# Patient Record
Sex: MALE | Race: WHITE | NOT HISPANIC OR LATINO | Employment: OTHER | URBAN - METROPOLITAN AREA
[De-identification: names, ages, dates, MRNs, and addresses within clinical notes are randomized per-mention and may not be internally consistent; named-entity substitution may affect disease eponyms.]

---

## 2017-01-07 DIAGNOSIS — I25.10 CORONARY ARTERY DISEASE INVOLVING NATIVE CORONARY ARTERY OF NATIVE HEART WITHOUT ANGINA PECTORIS: ICD-10-CM

## 2017-01-07 DIAGNOSIS — Z95.1 S/P CABG (CORONARY ARTERY BYPASS GRAFT): ICD-10-CM

## 2017-01-07 RX ORDER — METOPROLOL SUCCINATE 50 MG/1
TABLET, EXTENDED RELEASE ORAL
Qty: 30 TABLET | Refills: 6 | Status: SHIPPED | OUTPATIENT
Start: 2017-01-07 | End: 2017-08-05 | Stop reason: SDUPTHER

## 2017-05-22 ENCOUNTER — OFFICE VISIT (OUTPATIENT)
Dept: INTERNAL MEDICINE | Facility: CLINIC | Age: 67
End: 2017-05-22
Payer: MEDICARE

## 2017-05-22 ENCOUNTER — LAB VISIT (OUTPATIENT)
Dept: LAB | Facility: HOSPITAL | Age: 67
End: 2017-05-22
Attending: INTERNAL MEDICINE
Payer: MEDICARE

## 2017-05-22 VITALS
SYSTOLIC BLOOD PRESSURE: 126 MMHG | DIASTOLIC BLOOD PRESSURE: 84 MMHG | BODY MASS INDEX: 35.53 KG/M2 | WEIGHT: 239.88 LBS | HEART RATE: 57 BPM | HEIGHT: 69 IN | TEMPERATURE: 96 F | OXYGEN SATURATION: 97 %

## 2017-05-22 DIAGNOSIS — I15.2 HYPERTENSION ASSOCIATED WITH DIABETES: ICD-10-CM

## 2017-05-22 DIAGNOSIS — Z87.891 HISTORY OF SMOKING: ICD-10-CM

## 2017-05-22 DIAGNOSIS — E11.9 TYPE 2 DIABETES MELLITUS WITHOUT COMPLICATION, WITHOUT LONG-TERM CURRENT USE OF INSULIN: ICD-10-CM

## 2017-05-22 DIAGNOSIS — E11.59 HYPERTENSION ASSOCIATED WITH DIABETES: ICD-10-CM

## 2017-05-22 DIAGNOSIS — I25.10 CORONARY ARTERY DISEASE INVOLVING NATIVE CORONARY ARTERY OF NATIVE HEART WITHOUT ANGINA PECTORIS: ICD-10-CM

## 2017-05-22 DIAGNOSIS — Z00.00 ROUTINE GENERAL MEDICAL EXAMINATION AT A HEALTH CARE FACILITY: ICD-10-CM

## 2017-05-22 DIAGNOSIS — E11.69 HYPERLIPIDEMIA ASSOCIATED WITH TYPE 2 DIABETES MELLITUS: ICD-10-CM

## 2017-05-22 DIAGNOSIS — I25.2 OLD MI (MYOCARDIAL INFARCTION): ICD-10-CM

## 2017-05-22 DIAGNOSIS — E78.5 HYPERLIPIDEMIA ASSOCIATED WITH TYPE 2 DIABETES MELLITUS: ICD-10-CM

## 2017-05-22 DIAGNOSIS — Z00.00 ROUTINE GENERAL MEDICAL EXAMINATION AT A HEALTH CARE FACILITY: Primary | ICD-10-CM

## 2017-05-22 DIAGNOSIS — E66.01 SEVERE OBESITY (BMI 35.0-39.9): ICD-10-CM

## 2017-05-22 LAB
25(OH)D3+25(OH)D2 SERPL-MCNC: 43 NG/ML
ALBUMIN SERPL BCP-MCNC: 3.9 G/DL
ALP SERPL-CCNC: 83 U/L
ALT SERPL W/O P-5'-P-CCNC: 32 U/L
ANION GAP SERPL CALC-SCNC: 11 MMOL/L
AST SERPL-CCNC: 34 U/L
BASOPHILS # BLD AUTO: 0.01 K/UL
BASOPHILS NFR BLD: 0.2 %
BILIRUB SERPL-MCNC: 1 MG/DL
BUN SERPL-MCNC: 14 MG/DL
CALCIUM SERPL-MCNC: 9.8 MG/DL
CHLORIDE SERPL-SCNC: 102 MMOL/L
CO2 SERPL-SCNC: 29 MMOL/L
COMPLEXED PSA SERPL-MCNC: 1.4 NG/ML
CREAT SERPL-MCNC: 1.3 MG/DL
DIFFERENTIAL METHOD: ABNORMAL
EOSINOPHIL # BLD AUTO: 0.1 K/UL
EOSINOPHIL NFR BLD: 0.9 %
ERYTHROCYTE [DISTWIDTH] IN BLOOD BY AUTOMATED COUNT: 12.7 %
EST. GFR  (AFRICAN AMERICAN): >60 ML/MIN/1.73 M^2
EST. GFR  (NON AFRICAN AMERICAN): 56.9 ML/MIN/1.73 M^2
GLUCOSE SERPL-MCNC: 136 MG/DL
HCT VFR BLD AUTO: 43.7 %
HGB BLD-MCNC: 15 G/DL
LYMPHOCYTES # BLD AUTO: 1.4 K/UL
LYMPHOCYTES NFR BLD: 20.8 %
MCH RBC QN AUTO: 31.4 PG
MCHC RBC AUTO-ENTMCNC: 34.3 %
MCV RBC AUTO: 91 FL
MONOCYTES # BLD AUTO: 0.6 K/UL
MONOCYTES NFR BLD: 9.5 %
NEUTROPHILS # BLD AUTO: 4.5 K/UL
NEUTROPHILS NFR BLD: 68.6 %
PLATELET # BLD AUTO: 162 K/UL
PMV BLD AUTO: 11.1 FL
POTASSIUM SERPL-SCNC: 4.8 MMOL/L
PROT SERPL-MCNC: 7.2 G/DL
RBC # BLD AUTO: 4.78 M/UL
SODIUM SERPL-SCNC: 142 MMOL/L
TSH SERPL DL<=0.005 MIU/L-ACNC: 2.72 UIU/ML
WBC # BLD AUTO: 6.6 K/UL

## 2017-05-22 PROCEDURE — 82306 VITAMIN D 25 HYDROXY: CPT

## 2017-05-22 PROCEDURE — 84443 ASSAY THYROID STIM HORMONE: CPT

## 2017-05-22 PROCEDURE — 99999 PR PBB SHADOW E&M-EST. PATIENT-LVL III: CPT | Mod: PBBFAC,,, | Performed by: INTERNAL MEDICINE

## 2017-05-22 PROCEDURE — 80053 COMPREHEN METABOLIC PANEL: CPT

## 2017-05-22 PROCEDURE — 85025 COMPLETE CBC W/AUTO DIFF WBC: CPT

## 2017-05-22 PROCEDURE — 36415 COLL VENOUS BLD VENIPUNCTURE: CPT | Mod: PO

## 2017-05-22 PROCEDURE — 86803 HEPATITIS C AB TEST: CPT

## 2017-05-22 PROCEDURE — 99499 UNLISTED E&M SERVICE: CPT | Mod: S$PBB,,, | Performed by: INTERNAL MEDICINE

## 2017-05-22 PROCEDURE — 3079F DIAST BP 80-89 MM HG: CPT | Mod: S$GLB,,, | Performed by: INTERNAL MEDICINE

## 2017-05-22 PROCEDURE — 83036 HEMOGLOBIN GLYCOSYLATED A1C: CPT

## 2017-05-22 PROCEDURE — 3074F SYST BP LT 130 MM HG: CPT | Mod: S$GLB,,, | Performed by: INTERNAL MEDICINE

## 2017-05-22 PROCEDURE — 84153 ASSAY OF PSA TOTAL: CPT

## 2017-05-22 PROCEDURE — 99387 INIT PM E/M NEW PAT 65+ YRS: CPT | Mod: S$GLB,,, | Performed by: INTERNAL MEDICINE

## 2017-05-22 NOTE — PROGRESS NOTES
"Subjective:      Patient ID: Deniz Paulino is a 66 y.o. male.    Chief Complaint: Establish Care    65 yo with Patient Active Problem List:     CAD (coronary artery disease)     Hypertension associated with diabetes     Hyperlipidemia associated with type 2 diabetes mellitus     S/P CABG (coronary artery bypass graft)     Old MI (myocardial infarction)     Diabetes mellitus     Chest pain    Here today for annual exam.  Compliant with meds without significant side effects. Feeling in his usu state of health today. Quit tob 2003. 23 pack year h/o smoking.       Review of Systems   Constitutional: Negative for chills and fever.   HENT: Negative for ear pain and sore throat.    Respiratory: Negative for cough.    Cardiovascular: Negative for chest pain.   Gastrointestinal: Negative for abdominal pain and blood in stool.   Genitourinary: Negative for dysuria and hematuria.   Neurological: Negative for seizures and syncope.     Objective:   /84   Pulse (!) 57   Temp 96.4 °F (35.8 °C) (Tympanic)   Ht 5' 9" (1.753 m)   Wt 108.8 kg (239 lb 13.8 oz)   SpO2 97%   BMI 35.42 kg/m²     Physical Exam   Constitutional: He is oriented to person, place, and time. He appears well-developed and well-nourished. No distress.   HENT:   Head: Normocephalic and atraumatic.   Mouth/Throat: Oropharynx is clear and moist.   Eyes: EOM are normal. Pupils are equal, round, and reactive to light.   Neck: Neck supple. Carotid bruit is not present. No thyromegaly present.   Cardiovascular: Normal rate and regular rhythm.    Pulses:       Dorsalis pedis pulses are 2+ on the right side, and 2+ on the left side.   Pulmonary/Chest: Breath sounds normal. He has no wheezes. He has no rales.   Abdominal: Soft. Bowel sounds are normal. There is no tenderness.   Musculoskeletal: He exhibits no edema.   Feet:   Right Foot:   Protective Sensation: 8 sites tested. 8 sites sensed.   Skin Integrity: Negative for ulcer or blister.   Left Foot: "   Protective Sensation: 8 sites tested. 8 sites sensed.   Skin Integrity: Negative for ulcer or blister.   Lymphadenopathy:     He has no cervical adenopathy.   Neurological: He is alert and oriented to person, place, and time.   Skin: Skin is warm and dry.   Psychiatric: He has a normal mood and affect. His behavior is normal.     No visits with results within 2 Week(s) from this visit.   Latest known visit with results is:   Lab Visit on 11/08/2016   Component Date Value Ref Range Status    Cholesterol 11/08/2016 140  120 - 199 mg/dL Final    Comment: The National Cholesterol Education Program (NCEP) has set the  following guidelines (reference ranges) for Cholesterol:  Optimal.....................<200 mg/dL  Borderline High.............200-239 mg/dL  High........................> or = 240 mg/dL      Triglycerides 11/08/2016 154* 30 - 150 mg/dL Final    Comment: The National Cholesterol Education Program (NCEP) has set the  following guidelines (reference values) for triglycerides:  Normal......................<150 mg/dL  Borderline High.............150-199 mg/dL  High........................200-499 mg/dL      HDL 11/08/2016 42  40 - 75 mg/dL Final    Comment: The National Cholesterol Education Program (NCEP) has set the  following guidelines (reference values) for HDL Cholesterol:  Low...............<40 mg/dL  Optimal...........>60 mg/dL      LDL Cholesterol 11/08/2016 67.2  63.0 - 159.0 mg/dL Final    Comment: The National Cholesterol Education Program (NCEP) has set the  following guidelines (reference values) for LDL Cholesterol:  Optimal.......................<130 mg/dL  Borderline High...............130-159 mg/dL  High..........................160-189 mg/dL  Very High.....................>190 mg/dL      HDL/Chol Ratio 11/08/2016 30.0  20.0 - 50.0 % Final    Total Cholesterol/HDL Ratio 11/08/2016 3.3  2.0 - 5.0 Final    Non-HDL Cholesterol 11/08/2016 98  mg/dL Final    Comment: Risk category and  Non-HDL cholesterol goals:  Coronary heart disease (CHD)or equivalent (10-year risk of CHD >20%):  Non-HDL cholesterol goal     <130 mg/dL  Two or more CHD risk factors and 10-year risk of CHD <= 20%:  Non-HDL cholesterol goal     <160 mg/dL  0 to 1 CHD risk factor:  Non-HDL cholesterol goal     <190 mg/dL      Sodium 11/08/2016 142  136 - 145 mmol/L Final    Potassium 11/08/2016 5.0  3.5 - 5.1 mmol/L Final    Chloride 11/08/2016 103  95 - 110 mmol/L Final    CO2 11/08/2016 29  23 - 29 mmol/L Final    Glucose 11/08/2016 114* 70 - 110 mg/dL Final    BUN, Bld 11/08/2016 17  8 - 23 mg/dL Final    Creatinine 11/08/2016 1.2  0.5 - 1.4 mg/dL Final    Calcium 11/08/2016 10.1  8.7 - 10.5 mg/dL Final    Total Protein 11/08/2016 7.3  6.0 - 8.4 g/dL Final    Albumin 11/08/2016 4.0  3.5 - 5.2 g/dL Final    Total Bilirubin 11/08/2016 0.8  0.1 - 1.0 mg/dL Final    Comment: For infants and newborns, interpretation of results should be based  on gestational age, weight and in agreement with clinical  observations.  Premature Infant recommended reference ranges:  Up to 24 hours.............<8.0 mg/dL  Up to 48 hours............<12.0 mg/dL  3-5 days..................<15.0 mg/dL  6-29 days.................<15.0 mg/dL      Alkaline Phosphatase 11/08/2016 94  55 - 135 U/L Final    AST 11/08/2016 32  10 - 40 U/L Final    ALT 11/08/2016 32  10 - 44 U/L Final    Anion Gap 11/08/2016 10  8 - 16 mmol/L Final    eGFR if African American 11/08/2016 >60.0  >60 mL/min/1.73 m^2 Final    eGFR if non African American 11/08/2016 >60.0  >60 mL/min/1.73 m^2 Final    Comment: Calculation used to obtain the estimated glomerular filtration  rate (eGFR) is the CKD-EPI equation. Since race is unknown   in our information system, the eGFR values for   -American and Non--American patients are given   for each creatinine result.      Hemoglobin A1C 11/09/2016 6.7* 4.5 - 6.2 % Final    Comment: According to ADA guidelines,  hemoglobin A1C <7.0% represents  optimal control in non-pregnant diabetic patients.  Different  metrics may apply to specific populations.   Standards of Medical Care in Diabetes - 2016.  For the purpose of screening for the presence of diabetes:  <5.7%     Consistent with the absence of diabetes  5.7-6.4%  Consistent with increasing risk for diabetes   (prediabetes)  >or=6.5%  Consistent with diabetes  Currently no consensus exists for use of hemoglobin A1C  for diagnosis of diabetes for children.      Estimated Avg Glucose 11/09/2016 146* 68 - 131 mg/dL Final       Assessment:     1. Routine general medical examination at a health care facility    2. Type 2 diabetes mellitus without complication, without long-term current use of insulin    3. Hypertension associated with diabetes    4. Hyperlipidemia associated with type 2 diabetes mellitus    5. Coronary artery disease involving native coronary artery of native heart without angina pectoris    6. Old MI (myocardial infarction)    7. History of smoking    8. Severe obesity (BMI 35.0-39.9)      Plan:   Routine general medical examination at a health care facility  -     Comprehensive metabolic panel; Future; Expected date: 05/22/2017  -     CBC auto differential; Future; Expected date: 05/22/2017  -     Hepatitis C antibody; Future; Expected date: 05/22/2017  -     Hemoglobin A1c; Future; Expected date: 05/22/2017  -     TSH; Future; Expected date: 05/22/2017  -     Vitamin D; Future; Expected date: 05/22/2017  -     PSA, Screening; Future; Expected date: 05/22/2017    Type 2 diabetes mellitus without complication, without long-term current use of insulin  Controlled    continue current medications    Hypertension associated with diabetes   controlled  Continue current medications    Hyperlipidemia associated with type 2 diabetes mellitus   controlled  Continue current medications    Coronary artery disease involving native coronary artery of native heart without  angina pectoris    Old MI (myocardial infarction)    History of smoking  -      Abdominal Aorta; Future; Expected date: 05/22/2017    Severe obesity (BMI 35.0-39.9)   diet and exercise discussed     Check with your pharmacy about tetanus and shingles vaccines.    Please get last colonoscopy from [facility].    Return in about 1 year (around 5/22/2018), or if symptoms worsen or fail to improve.

## 2017-05-23 LAB
ESTIMATED AVG GLUCOSE: 146 MG/DL
HBA1C MFR BLD HPLC: 6.7 %
HCV AB SERPL QL IA: NEGATIVE

## 2017-06-02 ENCOUNTER — TELEPHONE (OUTPATIENT)
Dept: RADIOLOGY | Facility: HOSPITAL | Age: 67
End: 2017-06-02

## 2017-06-05 ENCOUNTER — HOSPITAL ENCOUNTER (OUTPATIENT)
Dept: RADIOLOGY | Facility: HOSPITAL | Age: 67
Discharge: HOME | End: 2017-06-05
Attending: INTERNAL MEDICINE
Payer: MEDICARE

## 2017-06-05 DIAGNOSIS — Z87.891 HISTORY OF SMOKING: ICD-10-CM

## 2017-06-05 PROCEDURE — 76775 US EXAM ABDO BACK WALL LIM: CPT | Mod: TC,PO

## 2017-06-05 PROCEDURE — 76775 US EXAM ABDO BACK WALL LIM: CPT | Mod: 26,,, | Performed by: RADIOLOGY

## 2017-06-16 DIAGNOSIS — Z95.1 S/P CABG (CORONARY ARTERY BYPASS GRAFT): ICD-10-CM

## 2017-06-16 DIAGNOSIS — I25.10 CORONARY ARTERY DISEASE INVOLVING NATIVE CORONARY ARTERY OF NATIVE HEART WITHOUT ANGINA PECTORIS: ICD-10-CM

## 2017-06-16 DIAGNOSIS — I10 ESSENTIAL HYPERTENSION: ICD-10-CM

## 2017-06-16 RX ORDER — HYDROCHLOROTHIAZIDE 12.5 MG/1
TABLET ORAL
Qty: 30 TABLET | Refills: 6 | Status: SHIPPED | OUTPATIENT
Start: 2017-06-16 | End: 2018-05-04 | Stop reason: SDUPTHER

## 2017-07-22 DIAGNOSIS — Z95.1 S/P CABG (CORONARY ARTERY BYPASS GRAFT): ICD-10-CM

## 2017-07-22 DIAGNOSIS — I25.10 CORONARY ARTERY DISEASE INVOLVING NATIVE CORONARY ARTERY OF NATIVE HEART WITHOUT ANGINA PECTORIS: ICD-10-CM

## 2017-07-22 DIAGNOSIS — I10 ESSENTIAL HYPERTENSION: ICD-10-CM

## 2017-07-22 RX ORDER — LISINOPRIL 20 MG/1
TABLET ORAL
Qty: 30 TABLET | Refills: 6 | Status: SHIPPED | OUTPATIENT
Start: 2017-07-22 | End: 2018-04-18 | Stop reason: SDUPTHER

## 2017-07-29 DIAGNOSIS — I10 ESSENTIAL HYPERTENSION: ICD-10-CM

## 2017-07-29 DIAGNOSIS — Z95.1 S/P CABG (CORONARY ARTERY BYPASS GRAFT): ICD-10-CM

## 2017-07-29 DIAGNOSIS — I25.10 CORONARY ARTERY DISEASE INVOLVING NATIVE CORONARY ARTERY OF NATIVE HEART WITHOUT ANGINA PECTORIS: ICD-10-CM

## 2017-07-29 RX ORDER — NISOLDIPINE 20 MG/1
TABLET, FILM COATED, EXTENDED RELEASE ORAL
Qty: 30 TABLET | Refills: 6 | Status: SHIPPED | OUTPATIENT
Start: 2017-07-29 | End: 2018-02-24 | Stop reason: SDUPTHER

## 2017-07-31 DIAGNOSIS — Z95.1 S/P CABG (CORONARY ARTERY BYPASS GRAFT): ICD-10-CM

## 2017-07-31 DIAGNOSIS — I25.10 CORONARY ARTERY DISEASE INVOLVING NATIVE CORONARY ARTERY OF NATIVE HEART WITHOUT ANGINA PECTORIS: ICD-10-CM

## 2017-07-31 DIAGNOSIS — E78.5 HYPERLIPIDEMIA, UNSPECIFIED HYPERLIPIDEMIA TYPE: ICD-10-CM

## 2017-08-01 DIAGNOSIS — Z95.1 S/P CABG (CORONARY ARTERY BYPASS GRAFT): ICD-10-CM

## 2017-08-01 DIAGNOSIS — I25.10 CORONARY ARTERY DISEASE INVOLVING NATIVE CORONARY ARTERY OF NATIVE HEART WITHOUT ANGINA PECTORIS: Primary | ICD-10-CM

## 2017-08-01 DIAGNOSIS — E78.5 HYPERLIPIDEMIA, UNSPECIFIED HYPERLIPIDEMIA TYPE: ICD-10-CM

## 2017-08-01 RX ORDER — ATORVASTATIN CALCIUM 40 MG/1
40 TABLET, FILM COATED ORAL NIGHTLY
Qty: 30 TABLET | Refills: 5 | Status: SHIPPED | OUTPATIENT
Start: 2017-08-01 | End: 2017-08-02 | Stop reason: SDUPTHER

## 2017-08-02 DIAGNOSIS — Z95.1 S/P CABG (CORONARY ARTERY BYPASS GRAFT): ICD-10-CM

## 2017-08-02 DIAGNOSIS — E78.5 HYPERLIPIDEMIA, UNSPECIFIED HYPERLIPIDEMIA TYPE: ICD-10-CM

## 2017-08-02 DIAGNOSIS — I25.10 CORONARY ARTERY DISEASE INVOLVING NATIVE CORONARY ARTERY OF NATIVE HEART WITHOUT ANGINA PECTORIS: ICD-10-CM

## 2017-08-02 DIAGNOSIS — E78.2 MIXED HYPERLIPIDEMIA: Primary | ICD-10-CM

## 2017-08-02 RX ORDER — ATORVASTATIN CALCIUM 40 MG/1
40 TABLET, FILM COATED ORAL NIGHTLY
Qty: 30 TABLET | Refills: 6 | Status: SHIPPED | OUTPATIENT
Start: 2017-08-02 | End: 2018-05-21 | Stop reason: DRUGHIGH

## 2017-08-02 NOTE — TELEPHONE ENCOUNTER
----- Message from Sadia Solano sent at 8/2/2017  2:24 PM CDT -----  Contact: [phone]  Would like to consult with nurse regarding renewal. Please call back at [phone].      Thanks,  Sadia Solano

## 2017-08-05 DIAGNOSIS — Z95.1 S/P CABG (CORONARY ARTERY BYPASS GRAFT): ICD-10-CM

## 2017-08-05 DIAGNOSIS — I25.10 CORONARY ARTERY DISEASE INVOLVING NATIVE CORONARY ARTERY OF NATIVE HEART WITHOUT ANGINA PECTORIS: ICD-10-CM

## 2017-08-06 RX ORDER — METOPROLOL SUCCINATE 50 MG/1
TABLET, EXTENDED RELEASE ORAL
Qty: 30 TABLET | Refills: 6 | Status: SHIPPED | OUTPATIENT
Start: 2017-08-06 | End: 2018-05-29 | Stop reason: SDUPTHER

## 2017-08-18 RX ORDER — METFORMIN HYDROCHLORIDE 500 MG/1
500 TABLET ORAL 2 TIMES DAILY WITH MEALS
Qty: 60 TABLET | Refills: 10 | Status: SHIPPED | OUTPATIENT
Start: 2017-08-18 | End: 2018-05-21 | Stop reason: SDUPTHER

## 2017-08-18 NOTE — TELEPHONE ENCOUNTER
Spoke with pt, notified him that refill request would be sent to Dr. Kraft for approval. Pt verbalized understanding.

## 2017-08-18 NOTE — TELEPHONE ENCOUNTER
----- Message from Blaire Jamess sent at 8/18/2017 11:38 AM CDT -----  Contact: Patient  1. What is the name of the medication you are requesting? Metformin  2. What is the dose? 500mg  3. How do you take the medication? Orally, topically, etc? orally  4. How often do you take this medication? Twice daily  5. Do you need a 30 day or 90 day supply? 30  6. How many refills are you requesting? 12  7. What is your preferred pharmacy and location of the pharmacy? [facility] on [address]  8. Who can we contact with further questions? Patient at [phone]    Patient will run out tomorrow of medication

## 2017-10-30 ENCOUNTER — LAB VISIT (OUTPATIENT)
Dept: LAB | Facility: HOSPITAL | Age: 67
End: 2017-10-30
Attending: INTERNAL MEDICINE
Payer: MEDICARE

## 2017-10-30 DIAGNOSIS — E78.2 MIXED HYPERLIPIDEMIA: ICD-10-CM

## 2017-10-30 LAB
ALBUMIN SERPL BCP-MCNC: 3.5 G/DL
ALP SERPL-CCNC: 91 U/L
ALT SERPL W/O P-5'-P-CCNC: 34 U/L
ANION GAP SERPL CALC-SCNC: 11 MMOL/L
AST SERPL-CCNC: 30 U/L
BILIRUB SERPL-MCNC: 0.9 MG/DL
BUN SERPL-MCNC: 16 MG/DL
CALCIUM SERPL-MCNC: 9.3 MG/DL
CHLORIDE SERPL-SCNC: 103 MMOL/L
CHOLEST SERPL-MCNC: 147 MG/DL
CHOLEST/HDLC SERPL: 3.3 {RATIO}
CO2 SERPL-SCNC: 25 MMOL/L
CREAT SERPL-MCNC: 1 MG/DL
EST. GFR  (AFRICAN AMERICAN): >60 ML/MIN/1.73 M^2
EST. GFR  (NON AFRICAN AMERICAN): >60 ML/MIN/1.73 M^2
GLUCOSE SERPL-MCNC: 122 MG/DL
HDLC SERPL-MCNC: 45 MG/DL
HDLC SERPL: 30.6 %
LDLC SERPL CALC-MCNC: 81.2 MG/DL
NONHDLC SERPL-MCNC: 102 MG/DL
POTASSIUM SERPL-SCNC: 3.9 MMOL/L
PROT SERPL-MCNC: 6.8 G/DL
SODIUM SERPL-SCNC: 139 MMOL/L
TRIGL SERPL-MCNC: 104 MG/DL

## 2017-10-30 PROCEDURE — 80053 COMPREHEN METABOLIC PANEL: CPT

## 2017-10-30 PROCEDURE — 36415 COLL VENOUS BLD VENIPUNCTURE: CPT

## 2017-10-30 PROCEDURE — 80061 LIPID PANEL: CPT

## 2017-11-01 DIAGNOSIS — I25.10 CORONARY ARTERY DISEASE, ANGINA PRESENCE UNSPECIFIED, UNSPECIFIED VESSEL OR LESION TYPE, UNSPECIFIED WHETHER NATIVE OR TRANSPLANTED HEART: Primary | ICD-10-CM

## 2017-11-02 ENCOUNTER — OFFICE VISIT (OUTPATIENT)
Dept: CARDIOLOGY | Facility: CLINIC | Age: 67
End: 2017-11-02
Payer: MEDICARE

## 2017-11-02 ENCOUNTER — CLINICAL SUPPORT (OUTPATIENT)
Dept: CARDIOLOGY | Facility: CLINIC | Age: 67
End: 2017-11-02
Payer: MEDICARE

## 2017-11-02 VITALS
HEART RATE: 60 BPM | BODY MASS INDEX: 35.53 KG/M2 | SYSTOLIC BLOOD PRESSURE: 128 MMHG | WEIGHT: 239.88 LBS | DIASTOLIC BLOOD PRESSURE: 78 MMHG | HEIGHT: 69 IN

## 2017-11-02 DIAGNOSIS — I15.2 HYPERTENSION ASSOCIATED WITH DIABETES: ICD-10-CM

## 2017-11-02 DIAGNOSIS — E11.59 HYPERTENSION ASSOCIATED WITH DIABETES: ICD-10-CM

## 2017-11-02 DIAGNOSIS — I25.2 OLD MI (MYOCARDIAL INFARCTION): ICD-10-CM

## 2017-11-02 DIAGNOSIS — Z95.1 S/P CABG (CORONARY ARTERY BYPASS GRAFT): ICD-10-CM

## 2017-11-02 DIAGNOSIS — E11.9 TYPE 2 DIABETES MELLITUS WITHOUT COMPLICATION, WITHOUT LONG-TERM CURRENT USE OF INSULIN: ICD-10-CM

## 2017-11-02 DIAGNOSIS — I25.10 CORONARY ARTERY DISEASE INVOLVING NATIVE CORONARY ARTERY OF NATIVE HEART WITHOUT ANGINA PECTORIS: Primary | ICD-10-CM

## 2017-11-02 DIAGNOSIS — E11.69 HYPERLIPIDEMIA ASSOCIATED WITH TYPE 2 DIABETES MELLITUS: ICD-10-CM

## 2017-11-02 DIAGNOSIS — E78.5 HYPERLIPIDEMIA ASSOCIATED WITH TYPE 2 DIABETES MELLITUS: ICD-10-CM

## 2017-11-02 DIAGNOSIS — I25.10 CORONARY ARTERY DISEASE, ANGINA PRESENCE UNSPECIFIED, UNSPECIFIED VESSEL OR LESION TYPE, UNSPECIFIED WHETHER NATIVE OR TRANSPLANTED HEART: ICD-10-CM

## 2017-11-02 PROCEDURE — 99499 UNLISTED E&M SERVICE: CPT | Mod: S$PBB,,, | Performed by: INTERNAL MEDICINE

## 2017-11-02 PROCEDURE — 99214 OFFICE O/P EST MOD 30 MIN: CPT | Mod: S$GLB,,, | Performed by: INTERNAL MEDICINE

## 2017-11-02 PROCEDURE — 99999 PR PBB SHADOW E&M-EST. PATIENT-LVL III: CPT | Mod: PBBFAC,,, | Performed by: INTERNAL MEDICINE

## 2017-11-02 PROCEDURE — 93000 ELECTROCARDIOGRAM COMPLETE: CPT | Mod: S$GLB,,, | Performed by: INTERNAL MEDICINE

## 2017-11-02 RX ORDER — LANOLIN ALCOHOL/MO/W.PET/CERES
1000 CREAM (GRAM) TOPICAL DAILY
COMMUNITY

## 2017-11-02 NOTE — PROGRESS NOTES
Subjective:   Patient ID:  Deniz Paulino is a 66 y.o. male who presents for follow up of Coronary Artery Disease (Patient presents to clinic today for annual Cardiology follow up. ); Hyperlipidemia; and Hypertension      HPI  A 67 yo male with h/o cad s/p cabg diabetes htn hlp is here for f/u cad. He has done well clinically ahs no angina no chest pain no shortness of breath he has not been compliant with diet no exercise due to muscle spasm from statins. He dropped atorvastatin to 40 mg po and has less spasm howver he has nocturnal muscle spasm relieved with some  Muscle relaxant . However he is not optimized on his ldl. His diabetes  May be imprioved a little. Has not lost weight . Takes meds regularily.has minimal snoring works nite he gets daytime sleepiness. No other stigmata for sleep apnea.  Past Medical History:   Diagnosis Date    Acute coronary syndrome     Coronary artery disease     Hyperlipidemia     Hypertension        Past Surgical History:   Procedure Laterality Date    CARDIAC CATHETERIZATION      CORONARY ARTERY BYPASS GRAFT         Social History   Substance Use Topics    Smoking status: Former Smoker     Packs/day: 1.00     Years: 30.00     Types: Cigarettes     Quit date: 2/1/2003    Smokeless tobacco: Never Used    Alcohol use Yes      Comment: socially       Family History   Problem Relation Age of Onset    Diabetes Mother     Heart murmur Mother     Cancer Mother      Breast    Stroke Mother     Alcohol abuse Father     Heart attack Father 63    Cancer Father      Esophageal       Current Outpatient Prescriptions   Medication Sig    aspirin (ECOTRIN) 325 MG EC tablet Take 325 mg by mouth once daily.    atorvastatin (LIPITOR) 40 MG tablet Take 1 tablet (40 mg total) by mouth every evening.    cyanocobalamin (VITAMIN B-12) 1000 MCG tablet Take 100 mcg by mouth once daily.    esomeprazole (NEXIUM) 20 MG capsule Take 20 mg by mouth before breakfast.    hydrochlorothiazide  (HYDRODIURIL) 12.5 MG Tab TAKE ONE TABLET BY MOUTH ONCE DAILY    lisinopril (PRINIVIL,ZESTRIL) 20 MG tablet TAKE ONE TABLET BY MOUTH ONCE DAILY    LORATADINE (ALAVERT ORAL) Take 1 tablet by mouth as needed.    metformin (GLUCOPHAGE) 500 MG tablet Take 1 tablet (500 mg total) by mouth 2 (two) times daily with meals.    metoprolol succinate (TOPROL-XL) 50 MG 24 hr tablet TAKE ONE TABLET BY MOUTH ONCE DAILY    nisoldipine (SULAR) 20 MG Tb24 TAKE ONE TABLET BY MOUTH ONCE DAILY    vitamin D 1000 units Tab Take 2,000 Units by mouth once daily.     No current facility-administered medications for this visit.      Current Outpatient Prescriptions on File Prior to Visit   Medication Sig    aspirin (ECOTRIN) 325 MG EC tablet Take 325 mg by mouth once daily.    atorvastatin (LIPITOR) 40 MG tablet Take 1 tablet (40 mg total) by mouth every evening.    esomeprazole (NEXIUM) 20 MG capsule Take 20 mg by mouth before breakfast.    hydrochlorothiazide (HYDRODIURIL) 12.5 MG Tab TAKE ONE TABLET BY MOUTH ONCE DAILY    lisinopril (PRINIVIL,ZESTRIL) 20 MG tablet TAKE ONE TABLET BY MOUTH ONCE DAILY    LORATADINE (ALAVERT ORAL) Take 1 tablet by mouth as needed.    metformin (GLUCOPHAGE) 500 MG tablet Take 1 tablet (500 mg total) by mouth 2 (two) times daily with meals.    metoprolol succinate (TOPROL-XL) 50 MG 24 hr tablet TAKE ONE TABLET BY MOUTH ONCE DAILY    nisoldipine (SULAR) 20 MG Tb24 TAKE ONE TABLET BY MOUTH ONCE DAILY    vitamin D 1000 units Tab Take 2,000 Units by mouth once daily.     No current facility-administered medications on file prior to visit.      Review of patient's allergies indicates:  No Known Allergies  Review of Systems   Constitution: Positive for malaise/fatigue. Negative for weakness.   Eyes: Negative for blurred vision.   Cardiovascular: Negative for chest pain, claudication, cyanosis, dyspnea on exertion, irregular heartbeat, leg swelling, near-syncope, orthopnea, palpitations and paroxysmal  nocturnal dyspnea.   Respiratory: Positive for snoring (minimal). Negative for cough, hemoptysis and shortness of breath.    Hematologic/Lymphatic: Negative for bleeding problem. Does not bruise/bleed easily.   Skin: Negative for dry skin and itching.   Musculoskeletal: Positive for muscle cramps and myalgias. Negative for falls and muscle weakness.   Gastrointestinal: Negative for abdominal pain, diarrhea, heartburn, hematemesis, hematochezia and melena.   Genitourinary: Negative for flank pain and hematuria.   Neurological: Positive for excessive daytime sleepiness. Negative for dizziness, focal weakness, headaches, light-headedness, numbness, paresthesias and seizures.   Psychiatric/Behavioral: Negative for altered mental status and memory loss. The patient is not nervous/anxious.    Allergic/Immunologic: Negative for hives.       Objective:   Physical Exam   Constitutional: He is oriented to person, place, and time. He appears well-developed and well-nourished. No distress.   HENT:   Head: Normocephalic and atraumatic.   Eyes: EOM are normal. Pupils are equal, round, and reactive to light. Right eye exhibits no discharge. Left eye exhibits no discharge.   Neck: Neck supple. No JVD present. No thyromegaly present.   Cardiovascular: Normal rate, regular rhythm, normal heart sounds and intact distal pulses.  Exam reveals no gallop and no friction rub.    No murmur heard.  Pulmonary/Chest: Effort normal and breath sounds normal. No respiratory distress. He has no wheezes. He has no rales. He exhibits no tenderness.   Chest wall incision well healed.   Abdominal: Soft. Bowel sounds are normal. He exhibits no distension. There is no tenderness.   Obese  abdomen   Musculoskeletal: Normal range of motion. He exhibits no edema.   Neurological: He is alert and oriented to person, place, and time. No cranial nerve deficit.   Skin: Skin is warm and dry. No rash noted. He is not diaphoretic. No erythema.   Psychiatric: He  "has a normal mood and affect. His behavior is normal.   Nursing note and vitals reviewed.    Vitals:    11/02/17 1004   BP: 128/78   Pulse: 60   Weight: 108.8 kg (239 lb 13.8 oz)   Height: 5' 9" (1.753 m)     Lab Results   Component Value Date    CHOL 147 10/30/2017    CHOL 140 11/08/2016    CHOL 129 10/27/2014     Lab Results   Component Value Date    HDL 45 10/30/2017    HDL 42 11/08/2016    HDL 52 10/27/2014     Lab Results   Component Value Date    LDLCALC 81.2 10/30/2017    LDLCALC 67.2 11/08/2016    LDLCALC 59.4 (L) 10/27/2014     Lab Results   Component Value Date    TRIG 104 10/30/2017    TRIG 154 (H) 11/08/2016    TRIG 88 10/27/2014     Lab Results   Component Value Date    CHOLHDL 30.6 10/30/2017    CHOLHDL 30.0 11/08/2016    CHOLHDL 40.3 10/27/2014       Chemistry        Component Value Date/Time     10/30/2017 0846    K 3.9 10/30/2017 0846     10/30/2017 0846    CO2 25 10/30/2017 0846    BUN 16 10/30/2017 0846    CREATININE 1.0 10/30/2017 0846     (H) 10/30/2017 0846        Component Value Date/Time    CALCIUM 9.3 10/30/2017 0846    ALKPHOS 91 10/30/2017 0846    AST 30 10/30/2017 0846    ALT 34 10/30/2017 0846    BILITOT 0.9 10/30/2017 0846    ESTGFRAFRICA >60.0 10/30/2017 0846    EGFRNONAA >60.0 10/30/2017 0846        Lab Results   Component Value Date    HGBA1C 6.7 (H) 05/22/2017       Lab Results   Component Value Date    TSH 2.721 05/22/2017     Lab Results   Component Value Date    INR 1.0 09/19/2009     Lab Results   Component Value Date    WBC 6.60 05/22/2017    HGB 15.0 05/22/2017    HCT 43.7 05/22/2017    MCV 91 05/22/2017     05/22/2017     BMP  Sodium   Date Value Ref Range Status   10/30/2017 139 136 - 145 mmol/L Final     Potassium   Date Value Ref Range Status   10/30/2017 3.9 3.5 - 5.1 mmol/L Final     Chloride   Date Value Ref Range Status   10/30/2017 103 95 - 110 mmol/L Final     CO2   Date Value Ref Range Status   10/30/2017 25 23 - 29 mmol/L Final     BUN, " Bld   Date Value Ref Range Status   10/30/2017 16 8 - 23 mg/dL Final     Creatinine   Date Value Ref Range Status   10/30/2017 1.0 0.5 - 1.4 mg/dL Final     Calcium   Date Value Ref Range Status   10/30/2017 9.3 8.7 - 10.5 mg/dL Final     Anion Gap   Date Value Ref Range Status   10/30/2017 11 8 - 16 mmol/L Final     eGFR if    Date Value Ref Range Status   10/30/2017 >60.0 >60 mL/min/1.73 m^2 Final     eGFR if non    Date Value Ref Range Status   10/30/2017 >60.0 >60 mL/min/1.73 m^2 Final     Comment:     Calculation used to obtain the estimated glomerular filtration  rate (eGFR) is the CKD-EPI equation. Since race is unknown   in our information system, the eGFR values for   -American and Non--American patients are given   for each creatinine result.       Estimated Creatinine Clearance: 88.3 mL/min (based on SCr of 1 mg/dL).    Assessment:     1. Coronary artery disease involving native coronary artery of native heart without angina pectoris    2. Hypertension associated with diabetes    3. Hyperlipidemia associated with type 2 diabetes mellitus    4. S/P CABG (coronary artery bypass graft)    5. Old MI (myocardial infarction)    6. Type 2 diabetes mellitus without complication, without long-term current use of insulin      Stable clinically. Has no angina no shortness of breath. He needs aggressive risk factor modification diet and exercise as well as weight loss and diabetes control. He has cramps as side effects from statins I will keep atorvastatin at this dose and aggressively work on risk factors stay hydrated and use coenzyme q10 300 mg po daily.has no muscle weakness.  Plan:   Coenzyme q10 300 mg po daily  Continue current therapy  Cardiac low salt diet.  Risk factor modification and excercise program.  F/u in 6 months with lipid cmp a1c

## 2017-12-22 ENCOUNTER — OFFICE VISIT (OUTPATIENT)
Dept: INTERNAL MEDICINE | Facility: CLINIC | Age: 67
End: 2017-12-22
Payer: MEDICARE

## 2017-12-22 VITALS
OXYGEN SATURATION: 98 % | WEIGHT: 231.5 LBS | SYSTOLIC BLOOD PRESSURE: 124 MMHG | HEART RATE: 58 BPM | BODY MASS INDEX: 34.29 KG/M2 | DIASTOLIC BLOOD PRESSURE: 76 MMHG | TEMPERATURE: 97 F | HEIGHT: 69 IN

## 2017-12-22 DIAGNOSIS — E11.59 HYPERTENSION ASSOCIATED WITH DIABETES: ICD-10-CM

## 2017-12-22 DIAGNOSIS — I15.2 HYPERTENSION ASSOCIATED WITH DIABETES: ICD-10-CM

## 2017-12-22 DIAGNOSIS — R10.9 FLANK PAIN: ICD-10-CM

## 2017-12-22 DIAGNOSIS — M62.838 MUSCLE SPASM: ICD-10-CM

## 2017-12-22 DIAGNOSIS — M79.18 MUSCULOSKELETAL PAIN: Primary | ICD-10-CM

## 2017-12-22 PROCEDURE — 99499 UNLISTED E&M SERVICE: CPT | Mod: S$PBB,,, | Performed by: INTERNAL MEDICINE

## 2017-12-22 PROCEDURE — 90662 IIV NO PRSV INCREASED AG IM: CPT | Mod: S$GLB,,, | Performed by: INTERNAL MEDICINE

## 2017-12-22 PROCEDURE — 99999 PR PBB SHADOW E&M-EST. PATIENT-LVL IV: CPT | Mod: PBBFAC,,, | Performed by: INTERNAL MEDICINE

## 2017-12-22 PROCEDURE — 99213 OFFICE O/P EST LOW 20 MIN: CPT | Mod: S$GLB,,, | Performed by: INTERNAL MEDICINE

## 2017-12-22 PROCEDURE — G0008 ADMIN INFLUENZA VIRUS VAC: HCPCS | Mod: S$GLB,,, | Performed by: INTERNAL MEDICINE

## 2017-12-22 RX ORDER — EPINEPHRINE 0.22MG
100 AEROSOL WITH ADAPTER (ML) INHALATION DAILY
COMMUNITY
End: 2018-05-21 | Stop reason: DRUGHIGH

## 2017-12-22 RX ORDER — METHOCARBAMOL 500 MG/1
500 TABLET, FILM COATED ORAL 4 TIMES DAILY
Qty: 40 TABLET | Refills: 0 | Status: SHIPPED | OUTPATIENT
Start: 2017-12-22 | End: 2018-01-02 | Stop reason: SDUPTHER

## 2017-12-22 RX ORDER — NAPROXEN 500 MG/1
500 TABLET ORAL 2 TIMES DAILY WITH MEALS
Qty: 30 TABLET | Refills: 0 | Status: SHIPPED | OUTPATIENT
Start: 2017-12-22 | End: 2018-08-02

## 2017-12-22 NOTE — PROGRESS NOTES
Subjective:      Patient ID: Deniz Paulino is a 67 y.o. male.    Chief Complaint: Flank Pain (left side)    66 yo with Patient Active Problem List:     CAD (coronary artery disease)     Hypertension associated with diabetes     Hyperlipidemia associated with type 2 diabetes mellitus     S/P CABG (coronary artery bypass graft)     Old MI (myocardial infarction)     Diabetes mellitus     Chest pain    Here today planning of left flank pain that is similar to previous muscle spasm pain improved with methocarbamol.      Flank Pain   This is a new problem. The current episode started in the past 7 days. The problem occurs intermittently. The problem is unchanged. Pain location: left flank. The quality of the pain is described as aching and cramping. Radiates to: left hip and left abd. The pain is moderate. The pain is the same all the time. The symptoms are aggravated by bending and twisting. Stiffness is present all day. Pertinent negatives include no abdominal pain, bladder incontinence, bowel incontinence, chest pain, headaches, leg pain, numbness, paresthesias, pelvic pain, tingling or weakness. Risk factors include obesity. Treatments tried: otc meds. The treatment provided mild relief.     Review of Systems   Constitutional: Negative for activity change and unexpected weight change.   HENT: Negative for hearing loss, rhinorrhea and trouble swallowing.    Eyes: Negative for discharge and visual disturbance.   Respiratory: Negative for chest tightness and wheezing.    Cardiovascular: Negative for chest pain and palpitations.   Gastrointestinal: Negative for abdominal pain, blood in stool, bowel incontinence, constipation, diarrhea and vomiting.   Endocrine: Negative for polydipsia and polyuria.   Genitourinary: Positive for flank pain. Negative for bladder incontinence, difficulty urinating, hematuria, pelvic pain and urgency.   Musculoskeletal: Negative for arthralgias, joint swelling and neck pain.  "  Neurological: Negative for tingling, weakness, numbness, headaches and paresthesias.   Psychiatric/Behavioral: Negative for confusion and dysphoric mood.     Objective:   /76 (BP Location: Right arm, Patient Position: Sitting)   Pulse (!) 58   Temp 96.8 °F (36 °C) (Tympanic)   Ht 5' 9" (1.753 m)   Wt 105 kg (231 lb 7.7 oz)   SpO2 98%   BMI 34.18 kg/m²     Physical Exam   Constitutional: He is oriented to person, place, and time. He appears well-developed and well-nourished. No distress.   Eyes: Conjunctivae and EOM are normal.   Cardiovascular: Normal rate and regular rhythm.    Pulmonary/Chest: Breath sounds normal. He has no wheezes. He has no rales.   Abdominal: Soft. Bowel sounds are normal. There is no tenderness.   Musculoskeletal: He exhibits no edema.        Lumbar back: He exhibits normal range of motion, no tenderness, no bony tenderness and no swelling.   Neurological: He is alert and oriented to person, place, and time. He displays normal reflexes. He exhibits normal muscle tone. Coordination normal.   Skin: Skin is warm and dry.   Psychiatric: He has a normal mood and affect. His behavior is normal.       Assessment:     1. Musculoskeletal pain    2. Flank pain    3. Muscle spasm    4. Hypertension associated with diabetes      Plan:   Musculoskeletal pain  -     naproxen (NAPROSYN) 500 MG tablet; Take 1 tablet (500 mg total) by mouth 2 (two) times daily with meals. For pain and inflammation  Dispense: 30 tablet; Refill: 0    Flank pain  -     methocarbamol (ROBAXIN) 500 MG Tab; Take 1 tablet (500 mg total) by mouth 4 (four) times daily. Prn sleep  Dispense: 40 tablet; Refill: 0    Muscle spasm  -     methocarbamol (ROBAXIN) 500 MG Tab; Take 1 tablet (500 mg total) by mouth 4 (four) times daily. Prn sleep  Dispense: 40 tablet; Refill: 0  -     naproxen (NAPROSYN) 500 MG tablet; Take 1 tablet (500 mg total) by mouth 2 (two) times daily with meals. For pain and inflammation  Dispense: 30 " tablet; Refill: 0    Hypertension associated with diabetes    Other orders  -     Influenza - High Dose (65+) (PF) (IM)    Tylenol 500-1000 mg every 8 hours prn pain.  Pt declined ua      Lab Frequency Next Occurrence   Comprehensive metabolic panel Once 05/04/2018   Lipid panel Once 05/04/2018   Hemoglobin A1c Once 05/01/2018       Problem List Items Addressed This Visit        Cardiac/Vascular    Hypertension associated with diabetes      Other Visit Diagnoses     Musculoskeletal pain    -  Primary    Relevant Medications    naproxen (NAPROSYN) 500 MG tablet    Flank pain        Relevant Medications    methocarbamol (ROBAXIN) 500 MG Tab    Muscle spasm        Relevant Medications    methocarbamol (ROBAXIN) 500 MG Tab    naproxen (NAPROSYN) 500 MG tablet          Return if symptoms worsen or fail to improve.

## 2018-01-02 DIAGNOSIS — M62.838 MUSCLE SPASM: ICD-10-CM

## 2018-01-02 DIAGNOSIS — R10.9 FLANK PAIN: ICD-10-CM

## 2018-01-02 RX ORDER — METHOCARBAMOL 500 MG/1
500 TABLET, FILM COATED ORAL 4 TIMES DAILY
Qty: 40 TABLET | Refills: 0 | Status: SHIPPED | OUTPATIENT
Start: 2018-01-02 | End: 2018-08-21 | Stop reason: SDUPTHER

## 2018-01-02 NOTE — TELEPHONE ENCOUNTER
----- Message from Nicolle Chin sent at 1/2/2018 11:57 AM CST -----  Contact: pt  1. What is the name of the medication you are requesting? Methocharbamol   2. What is the dose? 500mg  3. How do you take the medication? Orally, topically, etc? Orally  4. How often do you take this medication? 4 times a day   5. Do you need a 30 day or 90 day supply? .  6. How many refills are you requesting? .  7. What is your preferred pharmacy and location of the pharmacy? [facility] on [address] close to [facility]  8. Who can we contact with further questions? [phone]    Pt had a half a bottle but left it out of town.

## 2018-02-24 DIAGNOSIS — Z95.1 S/P CABG (CORONARY ARTERY BYPASS GRAFT): ICD-10-CM

## 2018-02-24 DIAGNOSIS — I10 ESSENTIAL HYPERTENSION: ICD-10-CM

## 2018-02-24 DIAGNOSIS — I25.10 CORONARY ARTERY DISEASE INVOLVING NATIVE CORONARY ARTERY OF NATIVE HEART WITHOUT ANGINA PECTORIS: ICD-10-CM

## 2018-02-24 RX ORDER — NISOLDIPINE 20 MG/1
TABLET, FILM COATED, EXTENDED RELEASE ORAL
Qty: 30 TABLET | Refills: 6 | Status: SHIPPED | OUTPATIENT
Start: 2018-02-24 | End: 2018-03-26 | Stop reason: RX

## 2018-03-23 NOTE — TELEPHONE ENCOUNTER
----- Message from Susana Jackson sent at 3/23/2018  1:22 PM CDT -----  Contact: [facility]  Please call pharmacy @ [phone] regarding surlar 20mg, states the medication is on back order.

## 2018-03-26 RX ORDER — AMLODIPINE BESYLATE 5 MG/1
5 TABLET ORAL DAILY
Qty: 30 TABLET | Refills: 11 | Status: SHIPPED | OUTPATIENT
Start: 2018-03-26 | End: 2019-03-16 | Stop reason: SDUPTHER

## 2018-04-18 DIAGNOSIS — I10 ESSENTIAL HYPERTENSION: ICD-10-CM

## 2018-04-18 DIAGNOSIS — I25.10 CORONARY ARTERY DISEASE INVOLVING NATIVE CORONARY ARTERY OF NATIVE HEART WITHOUT ANGINA PECTORIS: ICD-10-CM

## 2018-04-18 DIAGNOSIS — Z95.1 S/P CABG (CORONARY ARTERY BYPASS GRAFT): ICD-10-CM

## 2018-04-18 RX ORDER — LISINOPRIL 20 MG/1
20 TABLET ORAL DAILY
Qty: 30 TABLET | Refills: 6 | Status: SHIPPED | OUTPATIENT
Start: 2018-04-18 | End: 2018-05-29 | Stop reason: SDUPTHER

## 2018-05-04 DIAGNOSIS — I10 ESSENTIAL HYPERTENSION: ICD-10-CM

## 2018-05-04 DIAGNOSIS — Z95.1 S/P CABG (CORONARY ARTERY BYPASS GRAFT): ICD-10-CM

## 2018-05-04 DIAGNOSIS — I25.10 CORONARY ARTERY DISEASE INVOLVING NATIVE CORONARY ARTERY OF NATIVE HEART WITHOUT ANGINA PECTORIS: ICD-10-CM

## 2018-05-04 RX ORDER — HYDROCHLOROTHIAZIDE 12.5 MG/1
TABLET ORAL
Qty: 30 TABLET | Refills: 6 | Status: SHIPPED | OUTPATIENT
Start: 2018-05-04 | End: 2018-12-07 | Stop reason: SDUPTHER

## 2018-05-18 ENCOUNTER — LAB VISIT (OUTPATIENT)
Dept: LAB | Facility: HOSPITAL | Age: 68
End: 2018-05-18
Attending: INTERNAL MEDICINE
Payer: MEDICARE

## 2018-05-18 DIAGNOSIS — E78.5 HYPERLIPIDEMIA ASSOCIATED WITH TYPE 2 DIABETES MELLITUS: ICD-10-CM

## 2018-05-18 DIAGNOSIS — E11.9 TYPE 2 DIABETES MELLITUS WITHOUT COMPLICATION, WITHOUT LONG-TERM CURRENT USE OF INSULIN: ICD-10-CM

## 2018-05-18 DIAGNOSIS — E11.69 HYPERLIPIDEMIA ASSOCIATED WITH TYPE 2 DIABETES MELLITUS: ICD-10-CM

## 2018-05-18 LAB
ALBUMIN SERPL BCP-MCNC: 3.8 G/DL
ALP SERPL-CCNC: 88 U/L
ALT SERPL W/O P-5'-P-CCNC: 36 U/L
ANION GAP SERPL CALC-SCNC: 9 MMOL/L
AST SERPL-CCNC: 37 U/L
BILIRUB SERPL-MCNC: 1 MG/DL
BUN SERPL-MCNC: 14 MG/DL
CALCIUM SERPL-MCNC: 9.6 MG/DL
CHLORIDE SERPL-SCNC: 100 MMOL/L
CHOLEST SERPL-MCNC: 144 MG/DL
CHOLEST/HDLC SERPL: 2.7 {RATIO}
CO2 SERPL-SCNC: 28 MMOL/L
CREAT SERPL-MCNC: 1 MG/DL
EST. GFR  (AFRICAN AMERICAN): >60 ML/MIN/1.73 M^2
EST. GFR  (NON AFRICAN AMERICAN): >60 ML/MIN/1.73 M^2
ESTIMATED AVG GLUCOSE: 128 MG/DL
GLUCOSE SERPL-MCNC: 116 MG/DL
HBA1C MFR BLD HPLC: 6.1 %
HDLC SERPL-MCNC: 54 MG/DL
HDLC SERPL: 37.5 %
LDLC SERPL CALC-MCNC: 66.2 MG/DL
NONHDLC SERPL-MCNC: 90 MG/DL
POTASSIUM SERPL-SCNC: 3.7 MMOL/L
PROT SERPL-MCNC: 6.6 G/DL
SODIUM SERPL-SCNC: 137 MMOL/L
TRIGL SERPL-MCNC: 119 MG/DL

## 2018-05-18 PROCEDURE — 80061 LIPID PANEL: CPT

## 2018-05-18 PROCEDURE — 83036 HEMOGLOBIN GLYCOSYLATED A1C: CPT

## 2018-05-18 PROCEDURE — 36415 COLL VENOUS BLD VENIPUNCTURE: CPT

## 2018-05-18 PROCEDURE — 80053 COMPREHEN METABOLIC PANEL: CPT

## 2018-05-21 ENCOUNTER — PATIENT MESSAGE (OUTPATIENT)
Dept: INTERNAL MEDICINE | Facility: CLINIC | Age: 68
End: 2018-05-21

## 2018-05-21 ENCOUNTER — HOSPITAL ENCOUNTER (OUTPATIENT)
Dept: RADIOLOGY | Facility: HOSPITAL | Age: 68
Discharge: HOME | End: 2018-05-21
Attending: INTERNAL MEDICINE
Payer: MEDICARE

## 2018-05-21 ENCOUNTER — OFFICE VISIT (OUTPATIENT)
Dept: INTERNAL MEDICINE | Facility: CLINIC | Age: 68
End: 2018-05-21
Payer: MEDICARE

## 2018-05-21 ENCOUNTER — PATIENT MESSAGE (OUTPATIENT)
Dept: RHEUMATOLOGY | Facility: CLINIC | Age: 68
End: 2018-05-21

## 2018-05-21 ENCOUNTER — PATIENT OUTREACH (OUTPATIENT)
Dept: ADMINISTRATIVE | Facility: HOSPITAL | Age: 68
End: 2018-05-21

## 2018-05-21 VITALS
HEIGHT: 69 IN | WEIGHT: 238.75 LBS | BODY MASS INDEX: 35.36 KG/M2 | TEMPERATURE: 97 F | SYSTOLIC BLOOD PRESSURE: 138 MMHG | HEART RATE: 59 BPM | OXYGEN SATURATION: 99 % | DIASTOLIC BLOOD PRESSURE: 84 MMHG

## 2018-05-21 DIAGNOSIS — Z12.5 ENCOUNTER FOR SCREENING FOR MALIGNANT NEOPLASM OF PROSTATE: ICD-10-CM

## 2018-05-21 DIAGNOSIS — E11.9 TYPE 2 DIABETES MELLITUS WITHOUT COMPLICATION, WITHOUT LONG-TERM CURRENT USE OF INSULIN: ICD-10-CM

## 2018-05-21 DIAGNOSIS — Z95.1 S/P CABG (CORONARY ARTERY BYPASS GRAFT): ICD-10-CM

## 2018-05-21 DIAGNOSIS — Z00.00 ROUTINE GENERAL MEDICAL EXAMINATION AT A HEALTH CARE FACILITY: Primary | ICD-10-CM

## 2018-05-21 DIAGNOSIS — E78.5 HYPERLIPIDEMIA ASSOCIATED WITH TYPE 2 DIABETES MELLITUS: ICD-10-CM

## 2018-05-21 DIAGNOSIS — E11.69 HYPERLIPIDEMIA ASSOCIATED WITH TYPE 2 DIABETES MELLITUS: ICD-10-CM

## 2018-05-21 DIAGNOSIS — M79.671 PAIN OF RIGHT HEEL: ICD-10-CM

## 2018-05-21 DIAGNOSIS — I15.2 HYPERTENSION ASSOCIATED WITH DIABETES: ICD-10-CM

## 2018-05-21 DIAGNOSIS — E11.59 HYPERTENSION ASSOCIATED WITH DIABETES: ICD-10-CM

## 2018-05-21 DIAGNOSIS — Z23 NEED FOR PNEUMOCOCCAL VACCINATION: ICD-10-CM

## 2018-05-21 DIAGNOSIS — I25.10 CORONARY ARTERY DISEASE INVOLVING NATIVE CORONARY ARTERY OF NATIVE HEART WITHOUT ANGINA PECTORIS: ICD-10-CM

## 2018-05-21 DIAGNOSIS — I25.2 OLD MI (MYOCARDIAL INFARCTION): ICD-10-CM

## 2018-05-21 PROCEDURE — 99213 OFFICE O/P EST LOW 20 MIN: CPT | Mod: S$GLB,,, | Performed by: INTERNAL MEDICINE

## 2018-05-21 PROCEDURE — 73650 X-RAY EXAM OF HEEL: CPT | Mod: 26,RT,, | Performed by: RADIOLOGY

## 2018-05-21 PROCEDURE — 3044F HG A1C LEVEL LT 7.0%: CPT | Mod: CPTII,S$GLB,, | Performed by: INTERNAL MEDICINE

## 2018-05-21 PROCEDURE — 3075F SYST BP GE 130 - 139MM HG: CPT | Mod: CPTII,S$GLB,, | Performed by: INTERNAL MEDICINE

## 2018-05-21 PROCEDURE — 99999 PR PBB SHADOW E&M-EST. PATIENT-LVL III: CPT | Mod: PBBFAC,,, | Performed by: INTERNAL MEDICINE

## 2018-05-21 PROCEDURE — 3079F DIAST BP 80-89 MM HG: CPT | Mod: CPTII,S$GLB,, | Performed by: INTERNAL MEDICINE

## 2018-05-21 PROCEDURE — 73650 X-RAY EXAM OF HEEL: CPT | Mod: TC,FY,PO,RT

## 2018-05-21 RX ORDER — CALCIPOTRIENE 50 UG/G
CREAM TOPICAL 2 TIMES DAILY PRN
COMMUNITY
Start: 2018-04-25 | End: 2020-11-17

## 2018-05-21 RX ORDER — ATORVASTATIN CALCIUM 80 MG/1
40 TABLET, FILM COATED ORAL DAILY
COMMUNITY
Start: 2018-04-21 | End: 2018-05-29

## 2018-05-21 RX ORDER — FLUOROURACIL 50 MG/G
CREAM TOPICAL 2 TIMES DAILY PRN
COMMUNITY
Start: 2018-04-25 | End: 2020-11-17

## 2018-05-21 RX ORDER — METFORMIN HYDROCHLORIDE 500 MG/1
500 TABLET ORAL 2 TIMES DAILY WITH MEALS
Qty: 60 TABLET | Refills: 12 | Status: SHIPPED | OUTPATIENT
Start: 2018-05-21 | End: 2018-11-29 | Stop reason: SDUPTHER

## 2018-05-21 RX ORDER — ACETAMINOPHEN 160 MG/5ML
200 SUSPENSION, ORAL (FINAL DOSE FORM) ORAL 2 TIMES DAILY
COMMUNITY

## 2018-05-21 RX ORDER — METHYLPREDNISOLONE 4 MG/1
TABLET ORAL
Qty: 1 PACKAGE | Refills: 0 | Status: SHIPPED | OUTPATIENT
Start: 2018-05-21 | End: 2018-08-02

## 2018-05-21 NOTE — PROGRESS NOTES
Subjective:      Patient ID: Deniz Paulino is a 67 y.o. male.    Chief Complaint: Annual Exam    66 yo with Patient Active Problem List:     CAD (coronary artery disease)     Hypertension associated with diabetes     Hyperlipidemia associated with type 2 diabetes mellitus     S/P CABG (coronary artery bypass graft)     Old MI (myocardial infarction)     Diabetes mellitus    Past Medical History:  No date: Acute coronary syndrome  No date: Coronary artery disease  No date: Hyperlipidemia  No date: Hypertension    Here today for annual prev exam and c/o foot pain.  Compliant with meds without significant side effects. Quit smoking over 15 years ago. Feeling overall well today. Denies puncture wound. Energy and appetite are good.     Foot Injury    Incident onset: 2 months ago. The incident occurred at home. Injury mechanism: running in flip flops on rocks. The pain is present in the right heel. The quality of the pain is described as aching. The pain is moderate. The pain has been fluctuating since onset. Pertinent negatives include no inability to bear weight, loss of motion, muscle weakness, numbness or tingling. He reports no foreign bodies present. The symptoms are aggravated by weight bearing (walking). He has tried NSAIDs for the symptoms. The treatment provided mild relief.     Review of Systems   Constitutional: Negative for activity change, chills and fever.   HENT: Negative for ear pain and sore throat.    Eyes: Negative for discharge.   Respiratory: Negative for cough and wheezing.    Cardiovascular: Negative for chest pain and palpitations.   Gastrointestinal: Negative for abdominal pain, blood in stool, constipation, diarrhea and vomiting.   Genitourinary: Negative for difficulty urinating, dysuria and hematuria.   Skin: Negative for rash and wound.   Neurological: Negative for tingling, seizures, syncope, numbness and headaches.   Psychiatric/Behavioral: Negative for dysphoric mood.     Past Surgical  "History:   Procedure Laterality Date    CARDIAC CATHETERIZATION      CORONARY ARTERY BYPASS GRAFT       Social History     Social History    Marital status:      Spouse name: N/A    Number of children: N/A    Years of education: N/A     Occupational History    Not on file.     Social History Main Topics    Smoking status: Former Smoker     Packs/day: 1.00     Years: 30.00     Types: Cigarettes     Quit date: 2/1/2003    Smokeless tobacco: Never Used    Alcohol use Yes      Comment: socially    Drug use: Unknown    Sexual activity: Not on file     Other Topics Concern    Not on file     Social History Narrative    No narrative on file     family history includes Alcohol abuse in his father; Cancer in his father and mother; Diabetes in his mother; Heart attack (age of onset: 63) in his father; Heart murmur in his mother; Stroke in his mother.  Objective:   /84 (BP Location: Right arm, Patient Position: Sitting)   Pulse (!) 59   Temp 96.7 °F (35.9 °C) (Tympanic)   Ht 5' 9" (1.753 m)   Wt 108.3 kg (238 lb 12.1 oz)   SpO2 99%   BMI 35.26 kg/m²     Physical Exam   Constitutional: He is oriented to person, place, and time. He appears well-developed and well-nourished. No distress.   HENT:   Head: Normocephalic and atraumatic.   Mouth/Throat: Oropharynx is clear and moist.   Eyes: EOM are normal. Pupils are equal, round, and reactive to light.   Neck: Neck supple. Carotid bruit is not present. No thyromegaly present.   Cardiovascular: Normal rate and regular rhythm.    Pulses:       Dorsalis pedis pulses are 2+ on the right side, and 2+ on the left side.   Pulmonary/Chest: Breath sounds normal. He has no wheezes. He has no rales.   Abdominal: Soft. Bowel sounds are normal. There is no tenderness.   Musculoskeletal: He exhibits no edema.        Right foot: There is normal range of motion, no tenderness, no bony tenderness, no swelling, normal capillary refill and no laceration.        " Feet:    Feet:   Right Foot:   Protective Sensation: 8 sites tested. 8 sites sensed.   Skin Integrity: Negative for ulcer, blister, skin breakdown, erythema or warmth.   Left Foot:   Protective Sensation: 8 sites tested. 8 sites sensed.   Skin Integrity: Negative for ulcer, blister, skin breakdown, erythema or callus.   Lymphadenopathy:     He has no cervical adenopathy.   Neurological: He is alert and oriented to person, place, and time.   Skin: Skin is warm and dry.   Psychiatric: He has a normal mood and affect. His behavior is normal.     Lab Visit on 05/18/2018   Component Date Value Ref Range Status    Sodium 05/18/2018 137  136 - 145 mmol/L Final    Potassium 05/18/2018 3.7  3.5 - 5.1 mmol/L Final    Chloride 05/18/2018 100  95 - 110 mmol/L Final    CO2 05/18/2018 28  23 - 29 mmol/L Final    Glucose 05/18/2018 116* 70 - 110 mg/dL Final    BUN, Bld 05/18/2018 14  8 - 23 mg/dL Final    Creatinine 05/18/2018 1.0  0.5 - 1.4 mg/dL Final    Calcium 05/18/2018 9.6  8.7 - 10.5 mg/dL Final    Total Protein 05/18/2018 6.6  6.0 - 8.4 g/dL Final    Albumin 05/18/2018 3.8  3.5 - 5.2 g/dL Final    Total Bilirubin 05/18/2018 1.0  0.1 - 1.0 mg/dL Final    Comment: For infants and newborns, interpretation of results should be based  on gestational age, weight and in agreement with clinical  observations.  Premature Infant recommended reference ranges:  Up to 24 hours.............<8.0 mg/dL  Up to 48 hours............<12.0 mg/dL  3-5 days..................<15.0 mg/dL  6-29 days.................<15.0 mg/dL      Alkaline Phosphatase 05/18/2018 88  55 - 135 U/L Final    AST 05/18/2018 37  10 - 40 U/L Final    ALT 05/18/2018 36  10 - 44 U/L Final    Anion Gap 05/18/2018 9  8 - 16 mmol/L Final    eGFR if African American 05/18/2018 >60.0  >60 mL/min/1.73 m^2 Final    eGFR if non African American 05/18/2018 >60.0  >60 mL/min/1.73 m^2 Final    Comment: Calculation used to obtain the estimated glomerular  filtration  rate (eGFR) is the CKD-EPI equation.       Cholesterol 05/18/2018 144  120 - 199 mg/dL Final    Comment: The National Cholesterol Education Program (NCEP) has set the  following guidelines (reference ranges) for Cholesterol:  Optimal.....................<200 mg/dL  Borderline High.............200-239 mg/dL  High........................> or = 240 mg/dL      Triglycerides 05/18/2018 119  30 - 150 mg/dL Final    Comment: The National Cholesterol Education Program (NCEP) has set the  following guidelines (reference values) for triglycerides:  Normal......................<150 mg/dL  Borderline High.............150-199 mg/dL  High........................200-499 mg/dL      HDL 05/18/2018 54  40 - 75 mg/dL Final    Comment: The National Cholesterol Education Program (NCEP) has set the  following guidelines (reference values) for HDL Cholesterol:  Low...............<40 mg/dL  Optimal...........>60 mg/dL      LDL Cholesterol 05/18/2018 66.2  63.0 - 159.0 mg/dL Final    Comment: The National Cholesterol Education Program (NCEP) has set the  following guidelines (reference values) for LDL Cholesterol:  Optimal.......................<130 mg/dL  Borderline High...............130-159 mg/dL  High..........................160-189 mg/dL  Very High.....................>190 mg/dL      HDL/Chol Ratio 05/18/2018 37.5  20.0 - 50.0 % Final    Total Cholesterol/HDL Ratio 05/18/2018 2.7  2.0 - 5.0 Final    Non-HDL Cholesterol 05/18/2018 90  mg/dL Final    Comment: Risk category and Non-HDL cholesterol goals:  Coronary heart disease (CHD)or equivalent (10-year risk of CHD >20%):  Non-HDL cholesterol goal     <130 mg/dL  Two or more CHD risk factors and 10-year risk of CHD <= 20%:  Non-HDL cholesterol goal     <160 mg/dL  0 to 1 CHD risk factor:  Non-HDL cholesterol goal     <190 mg/dL      Hemoglobin A1C 05/18/2018 6.1* 4.0 - 5.6 % Final    Comment: According to ADA guidelines, hemoglobin A1c <7.0% represents  optimal  control in non-pregnant diabetic patients. Different  metrics may apply to specific patient populations.   Standards of Medical Care in Diabetes-2016.  For the purpose of screening for the presence of diabetes:  <5.7%     Consistent with the absence of diabetes  5.7-6.4%  Consistent with increasing risk for diabetes   (prediabetes)  >or=6.5%  Consistent with diabetes  Currently, no consensus exists for use of hemoglobin A1c  for diagnosis of diabetes for children.  This Hemoglobin A1c assay has significant interference with fetal   hemoglobin   (HbF). The results are invalid for patients with abnormal amounts of   HbF,   including those with known Hereditary Persistence   of Fetal Hemoglobin. Heterozygous hemoglobin variants (HbAS, HbAC,   HbAD, HbAE, HbA2) do not significantly interfere with this assay;   however, presence of multiple variants in a sample may impact the %   interference.      Estimated Avg Glucose 05/18/2018 128  68 - 131 mg/dL Final       Assessment:     1. Routine general medical examination at a health care facility    2. Coronary artery disease involving native coronary artery of native heart without angina pectoris    3. Hyperlipidemia associated with type 2 diabetes mellitus    4. Hypertension associated with diabetes    5. Old MI (myocardial infarction)    6. S/P CABG (coronary artery bypass graft)    7. Type 2 diabetes mellitus without complication, without long-term current use of insulin    8. Need for pneumococcal vaccination    9. Encounter for screening for malignant neoplasm of prostate    10. Pain of right heel      Plan:   Routine general medical examination at a health care facility  Heart healthy diet and reg exercise   reviewed    Coronary artery disease involving native coronary artery of native heart without angina pectoris  Stable  Seeing cards today.     Hyperlipidemia associated with type 2 diabetes mellitus  controlled  -     Lipid panel; Future; Expected date:  05/21/2019  -     TSH; Future; Expected date: 05/21/2019    Hypertension associated with diabetes  controlled  -     CBC auto differential; Future; Expected date: 05/21/2019    Old MI (myocardial infarction)    S/P CABG (coronary artery bypass graft)    Type 2 diabetes mellitus without complication, without long-term current use of insulin  Controlled  refilled  -     metFORMIN (GLUCOPHAGE) 500 MG tablet; Take 1 tablet (500 mg total) by mouth 2 (two) times daily with meals.  Dispense: 60 tablet; Refill: 12  -     Comprehensive metabolic panel; Future; Expected date: 05/21/2019  -     Hemoglobin A1c; Future; Expected date: 11/17/2018  -     Hemoglobin A1c; Future; Expected date: 05/21/2019    Encounter for screening for malignant neoplasm of prostate  -     PSA, Screening; Future; Expected date: 05/21/2019    Pain of right heel  Spur vs plantar fasciitis  Podiatry if no resolutions  Ice, offloading, stretching, inserts as discussed and pt info selected  -     X-Ray Calcaneus 2 View Right; Future; Expected date: 05/21/2018  -     methylPREDNISolone (MEDROL, CONCHA,) 4 mg tablet; use as directed  Dispense: 1 Package; Refill: 0      Does Eye exams with [facility].         Problem List Items Addressed This Visit        Cardiac/Vascular    CAD (coronary artery disease)    Hypertension associated with diabetes    Relevant Orders    CBC auto differential    Hyperlipidemia associated with type 2 diabetes mellitus    Relevant Orders    Lipid panel    TSH    S/P CABG (coronary artery bypass graft)    Old MI (myocardial infarction)       Endocrine    Diabetes mellitus    Relevant Medications    metFORMIN (GLUCOPHAGE) 500 MG tablet    Other Relevant Orders    Comprehensive metabolic panel    Hemoglobin A1c    Hemoglobin A1c      Other Visit Diagnoses     Routine general medical examination at a health care facility    -  Primary    Need for pneumococcal vaccination        Encounter for screening for malignant  neoplasm of prostate        Relevant Orders    PSA, Screening    Pain of right heel        Relevant Medications    methylPREDNISolone (MEDROL, CONCHA,) 4 mg tablet    Other Relevant Orders    X-Ray Calcaneus 2 View Right (Completed)          Follow-up in about 6 months (around 11/21/2018), or if symptoms worsen or fail to improve.

## 2018-05-21 NOTE — LETTER
May 21, 2018      We are seeing Deniz Paulino, 1950, at [facility]. Hiro Kraft MD is their primary care physician. To help with our [place] maintenance records could you please send the following:     MOST RECENT EYE EXAM     Please fax to [facility] at [phone], attention Carol Vann.     Thank-you in advance for your assistance. If you have any questions or concerns please contact me at [phone].     Carol RAMIREZ LPN  Care Coordination Department  [facility]  [phone]

## 2018-05-21 NOTE — PATIENT INSTRUCTIONS
Plantar Fasciitis  Plantar fasciitis is a painful swelling of the plantar fascia. The plantar fascia is a thick, fibrous layer of tissue. It covers the bones on the bottom of your foot. And it supports the foot bones in an arched position.  This can happen gradually or suddenly. It usually affects one foot at a time. Heel pain can be sharp, like a knife sticking into the bottom of your foot. You may feel pain after exercising, long-distance jogging, stair climbing, long periods of standing, or after standing up.  Risk factors include: non-active lifestyle, arthritis, diabetes, obesity or recent weight gain, flat foot, high arch. Wearing high heels, loose shoes, or shoes with poor arch support for long periods of time adds to the risk. This problem is commonly found in runners and dancers. It also found in people who stand on hard surfaces for long periods of time.  Foot pain from this condition is usually worse in the morning. But it improves with walking. By the end of the day there may be a dull aching. Treatment requires short-term rest and controlling swelling. It may take up to 9 months before all symptoms go away. Rarely, a steroid injection into the foot, or surgery, may be needed.  Home care  · If you are overweight, lose weight to help healing.  · Choose supportive shoes with good arch support and shock absorbency. Replace athletic shoes when they become worn out. Dont walk or run barefoot.  · Premade or custom-fitted shoe inserts may be helpful. Inserts made of silicone seem to be the most effective. Custom-made inserts can be provided by a podiatrist or foot specialist, physical therapist, or orthopedist.  · Premade or custom-made night splints keep the heel stretched out while you sleep. They may prevent morning pain.  · Avoid activities that stress the feet: jogging, prolonged standing or walking, contact sports, etc.  · First thing in the morning and before sports, stretch the bottom of your feet.  Gently flex your ankle so the toes move toward your knee.  · Icing may help control heel pain. Apply an ice pack to the heel for 10-20 minutes as a preventive. Or ice your heel after a severe flare-up of symptoms. You may repeat this every 1-2 hours as needed.  · You may use over-the-counter pain medicine to control pain, unless another medicine was prescribed. Anti-inflammatory pain medicines, such as ibuprofen or naproxen, may work better than acetaminophen. If you have chronic liver or kidney disease or ever had a stomach ulcer or GI bleeding, talk with your healthcare provider before using these medicines.  Follow-up care  Follow up with your healthcare provider, physical therapist, or podiatrist or foot specialist as advised.  Call for an appointment if pain worsens or there is no relief after a few weeks of home treatment. Shoe inserts, a night splint, or a special boot may be required.  If X-rays were taken, you will be told of any new findings that may affect your care.  When to seek medical advice  Call your healthcare provider right away if any of these occur:  · Foot swelling  · Redness with increasing pain  Date Last Reviewed: 11/21/2015  © 1728-3377 [employer]. [address] [place] [address]. All rights reserved. This information is not intended as a substitute for professional medical care. Always follow your healthcare professional's instructions.        Treating Plantar Fasciitis  First, your healthcare provider tries to determine the cause of your problem in order to suggest ways to relieve pain. If your pain is due to poor foot mechanics, custom-made shoe inserts (orthoses) may help.    Reduce symptoms  · To relieve mild symptoms, try aspirin, ibuprofen, or other medicines as directed. Rubbing ice on the affected area may also help.  · To reduce severe pain and swelling, your healthcare provider may prescribe pills or injections or a walking cast in some instances.  Physical therapy, such as ultrasound or a daily stretching program, may also be recommended. Surgery is rarely required.  · To reduce symptoms caused by poor foot mechanics, your foot may be taped. This supports the arch and temporarily controls movement. Night splints may also help by stretching the fascia.  Control movement  If taping helps, your healthcare provider may prescribe orthoses. Built from plaster casts of your feet, these inserts control the way your foot moves. As a result, your symptoms should go away.  Reduce overuse  Every time your foot strikes the ground, the plantar fascia is stretched. You can reduce the strain on the plantar fascia and the possibility of overuse by following these suggestions:  · Lose any excess weight.  · Avoid running on hard or uneven ground.  · Use orthoses at all times in your shoes and house slippers.  If surgery is needed  Your healthcare provider may consider surgery if other types of treatment don't control your pain. During surgery, the plantar fascia is partially cut to release tension. As you heal, fibrous tissue fills the space between the heel bone and the plantar fascia.   Date Last Reviewed: 10/14/2015  © 5800-8659 [employer]. [address] [place] [address]. All rights reserved. This information is not intended as a substitute for professional medical care. Always follow your healthcare professional's instructions.        Understanding Plantar Fasciitis    Plantar fasciitis is a condition that causes foot and heel pain. The plantar fascia is a tough band of tissue that runs across the bottom of the foot from the heel to the toes. This tissue pulls on the heel bone. It supports the arch of the foot as it pushes off the ground. If the tissue becomes irritated or red and swollen (inflamed), it is called plantar fasciitis.  How to say it  PLAN-tuhr [id number]-IY-[id number]   What causes plantar fasciitis?  Plantar fasciitis most often occurs from  overusing the plantar fascia. The tissue may become damaged from activities that put repeated stress on the heel and foot. Or it may wear down over time with age and ankle stiffness. You are more likely to have plantar fasciitis if you:  · Do activities that require a lot of running, jumping, or dancing  · Have a job that requires being on your feet for long periods  · Are overweight or obese  · Have certain foot problems, such as a tight Achilles tendon, flat feet, or high arches  · Often wear poorly fitting shoes  Symptoms of plantar fasciitis  The condition most often causes pain in the heel and the bottom of the foot. The pain may occur when you take your first steps in the morning. It may get better as you walk throughout the day. But as you continue to put weight on the foot, the pain often returns. Pain may also occur after standing or sitting for long periods.  Treating plantar fasciitis  Treatments for plantar fasciitis include:  · Resting the foot. This involves limiting movements that make your foot hurt. You may also need to avoid certain sports and types of work for a time.  · Using cold packs. Put an ice pack on the heel and foot to help reduce pain and swelling.  · Taking pain medicines. Prescription and over-the-counter pain medicines can help relieve pain and swelling.  · Using heel cups or foot inserts (orthotics). These are placed in the shoes to help support the heel or arch and cushion the heel. You may also be told to buy proper-fitting shoes with good arch support and cushioned soles.  · Taping the foot. This supports the arch and limits the movement of the plantar fascia to help relieve symptoms.  · Wearing a night splint. This stretches the plantar fascia and leg muscles while you sleep. This may help relieve pain.  · Doing exercises and physical therapy. These stretch and strengthen the plantar fascia and the muscles in the leg that support the heel and foot.  · Getting shots of medicine  into the foot. These may help relieve symptoms for a time.  · Having surgery. This may be needed if other treatments fail to relieve symptoms. During surgery, the surgeon may partially cut the plantar fascia to release tension.  Possible complications of plantar fasciitis  Without proper care and treatment, healing may take longer than normal. Also, symptoms may continue or get worse. Over time, the plantar fascia may be damaged. This can make it hard to walk or even stand without pain.  When to call your healthcare provider  Call your healthcare provider right away if you have any of these:  · Fever of 100.4°F (38°C) or higher, or as directed  · Symptoms that dont get better with treatment, or get worse  · New symptoms, such as numbness, tingling, or weakness in the foot   Date Last Reviewed: 3/10/2016  © 1609-7075 [employer]. [address] [place] [address]. All rights reserved. This information is not intended as a substitute for professional medical care. Always follow your healthcare professional's instructions.        Heel Spurs    The plantar fascia is a thick, fibrous layer of tissue that covers the bones on the bottom of your foot. It holds the foot bones in an arched position. Plantar fasciitis is a painful swelling of the plantar fascia.  A heel spur is an overgrowth of bone where the plantar fascia attaches to the heel bone. The heel spur itself usually doesnt cause pain. However, the heel spur might be a sign of plantar fasciitis which may cause your foot pain. There is no specific treatment for heel spurs.   Plantar fasciitis can develop slowly or suddenly. It usually affects one foot at a time. Heel pain can feel sharp, like a knife sticking into the bottom of your foot. You may feel pain after exercising, long-distance jogging, stair climbing, long periods of standing, or after standing up.  Risk factors for plantar fasciitis include: arthritis, diabetes, obesity or recent  weight gain, flat foot, and having high arches. Wearing high heels, loose shoes, or shoes with poor arch support adds to the risk.    Foot pain is usually worse in the morning. But it improves with walking. By the end of the day there may be a dull aching. Treatment includes short-term rest and controlling inflammation. It may take up to 9 months before all symptoms go away. In rare cases, a steroid injection in the foot, or surgery, may be needed.  Home care  · If you are overweight, lose weight to help healing.  · Choose supportive shoes with good arch support and shock absorbency. Replace athletic shoes when they become worn out. Dont walk or run barefoot.  · Premade or custom-fitted shoe inserts may be helpful. Inserts made of silicone seem to be the most effective. Custom-made inserts can be provided by a podiatrist or foot specialist, physical therapist, or orthopedist.  · Premade or custom-made night splints keep the heel stretched out while you sleep. They may prevent morning pain.  · Avoid activities that stress the feet: jogging, prolonged standing or walking, contact sports, etc.  · First thing in the morning and before sports, stretch the bottom of your foot. Gently flex your ankle so the toes move toward your knee.  · Icing may help control heel pain. Apply an ice pack to the heel for 10-20 minutes as a preventive. Or ice your heel after a severe flare-up of symptoms. You may repeat this every 1-2 hours as needed.  · You may use over-the-counter pain medicine to control pain, unless another medicine was prescribed. Anti-inflammatory pain medicines, such as ibuprofen or naproxen, may work better than acetaminophen. If you have chronic liver or kidney disease or ever had a stomach ulcer or GI bleeding, talk with your healthcare provider before using these medicines.  · Shoe inserts, a night splint, or a special boot may be needed. Use these as directed by your healthcare provider.  Follow-up  care   Follow up with your healthcare provider, physical therapist, or podiatrist or foot specialist as advised.  When to seek medical advice  Call your healthcare provider right away if any of these occur:  · The pain worsens.  · There is no relief after a few weeks of home treatment.   Date Last Reviewed: 11/23/2015  © 0492-3663 [employer]. [address] [place] [address]. All rights reserved. This information is not intended as a substitute for professional medical care. Always follow your healthcare professional's instructions.        Heel Spurs    The plantar fascia is a thick, fibrous layer of tissue that covers the bones on the bottom of your foot. It holds the foot bones in an arched position. Plantar fasciitis is a painful swelling of the plantar fascia.  A heel spur is an overgrowth of bone where the plantar fascia attaches to the heel bone. The heel spur itself usually doesnt cause pain. However, the heel spur might be a sign of plantar fasciitis which may cause your foot pain. There is no specific treatment for heel spurs.   Plantar fasciitis can develop slowly or suddenly. It usually affects one foot at a time. Heel pain can feel sharp, like a knife sticking into the bottom of your foot. You may feel pain after exercising, long-distance jogging, stair climbing, long periods of standing, or after standing up.  Risk factors for plantar fasciitis include: arthritis, diabetes, obesity or recent weight gain, flat foot, and having high arches. Wearing high heels, loose shoes, or shoes with poor arch support adds to the risk.    Foot pain is usually worse in the morning. But it improves with walking. By the end of the day there may be a dull aching. Treatment includes short-term rest and controlling inflammation. It may take up to 9 months before all symptoms go away. In rare cases, a steroid injection in the foot, or surgery, may be needed.  Home care  · If you are overweight, lose  weight to help healing.  · Choose supportive shoes with good arch support and shock absorbency. Replace athletic shoes when they become worn out. Dont walk or run barefoot.  · Premade or custom-fitted shoe inserts may be helpful. Inserts made of silicone seem to be the most effective. Custom-made inserts can be provided by a podiatrist or foot specialist, physical therapist, or orthopedist.  · Premade or custom-made night splints keep the heel stretched out while you sleep. They may prevent morning pain.  · Avoid activities that stress the feet: jogging, prolonged standing or walking, contact sports, etc.  · First thing in the morning and before sports, stretch the bottom of your foot. Gently flex your ankle so the toes move toward your knee.  · Icing may help control heel pain. Apply an ice pack to the heel for 10-20 minutes as a preventive. Or ice your heel after a severe flare-up of symptoms. You may repeat this every 1-2 hours as needed.  · You may use over-the-counter pain medicine to control pain, unless another medicine was prescribed. Anti-inflammatory pain medicines, such as ibuprofen or naproxen, may work better than acetaminophen. If you have chronic liver or kidney disease or ever had a stomach ulcer or GI bleeding, talk with your healthcare provider before using these medicines.  · Shoe inserts, a night splint, or a special boot may be needed. Use these as directed by your healthcare provider.  Follow-up care   Follow up with your healthcare provider, physical therapist, or podiatrist or foot specialist as advised.  When to seek medical advice  Call your healthcare provider right away if any of these occur:  · The pain worsens.  · There is no relief after a few weeks of home treatment.   Date Last Reviewed: 11/23/2015  © 4882-0543 [employer]. [address] [place] [address], [place] [address]. All rights reserved. This information is not intended as a substitute for professional medical care.  Always follow your healthcare professional's instructions.

## 2018-05-29 ENCOUNTER — OFFICE VISIT (OUTPATIENT)
Dept: CARDIOLOGY | Facility: CLINIC | Age: 68
End: 2018-05-29
Payer: MEDICARE

## 2018-05-29 VITALS
DIASTOLIC BLOOD PRESSURE: 78 MMHG | SYSTOLIC BLOOD PRESSURE: 122 MMHG | BODY MASS INDEX: 35.36 KG/M2 | HEIGHT: 69 IN | HEART RATE: 52 BPM | WEIGHT: 238.75 LBS

## 2018-05-29 DIAGNOSIS — I15.2 HYPERTENSION ASSOCIATED WITH DIABETES: ICD-10-CM

## 2018-05-29 DIAGNOSIS — I25.2 OLD MI (MYOCARDIAL INFARCTION): ICD-10-CM

## 2018-05-29 DIAGNOSIS — E11.69 HYPERLIPIDEMIA ASSOCIATED WITH TYPE 2 DIABETES MELLITUS: ICD-10-CM

## 2018-05-29 DIAGNOSIS — Z95.1 S/P CABG (CORONARY ARTERY BYPASS GRAFT): ICD-10-CM

## 2018-05-29 DIAGNOSIS — I25.10 CORONARY ARTERY DISEASE INVOLVING NATIVE CORONARY ARTERY OF NATIVE HEART WITHOUT ANGINA PECTORIS: Primary | ICD-10-CM

## 2018-05-29 DIAGNOSIS — E11.59 HYPERTENSION ASSOCIATED WITH DIABETES: ICD-10-CM

## 2018-05-29 DIAGNOSIS — E78.5 HYPERLIPIDEMIA ASSOCIATED WITH TYPE 2 DIABETES MELLITUS: ICD-10-CM

## 2018-05-29 DIAGNOSIS — I10 ESSENTIAL HYPERTENSION: ICD-10-CM

## 2018-05-29 DIAGNOSIS — E11.9 TYPE 2 DIABETES MELLITUS WITHOUT COMPLICATION, WITHOUT LONG-TERM CURRENT USE OF INSULIN: ICD-10-CM

## 2018-05-29 PROCEDURE — 3078F DIAST BP <80 MM HG: CPT | Mod: CPTII,S$GLB,, | Performed by: NURSE PRACTITIONER

## 2018-05-29 PROCEDURE — 99214 OFFICE O/P EST MOD 30 MIN: CPT | Mod: S$GLB,,, | Performed by: NURSE PRACTITIONER

## 2018-05-29 PROCEDURE — 3044F HG A1C LEVEL LT 7.0%: CPT | Mod: CPTII,S$GLB,, | Performed by: NURSE PRACTITIONER

## 2018-05-29 PROCEDURE — 3074F SYST BP LT 130 MM HG: CPT | Mod: CPTII,S$GLB,, | Performed by: NURSE PRACTITIONER

## 2018-05-29 PROCEDURE — 99999 PR PBB SHADOW E&M-EST. PATIENT-LVL III: CPT | Mod: PBBFAC,,, | Performed by: NURSE PRACTITIONER

## 2018-05-29 RX ORDER — LISINOPRIL 20 MG/1
20 TABLET ORAL DAILY
Qty: 90 TABLET | Refills: 3 | Status: SHIPPED | OUTPATIENT
Start: 2018-05-29 | End: 2019-08-09 | Stop reason: SDUPTHER

## 2018-05-29 RX ORDER — METOPROLOL SUCCINATE 50 MG/1
50 TABLET, EXTENDED RELEASE ORAL DAILY
Qty: 90 TABLET | Refills: 3 | Status: SHIPPED | OUTPATIENT
Start: 2018-05-29 | End: 2019-06-03 | Stop reason: SDUPTHER

## 2018-05-29 RX ORDER — ATORVASTATIN CALCIUM 40 MG/1
40 TABLET, FILM COATED ORAL DAILY
Qty: 90 TABLET | Refills: 3 | Status: SHIPPED | OUTPATIENT
Start: 2018-05-29 | End: 2019-06-14 | Stop reason: SDUPTHER

## 2018-05-29 NOTE — PROGRESS NOTES
Subjective:   Patient ID:  Deniz Paulino is a 67 y.o. male who presents for follow up of Coronary Artery Disease      HPI   Patient presents to clinic for follow up and management of CAD. Had issues with myalgias in the past due to atorvastatin, but improved with decreasing dose to 40mg daily. Has what he describes as very brief chest pain that is not concerning to him. Described as a brief cramp, but associated with SOB, palpitations, dizziness, near syncope or syncope. Is very active. Owns a small business clearing concrete. BP looks good. Lipids and CMP stable. Has good med compliance. Knows that he can do better with heart healthy diet.     Past Medical History:   Diagnosis Date    Acute coronary syndrome     Coronary artery disease     Hyperlipidemia     Hypertension        Past Surgical History:   Procedure Laterality Date    CARDIAC CATHETERIZATION      CORONARY ARTERY BYPASS GRAFT         Social History   Substance Use Topics    Smoking status: Former Smoker     Packs/day: 1.00     Years: 30.00     Types: Cigarettes     Quit date: 2/1/2003    Smokeless tobacco: Never Used    Alcohol use Yes      Comment: socially       Family History   Problem Relation Age of Onset    Diabetes Mother     Heart murmur Mother     Cancer Mother         Breast    Stroke Mother     Alcohol abuse Father     Heart attack Father 63    Cancer Father         Esophageal       Current Outpatient Prescriptions   Medication Sig    amLODIPine (NORVASC) 5 MG tablet Take 1 tablet (5 mg total) by mouth once daily.    aspirin (ECOTRIN) 325 MG EC tablet Take 325 mg by mouth once daily.    coenzyme Q10 200 mg capsule Take 200 mg by mouth 2 (two) times daily.    cyanocobalamin (VITAMIN B-12) 1000 MCG tablet Take 1,000 mcg by mouth once daily.     esomeprazole (NEXIUM) 20 MG capsule Take 20 mg by mouth before breakfast.    hydroCHLOROthiazide (HYDRODIURIL) 12.5 MG Tab TAKE ONE TABLET BY MOUTH ONCE DAILY    lisinopril  (PRINIVIL,ZESTRIL) 20 MG tablet Take 1 tablet (20 mg total) by mouth once daily.    LORATADINE (ALAVERT ORAL) Take 1 tablet by mouth as needed.    metFORMIN (GLUCOPHAGE) 500 MG tablet Take 1 tablet (500 mg total) by mouth 2 (two) times daily with meals.    metoprolol succinate (TOPROL-XL) 50 MG 24 hr tablet TAKE ONE TABLET BY MOUTH ONCE DAILY    naproxen (NAPROSYN) 500 MG tablet Take 1 tablet (500 mg total) by mouth 2 (two) times daily with meals. For pain and inflammation (Patient taking differently: Take 500 mg by mouth as needed. For pain and inflammation)    vitamin D 1000 units Tab Take 2,000 Units by mouth once daily.    calcipotriene (DOVONOX) 0.005 % cream Apply topically 2 (two) times daily as needed. For pre skin cancer    fluorouracil (EFUDEX) 5 % cream Apply topically 2 (two) times daily as needed.    methylPREDNISolone (MEDROL, CONCHA,) 4 mg tablet use as directed     No current facility-administered medications for this visit.      Current Outpatient Prescriptions on File Prior to Visit   Medication Sig    amLODIPine (NORVASC) 5 MG tablet Take 1 tablet (5 mg total) by mouth once daily.    aspirin (ECOTRIN) 325 MG EC tablet Take 325 mg by mouth once daily.    coenzyme Q10 200 mg capsule Take 200 mg by mouth 2 (two) times daily.    cyanocobalamin (VITAMIN B-12) 1000 MCG tablet Take 1,000 mcg by mouth once daily.     esomeprazole (NEXIUM) 20 MG capsule Take 20 mg by mouth before breakfast.    hydroCHLOROthiazide (HYDRODIURIL) 12.5 MG Tab TAKE ONE TABLET BY MOUTH ONCE DAILY    lisinopril (PRINIVIL,ZESTRIL) 20 MG tablet Take 1 tablet (20 mg total) by mouth once daily.    LORATADINE (ALAVERT ORAL) Take 1 tablet by mouth as needed.    metFORMIN (GLUCOPHAGE) 500 MG tablet Take 1 tablet (500 mg total) by mouth 2 (two) times daily with meals.    metoprolol succinate (TOPROL-XL) 50 MG 24 hr tablet TAKE ONE TABLET BY MOUTH ONCE DAILY    naproxen (NAPROSYN) 500 MG tablet Take 1 tablet (500 mg total)  "by mouth 2 (two) times daily with meals. For pain and inflammation (Patient taking differently: Take 500 mg by mouth as needed. For pain and inflammation)    vitamin D 1000 units Tab Take 2,000 Units by mouth once daily.    [DISCONTINUED] atorvastatin (LIPITOR) 80 MG tablet Take 40 mg by mouth once daily.    calcipotriene (DOVONOX) 0.005 % cream Apply topically 2 (two) times daily as needed. For pre skin cancer    fluorouracil (EFUDEX) 5 % cream Apply topically 2 (two) times daily as needed.    methylPREDNISolone (MEDROL, CONCHA,) 4 mg tablet use as directed     No current facility-administered medications on file prior to visit.        ROS    Objective:   Physical Exam  Vitals:    05/29/18 1028   BP: 122/78   BP Location: Left arm   Pulse: (!) 52   Weight: 108.3 kg (238 lb 12.1 oz)   Height: 5' 9" (1.753 m)     Lab Results   Component Value Date    CHOL 144 05/18/2018    CHOL 147 10/30/2017    CHOL 140 11/08/2016     Lab Results   Component Value Date    HDL 54 05/18/2018    HDL 45 10/30/2017    HDL 42 11/08/2016     Lab Results   Component Value Date    LDLCALC 66.2 05/18/2018    LDLCALC 81.2 10/30/2017    LDLCALC 67.2 11/08/2016     Lab Results   Component Value Date    TRIG 119 05/18/2018    TRIG 104 10/30/2017    TRIG 154 (H) 11/08/2016     Lab Results   Component Value Date    CHOLHDL 37.5 05/18/2018    CHOLHDL 30.6 10/30/2017    CHOLHDL 30.0 11/08/2016       Chemistry        Component Value Date/Time     05/18/2018 0756    K 3.7 05/18/2018 0756     05/18/2018 0756    CO2 28 05/18/2018 0756    BUN 14 05/18/2018 0756    CREATININE 1.0 05/18/2018 0756     (H) 05/18/2018 0756        Component Value Date/Time    CALCIUM 9.6 05/18/2018 0756    ALKPHOS 88 05/18/2018 0756    AST 37 05/18/2018 0756    ALT 36 05/18/2018 0756    BILITOT 1.0 05/18/2018 0756    ESTGFRAFRICA >60.0 05/18/2018 0756    EGFRNONAA >60.0 05/18/2018 0756          Lab Results   Component Value Date    TSH 2.721 05/22/2017 "     Lab Results   Component Value Date    INR 1.0 09/19/2009     Lab Results   Component Value Date    WBC 6.60 05/22/2017    HGB 15.0 05/22/2017    HCT 43.7 05/22/2017    MCV 91 05/22/2017     05/22/2017     BMP  Sodium   Date Value Ref Range Status   05/18/2018 137 136 - 145 mmol/L Final     Potassium   Date Value Ref Range Status   05/18/2018 3.7 3.5 - 5.1 mmol/L Final     Chloride   Date Value Ref Range Status   05/18/2018 100 95 - 110 mmol/L Final     CO2   Date Value Ref Range Status   05/18/2018 28 23 - 29 mmol/L Final     BUN, Bld   Date Value Ref Range Status   05/18/2018 14 8 - 23 mg/dL Final     Creatinine   Date Value Ref Range Status   05/18/2018 1.0 0.5 - 1.4 mg/dL Final     Calcium   Date Value Ref Range Status   05/18/2018 9.6 8.7 - 10.5 mg/dL Final     Anion Gap   Date Value Ref Range Status   05/18/2018 9 8 - 16 mmol/L Final     eGFR if    Date Value Ref Range Status   05/18/2018 >60.0 >60 mL/min/1.73 m^2 Final     eGFR if non    Date Value Ref Range Status   05/18/2018 >60.0 >60 mL/min/1.73 m^2 Final     Comment:     Calculation used to obtain the estimated glomerular filtration  rate (eGFR) is the CKD-EPI equation.        CrCl cannot be calculated (Patient's most recent lab result is older than the maximum 7 days allowed.).    Assessment:     1. Coronary artery disease involving native coronary artery of native heart without angina pectoris    2. S/P CABG (coronary artery bypass graft)    3. Essential hypertension    4. Hypertension associated with diabetes    5. Old MI (myocardial infarction)    6. Hyperlipidemia associated with type 2 diabetes mellitus    7. Type 2 diabetes mellitus without complication, without long-term current use of insulin    No angina or equivalent  Diabetes well controlled  BP stable on current regimen  No CNS complaints to suggest TIA or CVA  Myalgias have improved since decreasing atorvastatin to 40mg daily     Plan:   No changes  to medical management  Heart healthy diet  Exercise program   Diabetes control   Weight loss  RTC in 6 months with lipids, CMP and A1C, but patient requests 1 year follow up

## 2018-08-02 ENCOUNTER — OFFICE VISIT (OUTPATIENT)
Dept: INTERNAL MEDICINE | Facility: CLINIC | Age: 68
End: 2018-08-02
Payer: MEDICARE

## 2018-08-02 VITALS
HEIGHT: 69 IN | WEIGHT: 246.94 LBS | DIASTOLIC BLOOD PRESSURE: 84 MMHG | OXYGEN SATURATION: 98 % | BODY MASS INDEX: 36.57 KG/M2 | HEART RATE: 60 BPM | TEMPERATURE: 96 F | SYSTOLIC BLOOD PRESSURE: 136 MMHG

## 2018-08-02 DIAGNOSIS — M79.671 PAIN OF RIGHT HEEL: Primary | ICD-10-CM

## 2018-08-02 PROCEDURE — 99999 PR PBB SHADOW E&M-EST. PATIENT-LVL III: CPT | Mod: PBBFAC,,, | Performed by: INTERNAL MEDICINE

## 2018-08-02 PROCEDURE — 99214 OFFICE O/P EST MOD 30 MIN: CPT | Mod: S$GLB,,, | Performed by: INTERNAL MEDICINE

## 2018-08-02 PROCEDURE — 3079F DIAST BP 80-89 MM HG: CPT | Mod: CPTII,S$GLB,, | Performed by: INTERNAL MEDICINE

## 2018-08-02 PROCEDURE — 3075F SYST BP GE 130 - 139MM HG: CPT | Mod: CPTII,S$GLB,, | Performed by: INTERNAL MEDICINE

## 2018-08-02 RX ORDER — METHYLPREDNISOLONE 4 MG/1
TABLET ORAL
Qty: 1 PACKAGE | Refills: 0 | Status: SHIPPED | OUTPATIENT
Start: 2018-08-02 | End: 2018-11-27

## 2018-08-02 RX ORDER — NAPROXEN SODIUM 220 MG
440 TABLET ORAL 2 TIMES DAILY
COMMUNITY
End: 2018-11-27

## 2018-08-02 NOTE — PROGRESS NOTES
Subjective:      Patient ID: Deniz Paulino is a 67 y.o. male.    Chief Complaint: Foot Pain (right heel)    68 yo with Patient Active Problem List:     CAD (coronary artery disease)     Hypertension associated with diabetes     Hyperlipidemia associated with type 2 diabetes mellitus     S/P CABG (coronary artery bypass graft)     Old MI (myocardial infarction)     Diabetes mellitus    Past Medical History:  No date: Acute coronary syndrome  No date: Coronary artery disease  No date: Hyperlipidemia  No date: Hypertension    Here today c/o foot pain.       Leg Pain    The incident occurred more than 1 week ago. The incident occurred at home. There was no injury mechanism. The pain is present in the right heel. The quality of the pain is described as aching. The pain is moderate. The pain has been fluctuating since onset. Pertinent negatives include no inability to bear weight, loss of motion, loss of sensation, muscle weakness, numbness or tingling. He reports no foreign bodies present. Exacerbated by: walking. Treatments tried: medrol in past. The treatment provided significant relief.     Review of Systems   Constitutional: Negative for activity change and unexpected weight change.   HENT: Negative for hearing loss, rhinorrhea and trouble swallowing.    Eyes: Negative for discharge and visual disturbance.   Respiratory: Negative for chest tightness and wheezing.    Cardiovascular: Negative for chest pain and palpitations.   Gastrointestinal: Negative for blood in stool, constipation, diarrhea and vomiting.   Endocrine: Negative for polydipsia and polyuria.   Genitourinary: Negative for difficulty urinating, hematuria and urgency.   Musculoskeletal: Negative for arthralgias, joint swelling and neck pain.   Neurological: Negative for tingling, weakness, numbness and headaches.   Psychiatric/Behavioral: Negative for confusion and dysphoric mood.     Objective:   /84   Pulse 60   Temp 96.4 °F (35.8 °C)  "(Tympanic)   Ht 5' 9" (1.753 m)   Wt 112 kg (246 lb 14.6 oz)   SpO2 98%   BMI 36.46 kg/m²     Physical Exam    Assessment:     1. Pain of right heel      Plan:   Pain of right heel  -     methylPREDNISolone (MEDROLCONCHA,) 4 mg tablet; use as directed  Dispense: 1 Package; Refill: 0    plantar fasciitis vs heel spur  Advised inserts and stretching.   Podiatry if no resolution  Tylenol 500 to 1000 mg every 8 hours as needed for pain.     Lab Frequency Next Occurrence   Comprehensive metabolic panel Once 05/21/2019   Lipid panel Once 05/21/2019   CBC auto differential Once 05/21/2019   PSA, Screening Once 05/21/2019   TSH Once 05/21/2019   Hemoglobin A1c Once 11/17/2018   Hemoglobin A1c Once 05/21/2019   Comprehensive metabolic panel Once 11/28/2018   Lipid panel Once 11/28/2018       Problem List Items Addressed This Visit     None      Visit Diagnoses     Pain of right heel    -  Primary    Relevant Medications    methylPREDNISolone (MEDROL, CONCHA,) 4 mg tablet          Follow-up if symptoms worsen or fail to improve.  "

## 2018-08-20 DIAGNOSIS — M62.838 MUSCLE SPASM: ICD-10-CM

## 2018-08-20 DIAGNOSIS — R10.9 FLANK PAIN: ICD-10-CM

## 2018-08-20 DIAGNOSIS — M79.673 PAIN OF FOOT, UNSPECIFIED LATERALITY: Primary | ICD-10-CM

## 2018-08-20 NOTE — TELEPHONE ENCOUNTER
----- Message from Blaire Muñoz sent at 8/20/2018 10:53 AM CDT -----  Contact: Patient  Patient states his foot pain is not better, and needs a referral for a specialist, please call him back at [phone]. Thank you

## 2018-08-21 RX ORDER — METHOCARBAMOL 500 MG/1
500 TABLET, FILM COATED ORAL 4 TIMES DAILY
Qty: 40 TABLET | Refills: 0 | Status: SHIPPED | OUTPATIENT
Start: 2018-08-21 | End: 2019-07-22 | Stop reason: SDUPTHER

## 2018-08-21 NOTE — TELEPHONE ENCOUNTER
Called to schedule Podiatry appointment, pt is already scheduled for 8/28/18 with Dr. Mabry at [facility].   Pt also is requesting a prescription for Methocarbamol. States Dr. Kraft last prescribed this. Pt is complaining that since his foot is giving him trouble it has caused spasms to his hip and back.

## 2018-08-28 ENCOUNTER — OFFICE VISIT (OUTPATIENT)
Dept: PODIATRY | Facility: CLINIC | Age: 68
End: 2018-08-28
Payer: MEDICARE

## 2018-08-28 VITALS
HEART RATE: 61 BPM | RESPIRATION RATE: 16 BRPM | WEIGHT: 239.38 LBS | SYSTOLIC BLOOD PRESSURE: 158 MMHG | DIASTOLIC BLOOD PRESSURE: 91 MMHG | BODY MASS INDEX: 35.45 KG/M2 | HEIGHT: 69 IN

## 2018-08-28 DIAGNOSIS — M24.571 CONTRACTURE, RIGHT ANKLE: ICD-10-CM

## 2018-08-28 DIAGNOSIS — M72.2 PLANTAR FASCIITIS: Primary | ICD-10-CM

## 2018-08-28 DIAGNOSIS — M79.671 PAIN IN RIGHT FOOT: ICD-10-CM

## 2018-08-28 PROCEDURE — 3077F SYST BP >= 140 MM HG: CPT | Mod: CPTII,S$GLB,, | Performed by: PODIATRIST

## 2018-08-28 PROCEDURE — 99203 OFFICE O/P NEW LOW 30 MIN: CPT | Mod: 25,S$GLB,, | Performed by: PODIATRIST

## 2018-08-28 PROCEDURE — 20550 NJX 1 TENDON SHEATH/LIGAMENT: CPT | Mod: RT,S$GLB,, | Performed by: PODIATRIST

## 2018-08-28 PROCEDURE — 99999 PR PBB SHADOW E&M-EST. PATIENT-LVL III: CPT | Mod: PBBFAC,,, | Performed by: PODIATRIST

## 2018-08-28 PROCEDURE — 3080F DIAST BP >= 90 MM HG: CPT | Mod: CPTII,S$GLB,, | Performed by: PODIATRIST

## 2018-08-28 RX ORDER — DEXAMETHASONE SODIUM PHOSPHATE 4 MG/ML
4 INJECTION, SOLUTION INTRA-ARTICULAR; INTRALESIONAL; INTRAMUSCULAR; INTRAVENOUS; SOFT TISSUE ONCE
Status: COMPLETED | OUTPATIENT
Start: 2018-08-28 | End: 2018-08-28

## 2018-08-28 RX ORDER — TRIAMCINOLONE ACETONIDE 40 MG/ML
40 INJECTION, SUSPENSION INTRA-ARTICULAR; INTRAMUSCULAR ONCE
Status: COMPLETED | OUTPATIENT
Start: 2018-08-28 | End: 2018-08-28

## 2018-08-28 RX ADMIN — DEXAMETHASONE SODIUM PHOSPHATE 4 MG: 4 INJECTION, SOLUTION INTRA-ARTICULAR; INTRALESIONAL; INTRAMUSCULAR; INTRAVENOUS; SOFT TISSUE at 09:08

## 2018-08-28 RX ADMIN — TRIAMCINOLONE ACETONIDE 40 MG: 40 INJECTION, SUSPENSION INTRA-ARTICULAR; INTRAMUSCULAR at 09:08

## 2018-08-28 NOTE — PROGRESS NOTES
Subjective:       Patient ID: Deniz Paulino is a 67 y.o. male.    Chief Complaint: Heel Pain ( right heel pain, pain level 10/10, wears tennis shoes, PCP Dr. Kraft.)      HPI: Deniz Paulino complains of severe pains to the right foot. States pains are sharp and stabbing-like in nature. Pains are to the plantar foot, mostly with walking and standing. Rates the pains at approx. 10/10. States post-static dyskinesia. Denies any recent identifiable trauma. Does limp with gait. No NSAID medications thus far. Pains have been present for the past several weeks to months and the pains have worsened over the past couple of weeks.  He does not relate a history of plantar fasciitis. States walking and standing causes and/or exacerbates the symptoms. Patient has had no corticosteroid injection(s) to the right foot, prior. Patient's Primary Care Provider is Hiro Kraft MD.  He does wear work boots daily.  He has purchased over-the-counter arch supports.  He has been taking Tylenol for this ailment without relief. PCP did give him Medrol dose packs, x2, prior, over the past several months.     Review of patient's allergies indicates:  No Known Allergies    Past Medical History:   Diagnosis Date    Acute coronary syndrome     Coronary artery disease     Hyperlipidemia     Hypertension        Family History   Problem Relation Age of Onset    Diabetes Mother     Heart murmur Mother     Cancer Mother         Breast    Stroke Mother     Alcohol abuse Father     Heart attack Father 63    Cancer Father         Esophageal       Social History     Socioeconomic History    Marital status:      Spouse name: Not on file    Number of children: Not on file    Years of education: Not on file    Highest education level: Not on file   Social Needs    Financial resource strain: Not on file    Food insecurity - worry: Not on file    Food insecurity - inability: Not on file    Transportation needs - medical: Not  "on file    Transportation needs - non-medical: Not on file   Occupational History    Not on file   Tobacco Use    Smoking status: Former Smoker     Packs/day: 1.00     Years: 30.00     Pack years: 30.00     Types: Cigarettes     Last attempt to quit: 2/1/2003     Years since quitting: 15.5    Smokeless tobacco: Never Used   Substance and Sexual Activity    Alcohol use: Yes     Comment: socially    Drug use: Not on file    Sexual activity: Not on file   Other Topics Concern    Not on file   Social History Narrative    Not on file       Past Surgical History:   Procedure Laterality Date    CARDIAC CATHETERIZATION      CORONARY ARTERY BYPASS GRAFT         Review of Systems   Constitutional: Negative for chills, fatigue and fever.   HENT: Negative for hearing loss.    Eyes: Negative for photophobia and visual disturbance.   Respiratory: Negative for cough, chest tightness, shortness of breath and wheezing.    Cardiovascular: Negative for chest pain and palpitations.   Gastrointestinal: Negative for constipation, diarrhea, nausea and vomiting.   Endocrine: Negative for cold intolerance and heat intolerance.   Genitourinary: Negative for flank pain.   Musculoskeletal: Positive for gait problem. Negative for neck pain and neck stiffness.   Skin: Negative for wound.   Neurological: Negative for light-headedness, numbness and headaches.   Psychiatric/Behavioral: Negative for sleep disturbance.         Objective:   BP (!) 158/91 (BP Location: Left arm, Patient Position: Sitting, BP Method: Medium (Automatic))   Pulse 61   Resp 16   Ht 5' 9" (1.753 m)   Wt 108.6 kg (239 lb 6 oz)   BMI 35.35 kg/m²     X-Ray Calcaneus 2 View Right  Narrative: EXAMINATION:  XR CALCANEUS 2 VIEW RIGHT    CLINICAL HISTORY:  Pain in right foot    TECHNIQUE:  Tangential and lateral views of the right calcaneus were performed.    COMPARISON:  None    FINDINGS:  Dorsal and plantar calcaneal spurs.  Multi articular degenerative changes " noted in the ankle and midfoot.  No acute or healing fracture identified involving the calcaneus.  No focal or diffuse soft tissue swelling or radiopaque foreign body identified.  Impression: As above.    Electronically signed by: João Mcnally MD  Date:    05/21/2018  Time:    12:06       LOWER EXTREMITY PHYSICAL EXAMINATION    VASCULAR: The right DP pulse is 2/4 and the left DP is 2/4. The right PT pulse is 2/4 and the left PT pulse is 2/4. Proximal to distal, warm to warm. No dependent rubor or elevation palor is noted. Capillary refill time is less than 3 seconds. Hair growth is appreciated to the dorsal foot and digits.    NEUROLOGY: Proprioception is intact, bilateral. Sensation to light touch is intact. Negative Tinel's Sign and negative Valleix sign. No neurological sensations with compression of the area of Riddle's Nerve in the area of the Abductor Hallucis muscle belly.    ORTHOPEDIC:  Severe tenderness to palpation of the area around the plantar medial calcaneal tubercle on the right foot. Pains are characterized as sharp and stabbing-like with direct palpation of the area. There is also mild pain to palpation of the immediate plantar aspect of the heel, and mild pains to the lateral band of the fascia. No edema is noted. No fullness is noted. No pains or defects are noted within the plantar fascia at the arch. No plantar fibromas are noted. No defects are noted within the ligament. Dorsiflexion of the MTPJs with simultaneous palpation of the fascia at the arch, does worsen the pains. No pains with medial to lateral compression of the heel. Equinus contracture is noted. Antalgic gait pattern is noted.     DERMATOLOGY: No ecchymosis is noted.  Skin is supple, dry and intact. Skin is supple.  No hyperkeratosis noted. No calluses.  No open wounds or ulcerations are noted.  No palpable plantar fibromas noted.    Physical Exam    Assessment:     1. Plantar fasciitis    2. Contracture, right ankle    3. Pain  in right foot          Plan:     Plantar fasciitis    Contracture, right ankle    Pain in right foot        XRAYS are reviewed in detail with the patient. All questions and concerns regarding findings and its/their implications are outlined and discussed.  Did discuss proper and supportive shoe gear in detail and at length with the patient.  These are shoes with firm and robust arch support; medial counter.  Shoes which only bend at the metatarsophalangeal joint and which are rigid in the midfoot and hindfoot. Patient urged to purchase running type or cross training type shoes gear which are designed for pronation control.  Thorough discussion is had with the patient today, concerning the diagnosis, its etiology, and the treatment algorithm at present.  Following sterile preparation with alcohol swab and/or betadine paint, injection was given into and around the area of the right plantar medial calcaneal tubercle, using 25-gauge needle. Injection consisted of approximately 0.5cc of Dexamethasone Sodium Phosphate, 0.5cc of Kenalog-40 and 1cc of 1% Lidocaine w/ Epinephrine. Bandage application thereafter. Patient tolerated procedure well, and without complications or complaints. Patient educated that the area of pathology, might actually be slightly more painful, due to the injection, over the course of the next one to two days.               Future Appointments   Date Time Provider Department Center   9/11/2018  9:15 AM Don Mabry DPM [facility]   11/19/2018  9:00 AM [facility]   5/20/2019  8:00 AM [facility], [facility] [place]

## 2018-08-28 NOTE — LETTER
August 28, 2018      Hiro Kraft MD  [address]  [facility] [address]           [facility]  [address] [place] [id number]  Phone: [phone]  Fax: [phone]          Patient: Deniz Paulino   MR Number: [id number]   YOB: 1950   Date of Visit: 8/28/2018       Dear Dr. Hiro Kraft:    Thank you for referring Deniz Paulino to me for evaluation. Attached you will find relevant portions of my assessment and plan of care.    If you have questions, please do not hesitate to call me. I look forward to following Deniz Paulino along with you.    Sincerely,    Don Mabry, RADHA    Enclosure  CC:  No Recipients    If you would like to receive this communication electronically, please contact [email or web address] or [phone] to request more information on [facility] access.    For providers and/or their staff who would like to refer a patient to [facility], please contact us through our one-stop-shop provider referral line, [facility] , at [phone].    If you feel you have received this communication in error or would no longer like to receive these types of communications, please e-mail [email or web address]

## 2018-09-11 ENCOUNTER — OFFICE VISIT (OUTPATIENT)
Dept: PODIATRY | Facility: CLINIC | Age: 68
End: 2018-09-11
Payer: MEDICARE

## 2018-09-11 VITALS
DIASTOLIC BLOOD PRESSURE: 90 MMHG | BODY MASS INDEX: 35.59 KG/M2 | RESPIRATION RATE: 16 BRPM | SYSTOLIC BLOOD PRESSURE: 159 MMHG | HEART RATE: 59 BPM | HEIGHT: 69 IN | WEIGHT: 240.31 LBS

## 2018-09-11 DIAGNOSIS — M76.61 TENDONITIS, ACHILLES, RIGHT: ICD-10-CM

## 2018-09-11 DIAGNOSIS — M24.571 CONTRACTURE, RIGHT ANKLE: ICD-10-CM

## 2018-09-11 DIAGNOSIS — M72.2 PLANTAR FASCIITIS: Primary | ICD-10-CM

## 2018-09-11 DIAGNOSIS — M79.671 PAIN IN RIGHT FOOT: ICD-10-CM

## 2018-09-11 DIAGNOSIS — M77.31 CALCANEAL SPUR, RIGHT: ICD-10-CM

## 2018-09-11 PROCEDURE — 3077F SYST BP >= 140 MM HG: CPT | Mod: CPTII,,, | Performed by: PODIATRIST

## 2018-09-11 PROCEDURE — 99213 OFFICE O/P EST LOW 20 MIN: CPT | Mod: PBBFAC | Performed by: PODIATRIST

## 2018-09-11 PROCEDURE — 20550 NJX 1 TENDON SHEATH/LIGAMENT: CPT | Mod: PBBFAC | Performed by: PODIATRIST

## 2018-09-11 PROCEDURE — 3080F DIAST BP >= 90 MM HG: CPT | Mod: CPTII,,, | Performed by: PODIATRIST

## 2018-09-11 PROCEDURE — 20550 NJX 1 TENDON SHEATH/LIGAMENT: CPT | Mod: S$PBB,RT,, | Performed by: PODIATRIST

## 2018-09-11 PROCEDURE — 1101F PT FALLS ASSESS-DOCD LE1/YR: CPT | Mod: CPTII,,, | Performed by: PODIATRIST

## 2018-09-11 PROCEDURE — 99999 PR PBB SHADOW E&M-EST. PATIENT-LVL III: CPT | Mod: PBBFAC,,, | Performed by: PODIATRIST

## 2018-09-11 PROCEDURE — 99213 OFFICE O/P EST LOW 20 MIN: CPT | Mod: 25,S$PBB,, | Performed by: PODIATRIST

## 2018-09-11 RX ORDER — DEXAMETHASONE SODIUM PHOSPHATE 4 MG/ML
4 INJECTION, SOLUTION INTRA-ARTICULAR; INTRALESIONAL; INTRAMUSCULAR; INTRAVENOUS; SOFT TISSUE ONCE
Status: COMPLETED | OUTPATIENT
Start: 2018-09-11 | End: 2018-09-11

## 2018-09-11 RX ORDER — TRIAMCINOLONE ACETONIDE 40 MG/ML
40 INJECTION, SUSPENSION INTRA-ARTICULAR; INTRAMUSCULAR ONCE
Status: COMPLETED | OUTPATIENT
Start: 2018-09-11 | End: 2018-09-11

## 2018-09-11 RX ADMIN — TRIAMCINOLONE ACETONIDE 40 MG: 40 INJECTION, SUSPENSION INTRA-ARTICULAR; INTRAMUSCULAR at 01:09

## 2018-09-11 RX ADMIN — DEXAMETHASONE SODIUM PHOSPHATE 4 MG: 4 INJECTION, SOLUTION INTRA-ARTICULAR; INTRALESIONAL; INTRAMUSCULAR; INTRAVENOUS; SOFT TISSUE at 01:09

## 2018-09-11 NOTE — PROGRESS NOTES
Subjective:       Patient ID: Deniz Paulino is a 67 y.o. male.    Chief Complaint: Foot Pain (c/o right foot pain while walking, pain level 3/10, wears tennis shoes w/ socks, diabetic pt, PCP Dr. Kraft.)      HPI: Deniz Paulino presents to the office today, for follow-up concerning plantar fasciitis of the right foot. At this time, pains are 3/10 and are described as achy in nature. States slightly less analgic gait pattern. States mildly improved post static dyskinesia. States walking and standing is not as painful as prior, but persists somewhat. To this juncture, patient has had 1 cortisone injections to the right foot. States occasional NSAIDs. Patient's Primary Care Provider is Hiro Kraft MD.  He did purchase arch supports.  Patient also states slight posterior heel pains.  These pains are stated approximately 2/10.    Review of patient's allergies indicates:  No Known Allergies    Past Medical History:   Diagnosis Date    Acute coronary syndrome     Coronary artery disease     Hyperlipidemia     Hypertension        Family History   Problem Relation Age of Onset    Diabetes Mother     Heart murmur Mother     Cancer Mother         Breast    Stroke Mother     Alcohol abuse Father     Heart attack Father 63    Cancer Father         Esophageal       Social History     Socioeconomic History    Marital status:      Spouse name: Not on file    Number of children: Not on file    Years of education: Not on file    Highest education level: Not on file   Social Needs    Financial resource strain: Not on file    Food insecurity - worry: Not on file    Food insecurity - inability: Not on file    Transportation needs - medical: Not on file    Transportation needs - non-medical: Not on file   Occupational History    Not on file   Tobacco Use    Smoking status: Former Smoker     Packs/day: 1.00     Years: 30.00     Pack years: 30.00     Types: Cigarettes     Last attempt to quit:  "2/1/2003     Years since quitting: 15.6    Smokeless tobacco: Never Used   Substance and Sexual Activity    Alcohol use: Yes     Comment: socially    Drug use: Not on file    Sexual activity: Not on file   Other Topics Concern    Not on file   Social History Narrative    Not on file       Past Surgical History:   Procedure Laterality Date    CARDIAC CATHETERIZATION      CORONARY ARTERY BYPASS GRAFT         Review of Systems   Constitutional: Negative for chills, fatigue and fever.   HENT: Negative for hearing loss.    Eyes: Negative for photophobia and visual disturbance.   Respiratory: Negative for cough, chest tightness, shortness of breath and wheezing.    Cardiovascular: Negative for chest pain and palpitations.   Gastrointestinal: Negative for constipation, diarrhea, nausea and vomiting.   Endocrine: Negative for cold intolerance and heat intolerance.   Genitourinary: Negative for flank pain.   Musculoskeletal: Positive for gait problem. Negative for neck pain and neck stiffness.   Skin: Negative for wound.   Neurological: Negative for light-headedness, numbness and headaches.   Psychiatric/Behavioral: Negative for sleep disturbance.         Objective:   BP (!) 159/90 (BP Location: Right arm, Patient Position: Sitting, BP Method: Large (Automatic))   Pulse (!) 59   Resp 16   Ht 5' 9" (1.753 m)   Wt 109 kg (240 lb 4.8 oz)   BMI 35.49 kg/m²     X-Ray Calcaneus 2 View Right  Narrative: EXAMINATION:  XR CALCANEUS 2 VIEW RIGHT    CLINICAL HISTORY:  Pain in right foot    TECHNIQUE:  Tangential and lateral views of the right calcaneus were performed.    COMPARISON:  None    FINDINGS:  Dorsal and plantar calcaneal spurs.  Multi articular degenerative changes noted in the ankle and midfoot.  No acute or healing fracture identified involving the calcaneus.  No focal or diffuse soft tissue swelling or radiopaque foreign body identified.  Impression: As above.    Electronically signed by: João Mcnally, " MD  Date:    05/21/2018  Time:    12:06      LOWER EXTREMITY PHYSICAL EXAMINATION    NEUROLOGY: Negative Tinel's Sign and negative Valleix sign. No neurological sensations with compression of the area of Riddle's Nerve in the area of the Abductor Hallucis muscle belly.    ORTHOPEDIC:  Mild to moderate tenderness to palpation of the area around the plantar medial calcaneal tubercle on the right foot. Pains are characterized as sharp and stabbing-like with direct palpation of the area. There is also mild pain to palpation of the immediate plantar aspect of the heel, and mild pains to the lateral band of the fascia. No edema is noted. No fullness is noted. No pains or defects are noted within the plantar fascia at the arch. No plantar fibromas are noted. No defects are noted within the ligament. Dorsiflexion of the MTPJs with simultaneous palpation of the fascia at the arch, worsens the pains. No pains with medial to lateral compression of the heel. Equinus contracture is noted. Palpable posterior heel spurs also noted, slight.  Slight discomfort palpation of distal aspect the Achilles tendon upon its insertion into the heel bone.  No edema is noted.  No fusiform swelling is noted.  No palpable defects noted. Slightly antalgic gait pattern is noted.     DERMATOLOGY: No ecchymosis is noted.  Skin is supple, dry and intact. Skin is supple.  No hyperkeratosis noted. No calluses.  No open wounds or ulcerations are noted.  No palpable plantar fibromas noted.    VASCULAR: On the right foot, the dorsalis pedis pulse is 2/4 and the posterior tibial pulse is 1/4. Capillary refill time is less than 3 seconds. Hair growth is present on the dorsum of the foot and at the digits. No rubor is present. Proximal to distal temperature is warm to warm.    Physical Exam    Assessment:     1. Plantar fasciitis    2. Contracture, right ankle    3. Pain in right foot    4. Calcaneal spur, right    5. Tendonitis, Achilles, right           Plan:     Plantar fasciitis  Contracture, right ankle  Pain in right foot  -     dexamethasone injection 4 mg; Inject 1 mL (4 mg total) into the muscle once.  -     triamcinolone acetonide injection 40 mg; 1 mL (40 mg total) by Tendon Sheath Injection route once.    Improved plantar fasciitis, status post 1 cortisone injection to this point.  He did purchase over-the-counter arch supports.  Continue NSAIDs as needed.  Following sterile preparation with alcohol swab and/or betadine paint, injection was given into and around the area of the right plantar medial calcaneal tubercle, using 25-gauge needle. Injection consisted of approximately 0.5cc of Dexamethasone Sodium Phosphate, 0.5cc of Kenalog-40 and 1cc of 1% Lidocaine w/ Epinephrine. Bandage application thereafter. Patient tolerated procedure well, and without complications or complaints. Patient educated that the area of pathology, might actually be slightly more painful, due to the injection, over the course of the next one to two days.  Follow up as needed or in the event of symptomology.  I did discuss possible initiation outpatient physical therapy given his equinus contracture.      Calcaneal spur, right  Tendonitis, Achilles, right  Mild Achilles tendinitis, right lower extremity due to posterior enthesopathy.  Continue NSAIDs as needed.  Stretching exercises are discussed, taught, and are demonstrates to the patient this afternoon due to concomitant diagnosis of equinus contracture. The relationship between equinus contracture and the other aforementioned pathologies are detailed and outlined to the patient. The patient does acknowledge understanding, and we will embark on a vigorous stretching algorithm for the lower extremity.               Future Appointments   Date Time Provider Department Center   11/19/2018  9:00 [facility]   5/20/2019  8:00 AM [facility] [place]

## 2018-11-19 ENCOUNTER — LAB VISIT (OUTPATIENT)
Dept: LAB | Facility: HOSPITAL | Age: 68
End: 2018-11-19
Attending: INTERNAL MEDICINE
Payer: MEDICARE

## 2018-11-19 DIAGNOSIS — E11.9 TYPE 2 DIABETES MELLITUS WITHOUT COMPLICATION, WITHOUT LONG-TERM CURRENT USE OF INSULIN: ICD-10-CM

## 2018-11-19 DIAGNOSIS — E78.5 HYPERLIPIDEMIA ASSOCIATED WITH TYPE 2 DIABETES MELLITUS: ICD-10-CM

## 2018-11-19 DIAGNOSIS — I25.10 CORONARY ARTERY DISEASE INVOLVING NATIVE CORONARY ARTERY OF NATIVE HEART WITHOUT ANGINA PECTORIS: ICD-10-CM

## 2018-11-19 DIAGNOSIS — I10 ESSENTIAL HYPERTENSION: ICD-10-CM

## 2018-11-19 DIAGNOSIS — E11.69 HYPERLIPIDEMIA ASSOCIATED WITH TYPE 2 DIABETES MELLITUS: ICD-10-CM

## 2018-11-19 DIAGNOSIS — Z95.1 S/P CABG (CORONARY ARTERY BYPASS GRAFT): ICD-10-CM

## 2018-11-19 LAB
ALBUMIN SERPL BCP-MCNC: 3.8 G/DL
ALP SERPL-CCNC: 100 U/L
ALT SERPL W/O P-5'-P-CCNC: 33 U/L
ANION GAP SERPL CALC-SCNC: 10 MMOL/L
AST SERPL-CCNC: 28 U/L
BILIRUB SERPL-MCNC: 0.9 MG/DL
BUN SERPL-MCNC: 16 MG/DL
CALCIUM SERPL-MCNC: 9.9 MG/DL
CHLORIDE SERPL-SCNC: 102 MMOL/L
CHOLEST SERPL-MCNC: 145 MG/DL
CHOLEST/HDLC SERPL: 3.5 {RATIO}
CO2 SERPL-SCNC: 28 MMOL/L
CREAT SERPL-MCNC: 1.2 MG/DL
EST. GFR  (AFRICAN AMERICAN): >60 ML/MIN/1.73 M^2
EST. GFR  (NON AFRICAN AMERICAN): >60 ML/MIN/1.73 M^2
ESTIMATED AVG GLUCOSE: 157 MG/DL
GLUCOSE SERPL-MCNC: 190 MG/DL
HBA1C MFR BLD HPLC: 7.1 %
HDLC SERPL-MCNC: 42 MG/DL
HDLC SERPL: 29 %
LDLC SERPL CALC-MCNC: 79 MG/DL
NONHDLC SERPL-MCNC: 103 MG/DL
POTASSIUM SERPL-SCNC: 4 MMOL/L
PROT SERPL-MCNC: 6.8 G/DL
SODIUM SERPL-SCNC: 140 MMOL/L
TRIGL SERPL-MCNC: 120 MG/DL

## 2018-11-19 PROCEDURE — 80061 LIPID PANEL: CPT | Mod: PO

## 2018-11-19 PROCEDURE — 83036 HEMOGLOBIN GLYCOSYLATED A1C: CPT

## 2018-11-19 PROCEDURE — 80053 COMPREHEN METABOLIC PANEL: CPT | Mod: PO

## 2018-11-19 PROCEDURE — 36415 COLL VENOUS BLD VENIPUNCTURE: CPT | Mod: PO

## 2018-11-27 ENCOUNTER — OFFICE VISIT (OUTPATIENT)
Dept: CARDIOLOGY | Facility: CLINIC | Age: 68
End: 2018-11-27
Payer: MEDICARE

## 2018-11-27 ENCOUNTER — CLINICAL SUPPORT (OUTPATIENT)
Dept: CARDIOLOGY | Facility: CLINIC | Age: 68
End: 2018-11-27
Payer: MEDICARE

## 2018-11-27 VITALS
DIASTOLIC BLOOD PRESSURE: 90 MMHG | HEART RATE: 56 BPM | SYSTOLIC BLOOD PRESSURE: 138 MMHG | HEIGHT: 69 IN | WEIGHT: 243.19 LBS | BODY MASS INDEX: 36.02 KG/M2

## 2018-11-27 DIAGNOSIS — I25.10 CORONARY ARTERY DISEASE INVOLVING NATIVE CORONARY ARTERY OF NATIVE HEART WITHOUT ANGINA PECTORIS: ICD-10-CM

## 2018-11-27 DIAGNOSIS — E11.9 TYPE 2 DIABETES MELLITUS WITHOUT COMPLICATION, WITHOUT LONG-TERM CURRENT USE OF INSULIN: ICD-10-CM

## 2018-11-27 DIAGNOSIS — Z95.1 S/P CABG (CORONARY ARTERY BYPASS GRAFT): ICD-10-CM

## 2018-11-27 DIAGNOSIS — E11.59 HYPERTENSION ASSOCIATED WITH DIABETES: ICD-10-CM

## 2018-11-27 DIAGNOSIS — E11.69 HYPERLIPIDEMIA ASSOCIATED WITH TYPE 2 DIABETES MELLITUS: ICD-10-CM

## 2018-11-27 DIAGNOSIS — E78.5 HYPERLIPIDEMIA ASSOCIATED WITH TYPE 2 DIABETES MELLITUS: ICD-10-CM

## 2018-11-27 DIAGNOSIS — I25.10 CORONARY ARTERY DISEASE INVOLVING NATIVE CORONARY ARTERY OF NATIVE HEART WITHOUT ANGINA PECTORIS: Primary | ICD-10-CM

## 2018-11-27 DIAGNOSIS — I25.2 OLD MI (MYOCARDIAL INFARCTION): ICD-10-CM

## 2018-11-27 DIAGNOSIS — I15.2 HYPERTENSION ASSOCIATED WITH DIABETES: ICD-10-CM

## 2018-11-27 PROCEDURE — 93000 ELECTROCARDIOGRAM COMPLETE: CPT | Mod: S$GLB,,, | Performed by: INTERNAL MEDICINE

## 2018-11-27 PROCEDURE — 3045F PR MOST RECENT HEMOGLOBIN A1C LEVEL 7.0-9.0%: CPT | Mod: CPTII,S$GLB,, | Performed by: NURSE PRACTITIONER

## 2018-11-27 PROCEDURE — 99999 PR PBB SHADOW E&M-EST. PATIENT-LVL III: CPT | Mod: PBBFAC,,, | Performed by: NURSE PRACTITIONER

## 2018-11-27 PROCEDURE — 1101F PT FALLS ASSESS-DOCD LE1/YR: CPT | Mod: CPTII,S$GLB,, | Performed by: NURSE PRACTITIONER

## 2018-11-27 PROCEDURE — 99214 OFFICE O/P EST MOD 30 MIN: CPT | Mod: S$GLB,,, | Performed by: NURSE PRACTITIONER

## 2018-11-27 PROCEDURE — 3075F SYST BP GE 130 - 139MM HG: CPT | Mod: CPTII,S$GLB,, | Performed by: NURSE PRACTITIONER

## 2018-11-27 PROCEDURE — 3080F DIAST BP >= 90 MM HG: CPT | Mod: CPTII,S$GLB,, | Performed by: NURSE PRACTITIONER

## 2018-11-27 PROCEDURE — 99499 UNLISTED E&M SERVICE: CPT | Mod: S$GLB,,, | Performed by: NURSE PRACTITIONER

## 2018-11-27 NOTE — PROGRESS NOTES
Subjective:   Patient ID:  Deniz Paulino is a 68 y.o. male who presents for follow up of Coronary Artery Disease      HPI  Mr. Paulino has PMH of HTN,HLP,  DM, CAD, old MI, CABG x4 in 2003. He has been doing well since last visit. Had issues with myalgias on high dose Statin, but improved after decreasing dose. Patient still trying to maintain active lifestyle.   BP well controlled on current regimen. Runs 110-120's/70-80's at home.  Has no chest pain, SOB, orthopnea, PND, dizziness, near syncope, syncope, palpitations, edema. Lipids look good. CMP stable. A1C 7.1 Completed a course of steroid for plantar fascitis     Past Medical History:   Diagnosis Date    Acute coronary syndrome     Coronary artery disease     Diabetes mellitus 4/24/2014    Hyperlipidemia     Hypertension     Old MI (myocardial infarction) 4/24/2014    S/P CABG (coronary artery bypass graft) 4/24/2014       Past Surgical History:   Procedure Laterality Date    CARDIAC CATHETERIZATION      CORONARY ARTERY BYPASS GRAFT         Social History     Tobacco Use    Smoking status: Former Smoker     Packs/day: 1.00     Years: 30.00     Pack years: 30.00     Types: Cigarettes     Last attempt to quit: 2/1/2003     Years since quitting: 15.8    Smokeless tobacco: Never Used   Substance Use Topics    Alcohol use: Yes     Comment: socially    Drug use: Not on file       Family History   Problem Relation Age of Onset    Diabetes Mother     Heart murmur Mother     Cancer Mother         Breast    Stroke Mother     Alcohol abuse Father     Heart attack Father 63    Cancer Father         Esophageal       Current Outpatient Medications   Medication Sig    amLODIPine (NORVASC) 5 MG tablet Take 1 tablet (5 mg total) by mouth once daily.    aspirin (ECOTRIN) 325 MG EC tablet Take 325 mg by mouth once daily.    atorvastatin (LIPITOR) 40 MG tablet Take 1 tablet (40 mg total) by mouth once daily.    coenzyme Q10 200 mg capsule Take 200 mg by  mouth 2 (two) times daily.    cyanocobalamin (VITAMIN B-12) 1000 MCG tablet Take 1,000 mcg by mouth once daily.     esomeprazole (NEXIUM) 20 MG capsule Take 20 mg by mouth before breakfast.    fluorouracil (EFUDEX) 5 % cream Apply topically 2 (two) times daily as needed.    hydroCHLOROthiazide (HYDRODIURIL) 12.5 MG Tab TAKE ONE TABLET BY MOUTH ONCE DAILY    lisinopril (PRINIVIL,ZESTRIL) 20 MG tablet Take 1 tablet (20 mg total) by mouth once daily.    metFORMIN (GLUCOPHAGE) 500 MG tablet Take 1 tablet (500 mg total) by mouth 2 (two) times daily with meals.    metoprolol succinate (TOPROL-XL) 50 MG 24 hr tablet Take 1 tablet (50 mg total) by mouth once daily.    vitamin D 1000 units Tab Take 2,000 Units by mouth once daily.    calcipotriene (DOVONOX) 0.005 % cream Apply topically 2 (two) times daily as needed. For pre skin cancer    LORATADINE (ALAVERT ORAL) Take 1 tablet by mouth as needed.     No current facility-administered medications for this visit.      Current Outpatient Medications on File Prior to Visit   Medication Sig    amLODIPine (NORVASC) 5 MG tablet Take 1 tablet (5 mg total) by mouth once daily.    aspirin (ECOTRIN) 325 MG EC tablet Take 325 mg by mouth once daily.    atorvastatin (LIPITOR) 40 MG tablet Take 1 tablet (40 mg total) by mouth once daily.    coenzyme Q10 200 mg capsule Take 200 mg by mouth 2 (two) times daily.    cyanocobalamin (VITAMIN B-12) 1000 MCG tablet Take 1,000 mcg by mouth once daily.     esomeprazole (NEXIUM) 20 MG capsule Take 20 mg by mouth before breakfast.    fluorouracil (EFUDEX) 5 % cream Apply topically 2 (two) times daily as needed.    hydroCHLOROthiazide (HYDRODIURIL) 12.5 MG Tab TAKE ONE TABLET BY MOUTH ONCE DAILY    lisinopril (PRINIVIL,ZESTRIL) 20 MG tablet Take 1 tablet (20 mg total) by mouth once daily.    metFORMIN (GLUCOPHAGE) 500 MG tablet Take 1 tablet (500 mg total) by mouth 2 (two) times daily with meals.    metoprolol succinate  (TOPROL-XL) 50 MG 24 hr tablet Take 1 tablet (50 mg total) by mouth once daily.    vitamin D 1000 units Tab Take 2,000 Units by mouth once daily.    calcipotriene (DOVONOX) 0.005 % cream Apply topically 2 (two) times daily as needed. For pre skin cancer    LORATADINE (ALAVERT ORAL) Take 1 tablet by mouth as needed.    [DISCONTINUED] methylPREDNISolone (MEDROL, CONCHA,) 4 mg tablet use as directed    [DISCONTINUED] naproxen sodium (ANAPROX) 220 MG tablet Take 440 mg by mouth 2 (two) times daily.     No current facility-administered medications on file prior to visit.        Review of Systems   Constitution: Negative for diaphoresis, weakness, malaise/fatigue, weight gain and weight loss.   HENT: Negative for congestion and nosebleeds.    Cardiovascular: Negative for chest pain, claudication, cyanosis, dyspnea on exertion, irregular heartbeat, leg swelling, near-syncope, orthopnea, palpitations, paroxysmal nocturnal dyspnea and syncope.   Respiratory: Negative for cough, hemoptysis, shortness of breath, sleep disturbances due to breathing, snoring, sputum production and wheezing.    Hematologic/Lymphatic: Negative for bleeding problem. Does not bruise/bleed easily.   Skin: Negative for rash.   Musculoskeletal: Negative for arthritis, back pain, falls, joint pain, muscle cramps and muscle weakness.   Gastrointestinal: Negative for abdominal pain, constipation, diarrhea, heartburn, hematemesis, hematochezia, melena and nausea.   Genitourinary: Negative for dysuria, hematuria and nocturia.   Neurological: Negative for excessive daytime sleepiness, dizziness, headaches, light-headedness, loss of balance, numbness and vertigo.       Objective:   Physical Exam   Constitutional: He is oriented to person, place, and time. He appears well-developed and well-nourished.   Neck: Neck supple. No JVD present.   Cardiovascular: Normal rate, regular rhythm, normal heart sounds and normal pulses. Exam reveals no friction rub.   No  "murmur heard.  Pulmonary/Chest: Effort normal and breath sounds normal. No respiratory distress. He has no wheezes. He has no rales.   Sternal scar    Abdominal: Soft. Bowel sounds are normal. He exhibits no distension.   Musculoskeletal: He exhibits no edema or tenderness.   Neurological: He is alert and oriented to person, place, and time.   Skin: Skin is warm and dry. No rash noted.   Psychiatric: He has a normal mood and affect. His behavior is normal.   Nursing note and vitals reviewed.    Vitals:    11/27/18 1036   BP: (!) 138/90   BP Location: Right arm   Patient Position: Sitting   Pulse: (!) 56   Weight: 110.3 kg (243 lb 2.7 oz)   Height: 5' 9" (1.753 m)     Lab Results   Component Value Date    CHOL 145 11/19/2018    CHOL 144 05/18/2018    CHOL 147 10/30/2017     Lab Results   Component Value Date    HDL 42 11/19/2018    HDL 54 05/18/2018    HDL 45 10/30/2017     Lab Results   Component Value Date    LDLCALC 79.0 11/19/2018    LDLCALC 66.2 05/18/2018    LDLCALC 81.2 10/30/2017     Lab Results   Component Value Date    TRIG 120 11/19/2018    TRIG 119 05/18/2018    TRIG 104 10/30/2017     Lab Results   Component Value Date    CHOLHDL 29.0 11/19/2018    CHOLHDL 37.5 05/18/2018    CHOLHDL 30.6 10/30/2017       Chemistry        Component Value Date/Time     11/19/2018 0901    K 4.0 11/19/2018 0901     11/19/2018 0901    CO2 28 11/19/2018 0901    BUN 16 11/19/2018 0901    CREATININE 1.2 11/19/2018 0901     (H) 11/19/2018 0901        Component Value Date/Time    CALCIUM 9.9 11/19/2018 0901    ALKPHOS 100 11/19/2018 0901    AST 28 11/19/2018 0901    ALT 33 11/19/2018 0901    BILITOT 0.9 11/19/2018 0901    ESTGFRAFRICA >60 11/19/2018 0901    EGFRNONAA >60 11/19/2018 0901          Lab Results   Component Value Date    TSH 2.721 05/22/2017     Lab Results   Component Value Date    INR 1.0 09/19/2009     Lab Results   Component Value Date    WBC 6.60 05/22/2017    HGB 15.0 05/22/2017    HCT 43.7 " 05/22/2017    MCV 91 05/22/2017     05/22/2017     BMP  Sodium   Date Value Ref Range Status   11/19/2018 140 136 - 145 mmol/L Final     Potassium   Date Value Ref Range Status   11/19/2018 4.0 3.5 - 5.1 mmol/L Final     Chloride   Date Value Ref Range Status   11/19/2018 102 95 - 110 mmol/L Final     CO2   Date Value Ref Range Status   11/19/2018 28 23 - 29 mmol/L Final     BUN, Bld   Date Value Ref Range Status   11/19/2018 16 8 - 23 mg/dL Final     Creatinine   Date Value Ref Range Status   11/19/2018 1.2 0.5 - 1.4 mg/dL Final     Calcium   Date Value Ref Range Status   11/19/2018 9.9 8.7 - 10.5 mg/dL Final     Anion Gap   Date Value Ref Range Status   11/19/2018 10 8 - 16 mmol/L Final     eGFR if    Date Value Ref Range Status   11/19/2018 >60 >60 mL/min/1.73 m^2 Final     eGFR if non    Date Value Ref Range Status   11/19/2018 >60 >60 mL/min/1.73 m^2 Final     Comment:     Calculation used to obtain the estimated glomerular filtration  rate (eGFR) is the CKD-EPI equation.        CrCl cannot be calculated (Patient's most recent lab result is older than the maximum 7 days allowed.).    Assessment:     1. Coronary artery disease involving native coronary artery of native heart without angina pectoris    2. Hyperlipidemia associated with type 2 diabetes mellitus    3. Hypertension associated with diabetes    4. Old MI (myocardial infarction)    5. S/P CABG (coronary artery bypass graft)    6. Type 2 diabetes mellitus without complication, without long-term current use of insulin      No angina or equivalent  No abnormal bleeding on DAPT  Lipids look good. CMP stable and A1C 7.1   Had issues with myalgias in the past when he was on high dose statin, but has improved since decreasing dose   BP stable on current regimen   Good activity tolerance     Plan:   Recommendation made for patient to follow up in 6 months for CAD, but he requests to be followed annually. I have advised  him to reach out to me if he has issues before then   Heart healthy diet  Exercise routine  Weight loss  Diabetes management

## 2018-11-29 ENCOUNTER — OFFICE VISIT (OUTPATIENT)
Dept: INTERNAL MEDICINE | Facility: CLINIC | Age: 68
End: 2018-11-29
Payer: MEDICARE

## 2018-11-29 VITALS
BODY MASS INDEX: 36.27 KG/M2 | SYSTOLIC BLOOD PRESSURE: 138 MMHG | HEART RATE: 63 BPM | OXYGEN SATURATION: 99 % | TEMPERATURE: 98 F | DIASTOLIC BLOOD PRESSURE: 88 MMHG | WEIGHT: 245.56 LBS

## 2018-11-29 DIAGNOSIS — I25.10 CORONARY ARTERY DISEASE INVOLVING NATIVE CORONARY ARTERY OF NATIVE HEART WITHOUT ANGINA PECTORIS: ICD-10-CM

## 2018-11-29 DIAGNOSIS — E78.5 HYPERLIPIDEMIA ASSOCIATED WITH TYPE 2 DIABETES MELLITUS: Primary | ICD-10-CM

## 2018-11-29 DIAGNOSIS — E11.9 TYPE 2 DIABETES MELLITUS WITHOUT COMPLICATION, WITHOUT LONG-TERM CURRENT USE OF INSULIN: ICD-10-CM

## 2018-11-29 DIAGNOSIS — I15.2 HYPERTENSION ASSOCIATED WITH DIABETES: ICD-10-CM

## 2018-11-29 DIAGNOSIS — E11.69 HYPERLIPIDEMIA ASSOCIATED WITH TYPE 2 DIABETES MELLITUS: Primary | ICD-10-CM

## 2018-11-29 DIAGNOSIS — E11.59 HYPERTENSION ASSOCIATED WITH DIABETES: ICD-10-CM

## 2018-11-29 PROCEDURE — 1101F PT FALLS ASSESS-DOCD LE1/YR: CPT | Mod: CPTII,S$GLB,, | Performed by: INTERNAL MEDICINE

## 2018-11-29 PROCEDURE — 99499 UNLISTED E&M SERVICE: CPT | Mod: S$GLB,,, | Performed by: INTERNAL MEDICINE

## 2018-11-29 PROCEDURE — 99214 OFFICE O/P EST MOD 30 MIN: CPT | Mod: 25,S$GLB,, | Performed by: INTERNAL MEDICINE

## 2018-11-29 PROCEDURE — 3075F SYST BP GE 130 - 139MM HG: CPT | Mod: CPTII,S$GLB,, | Performed by: INTERNAL MEDICINE

## 2018-11-29 PROCEDURE — G0008 ADMIN INFLUENZA VIRUS VAC: HCPCS | Mod: S$GLB,,, | Performed by: INTERNAL MEDICINE

## 2018-11-29 PROCEDURE — 3079F DIAST BP 80-89 MM HG: CPT | Mod: CPTII,S$GLB,, | Performed by: INTERNAL MEDICINE

## 2018-11-29 PROCEDURE — 99999 PR PBB SHADOW E&M-EST. PATIENT-LVL III: CPT | Mod: PBBFAC,,, | Performed by: INTERNAL MEDICINE

## 2018-11-29 PROCEDURE — 90662 IIV NO PRSV INCREASED AG IM: CPT | Mod: S$GLB,,, | Performed by: INTERNAL MEDICINE

## 2018-11-29 PROCEDURE — 3045F PR MOST RECENT HEMOGLOBIN A1C LEVEL 7.0-9.0%: CPT | Mod: CPTII,S$GLB,, | Performed by: INTERNAL MEDICINE

## 2018-11-29 RX ORDER — METFORMIN HYDROCHLORIDE 1000 MG/1
1000 TABLET ORAL 2 TIMES DAILY WITH MEALS
Qty: 180 TABLET | Refills: 1 | Status: SHIPPED | OUTPATIENT
Start: 2018-11-29 | End: 2019-05-22 | Stop reason: SDUPTHER

## 2018-11-30 NOTE — PROGRESS NOTES
Subjective:      Patient ID: Deniz Paulino is a 68 y.o. male.    Chief Complaint: No chief complaint on file.    HPI   67 yo with   Patient Active Problem List   Diagnosis    CAD (coronary artery disease)    Hypertension associated with diabetes    Hyperlipidemia associated with type 2 diabetes mellitus    S/P CABG (coronary artery bypass graft)    Old MI (myocardial infarction)    Diabetes mellitus     Past Medical History:   Diagnosis Date    Acute coronary syndrome     Coronary artery disease     Diabetes mellitus 4/24/2014    Hyperlipidemia     Hypertension     Old MI (myocardial infarction) 4/24/2014    S/P CABG (coronary artery bypass graft) 4/24/2014       Here today for management of mult med problems as outlined below.  Compliant with meds without significant side effects. Admits to diet noncomplance. Exercising less due to foot pain.   Review of Systems   Constitutional: Negative for activity change and unexpected weight change.   HENT: Negative for hearing loss, rhinorrhea and trouble swallowing.    Eyes: Negative for discharge and visual disturbance.   Respiratory: Negative for chest tightness and wheezing.    Cardiovascular: Negative for chest pain and palpitations.   Gastrointestinal: Negative for blood in stool, constipation, diarrhea and vomiting.   Endocrine: Negative for polydipsia and polyuria.   Genitourinary: Negative for difficulty urinating, hematuria and urgency.   Musculoskeletal: Negative for arthralgias, joint swelling and neck pain.   Neurological: Negative for weakness and headaches.   Psychiatric/Behavioral: Negative for confusion and dysphoric mood.     Objective:   /88 (BP Location: Right arm, Patient Position: Sitting)   Pulse 63   Temp 97.7 °F (36.5 °C) (Oral)   Wt 111.4 kg (245 lb 9.5 oz)   SpO2 99%   BMI 36.27 kg/m²     Physical Exam   Constitutional: He is oriented to person, place, and time. He appears well-developed and well-nourished. No distress.    HENT:   Head: Normocephalic and atraumatic.   Mouth/Throat: Oropharynx is clear and moist.   Eyes: EOM are normal. Pupils are equal, round, and reactive to light.   Neck: Neck supple. No thyromegaly present.   Cardiovascular: Normal rate and regular rhythm.   Pulmonary/Chest: Breath sounds normal. He has no wheezes. He has no rales.   Abdominal: Soft. Bowel sounds are normal. There is no tenderness.   Lymphadenopathy:     He has no cervical adenopathy.   Neurological: He is alert and oriented to person, place, and time.   Skin: Skin is warm and dry.   Psychiatric: He has a normal mood and affect. His behavior is normal.       Lab Visit on 11/19/2018   Component Date Value Ref Range Status    Hemoglobin A1C 11/19/2018 7.1* 4.0 - 5.6 % Final    Comment: ADA Screening Guidelines:  5.7-6.4%  Consistent with prediabetes  >or=6.5%  Consistent with diabetes  High levels of fetal hemoglobin interfere with the HbA1C  assay. Heterozygous hemoglobin variants (HbS, HgC, etc)do  not significantly interfere with this assay.   However, presence of multiple variants may affect accuracy.      Estimated Avg Glucose 11/19/2018 157* 68 - 131 mg/dL Final    Sodium 11/19/2018 140  136 - 145 mmol/L Final    Potassium 11/19/2018 4.0  3.5 - 5.1 mmol/L Final    Chloride 11/19/2018 102  95 - 110 mmol/L Final    CO2 11/19/2018 28  23 - 29 mmol/L Final    Glucose 11/19/2018 190* 70 - 110 mg/dL Final    BUN, Bld 11/19/2018 16  8 - 23 mg/dL Final    Creatinine 11/19/2018 1.2  0.5 - 1.4 mg/dL Final    Calcium 11/19/2018 9.9  8.7 - 10.5 mg/dL Final    Total Protein 11/19/2018 6.8  6.0 - 8.4 g/dL Final    Albumin 11/19/2018 3.8  3.5 - 5.2 g/dL Final    Total Bilirubin 11/19/2018 0.9  0.1 - 1.0 mg/dL Final    Comment: For infants and newborns, interpretation of results should be based  on gestational age, weight and in agreement with clinical  observations.  Premature Infant recommended reference ranges:  Up to 24  hours.............<8.0 mg/dL  Up to 48 hours............<12.0 mg/dL  3-5 days..................<15.0 mg/dL  6-29 days.................<15.0 mg/dL      Alkaline Phosphatase 11/19/2018 100  55 - 135 U/L Final    AST 11/19/2018 28  10 - 40 U/L Final    ALT 11/19/2018 33  10 - 44 U/L Final    Anion Gap 11/19/2018 10  8 - 16 mmol/L Final    eGFR if African American 11/19/2018 >60  >60 mL/min/1.73 m^2 Final    eGFR if non African American 11/19/2018 >60  >60 mL/min/1.73 m^2 Final    Comment: Calculation used to obtain the estimated glomerular filtration  rate (eGFR) is the CKD-EPI equation.       Cholesterol 11/19/2018 145  120 - 199 mg/dL Final    Comment: The National Cholesterol Education Program (NCEP) has set the  following guidelines (reference ranges) for Cholesterol:  Optimal.....................<200 mg/dL  Borderline High.............200-239 mg/dL  High........................> or = 240 mg/dL      Triglycerides 11/19/2018 120  30 - 150 mg/dL Final    Comment: The National Cholesterol Education Program (NCEP) has set the  following guidelines (reference values) for triglycerides:  Normal......................<150 mg/dL  Borderline High.............150-199 mg/dL  High........................200-499 mg/dL      HDL 11/19/2018 42  40 - 75 mg/dL Final    Comment: The National Cholesterol Education Program (NCEP) has set the  following guidelines (reference values) for HDL Cholesterol:  Low...............<40 mg/dL  Optimal...........>60 mg/dL      LDL Cholesterol 11/19/2018 79.0  63.0 - 159.0 mg/dL Final    Comment: The National Cholesterol Education Program (NCEP) has set the  following guidelines (reference values) for LDL Cholesterol:  Optimal.......................<130 mg/dL  Borderline High...............130-159 mg/dL  High..........................160-189 mg/dL  Very High.....................>190 mg/dL      HDL/Chol Ratio 11/19/2018 29.0  20.0 - 50.0 % Final    Total Cholesterol/HDL Ratio 11/19/2018  3.5  2.0 - 5.0 Final    Non-HDL Cholesterol 11/19/2018 103  mg/dL Final    Comment: Risk category and Non-HDL cholesterol goals:  Coronary heart disease (CHD)or equivalent (10-year risk of CHD >20%):  Non-HDL cholesterol goal     <130 mg/dL  Two or more CHD risk factors and 10-year risk of CHD <= 20%:  Non-HDL cholesterol goal     <160 mg/dL  0 to 1 CHD risk factor:  Non-HDL cholesterol goal     <190 mg/dL     .  Assessment:     1. Hyperlipidemia associated with type 2 diabetes mellitus    2. Hypertension associated with diabetes    3. Coronary artery disease involving native coronary artery of native heart without angina pectoris    4. Type 2 diabetes mellitus without complication, without long-term current use of insulin      Plan:   Hyperlipidemia associated with type 2 diabetes mellitus  Stable    Hypertension associated with diabetes  Controlled    Coronary artery disease involving native coronary artery of native heart without angina pectoris  Stable    Type 2 diabetes mellitus without complication, without long-term current use of insulin  Not at goal  Increase metormin to 1000 bid  -     metFORMIN (GLUCOPHAGE) 1000 MG tablet; Take 1 tablet (1,000 mg total) by mouth 2 (two) times daily with meals.  Dispense: 180 tablet; Refill: 1    Other orders  -     Influenza - High Dose (65+) (PF) (IM)  -     Cancel: Influenza - High Dose (65+) (PF) (IM)        Lab Frequency Next Occurrence   Comprehensive metabolic panel Once 05/21/2019   Lipid panel Once 05/21/2019   CBC auto differential Once 05/21/2019   PSA, Screening Once 05/21/2019   TSH Once 05/21/2019   Hemoglobin A1c Once 05/21/2019       Problem List Items Addressed This Visit        Cardiac/Vascular    CAD (coronary artery disease)    Hypertension associated with diabetes    Relevant Medications    metFORMIN (GLUCOPHAGE) 1000 MG tablet    Hyperlipidemia associated with type 2 diabetes mellitus - Primary    Relevant Medications    metFORMIN (GLUCOPHAGE) 1000  MG tablet       Endocrine    Diabetes mellitus    Relevant Medications    metFORMIN (GLUCOPHAGE) 1000 MG tablet          Follow-up in about 6 months (around 5/29/2019), or if symptoms worsen or fail to improve.

## 2018-11-30 NOTE — PROGRESS NOTES
Patient, Deniz Paulino (MRN #[id number]), presented with a recorded BMI of 36.27 kg/m^2 and a documented comorbidity(s):  - Diabetes Mellitus Type 2  - Hypertension  - Hyperlipidemia  to which the severe obesity is a contributing factor. This is consistent with the definition of severe obesity (BMI 35.0-39.9) with comorbidity (ICD-10 E66.01, Z68.35). The patient's severe obesity was monitored, evaluated, addressed and/or treated. This addendum to the medical record is made on 11/30/2018.

## 2018-12-07 DIAGNOSIS — Z95.1 S/P CABG (CORONARY ARTERY BYPASS GRAFT): ICD-10-CM

## 2018-12-07 DIAGNOSIS — I10 ESSENTIAL HYPERTENSION: ICD-10-CM

## 2018-12-07 DIAGNOSIS — I25.10 CORONARY ARTERY DISEASE INVOLVING NATIVE CORONARY ARTERY OF NATIVE HEART WITHOUT ANGINA PECTORIS: ICD-10-CM

## 2018-12-07 RX ORDER — HYDROCHLOROTHIAZIDE 12.5 MG/1
TABLET ORAL
Qty: 30 TABLET | Refills: 6 | Status: SHIPPED | OUTPATIENT
Start: 2018-12-07 | End: 2019-06-24 | Stop reason: SDUPTHER

## 2019-03-16 RX ORDER — AMLODIPINE BESYLATE 5 MG/1
TABLET ORAL
Qty: 90 TABLET | Refills: 3 | Status: SHIPPED | OUTPATIENT
Start: 2019-03-16 | End: 2020-03-12 | Stop reason: SDUPTHER

## 2019-04-26 ENCOUNTER — LAB VISIT (OUTPATIENT)
Dept: LAB | Facility: HOSPITAL | Age: 69
End: 2019-04-26
Attending: INTERNAL MEDICINE
Payer: MEDICARE

## 2019-04-26 ENCOUNTER — OFFICE VISIT (OUTPATIENT)
Dept: INTERNAL MEDICINE | Facility: CLINIC | Age: 69
End: 2019-04-26
Payer: MEDICARE

## 2019-04-26 VITALS
SYSTOLIC BLOOD PRESSURE: 138 MMHG | DIASTOLIC BLOOD PRESSURE: 75 MMHG | OXYGEN SATURATION: 98 % | TEMPERATURE: 97 F | WEIGHT: 231.69 LBS | BODY MASS INDEX: 34.22 KG/M2 | HEART RATE: 71 BPM

## 2019-04-26 DIAGNOSIS — I15.2 HYPERTENSION ASSOCIATED WITH DIABETES: ICD-10-CM

## 2019-04-26 DIAGNOSIS — E11.9 TYPE 2 DIABETES MELLITUS WITHOUT COMPLICATION, WITHOUT LONG-TERM CURRENT USE OF INSULIN: ICD-10-CM

## 2019-04-26 DIAGNOSIS — E11.59 HYPERTENSION ASSOCIATED WITH DIABETES: ICD-10-CM

## 2019-04-26 DIAGNOSIS — E78.5 HYPERLIPIDEMIA ASSOCIATED WITH TYPE 2 DIABETES MELLITUS: ICD-10-CM

## 2019-04-26 DIAGNOSIS — R10.32 LLQ PAIN: Primary | ICD-10-CM

## 2019-04-26 DIAGNOSIS — R10.32 LLQ PAIN: ICD-10-CM

## 2019-04-26 DIAGNOSIS — E11.69 HYPERLIPIDEMIA ASSOCIATED WITH TYPE 2 DIABETES MELLITUS: ICD-10-CM

## 2019-04-26 LAB
ALBUMIN SERPL BCP-MCNC: 3.9 G/DL (ref 3.5–5.2)
ALP SERPL-CCNC: 70 U/L (ref 55–135)
ALT SERPL W/O P-5'-P-CCNC: 29 U/L (ref 10–44)
ANION GAP SERPL CALC-SCNC: 11 MMOL/L (ref 8–16)
AST SERPL-CCNC: 28 U/L (ref 10–40)
BASOPHILS # BLD AUTO: 0.03 K/UL (ref 0–0.2)
BASOPHILS NFR BLD: 0.5 % (ref 0–1.9)
BILIRUB SERPL-MCNC: 0.8 MG/DL (ref 0.1–1)
BUN SERPL-MCNC: 15 MG/DL (ref 8–23)
CALCIUM SERPL-MCNC: 9.5 MG/DL (ref 8.7–10.5)
CHLORIDE SERPL-SCNC: 102 MMOL/L (ref 95–110)
CO2 SERPL-SCNC: 27 MMOL/L (ref 23–29)
CREAT SERPL-MCNC: 0.9 MG/DL (ref 0.5–1.4)
DIFFERENTIAL METHOD: ABNORMAL
EOSINOPHIL # BLD AUTO: 0.1 K/UL (ref 0–0.5)
EOSINOPHIL NFR BLD: 1 % (ref 0–8)
ERYTHROCYTE [DISTWIDTH] IN BLOOD BY AUTOMATED COUNT: 12.8 % (ref 11.5–14.5)
EST. GFR  (AFRICAN AMERICAN): >60 ML/MIN/1.73 M^2
EST. GFR  (NON AFRICAN AMERICAN): >60 ML/MIN/1.73 M^2
GLUCOSE SERPL-MCNC: 109 MG/DL (ref 70–110)
HCT VFR BLD AUTO: 41.1 % (ref 40–54)
HGB BLD-MCNC: 14.3 G/DL (ref 14–18)
IMM GRANULOCYTES # BLD AUTO: 0.03 K/UL (ref 0–0.04)
IMM GRANULOCYTES NFR BLD AUTO: 0.5 % (ref 0–0.5)
LYMPHOCYTES # BLD AUTO: 1.3 K/UL (ref 1–4.8)
LYMPHOCYTES NFR BLD: 21.1 % (ref 18–48)
MCH RBC QN AUTO: 32.1 PG (ref 27–31)
MCHC RBC AUTO-ENTMCNC: 34.8 G/DL (ref 32–36)
MCV RBC AUTO: 92 FL (ref 82–98)
MONOCYTES # BLD AUTO: 0.7 K/UL (ref 0.3–1)
MONOCYTES NFR BLD: 10.6 % (ref 4–15)
NEUTROPHILS # BLD AUTO: 4.2 K/UL (ref 1.8–7.7)
NEUTROPHILS NFR BLD: 66.3 % (ref 38–73)
NRBC BLD-RTO: 0 /100 WBC
PLATELET # BLD AUTO: 148 K/UL (ref 150–350)
PMV BLD AUTO: 10.9 FL (ref 9.2–12.9)
POTASSIUM SERPL-SCNC: 3.8 MMOL/L (ref 3.5–5.1)
PROT SERPL-MCNC: 6.5 G/DL (ref 6–8.4)
RBC # BLD AUTO: 4.46 M/UL (ref 4.6–6.2)
SODIUM SERPL-SCNC: 140 MMOL/L (ref 136–145)
WBC # BLD AUTO: 6.31 K/UL (ref 3.9–12.7)

## 2019-04-26 PROCEDURE — 36415 COLL VENOUS BLD VENIPUNCTURE: CPT

## 2019-04-26 PROCEDURE — 99999 PR PBB SHADOW E&M-EST. PATIENT-LVL III: CPT | Mod: PBBFAC,,, | Performed by: INTERNAL MEDICINE

## 2019-04-26 PROCEDURE — 99999 PR PBB SHADOW E&M-EST. PATIENT-LVL III: ICD-10-PCS | Mod: PBBFAC,,, | Performed by: INTERNAL MEDICINE

## 2019-04-26 PROCEDURE — 3075F PR MOST RECENT SYSTOLIC BLOOD PRESS GE 130-139MM HG: ICD-10-PCS | Mod: CPTII,S$GLB,, | Performed by: INTERNAL MEDICINE

## 2019-04-26 PROCEDURE — 99499 RISK ADDL DX/OHS AUDIT: ICD-10-PCS | Mod: S$GLB,,, | Performed by: INTERNAL MEDICINE

## 2019-04-26 PROCEDURE — 3078F PR MOST RECENT DIASTOLIC BLOOD PRESSURE < 80 MM HG: ICD-10-PCS | Mod: CPTII,S$GLB,, | Performed by: INTERNAL MEDICINE

## 2019-04-26 PROCEDURE — 85025 COMPLETE CBC W/AUTO DIFF WBC: CPT

## 2019-04-26 PROCEDURE — 99214 OFFICE O/P EST MOD 30 MIN: CPT | Mod: S$GLB,,, | Performed by: INTERNAL MEDICINE

## 2019-04-26 PROCEDURE — 1101F PR PT FALLS ASSESS DOC 0-1 FALLS W/OUT INJ PAST YR: ICD-10-PCS | Mod: CPTII,S$GLB,, | Performed by: INTERNAL MEDICINE

## 2019-04-26 PROCEDURE — 99214 PR OFFICE/OUTPT VISIT, EST, LEVL IV, 30-39 MIN: ICD-10-PCS | Mod: S$GLB,,, | Performed by: INTERNAL MEDICINE

## 2019-04-26 PROCEDURE — 1101F PT FALLS ASSESS-DOCD LE1/YR: CPT | Mod: CPTII,S$GLB,, | Performed by: INTERNAL MEDICINE

## 2019-04-26 PROCEDURE — 3078F DIAST BP <80 MM HG: CPT | Mod: CPTII,S$GLB,, | Performed by: INTERNAL MEDICINE

## 2019-04-26 PROCEDURE — 80053 COMPREHEN METABOLIC PANEL: CPT

## 2019-04-26 PROCEDURE — 3045F PR MOST RECENT HEMOGLOBIN A1C LEVEL 7.0-9.0%: CPT | Mod: CPTII,S$GLB,, | Performed by: INTERNAL MEDICINE

## 2019-04-26 PROCEDURE — 3045F PR MOST RECENT HEMOGLOBIN A1C LEVEL 7.0-9.0%: ICD-10-PCS | Mod: CPTII,S$GLB,, | Performed by: INTERNAL MEDICINE

## 2019-04-26 PROCEDURE — 99499 UNLISTED E&M SERVICE: CPT | Mod: S$GLB,,, | Performed by: INTERNAL MEDICINE

## 2019-04-26 PROCEDURE — 3075F SYST BP GE 130 - 139MM HG: CPT | Mod: CPTII,S$GLB,, | Performed by: INTERNAL MEDICINE

## 2019-04-26 NOTE — PROGRESS NOTES
Subjective:      Patient ID: Deniz Paulino is a 68 y.o. male.    Chief Complaint: Abdominal Pain (x 6 months, daily, worse at night)    69 yo with   Patient Active Problem List   Diagnosis    CAD (coronary artery disease)    Hypertension associated with diabetes    Hyperlipidemia associated with type 2 diabetes mellitus    S/P CABG (coronary artery bypass graft)    Old MI (myocardial infarction)    Diabetes mellitus     Past Medical History:   Diagnosis Date    Acute coronary syndrome     Coronary artery disease     Diabetes mellitus 4/24/2014    Hyperlipidemia     Hypertension     Old MI (myocardial infarction) 4/24/2014    S/P CABG (coronary artery bypass graft) 4/24/2014     Here today c/o abd pain.  reports last colonoscopy 2012.    Abdominal Pain   This is a new problem. Episode onset: 5 months. The onset quality is gradual. The problem occurs daily. The problem has been waxing and waning. The pain is located in the LLQ. The quality of the pain is aching. The abdominal pain radiates to the periumbilical region. Associated symptoms include diarrhea (intermittent). Pertinent negatives include no belching, constipation, dysuria, fever, frequency, hematochezia, hematuria, melena or nausea. Exacerbated by: lying on left side. The pain is relieved by nothing. He has tried nothing for the symptoms. The treatment provided no relief. There is no history of abdominal surgery, colon cancer, Crohn's disease, gallstones, irritable bowel syndrome, pancreatitis or PUD. Patient's medical history does not include kidney stones.     Review of Systems   Constitutional: Negative for chills and fever.   HENT: Negative for ear pain and sore throat.    Respiratory: Negative for cough.    Cardiovascular: Negative for chest pain.   Gastrointestinal: Positive for abdominal pain and diarrhea (intermittent). Negative for anal bleeding, blood in stool, constipation, hematochezia, melena and nausea.   Genitourinary: Negative  for dysuria, frequency and hematuria.   Skin: Negative for rash and wound.   Neurological: Negative for seizures and syncope.     Objective:   /75   Pulse 71   Temp 97.2 °F (36.2 °C) (Tympanic)   Wt 105.1 kg (231 lb 11.3 oz)   SpO2 98%   BMI 34.22 kg/m²     Physical Exam   Constitutional: He appears well-developed and well-nourished. No distress.   Cardiovascular: Normal rate and regular rhythm.   Pulmonary/Chest: Effort normal and breath sounds normal.   Abdominal: Soft. Bowel sounds are normal. He exhibits no mass. There is no tenderness. There is no rebound and no guarding.   Skin: Skin is warm and dry.   Psychiatric: He has a normal mood and affect. His behavior is normal.       Assessment:     1. LLQ pain    2. Hyperlipidemia associated with type 2 diabetes mellitus    3. Hypertension associated with diabetes    4. Type 2 diabetes mellitus without complication, without long-term current use of insulin      Plan:   LLQ pain  -     CBC auto differential; Future; Expected date: 04/26/2019  -     Comprehensive metabolic panel; Future; Expected date: 04/26/2019  -     CT Abdomen Pelvis W Wo Contrast; Future; Expected date: 04/26/2019  -     Urinalysis; Future; Expected date: 04/26/2019    Hyperlipidemia associated with type 2 diabetes mellitus    Hypertension associated with diabetes    Type 2 diabetes mellitus without complication, without long-term current use of insulin    cont current meds for chronic conditions. Keep f/u appts  Try stool softener or mirilax  Lab Frequency Next Occurrence   Comprehensive metabolic panel Once 05/21/2019   Lipid panel Once 05/21/2019   CBC auto differential Once 05/21/2019   PSA, Screening Once 05/21/2019   TSH Once 05/21/2019   Hemoglobin A1c Once 05/21/2019       Problem List Items Addressed This Visit        Cardiac/Vascular    Hypertension associated with diabetes    Hyperlipidemia associated with type 2 diabetes mellitus       Endocrine    Diabetes mellitus       Other Visit Diagnoses     LLQ pain    -  Primary    Relevant Orders    CBC auto differential    Comprehensive metabolic panel    CT Abdomen Pelvis W Wo Contrast    Urinalysis (Completed)          Follow up if symptoms worsen or fail to improve.

## 2019-05-01 ENCOUNTER — HOSPITAL ENCOUNTER (OUTPATIENT)
Dept: RADIOLOGY | Facility: HOSPITAL | Age: 69
Discharge: HOME | End: 2019-05-01
Attending: INTERNAL MEDICINE
Payer: MEDICARE

## 2019-05-01 ENCOUNTER — TELEPHONE (OUTPATIENT)
Dept: INTERNAL MEDICINE | Facility: CLINIC | Age: 69
End: 2019-05-01

## 2019-05-01 DIAGNOSIS — R10.32 LLQ PAIN: ICD-10-CM

## 2019-05-01 DIAGNOSIS — R10.32 LLQ PAIN: Primary | ICD-10-CM

## 2019-05-01 PROCEDURE — 74178 CT ABD&PLV WO CNTR FLWD CNTR: CPT | Mod: 26,,, | Performed by: RADIOLOGY

## 2019-05-01 PROCEDURE — 25500020 PHARM REV CODE 255: Performed by: INTERNAL MEDICINE

## 2019-05-01 PROCEDURE — 74178 CT ABDOMEN PELVIS W WO CONTRAST: ICD-10-PCS | Mod: 26,,, | Performed by: RADIOLOGY

## 2019-05-01 PROCEDURE — 74178 CT ABD&PLV WO CNTR FLWD CNTR: CPT | Mod: TC

## 2019-05-01 RX ADMIN — IOHEXOL 100 ML: 350 INJECTION, SOLUTION INTRAVENOUS at 01:05

## 2019-05-01 RX ADMIN — IOHEXOL 30 ML: 350 INJECTION, SOLUTION INTRAVENOUS at 12:05

## 2019-05-01 NOTE — TELEPHONE ENCOUNTER
CT of abdomen and pelvis is normal.  Recommend patient see GI clinic if symptoms continue.  Please let me know if referral is needed.

## 2019-05-01 NOTE — TELEPHONE ENCOUNTER
Conveyed result and recommendation to pt who verbalized understanding, stated he would like a referral to GI, and scheduled a GI appt for 5/6/19.

## 2019-05-13 ENCOUNTER — OFFICE VISIT (OUTPATIENT)
Dept: GASTROENTEROLOGY | Facility: CLINIC | Age: 69
End: 2019-05-13
Payer: MEDICARE

## 2019-05-13 VITALS
HEIGHT: 70 IN | HEART RATE: 60 BPM | SYSTOLIC BLOOD PRESSURE: 138 MMHG | WEIGHT: 227.5 LBS | DIASTOLIC BLOOD PRESSURE: 82 MMHG | BODY MASS INDEX: 32.57 KG/M2

## 2019-05-13 DIAGNOSIS — R19.4 ALTERED BOWEL HABITS: ICD-10-CM

## 2019-05-13 DIAGNOSIS — R10.32 LLQ ABDOMINAL PAIN: Primary | ICD-10-CM

## 2019-05-13 DIAGNOSIS — K21.9 GASTROESOPHAGEAL REFLUX DISEASE, ESOPHAGITIS PRESENCE NOT SPECIFIED: ICD-10-CM

## 2019-05-13 DIAGNOSIS — R63.4 WEIGHT LOSS: ICD-10-CM

## 2019-05-13 DIAGNOSIS — Z86.010 HISTORY OF COLON POLYPS: ICD-10-CM

## 2019-05-13 PROCEDURE — 99999 PR PBB SHADOW E&M-EST. PATIENT-LVL III: ICD-10-PCS | Mod: PBBFAC,,, | Performed by: NURSE PRACTITIONER

## 2019-05-13 PROCEDURE — 99204 PR OFFICE/OUTPT VISIT, NEW, LEVL IV, 45-59 MIN: ICD-10-PCS | Mod: S$GLB,,, | Performed by: NURSE PRACTITIONER

## 2019-05-13 PROCEDURE — 1101F PT FALLS ASSESS-DOCD LE1/YR: CPT | Mod: CPTII,S$GLB,, | Performed by: NURSE PRACTITIONER

## 2019-05-13 PROCEDURE — 3079F DIAST BP 80-89 MM HG: CPT | Mod: CPTII,S$GLB,, | Performed by: NURSE PRACTITIONER

## 2019-05-13 PROCEDURE — 1101F PR PT FALLS ASSESS DOC 0-1 FALLS W/OUT INJ PAST YR: ICD-10-PCS | Mod: CPTII,S$GLB,, | Performed by: NURSE PRACTITIONER

## 2019-05-13 PROCEDURE — 3075F SYST BP GE 130 - 139MM HG: CPT | Mod: CPTII,S$GLB,, | Performed by: NURSE PRACTITIONER

## 2019-05-13 PROCEDURE — 3079F PR MOST RECENT DIASTOLIC BLOOD PRESSURE 80-89 MM HG: ICD-10-PCS | Mod: CPTII,S$GLB,, | Performed by: NURSE PRACTITIONER

## 2019-05-13 PROCEDURE — 99999 PR PBB SHADOW E&M-EST. PATIENT-LVL III: CPT | Mod: PBBFAC,,, | Performed by: NURSE PRACTITIONER

## 2019-05-13 PROCEDURE — 3075F PR MOST RECENT SYSTOLIC BLOOD PRESS GE 130-139MM HG: ICD-10-PCS | Mod: CPTII,S$GLB,, | Performed by: NURSE PRACTITIONER

## 2019-05-13 PROCEDURE — 99204 OFFICE O/P NEW MOD 45 MIN: CPT | Mod: S$GLB,,, | Performed by: NURSE PRACTITIONER

## 2019-05-13 RX ORDER — POLYETHYLENE GLYCOL 3350, SODIUM SULFATE ANHYDROUS, SODIUM BICARBONATE, SODIUM CHLORIDE, POTASSIUM CHLORIDE 236; 22.74; 6.74; 5.86; 2.97 G/4L; G/4L; G/4L; G/4L; G/4L
4 POWDER, FOR SOLUTION ORAL ONCE
Qty: 4000 ML | Refills: 0 | Status: SHIPPED | OUTPATIENT
Start: 2019-05-13 | End: 2019-05-13

## 2019-05-13 NOTE — LETTER
May 13, 2019      Hiro Kraft MD  [address]  [place] [address]           [facility]  [id number] [place] [id number]  Phone: [phone]  Fax: [phone]          Patient: Deniz Paulino   MR Number: [id number]   YOB: 1950   Date of Visit: 5/13/2019       Dear Dr. Hiro Kraft:    Thank you for referring Deniz Paulino to me for evaluation. Attached you will find relevant portions of my assessment and plan of care.    If you have questions, please do not hesitate to call me. I look forward to following Deniz Paulino along with you.    Sincerely,    Yuni Bermudez, ANANT    Enclosure  CC:  No Recipients    If you would like to receive this communication electronically, please contact [email or web address] or [phone] to request more information on [employer] access.    For providers and/or their staff who would like to refer a patient to [facility], please contact us through our one-stop-shop provider referral line, [facility], at [phone].    If you feel you have received this communication in error or would no longer like to receive these types of communications, please e-mail [email or web address]

## 2019-05-13 NOTE — PROGRESS NOTES
Clinic Consult:  [facility] Consultation Note    Reason for Consult:  The primary encounter diagnosis was LLQ abdominal pain. Diagnoses of Altered bowel habits, Gastroesophageal reflux disease, esophagitis presence not specified, Weight loss, and History of colon polyps were also pertinent to this visit.    PCP: Hiro Kraft   [address] [facility] / [place] [id number]    HPI:  This is a 68 y.o. male here for evaluation of the above. He was referred by primary care. He presents to clinic with complaints of LLQ pain that started in November 2018. Pain is worse when laying down on his left side. There is no relationship to eating. Around the same time, he also developed altered bowel pattern. He feels that his stools are thinner. No hematochezia or melena. Also has a long history of GERD. Has been taking Nexium for a couple of years. Prior to that, he would take Pepcid. Stopped Nexium a couple of week. Having breakthrough symptoms with Pepcid and Tums. He does report a 20 lb weight loss over the last several months. He reports having a colonoscopy about 7 years ago and did have a couple of polyps. He has never had an EGD previously.     Review of Systems   Constitutional: Positive for weight loss. Negative for fever and malaise/fatigue.   HENT: Negative for sore throat.    Respiratory: Negative for cough and wheezing.    Cardiovascular: Negative for chest pain and palpitations.   Gastrointestinal: Positive for abdominal pain and heartburn. Negative for blood in stool, constipation, diarrhea, melena, nausea and vomiting.        Altered bowel pattern   Genitourinary: Negative for dysuria and frequency.   Musculoskeletal: Negative for back pain, joint pain, myalgias and neck pain.   Skin: Negative for itching and rash.   Neurological: Negative for dizziness, speech change, seizures, loss of consciousness and headaches.   Psychiatric/Behavioral: Negative for depression and substance abuse. The patient is  not nervous/anxious.        Medical History:  has a past medical history of Acute coronary syndrome, Coronary artery disease, Diabetes mellitus (4/24/2014), Hyperlipidemia, Hypertension, Old MI (myocardial infarction) (4/24/2014), and S/P CABG (coronary artery bypass graft) (4/24/2014).    Surgical History:  has a past surgical history that includes Coronary artery bypass graft and Cardiac catheterization.    Family History: family history includes Alcohol abuse in his father; Cancer in his father and mother; Diabetes in his mother; Heart attack (age of onset: 63) in his father; Heart murmur in his mother; Stroke in his mother..     Social History:  reports that he quit smoking about 16 years ago. His smoking use included cigarettes. He has a 30.00 pack-year smoking history. He has never used smokeless tobacco. He reports that he drinks alcohol.    Allergies: Reviewed    Home Medications:   Current Outpatient Medications on File Prior to Visit   Medication Sig Dispense Refill    amLODIPine (NORVASC) 5 MG tablet TAKE 1 TABLET BY MOUTH ONCE DAILY 90 tablet 3    aspirin (ECOTRIN) 325 MG EC tablet Take 325 mg by mouth once daily.      atorvastatin (LIPITOR) 40 MG tablet Take 1 tablet (40 mg total) by mouth once daily. 90 tablet 3    calcipotriene (DOVONOX) 0.005 % cream Apply topically 2 (two) times daily as needed. For pre skin cancer      coenzyme Q10 200 mg capsule Take 200 mg by mouth 2 (two) times daily.      cyanocobalamin (VITAMIN B-12) 1000 MCG tablet Take 1,000 mcg by mouth once daily.       fluorouracil (EFUDEX) 5 % cream Apply topically 2 (two) times daily as needed.      hydroCHLOROthiazide (HYDRODIURIL) 12.5 MG Tab TAKE 1 TABLET BY MOUTH ONCE DAILY 30 tablet 6    lisinopril (PRINIVIL,ZESTRIL) 20 MG tablet Take 1 tablet (20 mg total) by mouth once daily. 90 tablet 3    LORATADINE (ALAVERT ORAL) Take 1 tablet by mouth as needed.      metFORMIN (GLUCOPHAGE) 1000 MG tablet Take 1 tablet (1,000 mg  "total) by mouth 2 (two) times daily with meals. 180 tablet 1    metoprolol succinate (TOPROL-XL) 50 MG 24 hr tablet Take 1 tablet (50 mg total) by mouth once daily. 90 tablet 3    vitamin D 1000 units Tab Take 1,000 Units by mouth once daily.       esomeprazole (NEXIUM) 20 MG capsule Take 20 mg by mouth before breakfast.       No current facility-administered medications on file prior to visit.        Physical Exam:  /82   Pulse 60   Ht 5' 10" (1.778 m)   Wt 103.2 kg (227 lb 8.2 oz)   BMI 32.65 kg/m²   Body mass index is 32.65 kg/m².  Physical Exam   Constitutional: He is oriented to person, place, and time and well-developed, well-nourished, and in no distress. No distress.   HENT:   Head: Normocephalic.   Eyes: Pupils are equal, round, and reactive to light. Conjunctivae are normal.   Cardiovascular: Normal rate, regular rhythm and normal heart sounds.   Pulmonary/Chest: Effort normal and breath sounds normal. No respiratory distress.   Abdominal: Soft. Bowel sounds are normal. He exhibits no distension. There is no tenderness.   Neurological: He is alert and oriented to person, place, and time. No cranial nerve deficit.   Skin: Skin is warm and dry. No rash noted.   Psychiatric: Mood and affect normal.       Labs: Pertinent labs reviewed.    Assessment:  1. LLQ abdominal pain    2. Altered bowel habits    3. Gastroesophageal reflux disease, esophagitis presence not specified    4. Weight loss    5. History of colon polyps         Recommendations:  LLQ abdominal pain  Altered bowel habits  History of colon polyps  - unclear cause of LLQ abdominal pain as it does seem more muscular in nature  - regardless, will plan for colonoscopy for altered bowel pattern   - prior CT scan unrevealing for GI cause of symptoms   -     Case request GI: ESOPHAGOGASTRODUODENOSCOPY (EGD), COLONOSCOPY  -     polyethylene glycol (GOLYTELY,NULYTELY) 236-22.74-6.74 -5.86 gram suspension; Take 4,000 mLs (4 L total) by mouth " once. for 1 dose  Dispense: 4000 mL; Refill: 0    Gastroesophageal reflux disease, esophagitis presence not specified  - restart Nexium OTC  - plan for EGD at time of colonoscopy   -     Case request GI: ESOPHAGOGASTRODUODENOSCOPY (EGD), COLONOSCOPY  -     polyethylene glycol (GOLYTELY,NULYTELY) 236-22.74-6.74 -5.86 gram suspension; Take 4,000 mLs (4 L total) by mouth once. for 1 dose  Dispense: 4000 mL; Refill: 0    Weight loss  - CT scan negative.  - needs colonoscopy   -     Case request GI: ESOPHAGOGASTRODUODENOSCOPY (EGD), COLONOSCOPY  -     polyethylene glycol (GOLYTELY,NULYTELY) 236-22.74-6.74 -5.86 gram suspension; Take 4,000 mLs (4 L total) by mouth once. for 1 dose  Dispense: 4000 mL; Refill: 0    Follow up to be determined after procedure.    Thank you so much for allowing me to participate in the care of DIANA Rucker

## 2019-05-20 ENCOUNTER — LAB VISIT (OUTPATIENT)
Dept: LAB | Facility: HOSPITAL | Age: 69
End: 2019-05-20
Attending: INTERNAL MEDICINE
Payer: MEDICARE

## 2019-05-20 DIAGNOSIS — E11.59 HYPERTENSION ASSOCIATED WITH DIABETES: ICD-10-CM

## 2019-05-20 DIAGNOSIS — E11.69 HYPERLIPIDEMIA ASSOCIATED WITH TYPE 2 DIABETES MELLITUS: ICD-10-CM

## 2019-05-20 DIAGNOSIS — E11.9 TYPE 2 DIABETES MELLITUS WITHOUT COMPLICATION, WITHOUT LONG-TERM CURRENT USE OF INSULIN: ICD-10-CM

## 2019-05-20 DIAGNOSIS — Z12.5 ENCOUNTER FOR SCREENING FOR MALIGNANT NEOPLASM OF PROSTATE: ICD-10-CM

## 2019-05-20 DIAGNOSIS — I15.2 HYPERTENSION ASSOCIATED WITH DIABETES: ICD-10-CM

## 2019-05-20 DIAGNOSIS — E78.5 HYPERLIPIDEMIA ASSOCIATED WITH TYPE 2 DIABETES MELLITUS: ICD-10-CM

## 2019-05-20 LAB
ALBUMIN SERPL BCP-MCNC: 3.9 G/DL (ref 3.5–5.2)
ALP SERPL-CCNC: 69 U/L (ref 55–135)
ALT SERPL W/O P-5'-P-CCNC: 24 U/L (ref 10–44)
ANION GAP SERPL CALC-SCNC: 11 MMOL/L (ref 8–16)
AST SERPL-CCNC: 27 U/L (ref 10–40)
BASOPHILS # BLD AUTO: 0.01 K/UL (ref 0–0.2)
BASOPHILS NFR BLD: 0.2 % (ref 0–1.9)
BILIRUB SERPL-MCNC: 0.9 MG/DL (ref 0.1–1)
BUN SERPL-MCNC: 12 MG/DL (ref 8–23)
CALCIUM SERPL-MCNC: 9.8 MG/DL (ref 8.7–10.5)
CHLORIDE SERPL-SCNC: 102 MMOL/L (ref 95–110)
CHOLEST SERPL-MCNC: 131 MG/DL (ref 120–199)
CHOLEST/HDLC SERPL: 2.5 {RATIO} (ref 2–5)
CO2 SERPL-SCNC: 28 MMOL/L (ref 23–29)
COMPLEXED PSA SERPL-MCNC: 1.3 NG/ML (ref 0–4)
CREAT SERPL-MCNC: 1.1 MG/DL (ref 0.5–1.4)
DIFFERENTIAL METHOD: ABNORMAL
EOSINOPHIL # BLD AUTO: 0.1 K/UL (ref 0–0.5)
EOSINOPHIL NFR BLD: 1.2 % (ref 0–8)
ERYTHROCYTE [DISTWIDTH] IN BLOOD BY AUTOMATED COUNT: 12.5 % (ref 11.5–14.5)
EST. GFR  (AFRICAN AMERICAN): >60 ML/MIN/1.73 M^2
EST. GFR  (NON AFRICAN AMERICAN): >60 ML/MIN/1.73 M^2
ESTIMATED AVG GLUCOSE: 117 MG/DL (ref 68–131)
GLUCOSE SERPL-MCNC: 123 MG/DL (ref 70–110)
HBA1C MFR BLD HPLC: 5.7 % (ref 4–5.6)
HCT VFR BLD AUTO: 42.5 % (ref 40–54)
HDLC SERPL-MCNC: 53 MG/DL (ref 40–75)
HDLC SERPL: 40.5 % (ref 20–50)
HGB BLD-MCNC: 15 G/DL (ref 14–18)
IMM GRANULOCYTES # BLD AUTO: 0.02 K/UL (ref 0–0.04)
IMM GRANULOCYTES NFR BLD AUTO: 0.3 % (ref 0–0.5)
LDLC SERPL CALC-MCNC: 55.8 MG/DL (ref 63–159)
LYMPHOCYTES # BLD AUTO: 1.3 K/UL (ref 1–4.8)
LYMPHOCYTES NFR BLD: 21.2 % (ref 18–48)
MCH RBC QN AUTO: 32.1 PG (ref 27–31)
MCHC RBC AUTO-ENTMCNC: 35.3 G/DL (ref 32–36)
MCV RBC AUTO: 91 FL (ref 82–98)
MONOCYTES # BLD AUTO: 0.7 K/UL (ref 0.3–1)
MONOCYTES NFR BLD: 11.2 % (ref 4–15)
NEUTROPHILS # BLD AUTO: 4 K/UL (ref 1.8–7.7)
NEUTROPHILS NFR BLD: 65.9 % (ref 38–73)
NONHDLC SERPL-MCNC: 78 MG/DL
NRBC BLD-RTO: 0 /100 WBC
PLATELET # BLD AUTO: 172 K/UL (ref 150–350)
PMV BLD AUTO: 11.2 FL (ref 9.2–12.9)
POTASSIUM SERPL-SCNC: 3.9 MMOL/L (ref 3.5–5.1)
PROT SERPL-MCNC: 6.7 G/DL (ref 6–8.4)
RBC # BLD AUTO: 4.67 M/UL (ref 4.6–6.2)
SODIUM SERPL-SCNC: 141 MMOL/L (ref 136–145)
TRIGL SERPL-MCNC: 111 MG/DL (ref 30–150)
TSH SERPL DL<=0.005 MIU/L-ACNC: 1.99 UIU/ML (ref 0.4–4)
WBC # BLD AUTO: 6 K/UL (ref 3.9–12.7)

## 2019-05-20 PROCEDURE — 83036 HEMOGLOBIN GLYCOSYLATED A1C: CPT

## 2019-05-20 PROCEDURE — 36415 COLL VENOUS BLD VENIPUNCTURE: CPT

## 2019-05-20 PROCEDURE — 80053 COMPREHEN METABOLIC PANEL: CPT

## 2019-05-20 PROCEDURE — 84153 ASSAY OF PSA TOTAL: CPT

## 2019-05-20 PROCEDURE — 84443 ASSAY THYROID STIM HORMONE: CPT

## 2019-05-20 PROCEDURE — 85025 COMPLETE CBC W/AUTO DIFF WBC: CPT

## 2019-05-20 PROCEDURE — 80061 LIPID PANEL: CPT

## 2019-05-22 ENCOUNTER — OFFICE VISIT (OUTPATIENT)
Dept: INTERNAL MEDICINE | Facility: CLINIC | Age: 69
End: 2019-05-22
Payer: MEDICARE

## 2019-05-22 VITALS
BODY MASS INDEX: 32.61 KG/M2 | HEART RATE: 63 BPM | DIASTOLIC BLOOD PRESSURE: 92 MMHG | SYSTOLIC BLOOD PRESSURE: 146 MMHG | OXYGEN SATURATION: 98 % | TEMPERATURE: 97 F | WEIGHT: 227.31 LBS

## 2019-05-22 DIAGNOSIS — Z95.1 S/P CABG (CORONARY ARTERY BYPASS GRAFT): ICD-10-CM

## 2019-05-22 DIAGNOSIS — E78.5 HYPERLIPIDEMIA ASSOCIATED WITH TYPE 2 DIABETES MELLITUS: ICD-10-CM

## 2019-05-22 DIAGNOSIS — Z00.00 ROUTINE GENERAL MEDICAL EXAMINATION AT A HEALTH CARE FACILITY: Primary | ICD-10-CM

## 2019-05-22 DIAGNOSIS — E11.59 HYPERTENSION ASSOCIATED WITH DIABETES: ICD-10-CM

## 2019-05-22 DIAGNOSIS — I25.10 CORONARY ARTERY DISEASE INVOLVING NATIVE CORONARY ARTERY OF NATIVE HEART WITHOUT ANGINA PECTORIS: ICD-10-CM

## 2019-05-22 DIAGNOSIS — I25.2 OLD MI (MYOCARDIAL INFARCTION): ICD-10-CM

## 2019-05-22 DIAGNOSIS — E11.69 HYPERLIPIDEMIA ASSOCIATED WITH TYPE 2 DIABETES MELLITUS: ICD-10-CM

## 2019-05-22 DIAGNOSIS — I15.2 HYPERTENSION ASSOCIATED WITH DIABETES: ICD-10-CM

## 2019-05-22 DIAGNOSIS — E11.9 TYPE 2 DIABETES MELLITUS WITHOUT COMPLICATION, WITHOUT LONG-TERM CURRENT USE OF INSULIN: ICD-10-CM

## 2019-05-22 DIAGNOSIS — B35.3 TINEA PEDIS OF BOTH FEET: ICD-10-CM

## 2019-05-22 PROCEDURE — 3077F PR MOST RECENT SYSTOLIC BLOOD PRESSURE >= 140 MM HG: ICD-10-PCS | Mod: CPTII,S$GLB,, | Performed by: INTERNAL MEDICINE

## 2019-05-22 PROCEDURE — 3044F HG A1C LEVEL LT 7.0%: CPT | Mod: CPTII,S$GLB,, | Performed by: INTERNAL MEDICINE

## 2019-05-22 PROCEDURE — 1101F PR PT FALLS ASSESS DOC 0-1 FALLS W/OUT INJ PAST YR: ICD-10-PCS | Mod: CPTII,S$GLB,, | Performed by: INTERNAL MEDICINE

## 2019-05-22 PROCEDURE — 3080F PR MOST RECENT DIASTOLIC BLOOD PRESSURE >= 90 MM HG: ICD-10-PCS | Mod: CPTII,S$GLB,, | Performed by: INTERNAL MEDICINE

## 2019-05-22 PROCEDURE — 3080F DIAST BP >= 90 MM HG: CPT | Mod: CPTII,S$GLB,, | Performed by: INTERNAL MEDICINE

## 2019-05-22 PROCEDURE — 99499 RISK ADDL DX/OHS AUDIT: ICD-10-PCS | Mod: S$GLB,,, | Performed by: INTERNAL MEDICINE

## 2019-05-22 PROCEDURE — 99499 UNLISTED E&M SERVICE: CPT | Mod: S$GLB,,, | Performed by: INTERNAL MEDICINE

## 2019-05-22 PROCEDURE — 99213 OFFICE O/P EST LOW 20 MIN: CPT | Mod: S$GLB,,, | Performed by: INTERNAL MEDICINE

## 2019-05-22 PROCEDURE — 3077F SYST BP >= 140 MM HG: CPT | Mod: CPTII,S$GLB,, | Performed by: INTERNAL MEDICINE

## 2019-05-22 PROCEDURE — 99999 PR PBB SHADOW E&M-EST. PATIENT-LVL III: ICD-10-PCS | Mod: PBBFAC,,, | Performed by: INTERNAL MEDICINE

## 2019-05-22 PROCEDURE — 1101F PT FALLS ASSESS-DOCD LE1/YR: CPT | Mod: CPTII,S$GLB,, | Performed by: INTERNAL MEDICINE

## 2019-05-22 PROCEDURE — 99213 PR OFFICE/OUTPT VISIT, EST, LEVL III, 20-29 MIN: ICD-10-PCS | Mod: S$GLB,,, | Performed by: INTERNAL MEDICINE

## 2019-05-22 PROCEDURE — 3044F PR MOST RECENT HEMOGLOBIN A1C LEVEL <7.0%: ICD-10-PCS | Mod: CPTII,S$GLB,, | Performed by: INTERNAL MEDICINE

## 2019-05-22 PROCEDURE — 99999 PR PBB SHADOW E&M-EST. PATIENT-LVL III: CPT | Mod: PBBFAC,,, | Performed by: INTERNAL MEDICINE

## 2019-05-22 RX ORDER — METFORMIN HYDROCHLORIDE 1000 MG/1
1000 TABLET ORAL 2 TIMES DAILY WITH MEALS
Qty: 180 TABLET | Refills: 1 | Status: SHIPPED | OUTPATIENT
Start: 2019-05-22 | End: 2019-12-13 | Stop reason: SDUPTHER

## 2019-05-22 RX ORDER — CICLOPIROX OLAMINE 7.7 MG/G
CREAM TOPICAL 2 TIMES DAILY
Qty: 90 G | Refills: 0 | Status: SHIPPED | OUTPATIENT
Start: 2019-05-22 | End: 2020-11-17

## 2019-05-22 NOTE — PATIENT INSTRUCTIONS
Check with your pharmacy regarding new shingles vaccine.     Bring bp record and machine to next visit.

## 2019-05-22 NOTE — PROGRESS NOTES
Subjective:      Patient ID: Deniz Paulino is a 68 y.o. male.    Chief Complaint: Follow-up (6 month)    Rash   This is a recurrent problem. The current episode started more than 1 year ago. The problem has been gradually worsening since onset. The affected locations include the left foot and right foot. The rash is characterized by peeling and itchiness. Associated with: moisture due to sweating during the day. Pertinent negatives include no anorexia, congestion, cough, diarrhea, facial edema, fatigue, fever, rhinorrhea, shortness of breath or sore throat. Treatments tried: Clotrimazole, changes stocks in middle of day. The treatment provided mild relief. There is no history of allergies or asthma.      Physical Exam   Constitutional: He is oriented to person, place, and time. He appears well-developed and well-nourished. No distress.   HENT:   Head: Normocephalic and atraumatic.   Mouth/Throat: Oropharynx is clear and moist.   Eyes: Pupils are equal, round, and reactive to light. EOM are normal.   Neck: Neck supple. No thyromegaly present.   Cardiovascular: Normal rate and regular rhythm.   Pulses:       Dorsalis pedis pulses are 2+ on the right side, and 2+ on the left side.   Pulmonary/Chest: Breath sounds normal. He has no wheezes. He has no rales.   Abdominal: Soft. Bowel sounds are normal. There is no tenderness.   Musculoskeletal: He exhibits no edema.   Feet:   Right Foot:   Protective Sensation: 8 sites tested. 8 sites sensed.   Skin Integrity: Negative for ulcer or blister.   Left Foot:   Protective Sensation: 8 sites tested. 8 sites sensed.   Skin Integrity: Negative for ulcer or blister.   Lymphadenopathy:     He has no cervical adenopathy.   Neurological: He is alert and oriented to person, place, and time.   Skin: Skin is warm and dry.   Faintly erythematous areas with whitish peeling to multiple webspaces of bilateral feet.  No tenderness. No discharge.   Psychiatric: He has a normal mood and  affect. His behavior is normal.     67 yo with   Patient Active Problem List   Diagnosis    CAD (coronary artery disease)    Hypertension associated with diabetes    Hyperlipidemia associated with type 2 diabetes mellitus    S/P CABG (coronary artery bypass graft)    Old MI (myocardial infarction)    Diabetes mellitus     Past Medical History:   Diagnosis Date    Acute coronary syndrome     Coronary artery disease     Diabetes mellitus 4/24/2014    Hyperlipidemia     Hypertension     Old MI (myocardial infarction) 4/24/2014    S/P CABG (coronary artery bypass graft) 4/24/2014     Here today for annual prev exam.  Compliant with meds without significant side effects. Energy and appetite are good.   He has improved his diet and exercise and has lost approximately 20 lb.  He reports that his home blood pressure is typically less than 120/80.    He also complains of rash to his feet.          Review of Systems   Constitutional: Negative for chills, fatigue and fever.   HENT: Negative for congestion, ear pain, rhinorrhea and sore throat.    Respiratory: Negative for cough and shortness of breath.    Cardiovascular: Negative for chest pain.   Gastrointestinal: Negative for abdominal pain, anorexia, blood in stool and diarrhea.   Genitourinary: Negative for dysuria and hematuria.   Skin: Positive for rash.   Neurological: Negative for seizures and syncope.     Objective:   BP (!) 146/92 (BP Location: Right arm, Patient Position: Sitting, BP Method: Large (Manual))   Pulse 63   Temp 96.8 °F (36 °C) (Tympanic)   Wt 103.1 kg (227 lb 4.7 oz)   SpO2 98%   BMI 32.61 kg/m²     Physical Exam   Constitutional: He is oriented to person, place, and time. He appears well-developed and well-nourished. No distress.   HENT:   Head: Normocephalic and atraumatic.   Mouth/Throat: Oropharynx is clear and moist.   Eyes: Pupils are equal, round, and reactive to light. EOM are normal.   Neck: Neck supple. No thyromegaly  present.   Cardiovascular: Normal rate and regular rhythm.   Pulses:       Dorsalis pedis pulses are 2+ on the right side, and 2+ on the left side.   Pulmonary/Chest: Breath sounds normal. He has no wheezes. He has no rales.   Abdominal: Soft. Bowel sounds are normal. There is no tenderness.   Musculoskeletal: He exhibits no edema.   Feet:   Right Foot:   Protective Sensation: 8 sites tested. 8 sites sensed.   Skin Integrity: Negative for ulcer or blister.   Left Foot:   Protective Sensation: 8 sites tested. 8 sites sensed.   Skin Integrity: Negative for ulcer or blister.   Lymphadenopathy:     He has no cervical adenopathy.   Neurological: He is alert and oriented to person, place, and time.   Skin: Skin is warm and dry.   Faintly erythematous areas with whitish peeling to multiple webspaces of bilateral feet.  No tenderness. No discharge.   Psychiatric: He has a normal mood and affect. His behavior is normal.       Assessment:     1. Routine general medical examination at a health care facility    2. Coronary artery disease involving native coronary artery of native heart without angina pectoris    3. Type 2 diabetes mellitus without complication, without long-term current use of insulin    4. Hyperlipidemia associated with type 2 diabetes mellitus    5. Hypertension associated with diabetes    6. Old MI (myocardial infarction)    7. S/P CABG (coronary artery bypass graft)    8. Tinea pedis of both feet      Plan:   Routine general medical examination at a health care facility  Heart healthy diet and regular exercise.  Health maintenance reviewed with patient  Coronary artery disease involving native coronary artery of native heart without angina pectoris  Asymptomatic.  Continue current medications.  Type 2 diabetes mellitus without complication, without long-term current use of insulin  Controlled.  Continue current medication  -     metFORMIN (GLUCOPHAGE) 1000 MG tablet; Take 1 tablet (1,000 mg total) by mouth  2 (two) times daily with meals.  Dispense: 180 tablet; Refill: 1    Hyperlipidemia associated with type 2 diabetes mellitus  Controlled.  Continue current medication  Hypertension associated with diabetes  Patient reports controlled blood pressure on home blood pressure cuff.  Patient will continue current medications.  He will follow up in 1 month with a record along with bring his blood pressure cuff to the clinic.  Old MI (myocardial infarction)  Asymptomatic.  Continue current medications.  S/P CABG (coronary artery bypass graft)    Tinea pedis of both feet  -     ciclopirox (LOPROX) 0.77 % Crea; Apply topically 2 (two) times daily.  Dispense: 90 g; Refill: 0            Problem List Items Addressed This Visit        Cardiac/Vascular    CAD (coronary artery disease)    Overview     Sees Sabrina         Hypertension associated with diabetes    Relevant Medications    metFORMIN (GLUCOPHAGE) 1000 MG tablet    Hyperlipidemia associated with type 2 diabetes mellitus    Relevant Medications    metFORMIN (GLUCOPHAGE) 1000 MG tablet    S/P CABG (coronary artery bypass graft)    Old MI (myocardial infarction)       Endocrine    Diabetes mellitus    Relevant Medications    metFORMIN (GLUCOPHAGE) 1000 MG tablet      Other Visit Diagnoses     Routine general medical examination at a health care facility    -  Primary    Tinea pedis of both feet        Relevant Medications    ciclopirox (LOPROX) 0.77 % Crea          Follow up in about 1 month (around 6/19/2019), or if symptoms worsen or fail to improve.

## 2019-06-03 DIAGNOSIS — Z95.1 S/P CABG (CORONARY ARTERY BYPASS GRAFT): ICD-10-CM

## 2019-06-03 DIAGNOSIS — I25.10 CORONARY ARTERY DISEASE INVOLVING NATIVE CORONARY ARTERY OF NATIVE HEART WITHOUT ANGINA PECTORIS: ICD-10-CM

## 2019-06-03 RX ORDER — METOPROLOL SUCCINATE 50 MG/1
50 TABLET, EXTENDED RELEASE ORAL DAILY
Qty: 90 TABLET | Refills: 3 | Status: SHIPPED | OUTPATIENT
Start: 2019-06-03 | End: 2020-05-22

## 2019-06-03 NOTE — TELEPHONE ENCOUNTER
----- Message from Sadia Solano sent at 6/3/2019  3:20 PM CDT -----  Contact: Patient  Type:  RX Refill Request    Who Called: pt  Refill or New Rx:refill  RX Name and Strength:metoprolol succinate (TOPROL-XL) 50 MG 24 hr tablet  How is the patient currently taking it? (ex. 1XDay):1xday  Is this a 30 day or 90 day RX:90 day  Preferred Pharmacy with phone number:  [facility] [id number] [facility] [place] - [address] [address] [facility]  [address] [facility] [address]  Phone: [phone] Fax: [phone]  Local or Mail Order:local  Ordering Provider:Ms. Cesar  Would the patient rather a call back or a response via [facility]? Call  Best Call Back Number:[phone]  Additional Information: pt is on the way out of town and he does not have any medication

## 2019-06-10 ENCOUNTER — PATIENT OUTREACH (OUTPATIENT)
Dept: ADMINISTRATIVE | Facility: HOSPITAL | Age: 69
End: 2019-06-10

## 2019-06-14 RX ORDER — ATORVASTATIN CALCIUM 40 MG/1
TABLET, FILM COATED ORAL
Qty: 90 TABLET | Refills: 3 | Status: SHIPPED | OUTPATIENT
Start: 2019-06-14 | End: 2020-06-10 | Stop reason: SDUPTHER

## 2019-06-24 ENCOUNTER — OFFICE VISIT (OUTPATIENT)
Dept: INTERNAL MEDICINE | Facility: CLINIC | Age: 69
End: 2019-06-24
Payer: MEDICARE

## 2019-06-24 VITALS
DIASTOLIC BLOOD PRESSURE: 82 MMHG | WEIGHT: 219.81 LBS | SYSTOLIC BLOOD PRESSURE: 140 MMHG | BODY MASS INDEX: 31.54 KG/M2 | OXYGEN SATURATION: 98 % | TEMPERATURE: 96 F | HEART RATE: 50 BPM

## 2019-06-24 DIAGNOSIS — E11.59 HYPERTENSION ASSOCIATED WITH DIABETES: Primary | ICD-10-CM

## 2019-06-24 DIAGNOSIS — I15.2 HYPERTENSION ASSOCIATED WITH DIABETES: Primary | ICD-10-CM

## 2019-06-24 PROCEDURE — 3079F PR MOST RECENT DIASTOLIC BLOOD PRESSURE 80-89 MM HG: ICD-10-PCS | Mod: CPTII,S$GLB,, | Performed by: INTERNAL MEDICINE

## 2019-06-24 PROCEDURE — 1101F PT FALLS ASSESS-DOCD LE1/YR: CPT | Mod: CPTII,S$GLB,, | Performed by: INTERNAL MEDICINE

## 2019-06-24 PROCEDURE — 3077F SYST BP >= 140 MM HG: CPT | Mod: CPTII,S$GLB,, | Performed by: INTERNAL MEDICINE

## 2019-06-24 PROCEDURE — 3044F HG A1C LEVEL LT 7.0%: CPT | Mod: CPTII,S$GLB,, | Performed by: INTERNAL MEDICINE

## 2019-06-24 PROCEDURE — 99213 OFFICE O/P EST LOW 20 MIN: CPT | Mod: S$GLB,,, | Performed by: INTERNAL MEDICINE

## 2019-06-24 PROCEDURE — 1101F PR PT FALLS ASSESS DOC 0-1 FALLS W/OUT INJ PAST YR: ICD-10-PCS | Mod: CPTII,S$GLB,, | Performed by: INTERNAL MEDICINE

## 2019-06-24 PROCEDURE — 3044F PR MOST RECENT HEMOGLOBIN A1C LEVEL <7.0%: ICD-10-PCS | Mod: CPTII,S$GLB,, | Performed by: INTERNAL MEDICINE

## 2019-06-24 PROCEDURE — 3077F PR MOST RECENT SYSTOLIC BLOOD PRESSURE >= 140 MM HG: ICD-10-PCS | Mod: CPTII,S$GLB,, | Performed by: INTERNAL MEDICINE

## 2019-06-24 PROCEDURE — 99213 PR OFFICE/OUTPT VISIT, EST, LEVL III, 20-29 MIN: ICD-10-PCS | Mod: S$GLB,,, | Performed by: INTERNAL MEDICINE

## 2019-06-24 PROCEDURE — 3079F DIAST BP 80-89 MM HG: CPT | Mod: CPTII,S$GLB,, | Performed by: INTERNAL MEDICINE

## 2019-06-24 PROCEDURE — 99999 PR PBB SHADOW E&M-EST. PATIENT-LVL III: CPT | Mod: PBBFAC,,, | Performed by: INTERNAL MEDICINE

## 2019-06-24 PROCEDURE — 99999 PR PBB SHADOW E&M-EST. PATIENT-LVL III: ICD-10-PCS | Mod: PBBFAC,,, | Performed by: INTERNAL MEDICINE

## 2019-06-24 RX ORDER — HYDROCHLOROTHIAZIDE 25 MG/1
25 TABLET ORAL DAILY
Qty: 90 TABLET | Refills: 1 | Status: SHIPPED | OUTPATIENT
Start: 2019-06-24 | End: 2019-07-22 | Stop reason: SDUPTHER

## 2019-06-24 NOTE — PROGRESS NOTES
Subjective:      Patient ID: Deniz Paulino is a 68 y.o. male.    Chief Complaint: Follow-up    HPI   69 yo with   Patient Active Problem List   Diagnosis    CAD (coronary artery disease)    Hypertension associated with diabetes    Hyperlipidemia associated with type 2 diabetes mellitus    S/P CABG (coronary artery bypass graft)    Old MI (myocardial infarction)    Diabetes mellitus     Here today for management of htn.  Compliant with meds without significant side effects. Home bp 96 to 133 systolic. Home machine reading about 10 mmhg lower than clinic reading.   Review of Systems   Constitutional: Negative for chills and fever.   HENT: Negative for ear pain and sore throat.    Respiratory: Negative for cough.    Cardiovascular: Negative for chest pain.   Gastrointestinal: Negative for abdominal pain and blood in stool.   Genitourinary: Negative for dysuria and hematuria.   Neurological: Negative for seizures and syncope.     Objective:   BP (!) 140/82   Pulse (!) 50   Temp 96.3 °F (35.7 °C) (Tympanic)   Wt 99.7 kg (219 lb 12.8 oz)   SpO2 98%   BMI 31.54 kg/m²     Physical Exam   Constitutional: He appears well-developed and well-nourished. No distress.   Cardiovascular: Normal rate.   Pulmonary/Chest: Effort normal and breath sounds normal.   Skin: Skin is warm and dry.   Psychiatric: He has a normal mood and affect. His behavior is normal.       Assessment:     1. Hypertension associated with diabetes      Plan:   Hypertension associated with diabetes  -     hydroCHLOROthiazide (HYDRODIURIL) 25 MG tablet; Take 1 tablet (25 mg total) by mouth once daily.  Dispense: 90 tablet; Refill: 1    bp not at goal  Declines dig htn program  try increase hctz to 25  Home bp record as discussed.       Problem List Items Addressed This Visit        Cardiac/Vascular    Hypertension associated with diabetes - Primary    Relevant Medications    hydroCHLOROthiazide (HYDRODIURIL) 25 MG tablet          Follow up in about  1 month (around 7/22/2019), or if symptoms worsen or fail to improve.

## 2019-07-22 ENCOUNTER — OFFICE VISIT (OUTPATIENT)
Dept: INTERNAL MEDICINE | Facility: CLINIC | Age: 69
End: 2019-07-22
Payer: MEDICARE

## 2019-07-22 VITALS
HEART RATE: 60 BPM | TEMPERATURE: 97 F | DIASTOLIC BLOOD PRESSURE: 76 MMHG | SYSTOLIC BLOOD PRESSURE: 110 MMHG | OXYGEN SATURATION: 99 % | WEIGHT: 218.25 LBS | BODY MASS INDEX: 31.32 KG/M2

## 2019-07-22 DIAGNOSIS — M62.838 MUSCLE SPASM: ICD-10-CM

## 2019-07-22 DIAGNOSIS — E11.9 TYPE 2 DIABETES MELLITUS WITHOUT COMPLICATION, WITHOUT LONG-TERM CURRENT USE OF INSULIN: Primary | ICD-10-CM

## 2019-07-22 DIAGNOSIS — E11.59 HYPERTENSION ASSOCIATED WITH DIABETES: ICD-10-CM

## 2019-07-22 DIAGNOSIS — I15.2 HYPERTENSION ASSOCIATED WITH DIABETES: ICD-10-CM

## 2019-07-22 DIAGNOSIS — S16.1XXA STRAIN OF NECK MUSCLE, INITIAL ENCOUNTER: ICD-10-CM

## 2019-07-22 PROCEDURE — 3074F SYST BP LT 130 MM HG: CPT | Mod: CPTII,S$GLB,, | Performed by: INTERNAL MEDICINE

## 2019-07-22 PROCEDURE — 1101F PR PT FALLS ASSESS DOC 0-1 FALLS W/OUT INJ PAST YR: ICD-10-PCS | Mod: CPTII,S$GLB,, | Performed by: INTERNAL MEDICINE

## 2019-07-22 PROCEDURE — 3078F PR MOST RECENT DIASTOLIC BLOOD PRESSURE < 80 MM HG: ICD-10-PCS | Mod: CPTII,S$GLB,, | Performed by: INTERNAL MEDICINE

## 2019-07-22 PROCEDURE — 99214 OFFICE O/P EST MOD 30 MIN: CPT | Mod: S$GLB,,, | Performed by: INTERNAL MEDICINE

## 2019-07-22 PROCEDURE — 3044F HG A1C LEVEL LT 7.0%: CPT | Mod: CPTII,S$GLB,, | Performed by: INTERNAL MEDICINE

## 2019-07-22 PROCEDURE — 99499 RISK ADDL DX/OHS AUDIT: ICD-10-PCS | Mod: S$GLB,,, | Performed by: INTERNAL MEDICINE

## 2019-07-22 PROCEDURE — 99999 PR PBB SHADOW E&M-EST. PATIENT-LVL III: ICD-10-PCS | Mod: PBBFAC,,, | Performed by: INTERNAL MEDICINE

## 2019-07-22 PROCEDURE — 3044F PR MOST RECENT HEMOGLOBIN A1C LEVEL <7.0%: ICD-10-PCS | Mod: CPTII,S$GLB,, | Performed by: INTERNAL MEDICINE

## 2019-07-22 PROCEDURE — 1101F PT FALLS ASSESS-DOCD LE1/YR: CPT | Mod: CPTII,S$GLB,, | Performed by: INTERNAL MEDICINE

## 2019-07-22 PROCEDURE — 3074F PR MOST RECENT SYSTOLIC BLOOD PRESSURE < 130 MM HG: ICD-10-PCS | Mod: CPTII,S$GLB,, | Performed by: INTERNAL MEDICINE

## 2019-07-22 PROCEDURE — 3078F DIAST BP <80 MM HG: CPT | Mod: CPTII,S$GLB,, | Performed by: INTERNAL MEDICINE

## 2019-07-22 PROCEDURE — 99499 UNLISTED E&M SERVICE: CPT | Mod: S$GLB,,, | Performed by: INTERNAL MEDICINE

## 2019-07-22 PROCEDURE — 99214 PR OFFICE/OUTPT VISIT, EST, LEVL IV, 30-39 MIN: ICD-10-PCS | Mod: S$GLB,,, | Performed by: INTERNAL MEDICINE

## 2019-07-22 PROCEDURE — 99999 PR PBB SHADOW E&M-EST. PATIENT-LVL III: CPT | Mod: PBBFAC,,, | Performed by: INTERNAL MEDICINE

## 2019-07-22 RX ORDER — HYDROCHLOROTHIAZIDE 12.5 MG/1
12.5 TABLET ORAL DAILY
Qty: 90 TABLET | Refills: 1 | Status: SHIPPED | OUTPATIENT
Start: 2019-07-22 | End: 2020-01-17

## 2019-07-22 RX ORDER — METHOCARBAMOL 500 MG/1
500 TABLET, FILM COATED ORAL 4 TIMES DAILY PRN
Qty: 40 TABLET | Refills: 0 | Status: SHIPPED | OUTPATIENT
Start: 2019-07-22 | End: 2020-06-24 | Stop reason: CLARIF

## 2019-07-22 RX ORDER — POLYETHYLENE GLYCOL-3350 AND ELECTROLYTES 236; 6.74; 5.86; 2.97; 22.74 G/274.31G; G/274.31G; G/274.31G; G/274.31G; G/274.31G
POWDER, FOR SOLUTION ORAL
Refills: 0 | COMMUNITY
Start: 2019-07-20 | End: 2020-06-24 | Stop reason: CLARIF

## 2019-07-22 NOTE — PROGRESS NOTES
Subjective:      Patient ID: Deniz Paulino is a 68 y.o. male.    Chief Complaint: Follow-up (1 month)    HPI   69 yo with   Patient Active Problem List   Diagnosis    CAD (coronary artery disease)    Hypertension associated with diabetes    Hyperlipidemia associated with type 2 diabetes mellitus    S/P CABG (coronary artery bypass graft)    Old MI (myocardial infarction)    Diabetes mellitus     Past Medical History:   Diagnosis Date    Acute coronary syndrome     Coronary artery disease     Diabetes mellitus 4/24/2014    Hyperlipidemia     Hypertension     Old MI (myocardial infarction) 4/24/2014    S/P CABG (coronary artery bypass graft) 4/24/2014     Here today for management of multiple medical problems as outlined below.  He reports compliance with his medications without significant side effects.  His home blood pressures been running 96 to 120s over 57 the 70s.  No significant symptoms.  He does also report that he has had some neck pain mostly to the left side.  There is some stiffness.  There is no radiation or weakness.  There has been no trauma.  This is similar to previous episodes that have been improved with methocarbamol.  Review of Systems   Constitutional: Negative for chills and fever.   HENT: Negative for ear pain and sore throat.    Respiratory: Negative for cough.    Cardiovascular: Negative for chest pain.   Gastrointestinal: Negative for abdominal pain and blood in stool.   Genitourinary: Negative for dysuria and hematuria.   Neurological: Negative for seizures and syncope.     Objective:   /76 (BP Location: Left arm, Patient Position: Sitting, BP Method: Large (Manual))   Pulse 60   Temp 96.9 °F (36.1 °C) (Tympanic)   Wt 99 kg (218 lb 4.1 oz)   SpO2 99%   BMI 31.32 kg/m²     Physical Exam   Constitutional: He is oriented to person, place, and time. He appears well-developed and well-nourished. No distress.   HENT:   Head: Normocephalic and atraumatic.    Mouth/Throat: Oropharynx is clear and moist.   Eyes: Pupils are equal, round, and reactive to light. EOM are normal.   Neck: Neck supple. Muscular tenderness present. No tracheal tenderness and no spinous process tenderness present. No neck rigidity. Normal range of motion present. No thyromegaly present.   Cardiovascular: Normal rate and regular rhythm.   Pulmonary/Chest: Breath sounds normal. He has no wheezes. He has no rales.   Abdominal: Soft. Bowel sounds are normal. There is no tenderness.   Musculoskeletal: He exhibits no edema.   Lymphadenopathy:     He has no cervical adenopathy.   Neurological: He is alert and oriented to person, place, and time.   Skin: Skin is warm and dry.   Psychiatric: He has a normal mood and affect. His behavior is normal.       Assessment:     1. Type 2 diabetes mellitus without complication, without long-term current use of insulin    2. Hypertension associated with diabetes    3. Muscle spasm    4. Strain of neck muscle, initial encounter      Plan:   Type 2 diabetes mellitus without complication, without long-term current use of insulin   stable.  Continue current medications  -     Cancel: Hemoglobin A1c; Future; Expected date: 01/18/2020  -     Hemoglobin A1c; Future; Expected date: 11/19/2019    Hypertension associated with diabetes  Relative hypotension.  Decreased hydrochlorothiazide to 12.5 mg.  Blood pressure parameters given to patient  -     hydroCHLOROthiazide (HYDRODIURIL) 12.5 MG Tab; Take 1 tablet (12.5 mg total) by mouth once daily.  Dispense: 90 tablet; Refill: 1    Muscle spasm  -     methocarbamol (ROBAXIN) 500 MG Tab; Take 1 tablet (500 mg total) by mouth 4 (four) times daily as needed (muscle spasm).  Dispense: 40 tablet; Refill: 0    Strain of neck muscle, initial encounter  robaxin as discussed      Lab Frequency Next Occurrence   Hemoglobin A1c Once 11/19/2019       Problem List Items Addressed This Visit        Cardiac/Vascular    Hypertension  associated with diabetes    Relevant Medications    hydroCHLOROthiazide (HYDRODIURIL) 12.5 MG Tab       Endocrine    Diabetes mellitus - Primary    Relevant Orders    Hemoglobin A1c      Other Visit Diagnoses     Muscle spasm        Relevant Medications    methocarbamol (ROBAXIN) 500 MG Tab    Strain of neck muscle, initial encounter              Follow up in about 4 months (around 11/22/2019), or if symptoms worsen or fail to improve.

## 2019-07-26 ENCOUNTER — ANESTHESIA (OUTPATIENT)
Dept: ENDOSCOPY | Facility: HOSPITAL | Age: 69
End: 2019-07-26
Payer: MEDICARE

## 2019-07-26 ENCOUNTER — HOSPITAL ENCOUNTER (OUTPATIENT)
Facility: HOSPITAL | Age: 69
Discharge: HOME | End: 2019-07-26
Attending: INTERNAL MEDICINE | Admitting: INTERNAL MEDICINE
Payer: MEDICARE

## 2019-07-26 ENCOUNTER — ANESTHESIA EVENT (OUTPATIENT)
Dept: ENDOSCOPY | Facility: HOSPITAL | Age: 69
End: 2019-07-26
Payer: MEDICARE

## 2019-07-26 VITALS
DIASTOLIC BLOOD PRESSURE: 76 MMHG | TEMPERATURE: 98 F | WEIGHT: 213.63 LBS | SYSTOLIC BLOOD PRESSURE: 120 MMHG | OXYGEN SATURATION: 96 % | RESPIRATION RATE: 18 BRPM | HEIGHT: 70 IN | BODY MASS INDEX: 30.58 KG/M2 | HEART RATE: 56 BPM

## 2019-07-26 DIAGNOSIS — R10.9 ABDOMINAL PAIN, UNSPECIFIED ABDOMINAL LOCATION: ICD-10-CM

## 2019-07-26 DIAGNOSIS — R10.9 ABDOMINAL PAIN: Primary | ICD-10-CM

## 2019-07-26 LAB — POCT GLUCOSE: 127 MG/DL (ref 70–110)

## 2019-07-26 PROCEDURE — 88305 TISSUE EXAM BY PATHOLOGIST: CPT | Performed by: PATHOLOGY

## 2019-07-26 PROCEDURE — 27201012 HC FORCEPS, HOT/COLD, DISP: Performed by: INTERNAL MEDICINE

## 2019-07-26 PROCEDURE — 27201089 HC SNARE, DISP (ANY): Performed by: INTERNAL MEDICINE

## 2019-07-26 PROCEDURE — 88305 TISSUE SPECIMEN TO PATHOLOGY - SURGERY: ICD-10-PCS | Mod: 26,,, | Performed by: PATHOLOGY

## 2019-07-26 PROCEDURE — 37000009 HC ANESTHESIA EA ADD 15 MINS: Performed by: INTERNAL MEDICINE

## 2019-07-26 PROCEDURE — 45385 COLONOSCOPY W/LESION REMOVAL: CPT | Performed by: INTERNAL MEDICINE

## 2019-07-26 PROCEDURE — 43239 PR EGD, FLEX, W/BIOPSY, SGL/MULTI: ICD-10-PCS | Mod: 51,,, | Performed by: INTERNAL MEDICINE

## 2019-07-26 PROCEDURE — 37000008 HC ANESTHESIA 1ST 15 MINUTES: Performed by: INTERNAL MEDICINE

## 2019-07-26 PROCEDURE — 63600175 PHARM REV CODE 636 W HCPCS: Performed by: INTERNAL MEDICINE

## 2019-07-26 PROCEDURE — 25000003 PHARM REV CODE 250: Performed by: NURSE ANESTHETIST, CERTIFIED REGISTERED

## 2019-07-26 PROCEDURE — 45385 PR COLONOSCOPY,REMV LESN,SNARE: ICD-10-PCS | Mod: PT,,, | Performed by: INTERNAL MEDICINE

## 2019-07-26 PROCEDURE — 88305 TISSUE EXAM BY PATHOLOGIST: CPT | Mod: 26,,, | Performed by: PATHOLOGY

## 2019-07-26 PROCEDURE — 63600175 PHARM REV CODE 636 W HCPCS: Performed by: NURSE ANESTHETIST, CERTIFIED REGISTERED

## 2019-07-26 PROCEDURE — 43239 EGD BIOPSY SINGLE/MULTIPLE: CPT | Performed by: INTERNAL MEDICINE

## 2019-07-26 PROCEDURE — 43239 EGD BIOPSY SINGLE/MULTIPLE: CPT | Mod: 51,,, | Performed by: INTERNAL MEDICINE

## 2019-07-26 PROCEDURE — 45385 COLONOSCOPY W/LESION REMOVAL: CPT | Mod: PT,,, | Performed by: INTERNAL MEDICINE

## 2019-07-26 RX ORDER — SODIUM CHLORIDE, SODIUM LACTATE, POTASSIUM CHLORIDE, CALCIUM CHLORIDE 600; 310; 30; 20 MG/100ML; MG/100ML; MG/100ML; MG/100ML
INJECTION, SOLUTION INTRAVENOUS CONTINUOUS
Status: DISCONTINUED | OUTPATIENT
Start: 2019-07-26 | End: 2019-07-26 | Stop reason: HOSPADM

## 2019-07-26 RX ORDER — PANTOPRAZOLE SODIUM 20 MG/1
20 TABLET, DELAYED RELEASE ORAL DAILY
Qty: 30 TABLET | Refills: 11 | Status: SHIPPED | OUTPATIENT
Start: 2019-07-26 | End: 2020-07-24 | Stop reason: SDUPTHER

## 2019-07-26 RX ORDER — GLYCOPYRROLATE 0.2 MG/ML
INJECTION INTRAMUSCULAR; INTRAVENOUS
Status: DISCONTINUED | OUTPATIENT
Start: 2019-07-26 | End: 2019-07-26

## 2019-07-26 RX ORDER — PROPOFOL 10 MG/ML
VIAL (ML) INTRAVENOUS
Status: DISCONTINUED | OUTPATIENT
Start: 2019-07-26 | End: 2019-07-26

## 2019-07-26 RX ORDER — LIDOCAINE HYDROCHLORIDE 10 MG/ML
INJECTION, SOLUTION EPIDURAL; INFILTRATION; INTRACAUDAL; PERINEURAL
Status: DISCONTINUED | OUTPATIENT
Start: 2019-07-26 | End: 2019-07-26

## 2019-07-26 RX ADMIN — PROPOFOL 20 MG: 10 INJECTION, EMULSION INTRAVENOUS at 08:07

## 2019-07-26 RX ADMIN — SODIUM CHLORIDE, SODIUM LACTATE, POTASSIUM CHLORIDE, AND CALCIUM CHLORIDE: .6; .31; .03; .02 INJECTION, SOLUTION INTRAVENOUS at 08:07

## 2019-07-26 RX ADMIN — PROPOFOL 30 MG: 10 INJECTION, EMULSION INTRAVENOUS at 08:07

## 2019-07-26 RX ADMIN — LIDOCAINE HYDROCHLORIDE 50 MG: 10 INJECTION, SOLUTION EPIDURAL; INFILTRATION; INTRACAUDAL; PERINEURAL at 08:07

## 2019-07-26 RX ADMIN — GLYCOPYRROLATE 0.2 MG: 0.2 INJECTION INTRAMUSCULAR; INTRAVENOUS at 08:07

## 2019-07-26 RX ADMIN — PROPOFOL 100 MG: 10 INJECTION, EMULSION INTRAVENOUS at 08:07

## 2019-07-26 NOTE — ANESTHESIA PREPROCEDURE EVALUATION
"                                                                                                             07/26/2019  Deniz Paulino is a 68 y.o., male.    Anesthesia Evaluation    I have reviewed the Patient Summary Reports.    I have reviewed the Nursing Notes.   I have reviewed the Medications.     Review of Systems  Anesthesia Hx:  No problems with previous Anesthesia  Denies Family Hx of Anesthesia complications.   Denies Personal Hx of Anesthesia complications.   Social:  Former Smoker, Alcohol Use Daily alcohol consumption (4 glasses of wine or 6-7 beers)   Hematology/Oncology:  Hematology Normal   Oncology Normal     Cardiovascular:   Exercise tolerance: good Hypertension Past MI CAD  CABG/stent      hyperlipidemia ECG has been reviewed. ekg 11/2018:  Sinus bradycardia 56  Otherwise normal ECG  When compared with ECG of 02-NOV-2017 09:53,  Minimal criteria for Anterior infarct are no longer Present  T wave inversion no longer evident in Anterior leads    Mi in '03 followed by cabg x4 vessels. Had a "little heart attack" in '16. Saw cards in nov '18. No issues.   Pulmonary:   Denies COPD.  Denies Asthma.  Denies Sleep Apnea.    Renal/:  Renal/ Normal     Hepatic/GI:   Bowel Prep. Denies GERD. Denies Liver Disease.  Denies Hepatitis.    Musculoskeletal:  Musculoskeletal Normal    Neurological:   Denies CVA. Denies Seizures.    Endocrine:   Diabetes Denies Hypothyroidism. Denies Hyperthyroidism.        Physical Exam  General:  Obesity    Airway/Jaw/Neck:  Airway Findings: Mouth Opening: Normal Tongue: Normal  General Airway Assessment: Adult  Mallampati: II      Dental:  Dental Findings: In tact   Chest/Lungs:  Chest/Lungs Findings: Clear to auscultation, Normal Respiratory Rate     Heart/Vascular:  Heart Findings: Rate: Normal  Rhythm: Regular Rhythm             Anesthesia Plan  Type of Anesthesia, risks & benefits discussed:  Anesthesia Type:  MAC  Patient's Preference:   Intra-op Monitoring Plan: " standard ASA monitors  Intra-op Monitoring Plan Comments:   Post Op Pain Control Plan:   Post Op Pain Control Plan Comments:   Induction:   IV  Beta Blocker:  Patient is on a Beta-Blocker and has received one dose within the past 24 hours (No further documentation required).       Informed Consent: Patient understands risks and agrees with Anesthesia plan.  Questions answered. Anesthesia consent signed with patient.  ASA Score: 2     Day of Surgery Review of History & Physical: I have interviewed and examined the patient. I have reviewed the patient's H&P dated:  There are no significant changes.  H&P update referred to the surgeon.         Ready For Surgery From Anesthesia Perspective.

## 2019-07-26 NOTE — DISCHARGE INSTRUCTIONS
Hemorrhoids    Hemorrhoids are swollen and inflamed veins inside the rectum and near the anus. The rectum is the last several inches of the colon. The anus is the passage between the rectum and the outside of the body.  Causes  The veins can become swollen due to increased pressure in them. This is most often caused by:  · Chronic constipation or diarrhea  · Straining when having a bowel movement  · Sitting too long on the toilet  · A low-fiber diet  · Pregnancy  Symptoms  · Bleeding from the rectum (this may be noticeable after bowel movements)  · Lump near the anus  · Itching around the anus  · Pain around the anus  There are different types of hemorrhoids. Depending on the type you have and the severity, you may be able to treat yourself at home. In some cases, a procedure may be the best treatment option. Your healthcare provider can tell you more about this, if needed.  Home care  General care  · To get relief from pain or itching, try:  ¨ Topical products. Your healthcare provider may prescribe or recommend creams, ointments, or pads that can be applied to the hemorrhoid. Use these exactly as directed.  ¨ Medicines. Your healthcare provider may recommend stool softeners, suppositories, or laxatives to help manage constipation. Use these exactly as directed.  ¨ Sitz baths. A sitz bath involves sitting in a few inches of warm bath water. Be careful not to make the water so hot that you burn yourself--test it before sitting in it. Soak for about 10 to 15 minutes a few times a day. This may help relieve pain.  Tips to help prevent hemorrhoids  · Eat more fiber. Fiber adds bulk to stool and absorbs water as it moves through your colon. This makes stool softer and easier to pass.  ¨ Increase the fiber in your diet with more fiber-rich foods. These include fresh fruit, vegetables, and whole grains.  ¨ Take a fiber supplement or bulking agent, if advised to by your provider. These include products such as psyllium  or methylcellulose.  · Drink plenty of water, if directed to by your provider. This can help keep stool soft.  · Be more active. Frequent exercise aids digestion and helps prevent constipation. It may also help make bowel movements more regular.  · Dont strain during bowel movements. This can make hemorrhoids more likely. Also, dont sit on the toilet for long periods of time.  Follow-up care  Follow up with your healthcare provider, or as advised. If a culture or imaging tests were done, you will be notified of the results when they are ready. This may take a few days or longer.  When to seek medical advice  Call your healthcare provider right away if any of these occur:  · Increased bleeding from the rectum  · Increased pain around the rectum or anus  · Weakness or dizziness  Call 911  Call 911 or return to the emergency department right away if any of these occur:  · Trouble breathing or swallowing  · Fainting or loss of consciousness  · Unusually fast heart rate  · Vomiting blood  · Large amounts of blood in stool  Date Last Reviewed: 6/22/2015 © 2000-2017 [employer]. [address] [place] [address]. All rights reserved. This information is not intended as a substitute for professional medical care. Always follow your healthcare professional's instructions.        Understanding Colon and Rectal Polyps    The colon (also called the large intestine) is a muscular tube that forms the last part of the digestive tract. It absorbs water and stores food waste. The colon is about 4 to 6 feet long. The rectum is the last 6 inches of the colon. The colon and rectum have a smooth lining composed of millions of cells. Changes in these cells can lead to growths in the colon that can become cancerous and should be removed. Multiple tests are available to screen for colon cancer, but the colonoscopy is the most recommended test. During colonoscopy, these polyps can be removed. How often you need this  test depends on many things including your condition, your family history, symptoms, and what the findings were at the previous colonoscopy.   When the colon lining changes  Changes that happen in the cells that line the colon or rectum can lead to growths called polyps. Over a period of years, polyps can turn cancerous. Removing polyps early may prevent cancer from ever forming.  Polyps  Polyps are fleshy clumps of tissue that form on the lining of the colon or rectum. Small polyps are usually benign (not cancerous). However, over time, cells in a polyp can change and become cancerous. Certain types of polyps known as adenomatous polyps are premalignant. The risk for invasive cancer increases with the size of the polyp and certain cell and gene features. This means that they can become cancerous if they're not removed. Hyperplastic polyps are benign. They can grow quite large and not turn cancerous.   Cancer  Almost all colorectal cancers start when polyp cells begin growing abnormally. As a cancerous tumor grows, it may involve more and more of the colon or rectum. In time, cancer can also grow beyond the colon or rectum and spread to nearby organs or to glands called lymph nodes. The cells can also travel to other parts of the body. This is known as metastasis. The earlier a cancerous tumor is removed, the better the chance of preventing its spread.    Date Last Reviewed: 8/1/2016 © 2000-2017 [employer]. [address] [place] [address], [place] [address]. All rights reserved. This information is not intended as a substitute for professional medical care. Always follow your healthcare professional's instructions.        Colonoscopy     A camera attached to a flexible tube with a viewing lens is used to take video pictures.     Colonoscopy is a test to view the inside of your lower digestive tract (colon and rectum). Sometimes it can show the last part of the small intestine (ileum). During the test, small  pieces of tissue may be removed for testing. This is called a biopsy. Small growths, such as polyps, may also be removed.   Why is colonoscopy done?  The test is done to help look for colon cancer. And it can help find the source of abdominal pain, bleeding, and changes in bowel habits. It may be needed once a year, depending on factors such as your:  · Age  · Health history  · Family health history  · Symptoms  · Results from any prior colonoscopy  Risks and possible complications  These include:  · Bleeding               · A puncture or tear in the colon   · Risks of anesthesia  · A cancer lesion not being seen  Getting ready   To prepare for the test:  · Talk with your healthcare provider about the risks of the test (see below). Also ask your healthcare provider about alternatives to the test.  · Tell your healthcare provider about any medicines you take. Also tell him or her about any health conditions you may have.  · Make sure your rectum and colon are empty for the test. Follow the diet and bowel prep instructions exactly. If you dont, the test may need to be rescheduled.  · Plan for a friend or family member to drive you home after the test.     Colonoscopy provides an inside view of the entire colon.     You may discuss the results with your doctor right away or at a future visit.  During the test   The test is usually done in the hospital on an outpatient basis. This means you go home the same day. The procedure takes about 30 minutes. During that time:  · You are given relaxing (sedating) medicine through an IV line. You may be drowsy, or fully asleep.  · The healthcare provider will first give you a physical exam to check for anal and rectal problems.  · Then the anus is lubricated and the scope inserted.  · If you are awake, you may have a feeling similar to needing to have a bowel movement. You may also feel pressure as air is pumped into the colon. Its OK to pass gas during the procedure.  · Biopsy,  polyp removal, or other treatments may be done during the test.  After the test   You may have gas right after the test. It can help to try to pass it to help prevent later bloating. Your healthcare provider may discuss the results with you right away. Or you may need to schedule a follow-up visit to talk about the results. After the test, you can go back to your normal eating and other activities. You may be tired from the sedation and need to rest for a few hours.  Date Last Reviewed: 11/1/2016 © 2000-2017 [employer]. [address] [place] [address]. All rights reserved. This information is not intended as a substitute for professional medical care. Always follow your healthcare professional's instructions.        Upper GI Endoscopy     During endoscopy, a long, flexible tube is used to view the inside of your upper GI tract.      Upper GI endoscopy allows your healthcare provider to look directly into the beginning of your gastrointestinal (GI) tract. The esophagus, stomach, and duodenum (the first part of the small intestine) make up the upper GI tract.   Before the exam  Follow these and any other instructions you are given before your endoscopy. If you dont follow the healthcare providers instructions carefully, the test may need to be canceled or done over:  · Don't eat or drink anything after midnight the night before your exam. If your exam is in the afternoon, drink only clear liquids in the morning. Don't eat or drink anything for 8 hours before the exam. In some cases, you may be able to take medicines with sips of water until 2 hours before the procedure. Speak with your healthcare provider about this.   · Bring your X-rays and any other test results you have.  · Because you will be sedated, arrange for an adult to drive you home after the exam.  · Tell your healthcare provider before the exam if you are taking any medicines or have any medical problems.  The procedure  Here  is what to expect:  · You will lie on the endoscopy table. Usually patients lie on the left side.  · You will be monitored and given oxygen.  · Your throat may be numbed with a spray or gargle. You are given medicine through an intravenous (IV) line that will help you relax and remain comfortable. You may be awake or asleep during the procedure.  · The healthcare provider will put the endoscope in your mouth and down your esophagus. It is thinner than most pieces of food that you swallow. It will not affect your breathing. The medicine helps keep you from gagging.  · Air is put into your GI tract to expand it. It can make you burp.  · During the procedure, the healthcare provider can take biopsies (tissue samples), remove abnormalities, such as polyps, or treat abnormalities through a variety of devices placed through the endoscope. You will not feel this.   · The endoscope carries images of your upper GI tract to a video screen. If you are awake, you may be able to look at the images.  · After the procedure is done, you will rest for a time. An adult must drive you home.  When to call your healthcare provider  Contact your healthcare provider if you have:  · Black or tarry stools, or blood in your stool  · Fever  · Pain in your belly that does not go away  · Nausea and vomiting, or vomiting blood   Date Last Reviewed: 7/1/2016  © 1295-5417 [employer]. [address] [place] [address], [place] [address]. All rights reserved. This information is not intended as a substitute for professional medical care. Always follow your healthcare professional's instructions.

## 2019-07-26 NOTE — ANESTHESIA POSTPROCEDURE EVALUATION
Anesthesia Post Evaluation    Patient: Deniz Paulino    Procedure(s) Performed: Procedure(s) (LRB):  ESOPHAGOGASTRODUODENOSCOPY (EGD) (N/A)  COLONOSCOPY (N/A)    Final Anesthesia Type: MAC  Patient location during evaluation: PACU  Patient participation: Yes- Able to Participate  Level of consciousness: awake  Post-procedure vital signs: reviewed and stable  Pain management: adequate  Airway patency: patent  PONV status at discharge: No PONV  Anesthetic complications: no      Cardiovascular status: blood pressure returned to baseline and hemodynamically stable  Respiratory status: unassisted, room air and spontaneous ventilation  Hydration status: euvolemic  Follow-up not needed.          Vitals Value Taken Time   /87 7/26/2019  7:54 AM   Temp 36.6 °C (97.9 °F) 7/26/2019  7:54 AM   Pulse 60 7/26/2019  7:54 AM   Resp 18 7/26/2019  7:54 AM   SpO2 96 % 7/26/2019  7:54 AM         No case tracking events are documented in the log.      Pain/Flaco Score: No data recorded

## 2019-07-26 NOTE — DISCHARGE SUMMARY
Endoscopy Discharge Summary      Admit Date: 7/26/2019    Discharge Date and Time:  7/26/2019 9:13 AM    Attending Physician: Opal Oshea MD     Discharge Physician: Opal Oshea MD     Principal Admitting Diagnoses: Abdominal pain         Discharge Diagnosis: The primary encounter diagnosis was Abdominal pain. A diagnosis of Abdominal pain, unspecified abdominal location was also pertinent to this visit.     Discharged Condition: Good    Indication for Admission: Abdominal pain     Hospital Course: Patient was admitted for an inpatient procedure and tolerated the procedure well with no complications.    Significant Diagnostic Studies: Colonoscopy  and EGD    Pathology (if any):  Specimen (12h ago, onward)    Start     Ordered    07/26/19 0836  Specimen to Pathology - Surgery  Once     Comments:  #1 Gastric Bx for Gastritis R/O H Pylori#2 Transverse Colon Polyp     Start Status     07/26/19 0836 Collected (07/26/19 0901) Order ID: [id number]       07/26/19 0901          Estimated Blood Loss: 1 ml.    Discussed with: patient.    Disposition: Home.    Follow Up/Patient Instructions:   Current Discharge Medication List      CONTINUE these medications which have NOT CHANGED    Details   amLODIPine (NORVASC) 5 MG tablet TAKE 1 TABLET BY MOUTH ONCE DAILY  Qty: 90 tablet, Refills: 3      aspirin (ECOTRIN) 325 MG EC tablet Take 325 mg by mouth once daily.      atorvastatin (LIPITOR) 40 MG tablet TAKE 1 TABLET BY MOUTH ONCE DAILY  Qty: 90 tablet, Refills: 3      coenzyme Q10 200 mg capsule Take 200 mg by mouth 2 (two) times daily.      cyanocobalamin (VITAMIN B-12) 1000 MCG tablet Take 1,000 mcg by mouth once daily.       fluorouracil (EFUDEX) 5 % cream Apply topically 2 (two) times daily as needed.      GAVILYTE-G 236-22.74-6.74 -5.86 gram suspension USE AS DIRECTED  Refills: 0      hydroCHLOROthiazide (HYDRODIURIL) 12.5 MG Tab Take 1 tablet (12.5 mg total) by mouth once daily.  Qty: 90 tablet, Refills: 1     Comments: Please consider 90 day supplies to promote better adherence  Associated Diagnoses: Hypertension associated with diabetes      lisinopril (PRINIVIL,ZESTRIL) 20 MG tablet Take 1 tablet (20 mg total) by mouth once daily.  Qty: 90 tablet, Refills: 3    Associated Diagnoses: S/P CABG (coronary artery bypass graft); Coronary artery disease involving native coronary artery of native heart without angina pectoris; Essential hypertension      metFORMIN (GLUCOPHAGE) 1000 MG tablet Take 1 tablet (1,000 mg total) by mouth 2 (two) times daily with meals.  Qty: 180 tablet, Refills: 1    Associated Diagnoses: Type 2 diabetes mellitus without complication, without long-term current use of insulin      methocarbamol (ROBAXIN) 500 MG Tab Take 1 tablet (500 mg total) by mouth 4 (four) times daily as needed (muscle spasm).  Qty: 40 tablet, Refills: 0    Associated Diagnoses: Muscle spasm      metoprolol succinate (TOPROL-XL) 50 MG 24 hr tablet Take 1 tablet (50 mg total) by mouth once daily.  Qty: 90 tablet, Refills: 3    Associated Diagnoses: S/P CABG (coronary artery bypass graft); Coronary artery disease involving native coronary artery of native heart without angina pectoris      vitamin D 1000 units Tab Take 1,000 Units by mouth once daily.       calcipotriene (DOVONOX) 0.005 % cream Apply topically 2 (two) times daily as needed. For pre skin cancer      ciclopirox (LOPROX) 0.77 % Crea Apply topically 2 (two) times daily.  Qty: 90 g, Refills: 0    Associated Diagnoses: Tinea pedis of both feet      LORATADINE (ALAVERT ORAL) Take 1 tablet by mouth as needed.             Discharge Procedure Orders   Diet general     Call MD for:  temperature >100.4     Call MD for:  persistent nausea and vomiting     Call MD for:  severe uncontrolled pain     Call MD for:  difficulty breathing, headache or visual disturbances     Activity as tolerated       Follow-up Information     Call Opal Oshea MD.    Specialty:   Gastroenterology  Why:  For pathology results  Contact information:  [address]  [place] [address]  [phone]

## 2019-07-26 NOTE — ANESTHESIA RELEASE NOTE
"Anesthesia Release from PACU Note    Patient: Deniz Paulino    Procedure(s) Performed: Procedure(s) (LRB):  ESOPHAGOGASTRODUODENOSCOPY (EGD) (N/A)  COLONOSCOPY (N/A)    Anesthesia type: MAC    Post pain: Adequate analgesia    Post assessment: no apparent anesthetic complications, tolerated procedure well and no evidence of recall    Last Vitals:   Visit Vitals  /87 (BP Location: Left arm, Patient Position: Sitting)   Pulse 60   Temp 36.6 °C (97.9 °F) (Temporal)   Resp 18   Ht 5' 10" (1.778 m)   Wt 96.9 kg (213 lb 10 oz)   SpO2 96%   BMI 30.65 kg/m²       Post vital signs: stable    Level of consciousness: awake    Nausea/Vomiting: no nausea/no vomiting    Complications: none    Airway Patency: patent    Respiratory: unassisted, spontaneous ventilation, room air    Cardiovascular: stable and blood pressure at baseline    Hydration: euvolemic  "

## 2019-07-26 NOTE — [employer] PATIENT INSTRUCTIONS
Discharge Summary/Instructions after an Endoscopic Procedure  Patient Name: Deniz Paulino  Patient MRN: [id number]  Patient YOB: 1950 Friday, July 26, 2019 Opal Oshea MD  RESTRICTIONS:  During your procedure today, you received medications for sedation.  These   medications may affect your judgment, balance and coordination.  Therefore,   for 24 hours, you have the following restrictions:   - DO NOT drive a car, operate machinery, make legal/financial decisions,   sign important papers or drink alcohol.    ACTIVITY:  Today: no heavy lifting, straining or running due to procedural   sedation/anesthesia.  The following day: return to full activity including work.  DIET:  Eat and drink normally unless instructed otherwise.     TREATMENT FOR COMMON SIDE EFFECTS:  - Mild abdominal pain, nausea, belching, bloating or excessive gas:  rest,   eat lightly and use a heating pad.  - Sore Throat: treat with throat lozenges and/or gargle with warm salt   water.  - Because air was used during the procedure, expelling large amounts of air   from your rectum or belching is normal.  - If a bowel prep was taken, you may not have a bowel movement for 1-3 days.    This is normal.  SYMPTOMS TO WATCH FOR AND REPORT TO YOUR PHYSICIAN:  1. Abdominal pain or bloating, other than gas cramps.  2. Chest pain.  3. Back pain.  4. Signs of infection such as: chills or fever occurring within 24 hours   after the procedure.  5. Rectal bleeding, which would show as bright red, maroon, or black stools.   (A tablespoon of blood from the rectum is not serious, especially if   hemorrhoids are present.)  6. Vomiting.  7. Weakness or dizziness.  GO DIRECTLY TO THE NEAREST EMERGENCY ROOM IF YOU HAVE ANY OF THE FOLLOWING:      Difficulty breathing              Chills and/or fever over 101 F   Persistent vomiting and/or vomiting blood   Severe abdominal pain   Severe chest pain   Black, tarry stools   Bleeding- more than one  tablespoon   Any other symptom or condition that you feel may need urgent attention  Your doctor recommends these additional instructions:  If any biopsies were taken, your doctors clinic will contact you in 1 to 2   weeks with any results.  - Patient has a contact number available for emergencies.  The signs and   symptoms of potential delayed complications were discussed with the   patient.  Return to normal activities tomorrow.  Written discharge   instructions were provided to the patient.   - Discharge patient to home (via wheelchair).   - Resume previous diet today.   - Continue present medications.   - No aspirin, ibuprofen, naproxen, or other non-steroidal anti-inflammatory   drugs for 7 days after polyp removal.   - Await pathology results.   - Repeat colonoscopy in 3 years for surveillance.  For questions, problems or results please call your physician Opal Oshea MD at Work:  [phone]  If you have any questions about the above instructions, call the GI   department at [phone] or call the endoscopy unit at [phone]   from 7am until 3 pm.  [facility] [place], EMERGENCY ROOM PHONE NUMBER:   [phone]  IF A COMPLICATION OR EMERGENCY SITUATION ARISES AND YOU ARE UNABLE TO REACH   YOUR PHYSICIAN - GO DIRECTLY TO THE EMERGENCY ROOM.  I have read or have had read to me these discharge instructions for my   procedure and have received a written copy.  I understand these   instructions and will follow-up with my physician if I have any questions.     __________________________________       _____________________________________  Nurse Signature                                          Patient/Designated   Responsible Party Signature  MD Opal Byrne MD  7/26/2019 9:09:41 AM  This report has been verified and signed electronically.  [employer]

## 2019-07-26 NOTE — H&P
PRE PROCEDURE H&P    Patient Name: Deniz Paulino  MRN: [id number]  : 1950  Date of Procedure:  2019  Referring Physician: Yuni Bermudez NP  Primary Physician: Hiro Kraft MD  Procedure Physician: Opal Oshea MD       Planned Procedure: Colonoscopy and EGD  Diagnosis: abdominal pain, altered bowel habits   Chief Complaint: Same as above    HPI: Patient is an 68 y.o. male is here for the above.     Last colonoscopy: 7 years ago   Family history: negative   Anticoagulation: none     Past Medical History:   Past Medical History:   Diagnosis Date    Acute coronary syndrome     Coronary artery disease     Diabetes mellitus 2014    Hyperlipidemia     Hypertension     Old MI (myocardial infarction) 2014    S/P CABG (coronary artery bypass graft) 2014        Past Surgical History:  Past Surgical History:   Procedure Laterality Date    CARDIAC CATHETERIZATION      CORONARY ARTERY BYPASS GRAFT          Home Medications:  Prior to Admission medications    Medication Sig Start Date End Date Taking? Authorizing Provider   amLODIPine (NORVASC) 5 MG tablet TAKE 1 TABLET BY MOUTH ONCE DAILY 3/16/19  Yes Kimberly Bennett MD   aspirin (ECOTRIN) 325 MG EC tablet Take 325 mg by mouth once daily.   Yes Historical Provider, MD   atorvastatin (LIPITOR) 40 MG tablet TAKE 1 TABLET BY MOUTH ONCE DAILY 19  Yes DONNA Bynum   coenzyme Q10 200 mg capsule Take 200 mg by mouth 2 (two) times daily.   Yes Historical Provider, MD   cyanocobalamin (VITAMIN B-12) 1000 MCG tablet Take 1,000 mcg by mouth once daily.    Yes Historical Provider, MD   fluorouracil (EFUDEX) 5 % cream Apply topically 2 (two) times daily as needed. 18  Yes Historical Provider, MD   GAVILYTE-G 236-22.74-6.74 -5.86 gram suspension USE AS DIRECTED 19  Yes Historical Provider, MD   hydroCHLOROthiazide (HYDRODIURIL) 12.5 MG Tab Take 1 tablet (12.5 mg total) by mouth once daily. 19  Yes Hiro Kraft  MD   lisinopril (PRINIVIL,ZESTRIL) 20 MG tablet Take 1 tablet (20 mg total) by mouth once daily. 18  Yes DONNA Bynum   metFORMIN (GLUCOPHAGE) 1000 MG tablet Take 1 tablet (1,000 mg total) by mouth 2 (two) times daily with meals. 19  Yes Hiro Kraft MD   methocarbamol (ROBAXIN) 500 MG Tab Take 1 tablet (500 mg total) by mouth 4 (four) times daily as needed (muscle spasm). 19  Yes Hiro Kraft MD   metoprolol succinate (TOPROL-XL) 50 MG 24 hr tablet Take 1 tablet (50 mg total) by mouth once daily. 6/3/19  Yes DONNA Bynum   vitamin D 1000 units Tab Take 1,000 Units by mouth once daily.    Yes Historical Provider, MD   calcipotriene (DOVONOX) 0.005 % cream Apply topically 2 (two) times daily as needed. For pre skin cancer 18   Historical Provider, MD   ciclopirox (LOPROX) 0.77 % Crea Apply topically 2 (two) times daily. 19   Hiro Kraft MD   LORATADINE (ALAVERT ORAL) Take 1 tablet by mouth as needed.    Historical Provider, MD        Allergies:  Review of patient's allergies indicates:  No Known Allergies     Social History:   Social History     Socioeconomic History    Marital status:      Spouse name: Not on file    Number of children: Not on file    Years of education: Not on file    Highest education level: Not on file   Occupational History    Not on file   Social Needs    Financial resource strain: Not on file    Food insecurity:     Worry: Not on file     Inability: Not on file    Transportation needs:     Medical: Not on file     Non-medical: Not on file   Tobacco Use    Smoking status: Former Smoker     Packs/day: 1.00     Years: 30.00     Pack years: 30.00     Types: Cigarettes     Last attempt to quit: 2003     Years since quittin.4    Smokeless tobacco: Never Used   Substance and Sexual Activity    Alcohol use: Yes     Comment: socially    Drug use: Never    Sexual activity: Not on file   Lifestyle    Physical  "activity:     Days per week: Not on file     Minutes per session: Not on file    Stress: Not on file   Relationships    Social connections:     Talks on phone: Not on file     Gets together: Not on file     Attends Bahai service: Not on file     Active member of club or organization: Not on file     Attends meetings of clubs or organizations: Not on file     Relationship status: Not on file   Other Topics Concern    Not on file   Social History Narrative    Not on file       Family History:  Family History   Problem Relation Age of Onset    Diabetes Mother     Heart murmur Mother     Cancer Mother         Breast    Stroke Mother     Alcohol abuse Father     Heart attack Father 63    Cancer Father         Esophageal       ROS: No acute cardiac events, no acute respiratory complaints.     Physical Exam (all patients):    /87 (BP Location: Left arm, Patient Position: Sitting)   Pulse 60   Temp 97.9 °F (36.6 °C) (Temporal)   Resp 18   Ht 5' 10" (1.778 m)   Wt 96.9 kg (213 lb 10 oz)   SpO2 96%   BMI 30.65 kg/m²   Lungs: Clear to auscultation bilaterally, respirations unlabored  Heart: Regular rate and rhythm, S1 and S2 normal, no obvious murmurs  Abdomen:         Soft, non-tender, bowel sounds normal, no masses, no organomegaly    Lab Results   Component Value Date    WBC 6.00 05/20/2019    MCV 91 05/20/2019    RDW 12.5 05/20/2019     05/20/2019    INR 1.0 09/19/2009     (H) 05/20/2019    HGBA1C 5.7 (H) 05/20/2019    BUN 12 05/20/2019     05/20/2019    K 3.9 05/20/2019     05/20/2019        SEDATION PLAN: per anesthesia      History reviewed, vital signs satisfactory, cardiopulmonary status satisfactory, sedation options, risks and plans have been discussed with the patient  All their questions were answered and the patient agrees to the sedation procedures as planned and the patient is deemed an appropriate candidate for the sedation as planned.    Procedure " explained to patient, informed consent obtained and placed in chart.    Opal Oshea  7/26/2019  8:26 AM

## 2019-07-26 NOTE — [employer] PATIENT INSTRUCTIONS
Discharge Summary/Instructions after an Endoscopic Procedure  Patient Name: Deniz Paulino  Patient MRN: [id number]  Patient YOB: 1950 Friday, July 26, 2019 Opal Oshea MD  RESTRICTIONS:  During your procedure today, you received medications for sedation.  These   medications may affect your judgment, balance and coordination.  Therefore,   for 24 hours, you have the following restrictions:   - DO NOT drive a car, operate machinery, make legal/financial decisions,   sign important papers or drink alcohol.    ACTIVITY:  Today: no heavy lifting, straining or running due to procedural   sedation/anesthesia.  The following day: return to full activity including work.  DIET:  Eat and drink normally unless instructed otherwise.     TREATMENT FOR COMMON SIDE EFFECTS:  - Mild abdominal pain, nausea, belching, bloating or excessive gas:  rest,   eat lightly and use a heating pad.  - Sore Throat: treat with throat lozenges and/or gargle with warm salt   water.  - Because air was used during the procedure, expelling large amounts of air   from your rectum or belching is normal.  - If a bowel prep was taken, you may not have a bowel movement for 1-3 days.    This is normal.  SYMPTOMS TO WATCH FOR AND REPORT TO YOUR PHYSICIAN:  1. Abdominal pain or bloating, other than gas cramps.  2. Chest pain.  3. Back pain.  4. Signs of infection such as: chills or fever occurring within 24 hours   after the procedure.  5. Rectal bleeding, which would show as bright red, maroon, or black stools.   (A tablespoon of blood from the rectum is not serious, especially if   hemorrhoids are present.)  6. Vomiting.  7. Weakness or dizziness.  GO DIRECTLY TO THE NEAREST EMERGENCY ROOM IF YOU HAVE ANY OF THE FOLLOWING:      Difficulty breathing              Chills and/or fever over 101 F   Persistent vomiting and/or vomiting blood   Severe abdominal pain   Severe chest pain   Black, tarry stools   Bleeding- more than one  tablespoon   Any other symptom or condition that you feel may need urgent attention  Your doctor recommends these additional instructions:  If any biopsies were taken, your doctors clinic will contact you in 1 to 2   weeks with any results.  - Patient has a contact number available for emergencies.  The signs and   symptoms of potential delayed complications were discussed with the   patient.  Return to normal activities tomorrow.  Written discharge   instructions were provided to the patient.   - Discharge patient to home (via wheelchair).   - Resume previous diet today.   - Continue present medications.   - Await pathology results.  For questions, problems or results please call your physician Opal Oshea MD at Work:  [phone]  If you have any questions about the above instructions, call the GI   department at [phone] or call the endoscopy unit at [phone]   from 7am until 3 pm.  [facility] [place], EMERGENCY ROOM PHONE NUMBER:   [phone]  IF A COMPLICATION OR EMERGENCY SITUATION ARISES AND YOU ARE UNABLE TO REACH   YOUR PHYSICIAN - GO DIRECTLY TO THE EMERGENCY ROOM.  I have read or have had read to me these discharge instructions for my   procedure and have received a written copy.  I understand these   instructions and will follow-up with my physician if I have any questions.     __________________________________       _____________________________________  Nurse Signature                                          Patient/Designated   Responsible Party Signature  MD Opal Byrne MD  7/26/2019 9:12:18 AM  This report has been verified and signed electronically.  [employer]

## 2019-07-26 NOTE — TRANSFER OF CARE
"Anesthesia Transfer of Care Note    Patient: Deniz Paulino    Procedure(s) Performed: Procedure(s) (LRB):  ESOPHAGOGASTRODUODENOSCOPY (EGD) (N/A)  COLONOSCOPY (N/A)    Patient location: PACU    Anesthesia Type: MAC    Transport from OR: Transported from OR on room air with adequate spontaneous ventilation    Post pain: adequate analgesia    Post assessment: no apparent anesthetic complications and tolerated procedure well    Post vital signs: stable    Level of consciousness: awake    Nausea/Vomiting: no nausea/vomiting    Complications: none    Transfer of care protocol was followed      Last vitals:   Visit Vitals  /87 (BP Location: Left arm, Patient Position: Sitting)   Pulse 60   Temp 36.6 °C (97.9 °F) (Temporal)   Resp 18   Ht 5' 10" (1.778 m)   Wt 96.9 kg (213 lb 10 oz)   SpO2 96%   BMI 30.65 kg/m²     "

## 2019-08-09 DIAGNOSIS — I10 ESSENTIAL HYPERTENSION: ICD-10-CM

## 2019-08-09 DIAGNOSIS — Z95.1 S/P CABG (CORONARY ARTERY BYPASS GRAFT): ICD-10-CM

## 2019-08-09 DIAGNOSIS — I25.10 CORONARY ARTERY DISEASE INVOLVING NATIVE CORONARY ARTERY OF NATIVE HEART WITHOUT ANGINA PECTORIS: ICD-10-CM

## 2019-08-09 RX ORDER — LISINOPRIL 20 MG/1
TABLET ORAL
Qty: 90 TABLET | Refills: 3 | Status: SHIPPED | OUTPATIENT
Start: 2019-08-09 | End: 2020-08-04 | Stop reason: SDUPTHER

## 2019-11-14 ENCOUNTER — LAB VISIT (OUTPATIENT)
Dept: LAB | Facility: HOSPITAL | Age: 69
End: 2019-11-14
Attending: INTERNAL MEDICINE
Payer: MEDICARE

## 2019-11-14 DIAGNOSIS — E11.9 TYPE 2 DIABETES MELLITUS WITHOUT COMPLICATION, WITHOUT LONG-TERM CURRENT USE OF INSULIN: ICD-10-CM

## 2019-11-14 LAB
ESTIMATED AVG GLUCOSE: 111 MG/DL (ref 68–131)
HBA1C MFR BLD HPLC: 5.5 % (ref 4–5.6)

## 2019-11-14 PROCEDURE — 36415 COLL VENOUS BLD VENIPUNCTURE: CPT

## 2019-11-14 PROCEDURE — 83036 HEMOGLOBIN GLYCOSYLATED A1C: CPT

## 2019-11-19 ENCOUNTER — OFFICE VISIT (OUTPATIENT)
Dept: CARDIOLOGY | Facility: CLINIC | Age: 69
End: 2019-11-19
Payer: MEDICARE

## 2019-11-19 ENCOUNTER — CLINICAL SUPPORT (OUTPATIENT)
Dept: CARDIOLOGY | Facility: CLINIC | Age: 69
End: 2019-11-19
Payer: MEDICARE

## 2019-11-19 VITALS
OXYGEN SATURATION: 96 % | WEIGHT: 209 LBS | DIASTOLIC BLOOD PRESSURE: 82 MMHG | HEIGHT: 70 IN | BODY MASS INDEX: 29.92 KG/M2 | SYSTOLIC BLOOD PRESSURE: 130 MMHG | HEART RATE: 59 BPM

## 2019-11-19 DIAGNOSIS — I25.10 CORONARY ARTERY DISEASE INVOLVING NATIVE CORONARY ARTERY OF NATIVE HEART WITHOUT ANGINA PECTORIS: Primary | ICD-10-CM

## 2019-11-19 DIAGNOSIS — Z95.1 S/P CABG (CORONARY ARTERY BYPASS GRAFT): ICD-10-CM

## 2019-11-19 DIAGNOSIS — I25.10 CORONARY ARTERY DISEASE INVOLVING NATIVE CORONARY ARTERY OF NATIVE HEART WITHOUT ANGINA PECTORIS: ICD-10-CM

## 2019-11-19 DIAGNOSIS — E11.59 HYPERTENSION ASSOCIATED WITH DIABETES: ICD-10-CM

## 2019-11-19 DIAGNOSIS — I15.2 HYPERTENSION ASSOCIATED WITH DIABETES: ICD-10-CM

## 2019-11-19 PROCEDURE — 99214 OFFICE O/P EST MOD 30 MIN: CPT | Mod: S$GLB,,, | Performed by: NURSE PRACTITIONER

## 2019-11-19 PROCEDURE — 3075F PR MOST RECENT SYSTOLIC BLOOD PRESS GE 130-139MM HG: ICD-10-PCS | Mod: CPTII,S$GLB,, | Performed by: NURSE PRACTITIONER

## 2019-11-19 PROCEDURE — 3044F HG A1C LEVEL LT 7.0%: CPT | Mod: CPTII,S$GLB,, | Performed by: NURSE PRACTITIONER

## 2019-11-19 PROCEDURE — 1101F PR PT FALLS ASSESS DOC 0-1 FALLS W/OUT INJ PAST YR: ICD-10-PCS | Mod: CPTII,S$GLB,, | Performed by: NURSE PRACTITIONER

## 2019-11-19 PROCEDURE — 99999 PR PBB SHADOW E&M-EST. PATIENT-LVL IV: ICD-10-PCS | Mod: PBBFAC,,, | Performed by: NURSE PRACTITIONER

## 2019-11-19 PROCEDURE — 1101F PT FALLS ASSESS-DOCD LE1/YR: CPT | Mod: CPTII,S$GLB,, | Performed by: NURSE PRACTITIONER

## 2019-11-19 PROCEDURE — 3075F SYST BP GE 130 - 139MM HG: CPT | Mod: CPTII,S$GLB,, | Performed by: NURSE PRACTITIONER

## 2019-11-19 PROCEDURE — 3079F DIAST BP 80-89 MM HG: CPT | Mod: CPTII,S$GLB,, | Performed by: NURSE PRACTITIONER

## 2019-11-19 PROCEDURE — 93000 EKG 12-LEAD: ICD-10-PCS | Mod: S$GLB,,, | Performed by: INTERNAL MEDICINE

## 2019-11-19 PROCEDURE — 99214 PR OFFICE/OUTPT VISIT, EST, LEVL IV, 30-39 MIN: ICD-10-PCS | Mod: S$GLB,,, | Performed by: NURSE PRACTITIONER

## 2019-11-19 PROCEDURE — 3044F PR MOST RECENT HEMOGLOBIN A1C LEVEL <7.0%: ICD-10-PCS | Mod: CPTII,S$GLB,, | Performed by: NURSE PRACTITIONER

## 2019-11-19 PROCEDURE — 3079F PR MOST RECENT DIASTOLIC BLOOD PRESSURE 80-89 MM HG: ICD-10-PCS | Mod: CPTII,S$GLB,, | Performed by: NURSE PRACTITIONER

## 2019-11-19 PROCEDURE — 99999 PR PBB SHADOW E&M-EST. PATIENT-LVL IV: CPT | Mod: PBBFAC,,, | Performed by: NURSE PRACTITIONER

## 2019-11-19 PROCEDURE — 93000 ELECTROCARDIOGRAM COMPLETE: CPT | Mod: S$GLB,,, | Performed by: INTERNAL MEDICINE

## 2019-11-19 NOTE — PROGRESS NOTES
Subjective:   Patient ID:  Deniz Paulino is a 68 y.o. male who presents for follow up of CAD F/U      HPI  Mr. Paulino's current medical conditions include HTN,HLP,  DM, CAD, old MI, CABG x4 in .  Had skin cancer removal from scalp in Oct 2019. Has lost 30+ intentionally.   Fasting lipids and CMP in May 2019 stable and controlled. His A1 shows good DM control.   [facility]   Denies any chest pain, SOB, GARRIDO,  orthopnea, PND, dizziness, palpitations,  near syncope, syncope or edema . Has no symptoms concerning for angina or equivalent. No CNS Complaints to suggest TIA or CVA. Does well with limiting sodium intake.    EKG today shows sinus bradycardia.   Past Medical History:   Diagnosis Date    Acute coronary syndrome     Coronary artery disease     Diabetes mellitus 2014    Hyperlipidemia     Hypertension     Old MI (myocardial infarction) 2014    S/P CABG (coronary artery bypass graft) 2014       Past Surgical History:   Procedure Laterality Date    CARDIAC CATHETERIZATION      COLONOSCOPY N/A 2019    Procedure: COLONOSCOPY;  Surgeon: Opal Oshea MD;  Location: [facility];  Service: Endoscopy;  Laterality: N/A;    CORONARY ARTERY BYPASS GRAFT      ESOPHAGOGASTRODUODENOSCOPY N/A 2019    Procedure: ESOPHAGOGASTRODUODENOSCOPY (EGD);  Surgeon: Opal Oshea MD;  Location: [facility];  Service: Endoscopy;  Laterality: N/A;       Social History     Tobacco Use    Smoking status: Former Smoker     Packs/day: 1.00     Years: 30.00     Pack years: 30.00     Types: Cigarettes     Last attempt to quit: 2003     Years since quittin.8    Smokeless tobacco: Never Used   Substance Use Topics    Alcohol use: Yes     Comment: socially    Drug use: Never       Family History   Problem Relation Age of Onset    Diabetes Mother     Heart murmur Mother     Cancer Mother         Breast    Stroke Mother     Alcohol abuse Father     Heart  attack Father 63    Cancer Father         Esophageal       Current Outpatient Medications   Medication Sig    amLODIPine (NORVASC) 5 MG tablet TAKE 1 TABLET BY MOUTH ONCE DAILY    aspirin (ECOTRIN) 325 MG EC tablet Take 325 mg by mouth once daily.    atorvastatin (LIPITOR) 40 MG tablet TAKE 1 TABLET BY MOUTH ONCE DAILY    calcipotriene (DOVONOX) 0.005 % cream Apply topically 2 (two) times daily as needed. For pre skin cancer    ciclopirox (LOPROX) 0.77 % Crea Apply topically 2 (two) times daily.    coenzyme Q10 200 mg capsule Take 200 mg by mouth 2 (two) times daily.    cyanocobalamin (VITAMIN B-12) 1000 MCG tablet Take 1,000 mcg by mouth once daily.     fluorouracil (EFUDEX) 5 % cream Apply topically 2 (two) times daily as needed.    hydroCHLOROthiazide (HYDRODIURIL) 12.5 MG Tab Take 1 tablet (12.5 mg total) by mouth once daily.    lisinopril (PRINIVIL,ZESTRIL) 20 MG tablet TAKE 1 TABLET BY MOUTH ONCE DAILY    LORATADINE (ALAVERT ORAL) Take 1 tablet by mouth as needed.    metFORMIN (GLUCOPHAGE) 1000 MG tablet Take 1 tablet (1,000 mg total) by mouth 2 (two) times daily with meals.    methocarbamol (ROBAXIN) 500 MG Tab Take 1 tablet (500 mg total) by mouth 4 (four) times daily as needed (muscle spasm).    metoprolol succinate (TOPROL-XL) 50 MG 24 hr tablet Take 1 tablet (50 mg total) by mouth once daily.    pantoprazole (PROTONIX) 20 MG tablet Take 1 tablet (20 mg total) by mouth once daily.    vitamin D 1000 units Tab Take 1,000 Units by mouth once daily.     GAVILYTE-G 236-22.74-6.74 -5.86 gram suspension USE AS DIRECTED     No current facility-administered medications for this visit.      Current Outpatient Medications on File Prior to Visit   Medication Sig    amLODIPine (NORVASC) 5 MG tablet TAKE 1 TABLET BY MOUTH ONCE DAILY    aspirin (ECOTRIN) 325 MG EC tablet Take 325 mg by mouth once daily.    atorvastatin (LIPITOR) 40 MG tablet TAKE 1 TABLET BY MOUTH ONCE DAILY    calcipotriene  (DOVONOX) 0.005 % cream Apply topically 2 (two) times daily as needed. For pre skin cancer    ciclopirox (LOPROX) 0.77 % Crea Apply topically 2 (two) times daily.    coenzyme Q10 200 mg capsule Take 200 mg by mouth 2 (two) times daily.    cyanocobalamin (VITAMIN B-12) 1000 MCG tablet Take 1,000 mcg by mouth once daily.     fluorouracil (EFUDEX) 5 % cream Apply topically 2 (two) times daily as needed.    hydroCHLOROthiazide (HYDRODIURIL) 12.5 MG Tab Take 1 tablet (12.5 mg total) by mouth once daily.    lisinopril (PRINIVIL,ZESTRIL) 20 MG tablet TAKE 1 TABLET BY MOUTH ONCE DAILY    LORATADINE (ALAVERT ORAL) Take 1 tablet by mouth as needed.    metFORMIN (GLUCOPHAGE) 1000 MG tablet Take 1 tablet (1,000 mg total) by mouth 2 (two) times daily with meals.    methocarbamol (ROBAXIN) 500 MG Tab Take 1 tablet (500 mg total) by mouth 4 (four) times daily as needed (muscle spasm).    metoprolol succinate (TOPROL-XL) 50 MG 24 hr tablet Take 1 tablet (50 mg total) by mouth once daily.    pantoprazole (PROTONIX) 20 MG tablet Take 1 tablet (20 mg total) by mouth once daily.    vitamin D 1000 units Tab Take 1,000 Units by mouth once daily.     GAVILYTE-G 236-22.74-6.74 -5.86 gram suspension USE AS DIRECTED     No current facility-administered medications on file prior to visit.        Review of Systems   Constitution: Negative for diaphoresis, malaise/fatigue, weight gain and weight loss.   HENT: Negative for congestion and nosebleeds.    Cardiovascular: Negative for chest pain, claudication, cyanosis, dyspnea on exertion, irregular heartbeat, leg swelling, near-syncope, orthopnea, palpitations, paroxysmal nocturnal dyspnea and syncope.   Respiratory: Negative for cough, hemoptysis, shortness of breath, sleep disturbances due to breathing, snoring, sputum production and wheezing.    Hematologic/Lymphatic: Negative for bleeding problem. Does not bruise/bleed easily.   Skin: Negative for rash.   Musculoskeletal: Negative  "for arthritis, back pain, falls, joint pain, muscle cramps and muscle weakness.   Gastrointestinal: Negative for abdominal pain, constipation, diarrhea, heartburn, hematemesis, hematochezia, melena, nausea and vomiting.   Genitourinary: Negative for dysuria, hematuria and nocturia.   Neurological: Negative for excessive daytime sleepiness, dizziness, headaches, light-headedness, loss of balance, numbness, vertigo and weakness.       Objective:   Physical Exam   Constitutional: He is oriented to person, place, and time. He appears well-developed and well-nourished.   Neck: Neck supple. No JVD present.   Cardiovascular: Normal rate, regular rhythm, normal heart sounds and normal pulses. Exam reveals no friction rub.   No murmur heard.  Pulmonary/Chest: Effort normal and breath sounds normal. No respiratory distress. He has no wheezes. He has no rales.   Sternal scar    Abdominal: Soft. Bowel sounds are normal. He exhibits no distension.   Musculoskeletal: He exhibits no edema or tenderness.   Neurological: He is alert and oriented to person, place, and time.   Skin: Skin is warm and dry. No rash noted.   Psychiatric: He has a normal mood and affect. His behavior is normal.   Nursing note and vitals reviewed.    Vitals:    11/19/19 0954 11/19/19 0955   BP: 138/82 130/82   BP Location: Right arm Left arm   Patient Position: Sitting Sitting   Pulse: (!) 59    SpO2: 96%    Weight: 94.8 kg (209 lb)    Height: 5' 10" (1.778 m)      Lab Results   Component Value Date    CHOL 131 05/20/2019    CHOL 145 11/19/2018    CHOL 144 05/18/2018     Lab Results   Component Value Date    HDL 53 05/20/2019    HDL 42 11/19/2018    HDL 54 05/18/2018     Lab Results   Component Value Date    LDLCALC 55.8 (L) 05/20/2019    LDLCALC 79.0 11/19/2018    LDLCALC 66.2 05/18/2018     Lab Results   Component Value Date    TRIG 111 05/20/2019    TRIG 120 11/19/2018    TRIG 119 05/18/2018     Lab Results   Component Value Date    CHOLHDL 40.5 " 05/20/2019    CHOLHDL 29.0 11/19/2018    CHOLHDL 37.5 05/18/2018       Chemistry        Component Value Date/Time     05/20/2019 1132    K 3.9 05/20/2019 1132     05/20/2019 1132    CO2 28 05/20/2019 1132    BUN 12 05/20/2019 1132    CREATININE 1.1 05/20/2019 1132     (H) 05/20/2019 1132        Component Value Date/Time    CALCIUM 9.8 05/20/2019 1132    ALKPHOS 69 05/20/2019 1132    AST 27 05/20/2019 1132    ALT 24 05/20/2019 1132    BILITOT 0.9 05/20/2019 1132    ESTGFRAFRICA >60.0 05/20/2019 1132    EGFRNONAA >60.0 05/20/2019 1132          Lab Results   Component Value Date    TSH 1.985 05/20/2019     Lab Results   Component Value Date    INR 1.0 09/19/2009     Lab Results   Component Value Date    WBC 6.00 05/20/2019    HGB 15.0 05/20/2019    HCT 42.5 05/20/2019    MCV 91 05/20/2019     05/20/2019     BMP  Sodium   Date Value Ref Range Status   05/20/2019 141 136 - 145 mmol/L Final     Potassium   Date Value Ref Range Status   05/20/2019 3.9 3.5 - 5.1 mmol/L Final     Chloride   Date Value Ref Range Status   05/20/2019 102 95 - 110 mmol/L Final     CO2   Date Value Ref Range Status   05/20/2019 28 23 - 29 mmol/L Final     BUN, Bld   Date Value Ref Range Status   05/20/2019 12 8 - 23 mg/dL Final     Creatinine   Date Value Ref Range Status   05/20/2019 1.1 0.5 - 1.4 mg/dL Final     Calcium   Date Value Ref Range Status   05/20/2019 9.8 8.7 - 10.5 mg/dL Final     Anion Gap   Date Value Ref Range Status   05/20/2019 11 8 - 16 mmol/L Final     eGFR if    Date Value Ref Range Status   05/20/2019 >60.0 >60 mL/min/1.73 m^2 Final     eGFR if non    Date Value Ref Range Status   05/20/2019 >60.0 >60 mL/min/1.73 m^2 Final     Comment:     Calculation used to obtain the estimated glomerular filtration  rate (eGFR) is the CKD-EPI equation.        CrCl cannot be calculated (Patient's most recent lab result is older than the maximum 7 days allowed.).    Assessment:      1. Coronary artery disease involving native coronary artery of native heart without angina pectoris    2. Hypertension associated with diabetes    3. S/P CABG (coronary artery bypass graft)        Plan:   Coronary artery disease involving native coronary artery of native heart without angina pectoris  Continue ASA, Statin, BB  Heart healthy diet   Exercise program as tolerated      Hypertension associated with diabetes  Continue metoprolol, amlodipine  Lisinopril and HCTZ    S/P CABG (coronary artery bypass graft)  Continue current medical management     RTC in 1 year per patient request with [facility] . Patient will bring copy of labs again     Labs scanned into record today

## 2019-11-26 ENCOUNTER — OFFICE VISIT (OUTPATIENT)
Dept: INTERNAL MEDICINE | Facility: CLINIC | Age: 69
End: 2019-11-26
Payer: MEDICARE

## 2019-11-26 VITALS
TEMPERATURE: 97 F | WEIGHT: 213.63 LBS | HEIGHT: 70 IN | HEART RATE: 65 BPM | OXYGEN SATURATION: 97 % | DIASTOLIC BLOOD PRESSURE: 62 MMHG | BODY MASS INDEX: 30.58 KG/M2 | SYSTOLIC BLOOD PRESSURE: 138 MMHG

## 2019-11-26 DIAGNOSIS — Z95.1 S/P CABG (CORONARY ARTERY BYPASS GRAFT): ICD-10-CM

## 2019-11-26 DIAGNOSIS — E11.69 HYPERLIPIDEMIA ASSOCIATED WITH TYPE 2 DIABETES MELLITUS: ICD-10-CM

## 2019-11-26 DIAGNOSIS — I15.2 HYPERTENSION ASSOCIATED WITH DIABETES: Primary | ICD-10-CM

## 2019-11-26 DIAGNOSIS — E11.9 TYPE 2 DIABETES MELLITUS WITHOUT COMPLICATION, WITHOUT LONG-TERM CURRENT USE OF INSULIN: ICD-10-CM

## 2019-11-26 DIAGNOSIS — E78.5 HYPERLIPIDEMIA ASSOCIATED WITH TYPE 2 DIABETES MELLITUS: ICD-10-CM

## 2019-11-26 DIAGNOSIS — Z12.5 ENCOUNTER FOR SCREENING FOR MALIGNANT NEOPLASM OF PROSTATE: ICD-10-CM

## 2019-11-26 DIAGNOSIS — I25.10 CORONARY ARTERY DISEASE INVOLVING NATIVE CORONARY ARTERY OF NATIVE HEART WITHOUT ANGINA PECTORIS: ICD-10-CM

## 2019-11-26 DIAGNOSIS — E11.59 HYPERTENSION ASSOCIATED WITH DIABETES: Primary | ICD-10-CM

## 2019-11-26 PROCEDURE — 1159F MED LIST DOCD IN RCRD: CPT | Mod: S$GLB,,, | Performed by: INTERNAL MEDICINE

## 2019-11-26 PROCEDURE — 1101F PR PT FALLS ASSESS DOC 0-1 FALLS W/OUT INJ PAST YR: ICD-10-PCS | Mod: CPTII,S$GLB,, | Performed by: INTERNAL MEDICINE

## 2019-11-26 PROCEDURE — 99999 PR PBB SHADOW E&M-EST. PATIENT-LVL III: CPT | Mod: PBBFAC,,, | Performed by: INTERNAL MEDICINE

## 2019-11-26 PROCEDURE — 3075F PR MOST RECENT SYSTOLIC BLOOD PRESS GE 130-139MM HG: ICD-10-PCS | Mod: CPTII,S$GLB,, | Performed by: INTERNAL MEDICINE

## 2019-11-26 PROCEDURE — 1101F PT FALLS ASSESS-DOCD LE1/YR: CPT | Mod: CPTII,S$GLB,, | Performed by: INTERNAL MEDICINE

## 2019-11-26 PROCEDURE — 3078F DIAST BP <80 MM HG: CPT | Mod: CPTII,S$GLB,, | Performed by: INTERNAL MEDICINE

## 2019-11-26 PROCEDURE — 99213 OFFICE O/P EST LOW 20 MIN: CPT | Mod: S$GLB,,, | Performed by: INTERNAL MEDICINE

## 2019-11-26 PROCEDURE — 99213 PR OFFICE/OUTPT VISIT, EST, LEVL III, 20-29 MIN: ICD-10-PCS | Mod: S$GLB,,, | Performed by: INTERNAL MEDICINE

## 2019-11-26 PROCEDURE — 3078F PR MOST RECENT DIASTOLIC BLOOD PRESSURE < 80 MM HG: ICD-10-PCS | Mod: CPTII,S$GLB,, | Performed by: INTERNAL MEDICINE

## 2019-11-26 PROCEDURE — 1126F AMNT PAIN NOTED NONE PRSNT: CPT | Mod: S$GLB,,, | Performed by: INTERNAL MEDICINE

## 2019-11-26 PROCEDURE — 3044F HG A1C LEVEL LT 7.0%: CPT | Mod: CPTII,S$GLB,, | Performed by: INTERNAL MEDICINE

## 2019-11-26 PROCEDURE — 99499 RISK ADDL DX/OHS AUDIT: ICD-10-PCS | Mod: S$GLB,,, | Performed by: INTERNAL MEDICINE

## 2019-11-26 PROCEDURE — 1126F PR PAIN SEVERITY QUANTIFIED, NO PAIN PRESENT: ICD-10-PCS | Mod: S$GLB,,, | Performed by: INTERNAL MEDICINE

## 2019-11-26 PROCEDURE — 99999 PR PBB SHADOW E&M-EST. PATIENT-LVL III: ICD-10-PCS | Mod: PBBFAC,,, | Performed by: INTERNAL MEDICINE

## 2019-11-26 PROCEDURE — 3075F SYST BP GE 130 - 139MM HG: CPT | Mod: CPTII,S$GLB,, | Performed by: INTERNAL MEDICINE

## 2019-11-26 PROCEDURE — 1159F PR MEDICATION LIST DOCUMENTED IN MEDICAL RECORD: ICD-10-PCS | Mod: S$GLB,,, | Performed by: INTERNAL MEDICINE

## 2019-11-26 PROCEDURE — 99499 UNLISTED E&M SERVICE: CPT | Mod: S$GLB,,, | Performed by: INTERNAL MEDICINE

## 2019-11-26 PROCEDURE — 3044F PR MOST RECENT HEMOGLOBIN A1C LEVEL <7.0%: ICD-10-PCS | Mod: CPTII,S$GLB,, | Performed by: INTERNAL MEDICINE

## 2019-11-26 NOTE — PROGRESS NOTES
"Subjective:      Patient ID: Deniz Paulino is a 69 y.o. male.    Chief Complaint: Follow-up (4 month )    HPI   68 yo with   Patient Active Problem List   Diagnosis    CAD (coronary artery disease)    Hypertension associated with diabetes    Hyperlipidemia associated with type 2 diabetes mellitus    S/P CABG (coronary artery bypass graft)    Old MI (myocardial infarction)    Diabetes mellitus    Abdominal pain     Past Medical History:   Diagnosis Date    Acute coronary syndrome     Coronary artery disease     Diabetes mellitus 4/24/2014    Hyperlipidemia     Hypertension     Old MI (myocardial infarction) 4/24/2014    S/P CABG (coronary artery bypass graft) 4/24/2014     Here today for management of mult med problems as outlined below.   Compliant with meds without significant side effects. No new c/o. Home bp 107 ot 144/47- 68.       Review of Systems   Constitutional: Negative for chills and fever.   HENT: Negative for ear pain and sore throat.    Respiratory: Negative for cough.    Cardiovascular: Negative for chest pain.   Gastrointestinal: Negative for abdominal pain and blood in stool.   Genitourinary: Negative for dysuria and hematuria.   Neurological: Negative for seizures and syncope.     Objective:   /62 (BP Location: Right arm, Patient Position: Sitting, BP Method: Large (Automatic))   Pulse 65   Temp 96.6 °F (35.9 °C) (Tympanic)   Ht 5' 10" (1.778 m)   Wt 96.9 kg (213 lb 10 oz)   SpO2 97%   BMI 30.65 kg/m²     Physical Exam   Constitutional: He is oriented to person, place, and time. He appears well-developed and well-nourished. No distress.   HENT:   Head: Normocephalic and atraumatic.   Mouth/Throat: Oropharynx is clear and moist.   Eyes: Pupils are equal, round, and reactive to light. EOM are normal.   Neck: Neck supple. No thyromegaly present.   Cardiovascular: Normal rate and regular rhythm.   Pulmonary/Chest: Breath sounds normal. He has no wheezes. He has no rales. "   Abdominal: Soft. Bowel sounds are normal. There is no tenderness.   Musculoskeletal: He exhibits no edema.   Lymphadenopathy:     He has no cervical adenopathy.   Neurological: He is alert and oriented to person, place, and time.   Skin: Skin is warm and dry.   Psychiatric: He has a normal mood and affect. His behavior is normal.       Assessment:     1. Hypertension associated with diabetes    2. Hyperlipidemia associated with type 2 diabetes mellitus    3. Coronary artery disease involving native coronary artery of native heart without angina pectoris    4. Type 2 diabetes mellitus without complication, without long-term current use of insulin    5. S/P CABG (coronary artery bypass graft)    6. Encounter for screening for malignant neoplasm of prostate      Plan:   Hypertension associated with diabetes  controlled  -     CBC auto differential; Future; Expected date: 05/24/2020  -     Comprehensive metabolic panel; Future; Expected date: 05/24/2020  -     TSH; Future; Expected date: 05/24/2020    Hyperlipidemia associated with type 2 diabetes mellitus  Stable    Coronary artery disease involving native coronary artery of native heart without angina pectoris  stable  -     Lipid panel; Future; Expected date: 05/24/2020    Type 2 diabetes mellitus without complication, without long-term current use of insulin  Controlled  -     Hemoglobin A1c; Future; Expected date: 05/24/2020    S/P CABG (coronary artery bypass graft)    Encounter for screening for malignant neoplasm of prostate  -     PSA, Screening; Future; Expected date: 05/24/2020      Above conditions stable.  Cont current meds  Up to date with cards.       Problem List Items Addressed This Visit        Cardiac/Vascular    CAD (coronary artery disease)    Overview     Sees Sabrina         Relevant Orders    Lipid panel    Hypertension associated with diabetes - Primary    Relevant Orders    CBC auto differential    Comprehensive metabolic panel    TSH     Hyperlipidemia associated with type 2 diabetes mellitus    S/P CABG (coronary artery bypass graft)       Endocrine    Diabetes mellitus    Relevant Orders    Hemoglobin A1c      Other Visit Diagnoses     Encounter for screening for malignant neoplasm of prostate        Relevant Orders    PSA, Screening          Follow up in about 6 months (around 5/26/2020), or if symptoms worsen or fail to improve.

## 2019-12-13 DIAGNOSIS — E11.9 TYPE 2 DIABETES MELLITUS WITHOUT COMPLICATION, WITHOUT LONG-TERM CURRENT USE OF INSULIN: ICD-10-CM

## 2019-12-13 RX ORDER — METFORMIN HYDROCHLORIDE 1000 MG/1
TABLET ORAL
Qty: 180 TABLET | Refills: 1 | Status: SHIPPED | OUTPATIENT
Start: 2019-12-13 | End: 2020-06-11

## 2020-01-17 DIAGNOSIS — I15.2 HYPERTENSION ASSOCIATED WITH DIABETES: ICD-10-CM

## 2020-01-17 DIAGNOSIS — E11.59 HYPERTENSION ASSOCIATED WITH DIABETES: ICD-10-CM

## 2020-01-17 RX ORDER — HYDROCHLOROTHIAZIDE 12.5 MG/1
TABLET ORAL
Qty: 90 TABLET | Refills: 1 | Status: SHIPPED | OUTPATIENT
Start: 2020-01-17 | End: 2020-06-11

## 2020-03-09 ENCOUNTER — PATIENT MESSAGE (OUTPATIENT)
Dept: INTERNAL MEDICINE | Facility: CLINIC | Age: 70
End: 2020-03-09

## 2020-03-12 RX ORDER — AMLODIPINE BESYLATE 5 MG/1
5 TABLET ORAL DAILY
Qty: 90 TABLET | Refills: 3 | Status: SHIPPED | OUTPATIENT
Start: 2020-03-12 | End: 2020-12-09

## 2020-05-18 ENCOUNTER — LAB VISIT (OUTPATIENT)
Dept: LAB | Facility: HOSPITAL | Age: 70
End: 2020-05-18
Attending: INTERNAL MEDICINE
Payer: MEDICARE

## 2020-05-18 DIAGNOSIS — I25.10 CORONARY ARTERY DISEASE INVOLVING NATIVE CORONARY ARTERY OF NATIVE HEART WITHOUT ANGINA PECTORIS: ICD-10-CM

## 2020-05-18 DIAGNOSIS — E11.9 TYPE 2 DIABETES MELLITUS WITHOUT COMPLICATION, WITHOUT LONG-TERM CURRENT USE OF INSULIN: ICD-10-CM

## 2020-05-18 DIAGNOSIS — Z12.5 ENCOUNTER FOR SCREENING FOR MALIGNANT NEOPLASM OF PROSTATE: ICD-10-CM

## 2020-05-18 DIAGNOSIS — I15.2 HYPERTENSION ASSOCIATED WITH DIABETES: ICD-10-CM

## 2020-05-18 DIAGNOSIS — E11.59 HYPERTENSION ASSOCIATED WITH DIABETES: ICD-10-CM

## 2020-05-18 LAB
ALBUMIN SERPL BCP-MCNC: 4 G/DL (ref 3.5–5.2)
ALP SERPL-CCNC: 64 U/L (ref 55–135)
ALT SERPL W/O P-5'-P-CCNC: 24 U/L (ref 10–44)
ANION GAP SERPL CALC-SCNC: 10 MMOL/L (ref 8–16)
AST SERPL-CCNC: 27 U/L (ref 10–40)
BASOPHILS # BLD AUTO: 0.03 K/UL (ref 0–0.2)
BASOPHILS NFR BLD: 0.5 % (ref 0–1.9)
BILIRUB SERPL-MCNC: 0.7 MG/DL (ref 0.1–1)
BUN SERPL-MCNC: 16 MG/DL (ref 8–23)
CALCIUM SERPL-MCNC: 9.4 MG/DL (ref 8.7–10.5)
CHLORIDE SERPL-SCNC: 100 MMOL/L (ref 95–110)
CHOLEST SERPL-MCNC: 142 MG/DL (ref 120–199)
CHOLEST/HDLC SERPL: 2.8 {RATIO} (ref 2–5)
CO2 SERPL-SCNC: 31 MMOL/L (ref 23–29)
COMPLEXED PSA SERPL-MCNC: 1.3 NG/ML (ref 0–4)
CREAT SERPL-MCNC: 0.9 MG/DL (ref 0.5–1.4)
DIFFERENTIAL METHOD: ABNORMAL
EOSINOPHIL # BLD AUTO: 0.1 K/UL (ref 0–0.5)
EOSINOPHIL NFR BLD: 1.3 % (ref 0–8)
ERYTHROCYTE [DISTWIDTH] IN BLOOD BY AUTOMATED COUNT: 12.6 % (ref 11.5–14.5)
EST. GFR  (AFRICAN AMERICAN): >60 ML/MIN/1.73 M^2
EST. GFR  (NON AFRICAN AMERICAN): >60 ML/MIN/1.73 M^2
ESTIMATED AVG GLUCOSE: 111 MG/DL (ref 68–131)
GLUCOSE SERPL-MCNC: 91 MG/DL (ref 70–110)
HBA1C MFR BLD HPLC: 5.5 % (ref 4–5.6)
HCT VFR BLD AUTO: 44.1 % (ref 40–54)
HDLC SERPL-MCNC: 50 MG/DL (ref 40–75)
HDLC SERPL: 35.2 % (ref 20–50)
HGB BLD-MCNC: 14.7 G/DL (ref 14–18)
IMM GRANULOCYTES # BLD AUTO: 0.03 K/UL (ref 0–0.04)
IMM GRANULOCYTES NFR BLD AUTO: 0.5 % (ref 0–0.5)
LDLC SERPL CALC-MCNC: 66.2 MG/DL (ref 63–159)
LYMPHOCYTES # BLD AUTO: 1.9 K/UL (ref 1–4.8)
LYMPHOCYTES NFR BLD: 29.2 % (ref 18–48)
MCH RBC QN AUTO: 32.2 PG (ref 27–31)
MCHC RBC AUTO-ENTMCNC: 33.3 G/DL (ref 32–36)
MCV RBC AUTO: 97 FL (ref 82–98)
MONOCYTES # BLD AUTO: 0.7 K/UL (ref 0.3–1)
MONOCYTES NFR BLD: 10.6 % (ref 4–15)
NEUTROPHILS # BLD AUTO: 3.7 K/UL (ref 1.8–7.7)
NEUTROPHILS NFR BLD: 57.9 % (ref 38–73)
NONHDLC SERPL-MCNC: 92 MG/DL
NRBC BLD-RTO: 0 /100 WBC
PLATELET # BLD AUTO: 182 K/UL (ref 150–350)
PMV BLD AUTO: 10.8 FL (ref 9.2–12.9)
POTASSIUM SERPL-SCNC: 3.6 MMOL/L (ref 3.5–5.1)
PROT SERPL-MCNC: 6.8 G/DL (ref 6–8.4)
RBC # BLD AUTO: 4.57 M/UL (ref 4.6–6.2)
SODIUM SERPL-SCNC: 141 MMOL/L (ref 136–145)
TRIGL SERPL-MCNC: 129 MG/DL (ref 30–150)
TSH SERPL DL<=0.005 MIU/L-ACNC: 1.84 UIU/ML (ref 0.4–4)
WBC # BLD AUTO: 6.33 K/UL (ref 3.9–12.7)

## 2020-05-18 PROCEDURE — 36415 COLL VENOUS BLD VENIPUNCTURE: CPT

## 2020-05-18 PROCEDURE — 80053 COMPREHEN METABOLIC PANEL: CPT

## 2020-05-18 PROCEDURE — 84443 ASSAY THYROID STIM HORMONE: CPT

## 2020-05-18 PROCEDURE — 85025 COMPLETE CBC W/AUTO DIFF WBC: CPT

## 2020-05-18 PROCEDURE — 83036 HEMOGLOBIN GLYCOSYLATED A1C: CPT

## 2020-05-18 PROCEDURE — 84153 ASSAY OF PSA TOTAL: CPT

## 2020-05-18 PROCEDURE — 80061 LIPID PANEL: CPT

## 2020-05-22 DIAGNOSIS — I25.10 CORONARY ARTERY DISEASE INVOLVING NATIVE CORONARY ARTERY OF NATIVE HEART WITHOUT ANGINA PECTORIS: ICD-10-CM

## 2020-05-22 DIAGNOSIS — Z95.1 S/P CABG (CORONARY ARTERY BYPASS GRAFT): ICD-10-CM

## 2020-05-22 RX ORDER — METOPROLOL SUCCINATE 50 MG/1
TABLET, EXTENDED RELEASE ORAL
Qty: 30 TABLET | Refills: 11 | Status: SHIPPED | OUTPATIENT
Start: 2020-05-22 | End: 2021-05-19 | Stop reason: SDUPTHER

## 2020-05-26 ENCOUNTER — PATIENT MESSAGE (OUTPATIENT)
Dept: ADMINISTRATIVE | Facility: OTHER | Age: 70
End: 2020-05-26

## 2020-05-26 ENCOUNTER — OFFICE VISIT (OUTPATIENT)
Dept: INTERNAL MEDICINE | Facility: CLINIC | Age: 70
End: 2020-05-26
Payer: MEDICARE

## 2020-05-26 VITALS
BODY MASS INDEX: 32.08 KG/M2 | HEART RATE: 71 BPM | DIASTOLIC BLOOD PRESSURE: 90 MMHG | TEMPERATURE: 96 F | SYSTOLIC BLOOD PRESSURE: 136 MMHG | WEIGHT: 223.56 LBS | OXYGEN SATURATION: 96 %

## 2020-05-26 DIAGNOSIS — Z00.00 ROUTINE GENERAL MEDICAL EXAMINATION AT A HEALTH CARE FACILITY: Primary | ICD-10-CM

## 2020-05-26 DIAGNOSIS — E11.9 TYPE 2 DIABETES MELLITUS WITHOUT COMPLICATION, WITHOUT LONG-TERM CURRENT USE OF INSULIN: ICD-10-CM

## 2020-05-26 DIAGNOSIS — E11.69 HYPERLIPIDEMIA ASSOCIATED WITH TYPE 2 DIABETES MELLITUS: ICD-10-CM

## 2020-05-26 DIAGNOSIS — I15.2 HYPERTENSION ASSOCIATED WITH DIABETES: ICD-10-CM

## 2020-05-26 DIAGNOSIS — Z23 NEED FOR PNEUMOCOCCAL VACCINATION: ICD-10-CM

## 2020-05-26 DIAGNOSIS — E78.5 HYPERLIPIDEMIA ASSOCIATED WITH TYPE 2 DIABETES MELLITUS: ICD-10-CM

## 2020-05-26 DIAGNOSIS — I25.10 CORONARY ARTERY DISEASE INVOLVING NATIVE CORONARY ARTERY OF NATIVE HEART WITHOUT ANGINA PECTORIS: ICD-10-CM

## 2020-05-26 DIAGNOSIS — E11.59 HYPERTENSION ASSOCIATED WITH DIABETES: ICD-10-CM

## 2020-05-26 DIAGNOSIS — B35.3 TINEA PEDIS OF RIGHT FOOT: ICD-10-CM

## 2020-05-26 PROBLEM — R10.9 ABDOMINAL PAIN: Status: RESOLVED | Noted: 2019-07-26 | Resolved: 2020-05-26

## 2020-05-26 PROCEDURE — 99999 PR PBB SHADOW E&M-EST. PATIENT-LVL III: ICD-10-PCS | Mod: PBBFAC,,, | Performed by: INTERNAL MEDICINE

## 2020-05-26 PROCEDURE — 99214 PR OFFICE/OUTPT VISIT, EST, LEVL IV, 30-39 MIN: ICD-10-PCS | Mod: 25,S$GLB,, | Performed by: INTERNAL MEDICINE

## 2020-05-26 PROCEDURE — G0009 ADMIN PNEUMOCOCCAL VACCINE: HCPCS | Mod: S$GLB,,, | Performed by: INTERNAL MEDICINE

## 2020-05-26 PROCEDURE — G0009 PNEUMOCOCCAL POLYSACCHARIDE VACCINE 23-VALENT =>2YO SQ IM: ICD-10-PCS | Mod: S$GLB,,, | Performed by: INTERNAL MEDICINE

## 2020-05-26 PROCEDURE — 99214 OFFICE O/P EST MOD 30 MIN: CPT | Mod: 25,S$GLB,, | Performed by: INTERNAL MEDICINE

## 2020-05-26 PROCEDURE — 90732 PPSV23 VACC 2 YRS+ SUBQ/IM: CPT | Mod: S$GLB,,, | Performed by: INTERNAL MEDICINE

## 2020-05-26 PROCEDURE — 99499 RISK ADDL DX/OHS AUDIT: ICD-10-PCS | Mod: S$GLB,,, | Performed by: INTERNAL MEDICINE

## 2020-05-26 PROCEDURE — 90732 PNEUMOCOCCAL POLYSACCHARIDE VACCINE 23-VALENT =>2YO SQ IM: ICD-10-PCS | Mod: S$GLB,,, | Performed by: INTERNAL MEDICINE

## 2020-05-26 PROCEDURE — 99499 UNLISTED E&M SERVICE: CPT | Mod: S$GLB,,, | Performed by: INTERNAL MEDICINE

## 2020-05-26 PROCEDURE — 3044F HG A1C LEVEL LT 7.0%: CPT | Mod: CPTII,S$GLB,, | Performed by: INTERNAL MEDICINE

## 2020-05-26 PROCEDURE — 3080F PR MOST RECENT DIASTOLIC BLOOD PRESSURE >= 90 MM HG: ICD-10-PCS | Mod: CPTII,S$GLB,, | Performed by: INTERNAL MEDICINE

## 2020-05-26 PROCEDURE — 99999 PR PBB SHADOW E&M-EST. PATIENT-LVL III: CPT | Mod: PBBFAC,,, | Performed by: INTERNAL MEDICINE

## 2020-05-26 PROCEDURE — 3075F PR MOST RECENT SYSTOLIC BLOOD PRESS GE 130-139MM HG: ICD-10-PCS | Mod: CPTII,S$GLB,, | Performed by: INTERNAL MEDICINE

## 2020-05-26 PROCEDURE — 3044F PR MOST RECENT HEMOGLOBIN A1C LEVEL <7.0%: ICD-10-PCS | Mod: CPTII,S$GLB,, | Performed by: INTERNAL MEDICINE

## 2020-05-26 PROCEDURE — 3075F SYST BP GE 130 - 139MM HG: CPT | Mod: CPTII,S$GLB,, | Performed by: INTERNAL MEDICINE

## 2020-05-26 PROCEDURE — 3080F DIAST BP >= 90 MM HG: CPT | Mod: CPTII,S$GLB,, | Performed by: INTERNAL MEDICINE

## 2020-05-26 RX ORDER — CLOTRIMAZOLE 1 %
CREAM (GRAM) TOPICAL 2 TIMES DAILY
Qty: 45 G | Refills: 0 | Status: SHIPPED | OUTPATIENT
Start: 2020-05-26 | End: 2020-11-17

## 2020-05-26 NOTE — PROGRESS NOTES
Subjective:      Patient ID: Deniz Paulino is a 69 y.o. male.    Chief Complaint: Follow-up    HPI     68 yo with   Patient Active Problem List   Diagnosis    CAD (coronary artery disease)    Hypertension associated with diabetes    Hyperlipidemia associated with type 2 diabetes mellitus    S/P CABG (coronary artery bypass graft)    Old MI (myocardial infarction)    Diabetes mellitus     Past Medical History:   Diagnosis Date    Acute coronary syndrome     Coronary artery disease     Diabetes mellitus 4/24/2014    Hyperlipidemia     Hypertension     Old MI (myocardial infarction) 4/24/2014    S/P CABG (coronary artery bypass graft) 4/24/2014     Here today for annual prev exam.  Compliant with meds without significant side effects. Energy and appetite are good.   Home bp 130s / 60s to 70s.     Review of Systems   Constitutional: Negative for chills and fever.   HENT: Negative for ear pain and sore throat.    Respiratory: Negative for cough.    Cardiovascular: Negative for chest pain.   Gastrointestinal: Negative for abdominal pain and blood in stool.   Genitourinary: Negative for dysuria and hematuria.   Neurological: Negative for seizures and syncope.     Objective:   BP (!) 136/90 (BP Location: Left arm, Patient Position: Sitting, BP Method: Large (Manual))   Pulse 71   Temp 96.2 °F (35.7 °C) (Tympanic)   Wt 101.4 kg (223 lb 8.7 oz)   SpO2 96%   BMI 32.08 kg/m²     Physical Exam   Constitutional: He is oriented to person, place, and time. He appears well-developed and well-nourished. No distress.   HENT:   Head: Normocephalic and atraumatic.   Mouth/Throat: Oropharynx is clear and moist.   Eyes: Pupils are equal, round, and reactive to light. EOM are normal.   Neck: Neck supple. No thyromegaly present.   Cardiovascular: Normal rate and regular rhythm.   Pulmonary/Chest: Breath sounds normal. He has no wheezes. He has no rales.   Abdominal: Soft. Bowel sounds are normal. There is no tenderness.    Musculoskeletal: He exhibits no edema.   Feet:   Right Foot:   Protective Sensation: 8 sites tested. 6 sites sensed.   Skin Integrity: Negative for ulcer or blister.   Left Foot:   Protective Sensation: 8 sites tested. 6 sites sensed.   Skin Integrity: Negative for ulcer or blister.   Lymphadenopathy:     He has no cervical adenopathy.   Neurological: He is alert and oriented to person, place, and time.   Skin: Skin is warm and dry.   Psychiatric: He has a normal mood and affect. His behavior is normal.       Assessment:     1. Routine general medical examination at a health care facility    2. Coronary artery disease involving native coronary artery of native heart without angina pectoris    3. Hyperlipidemia associated with type 2 diabetes mellitus    4. Hypertension associated with diabetes    5. Type 2 diabetes mellitus without complication, without long-term current use of insulin    6. Need for pneumococcal vaccination      Plan:   Routine general medical examination at a health care facility  Heart healthy diet and reg exercise   reviewed    Coronary artery disease involving native coronary artery of native heart without angina pectoris  Up to date with cards    Hyperlipidemia associated with type 2 diabetes mellitus    Hypertension associated with diabetes  Reports controlled  Monitor with digital medicine   -     Hypertension Digital Medicine (HDMP) Enrollment Order  -     Hypertension Digital Medicine (HDMP): Assign Onboarding Questionnaires    Type 2 diabetes mellitus without complication, without long-term current use of insulin  -     Hemoglobin A1C; Future; Expected date: 11/22/2020    Need for pneumococcal vaccination  -     (In Office Administered) Pneumococcal Polysaccharide Vaccine (23 Valent) (SQ/IM)            Problem List Items Addressed This Visit        Cardiac/Vascular    CAD (coronary artery disease)    Overview     Sees Sabrina         Hypertension associated with diabetes    Relevant  Orders    Hypertension Digital Medicine (HDMP) Enrollment Order (Completed)    Hypertension Digital Medicine (HDMP): Assign Onboarding Questionnaires (Completed)    Hyperlipidemia associated with type 2 diabetes mellitus       Endocrine    Diabetes mellitus    Relevant Orders    Hemoglobin A1C      Other Visit Diagnoses     Routine general medical examination at a health care facility    -  Primary    Need for pneumococcal vaccination        Relevant Orders    (In Office Administered) Pneumococcal Polysaccharide Vaccine (23 Valent) (SQ/IM)          Follow up in about 6 weeks (around 7/7/2020), or if symptoms worsen or fail to improve.

## 2020-05-27 ENCOUNTER — PATIENT OUTREACH (OUTPATIENT)
Dept: OTHER | Facility: OTHER | Age: 70
End: 2020-05-27

## 2020-05-27 ENCOUNTER — PATIENT MESSAGE (OUTPATIENT)
Dept: ADMINISTRATIVE | Facility: OTHER | Age: 70
End: 2020-05-27

## 2020-05-27 NOTE — LETTER
May 27, 2020     Deniz Paulino  [address] [place] [address]       Dear Deniz,    Welcome to [email or web address]! Our goal is to make care effective, proactive and convenient by using data you send us from home to better treat your chronic conditions.              My name is Glory Ragsdale, and I am your dedicated Digital Medicine clinician. As an expert in medication management, I will help ensure that the medications you are taking continue to provide the intended benefits and help you reach your goals. You can reach me directly at [phone] or by sending me a message directly through your [facility] account.      I am Ying Barksdale and I will be your health . My job is to help you identify lifestyle changes to improve your disease control. We will talk about nutrition, exercise, and other ways you may be able to adjust your current habits to better your health. Additionally, we will help ensure you are completing the tests and screenings that are necessary to help manage your conditions. You can reach me directly at [phone] or by sending me a message directly through your [facility] account.    Most importantly, YOU are at the center of this team. Together, we will work to improve your overall health and encourage you to meet your goals for a healthier lifestyle.     What we expect from YOU:  · Please take frequent home blood pressure measurements. We ask that you take at least 1 blood pressure reading per week, but more information will better help us get you know you. Be sure you rest for a few minutes before taking the reading in a quiet, comfortable place.     Be available to receive phone calls or [employer] messages, when appropriate, from your care team. Please let us know if there are any specific days or times that work best for us to reach you via phone.     Complete routine tests and screenings. Dont worry, we will help keep you on track!           What you  should expect from your Digital Medicine Care Team:   We will work with you to create a personalized plan of care and provide you with encouragement and education, including regarding lifestyle changes, that could help you manage your disease states.     We will adjust your current medications, if needed, and continue to monitor your long-term progress.     We will provide you and your physician with monthly progress reports after you have been in the program for more than 30 days.     We will send you reminders through [facility] and text messages to help ensure you do not miss any testing deadlines to help manage your disease states.    You will be able to reach us by phone or through your [employer] account by clicking our names under Care Team on the right side of the home screen.    I look forward to working with you to achieve your blood pressure goals!    We look forward to working with you to help manage your health,    Sincerely,    Your Digital Medicine Team    Please visit our websites to learn more:   · Hypertension: [email or web address]      Remember, we are not available for emergencies. If you have an emergency, please contact your doctors office directly or call [facility] on-call ([phone] or [phone]) or [phone].

## 2020-06-10 ENCOUNTER — PATIENT OUTREACH (OUTPATIENT)
Dept: OTHER | Facility: OTHER | Age: 70
End: 2020-06-10

## 2020-06-10 DIAGNOSIS — E11.69 HYPERLIPIDEMIA ASSOCIATED WITH TYPE 2 DIABETES MELLITUS: Primary | ICD-10-CM

## 2020-06-10 DIAGNOSIS — E11.59 HYPERTENSION ASSOCIATED WITH DIABETES: Primary | ICD-10-CM

## 2020-06-10 DIAGNOSIS — E78.5 HYPERLIPIDEMIA ASSOCIATED WITH TYPE 2 DIABETES MELLITUS: Primary | ICD-10-CM

## 2020-06-10 DIAGNOSIS — I15.2 HYPERTENSION ASSOCIATED WITH DIABETES: Primary | ICD-10-CM

## 2020-06-10 RX ORDER — ATORVASTATIN CALCIUM 40 MG/1
40 TABLET, FILM COATED ORAL DAILY
Qty: 90 TABLET | Refills: 3 | Status: SHIPPED | OUTPATIENT
Start: 2020-06-10 | End: 2020-06-12

## 2020-06-11 RX ORDER — HYDROCHLOROTHIAZIDE 12.5 MG/1
25 TABLET ORAL DAILY
Qty: 90 TABLET | Refills: 1
Start: 2020-06-11 | End: 2020-09-18

## 2020-06-11 NOTE — PROGRESS NOTES
Digital Medicine: Clinician Introduction    Deniz Paulino is a 69 y.o. male who is newly enrolled in the Digital Medicine Clinic.    The following information was reviewed and updated:  Preferred pharmacy   [place] [id number] - [place]CUA - [address]  [address] [place] [address]  Phone: [phone] Fax: [phone]      Patient prefers a 90 days supply.     Review of patient's allergies indicates:  No Known Allergies    I spoke for with the patient today for his initial enrollment call. His blood pressure after readings are slightly elevated.  He is taking his hydrochlorothiazide in the morning and other blood pressure medications at night.  He was taking hydrochlorothiazide 25 mg in the past but the dosage was decreased due to hypotension, lightheadedness, and dizziness.  He does admit that he has been out of work for the past 3 months but should be returning to work next week.  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    The history is provided by the patient. No  was used.     HYPERTENSION  Our goal is to get BP to consistently below 130/80mmHg and make the process convenient so patient can avoid extra trips to the office. Getting your blood pressure below 130/80mmHg (definition of control) will reduce your risk for heart attack, kidney failure, stroke and death (as well as kidney failure, eye disease, & dementia)      Reviewed non-pharmacologic therapies and impact on BP      Explained that we expect patient to obtain several blood pressures per week at random times of day.  Instructed patient not to allow anyone else to use phone and monitoring device.  Confirmed appropriate BP monitoring technique.      Explained to patient that the digital medicine team is not available for emergencies.  Patient will call [facility] on-call ([phone] or [phone])  or 911 if needed.    Patient's BP goal is 130/80. Patients BP average is 142/86 mmHg, which is above goal, per 2017 ACC/AHA Hypertension Guidelines.      Medication Change: dose increase    Med Review complete.    Allergies reviewed.      Last 5 Patient Entered Readings                                      Current 30 Day Average: 142/86     Recent Readings 6/10/2020 6/9/2020 6/8/2020 6/7/2020 6/5/2020    SBP (mmHg) 144 144 151 133 139    DBP (mmHg) 89 90 87 74 86    Pulse 54 62 58 62 55            INTERVENTION(S)  reviewed appropriate dose schedule, recommended diet modifications, recommended physical activity, reviewed monitoring technique, recommended med change and encouragement/support    PLAN  patient verbalizes understanding, patient amenable to changes and additional monitoring needed    Average blood pressure 142/86    Increase hydrochlorothiazide to 25 mg.  Order BMP. Follow-up in 4 weeks      There are no preventive care reminders to display for this patient.    Current Medication Regimen:  Hypertension Medications             amLODIPine (NORVASC) 5 MG tablet Take 1 tablet (5 mg total) by mouth once daily.    hydroCHLOROthiazide (HYDRODIURIL) 12.5 MG Tab TAKE 1 TABLET BY MOUTH ONCE DAILY    lisinopril (PRINIVIL,ZESTRIL) 20 MG tablet TAKE 1 TABLET BY MOUTH ONCE DAILY    metoprolol succinate (TOPROL-XL) 50 MG 24 hr tablet Take 1 tablet by mouth once daily            Reviewed the importance of self-monitoring, medication adherence, and that the health  can be used as a resource for lifestyle modifications to help reduce or maintain a healthy lifestyle.    Sent link to [facility] [employer] webpages and my contact information via [employer] for future questions. Follow up scheduled.         Screenings

## 2020-06-17 ENCOUNTER — PATIENT OUTREACH (OUTPATIENT)
Dept: OTHER | Facility: OTHER | Age: 70
End: 2020-06-17

## 2020-06-17 NOTE — PROGRESS NOTES
Digital Medicine: Health  Follow-Up    Average blood pressure today is 141/84. BP is trending downward now with the increase in HCTZ. Patient states that he has been feeling well.    The history is provided by the patient. No  was used.   Follow Up  Follow-up reason(s): routine education      Routine Education Topics: eating patterns and physical activity        INTERVENTION(S)  recommended diet modifications, recommend physical activity and encouragement/support    PLAN  patient verbalizes understanding and continue monitoring    Will f/u in 4 weeks, sooner if concerns.       There are no preventive care reminders to display for this patient.    Last 5 Patient Entered Readings                                      Current 30 Day Average: 141/84     Recent Readings 6/16/2020 6/15/2020 6/15/2020 6/14/2020 6/13/2020    SBP (mmHg) 134 141 140 133 129    DBP (mmHg) 72 71 82 75 83    Pulse 66 62 62 59 58                      Diet Screening   Patient reports eating or drinking the following: water    The patient drinks 68 ounces of water per day.    He has the following dietary restrictions: low sodium dietHe cooks for self.      Patient reports that he does a lot of cooking and adds salt, pepper, and abundio's when he's cooking. He doesn't add any salt after when he is eating. He doesn't eat fried foods, canned foods or fast food. They eat at home mostly and try to eat fairly healthy. Recommend trying to reduce some of his salt intake.     Intervention(s): low sodium diet education, reducing sodium intake and increasing water intake    Physical Activity Screening   When asked if exercising, patient responded: yes    Patient participates in the following activities: biking    Patient reports that he has started riding his bike for exercise. He plans to do this every day except every other Friday when he goes back to work. He has been doing this now for 3 days.     Medication Adherence Screening   He  did not miss a dose this month.  Patient knows purpose of medications.      Patient identified the following reasons for non-compliance: None    Denies concerns today.     Tobacco and Alcohol Screening       No tobacco use, quit smoking in 2003.     Patient is not eligible for referral to smoking cessation.      3-4 glasses of wine every evening. He will have 4-6 beers on the weekend. Recommend trying to limit alcohol intake to 2 drinks or less per day.       SDOH

## 2020-06-24 ENCOUNTER — PATIENT OUTREACH (OUTPATIENT)
Dept: OTHER | Facility: OTHER | Age: 70
End: 2020-06-24

## 2020-06-24 ENCOUNTER — HOSPITAL ENCOUNTER (EMERGENCY)
Facility: HOSPITAL | Age: 70
Discharge: HOME | End: 2020-06-24
Attending: EMERGENCY MEDICINE
Payer: MEDICARE

## 2020-06-24 ENCOUNTER — NURSE TRIAGE (OUTPATIENT)
Dept: ADMINISTRATIVE | Facility: CLINIC | Age: 70
End: 2020-06-24
Payer: MEDICARE

## 2020-06-24 VITALS
WEIGHT: 221.69 LBS | RESPIRATION RATE: 18 BRPM | HEIGHT: 70 IN | SYSTOLIC BLOOD PRESSURE: 149 MMHG | OXYGEN SATURATION: 99 % | DIASTOLIC BLOOD PRESSURE: 98 MMHG | HEART RATE: 109 BPM | BODY MASS INDEX: 31.74 KG/M2 | TEMPERATURE: 98 F

## 2020-06-24 DIAGNOSIS — R00.0 TACHYCARDIA: ICD-10-CM

## 2020-06-24 LAB
ALBUMIN SERPL BCP-MCNC: 4.1 G/DL (ref 3.5–5.2)
ALP SERPL-CCNC: 83 U/L (ref 55–135)
ALT SERPL W/O P-5'-P-CCNC: 24 U/L (ref 10–44)
AMPHET+METHAMPHET UR QL: NEGATIVE
ANION GAP SERPL CALC-SCNC: 14 MMOL/L (ref 8–16)
AST SERPL-CCNC: 29 U/L (ref 10–40)
BARBITURATES UR QL SCN>200 NG/ML: NEGATIVE
BASOPHILS # BLD AUTO: 0.03 K/UL (ref 0–0.2)
BASOPHILS NFR BLD: 0.4 % (ref 0–1.9)
BENZODIAZ UR QL SCN>200 NG/ML: NEGATIVE
BILIRUB SERPL-MCNC: 0.5 MG/DL (ref 0.1–1)
BILIRUB UR QL STRIP: NEGATIVE
BUN SERPL-MCNC: 17 MG/DL (ref 8–23)
BZE UR QL SCN: NEGATIVE
CALCIUM SERPL-MCNC: 9.9 MG/DL (ref 8.7–10.5)
CANNABINOIDS UR QL SCN: NEGATIVE
CHLORIDE SERPL-SCNC: 101 MMOL/L (ref 95–110)
CLARITY UR: CLEAR
CO2 SERPL-SCNC: 27 MMOL/L (ref 23–29)
COLOR UR: YELLOW
CREAT SERPL-MCNC: 1.1 MG/DL (ref 0.5–1.4)
CREAT UR-MCNC: 319 MG/DL (ref 23–375)
D DIMER PPP IA.FEU-MCNC: 0.29 MG/L FEU
DIFFERENTIAL METHOD: ABNORMAL
EOSINOPHIL # BLD AUTO: 0.1 K/UL (ref 0–0.5)
EOSINOPHIL NFR BLD: 0.8 % (ref 0–8)
ERYTHROCYTE [DISTWIDTH] IN BLOOD BY AUTOMATED COUNT: 12.3 % (ref 11.5–14.5)
EST. GFR  (AFRICAN AMERICAN): >60 ML/MIN/1.73 M^2
EST. GFR  (NON AFRICAN AMERICAN): >60 ML/MIN/1.73 M^2
GLUCOSE SERPL-MCNC: 90 MG/DL (ref 70–110)
GLUCOSE UR QL STRIP: NEGATIVE
HCT VFR BLD AUTO: 44.7 % (ref 40–54)
HCV AB SERPL QL IA: NEGATIVE
HGB BLD-MCNC: 15.2 G/DL (ref 14–18)
HGB UR QL STRIP: NEGATIVE
IMM GRANULOCYTES # BLD AUTO: 0.03 K/UL (ref 0–0.04)
IMM GRANULOCYTES NFR BLD AUTO: 0.4 % (ref 0–0.5)
KETONES UR QL STRIP: ABNORMAL
LEUKOCYTE ESTERASE UR QL STRIP: NEGATIVE
LYMPHOCYTES # BLD AUTO: 1.7 K/UL (ref 1–4.8)
LYMPHOCYTES NFR BLD: 22.2 % (ref 18–48)
MAGNESIUM SERPL-MCNC: 1.4 MG/DL (ref 1.6–2.6)
MCH RBC QN AUTO: 32.1 PG (ref 27–31)
MCHC RBC AUTO-ENTMCNC: 34 G/DL (ref 32–36)
MCV RBC AUTO: 94 FL (ref 82–98)
METHADONE UR QL SCN>300 NG/ML: NEGATIVE
MONOCYTES # BLD AUTO: 0.9 K/UL (ref 0.3–1)
MONOCYTES NFR BLD: 11.4 % (ref 4–15)
NEUTROPHILS # BLD AUTO: 4.9 K/UL (ref 1.8–7.7)
NEUTROPHILS NFR BLD: 64.8 % (ref 38–73)
NITRITE UR QL STRIP: NEGATIVE
NRBC BLD-RTO: 0 /100 WBC
OPIATES UR QL SCN: NEGATIVE
PCP UR QL SCN>25 NG/ML: NEGATIVE
PH UR STRIP: 6 [PH] (ref 5–8)
PLATELET # BLD AUTO: 170 K/UL (ref 150–350)
PMV BLD AUTO: 10.4 FL (ref 9.2–12.9)
POTASSIUM SERPL-SCNC: 3.8 MMOL/L (ref 3.5–5.1)
PROT SERPL-MCNC: 7.2 G/DL (ref 6–8.4)
PROT UR QL STRIP: ABNORMAL
RBC # BLD AUTO: 4.74 M/UL (ref 4.6–6.2)
SODIUM SERPL-SCNC: 142 MMOL/L (ref 136–145)
SP GR UR STRIP: 1.02 (ref 1–1.03)
TOXICOLOGY INFORMATION: NORMAL
TROPONIN I SERPL DL<=0.01 NG/ML-MCNC: 0.01 NG/ML (ref 0–0.03)
TSH SERPL DL<=0.005 MIU/L-ACNC: 1.66 UIU/ML (ref 0.4–4)
URN SPEC COLLECT METH UR: ABNORMAL
UROBILINOGEN UR STRIP-ACNC: 1 EU/DL
WBC # BLD AUTO: 7.48 K/UL (ref 3.9–12.7)

## 2020-06-24 PROCEDURE — 85025 COMPLETE CBC W/AUTO DIFF WBC: CPT

## 2020-06-24 PROCEDURE — 81003 URINALYSIS AUTO W/O SCOPE: CPT | Mod: 59

## 2020-06-24 PROCEDURE — 25000003 PHARM REV CODE 250: Performed by: EMERGENCY MEDICINE

## 2020-06-24 PROCEDURE — 84484 ASSAY OF TROPONIN QUANT: CPT

## 2020-06-24 PROCEDURE — 85379 FIBRIN DEGRADATION QUANT: CPT

## 2020-06-24 PROCEDURE — 80307 DRUG TEST PRSMV CHEM ANLYZR: CPT

## 2020-06-24 PROCEDURE — 83735 ASSAY OF MAGNESIUM: CPT

## 2020-06-24 PROCEDURE — 80053 COMPREHEN METABOLIC PANEL: CPT

## 2020-06-24 PROCEDURE — 96360 HYDRATION IV INFUSION INIT: CPT

## 2020-06-24 PROCEDURE — 86803 HEPATITIS C AB TEST: CPT

## 2020-06-24 PROCEDURE — 99284 EMERGENCY DEPT VISIT MOD MDM: CPT | Mod: 25

## 2020-06-24 PROCEDURE — 96361 HYDRATE IV INFUSION ADD-ON: CPT

## 2020-06-24 PROCEDURE — 84443 ASSAY THYROID STIM HORMONE: CPT

## 2020-06-24 PROCEDURE — 36415 COLL VENOUS BLD VENIPUNCTURE: CPT

## 2020-06-24 RX ADMIN — SODIUM CHLORIDE 1000 ML: 0.9 INJECTION, SOLUTION INTRAVENOUS at 04:06

## 2020-06-24 RX ADMIN — SODIUM CHLORIDE 500 ML: 0.9 INJECTION, SOLUTION INTRAVENOUS at 05:06

## 2020-06-24 NOTE — DISCHARGE INSTRUCTIONS
You elevated heart rate today improved with IV fluids.  This is likely related to dehydration.  Drink plenty of fluids.  Continue home medications.  Follow up with her doctor tomorrow or the following day for re-evaluation.  Return as needed for any worsening symptoms, problems questions or concerns

## 2020-06-24 NOTE — PROGRESS NOTES
Digital Medicine: Health  Follow-Up    Patient called me today concerned that his pulse is high at 120's and 130's. He states that he is feeling fine today. He did go to bed last night and thought that he could feel his heart beating. His blood pressure is also higher today. I asked when the last time he charged his cuff and he said it's been awhile but he checked his pulse on another cuff he had and it was the same. Denies change in anything today, no recent coffee/caffeine, no new meds or allergy meds. He was relaxing and listening to classical music when he took his reading. He did take some TUMS at 1. Only had 2 eggs for breakfast this morning and hasn't had anything else. Told him I would talk to pharmD to see what she thinks or has any concerns about this. Will f/u tomorrow and see how patient is doing.     06/25: Checked in with patient today since pharmD suggested he go to the ER. Patient went to the ER yesterday and had labs and EKG done. He said that he was given 2 bags of fluids and sent home. He is doing better today and his blood pressure and pulse are down. Recommend checking in with PCP today or tomorrow and he said he would do that.     The history is provided by the patient. No  was used.           PLAN  patient verbalizes understanding, await MD intervention and continue monitoring    Will f/u in 2 weeks, sooner if concerns      There are no preventive care reminders to display for this patient.    Last 5 Patient Entered Readings                                      Current 30 Day Average: 139/84     Recent Readings 6/24/2020 6/24/2020 6/24/2020 6/24/2020 6/23/2020    SBP (mmHg) 138 123 109 123 132    DBP (mmHg) 101 102 83 76 86    Pulse 131 130 129 128 61                  Screenings    SDOH

## 2020-06-24 NOTE — PROGRESS NOTES
Digital Medicine: Clinician Follow-Up    I spoke with the patient today after getting a message from his health  about elevated HR. He says he woke up in the middle of the night with his heart pounding, but went back to sleep. Today he says he feels normal. He denies chest pain,palpitations, lightheadedness or dizziness. He admits to drinking an adequate amount of water yesterday, denies doing an unusual activities. Today he has had a restful day.     The history is provided by the patient. No  was used.   Follow Up  Follow-up reason(s): reading review          INTERVENTION(S)  reviewed appropriate dose schedule and encouragement/support    PLAN  patient verbalizes understanding and additional monitoring needed    Avr /85    Patient currently experience elevated BP and HR in the 120s and 130s. He is asymptomatic. I advised that he go to the ED or call [facility] on call      There are no preventive care reminders to display for this patient.    Last 5 Patient Entered Readings                                      Current 30 Day Average: 139/84     Recent Readings 6/24/2020 6/24/2020 6/24/2020 6/24/2020 6/23/2020    SBP (mmHg) 138 123 109 123 132    DBP (mmHg) 101 102 83 76 86    Pulse 131 130 129 128 61             Hypertension Medications             amLODIPine (NORVASC) 5 MG tablet Take 1 tablet (5 mg total) by mouth once daily.    hydroCHLOROthiazide (HYDRODIURIL) 12.5 MG Tab Take 2 tablets (25 mg total) by mouth once daily.    lisinopril (PRINIVIL,ZESTRIL) 20 MG tablet TAKE 1 TABLET BY MOUTH ONCE DAILY    metoprolol succinate (TOPROL-XL) 50 MG 24 hr tablet Take 1 tablet by mouth once daily                 Screenings

## 2020-06-24 NOTE — TELEPHONE ENCOUNTER
"Received call pt states his HR has been @ 140 since 1pm today and it has him worried. Denies any other symptoms or CP. As NT began question pt states "My ride is here. My wife is bringing me to the ED." Instructed pt to call NT service if he has any other questions or concerns.  "

## 2020-06-24 NOTE — ED PROVIDER NOTES
SCRIBE #1 NOTE: I, Deann Ordoñez/Jez Rucker, am scribing for, and in the presence of, Shay Dhillon Jr., MD. I have scribed the entire note.       History     Chief Complaint   Patient presents with    Hypertension     Review of patient's allergies indicates:  No Known Allergies      History of Present Illness     HPI    6/24/2020, 3:59 PM  History obtained from the patient      History of Present Illness: Deniz Paulino is a 69 y.o. male patient with a PMHx of CAD, DM, HLD, HTN, and S/P CABG who presents to the Emergency Department for evaluation of elevated blood pressure which onset gradually 3 hours PTA. Symptoms are constant and moderate in severity. No mitigating or exacerbating factors reported. Associated sxs include tachycardia. Patient denies any SOB, CP, leg edema, fever, chills, n/v/d, headaches, dizziness, palpitations numbness/weakness, and all other sxs at this time. No prior Tx included. No further complaints or concerns at this time.       Arrival mode: Personal vehicle    PCP: Hiro Kraft MD        Past Medical History:  Past Medical History:   Diagnosis Date    Acute coronary syndrome     Coronary artery disease     Diabetes mellitus 4/24/2014    Hyperlipidemia     Hypertension     Old MI (myocardial infarction) 4/24/2014    S/P CABG (coronary artery bypass graft) 4/24/2014       Past Surgical History:  Past Surgical History:   Procedure Laterality Date    CARDIAC CATHETERIZATION      COLONOSCOPY N/A 7/26/2019    Procedure: COLONOSCOPY;  Surgeon: Opal Oshea MD;  Location: [facility];  Service: Endoscopy;  Laterality: N/A;    CORONARY ARTERY BYPASS GRAFT      ESOPHAGOGASTRODUODENOSCOPY N/A 7/26/2019    Procedure: ESOPHAGOGASTRODUODENOSCOPY (EGD);  Surgeon: Opal Oshea MD;  Location: [facility];  Service: Endoscopy;  Laterality: N/A;    SKIN CANCER EXCISION  09/2018         Family History:  Family History   Problem Relation Age of Onset    Diabetes Mother     Heart  murmur Mother     Cancer Mother         Breast    Stroke Mother     Alcohol abuse Father     Heart attack Father 63    Cancer Father         Esophageal       Social History:  Social History     Tobacco Use    Smoking status: Former Smoker     Packs/day: 1.00     Years: 30.00     Pack years: 30.00     Types: Cigarettes     Quit date: 2003     Years since quittin.4    Smokeless tobacco: Never Used   Substance and Sexual Activity    Alcohol use: Yes     Frequency: 4 or more times a week     Drinks per session: 5 or 6     Binge frequency: Monthly     Comment: socially    Drug use: Never    Sexual activity: Not on file        Review of Systems     Review of Systems   Constitutional: Negative for chills and fever.   HENT: Negative for sore throat.    Respiratory: Negative for shortness of breath.    Cardiovascular: Negative for chest pain, palpitations and leg swelling.        (+) tachycardia   Gastrointestinal: Negative for diarrhea, nausea and vomiting.   Genitourinary: Negative for dysuria.   Musculoskeletal: Negative for back pain.   Skin: Negative for rash.   Neurological: Negative for dizziness, weakness, numbness and headaches.   Hematological: Does not bruise/bleed easily.   All other systems reviewed and are negative.     Physical Exam     Initial Vitals [20 1537]   BP Pulse Resp Temp SpO2   (!) 133/91 (!) 130 18 97.7 °F (36.5 °C) 97 %      MAP       --          Physical Exam  Nursing Notes and Vital Signs Reviewed.  Constitutional: Patient is in no acute distress. Well-developed and well-nourished.  Head: Atraumatic. Normocephalic.  Eyes:  EOM intact.  No scleral icterus.  ENT: Mucous membranes are moist.  Nares clear   Neck: Supple. Full ROM. No lymphadenopathy.  Cardiovascular: Tachycardic. Regular rhythm. No murmurs, rubs, or gallops. Distal pulses are 2+ and symmetric.  Pulmonary/Chest: No respiratory distress. Clear to auscultation bilaterally. No wheezing or rales.  Abdominal: Soft  "and non-distended.  There is no tenderness.  No rebound, guarding, or rigidity. Good bowel sounds.  Genitourinary: No CVA tenderness  Musculoskeletal: Moves all extremities. No obvious deformities. No edema. No calf tenderness.  Skin: Warm and dry.  Neurological:  Alert, awake, and appropriate.  Normal speech.  No acute focal neurological deficits are appreciated.  Psychiatric: Normal affect. Good eye contact. Appropriate in content.     ED Course   Procedures  ED Vital Signs:  Vitals:    06/24/20 1537   BP: (!) 133/91   Pulse: (!) 130   Resp: 18   Temp: 97.7 °F (36.5 °C)   TempSrc: Temporal   SpO2: 97%   Weight: 100.5 kg (221 lb 10.8 oz)   Height: 5' 10" (1.778 m)       Abnormal Lab Results:  Labs Reviewed   HEPATITIS C ANTIBODY        All Lab Results:  Results for orders placed or performed during the hospital encounter of 06/24/20   Hepatitis C antibody   Result Value Ref Range    Hepatitis C Ab Negative Negative   CBC auto differential   Result Value Ref Range    WBC 7.48 3.90 - 12.70 K/uL    RBC 4.74 4.60 - 6.20 M/uL    Hemoglobin 15.2 14.0 - 18.0 g/dL    Hematocrit 44.7 40.0 - 54.0 %    Mean Corpuscular Volume 94 82 - 98 fL    Mean Corpuscular Hemoglobin 32.1 (H) 27.0 - 31.0 pg    Mean Corpuscular Hemoglobin Conc 34.0 32.0 - 36.0 g/dL    RDW 12.3 11.5 - 14.5 %    Platelets 170 150 - 350 K/uL    MPV 10.4 9.2 - 12.9 fL    Immature Granulocytes 0.4 0.0 - 0.5 %    Gran # (ANC) 4.9 1.8 - 7.7 K/uL    Immature Grans (Abs) 0.03 0.00 - 0.04 K/uL    Lymph # 1.7 1.0 - 4.8 K/uL    Mono # 0.9 0.3 - 1.0 K/uL    Eos # 0.1 0.0 - 0.5 K/uL    Baso # 0.03 0.00 - 0.20 K/uL    nRBC 0 0 /100 WBC    Gran% 64.8 38.0 - 73.0 %    Lymph% 22.2 18.0 - 48.0 %    Mono% 11.4 4.0 - 15.0 %    Eosinophil% 0.8 0.0 - 8.0 %    Basophil% 0.4 0.0 - 1.9 %    Differential Method Automated    Comprehensive metabolic panel   Result Value Ref Range    Sodium 142 136 - 145 mmol/L    Potassium 3.8 3.5 - 5.1 mmol/L    Chloride 101 95 - 110 mmol/L    CO2 27 " 23 - 29 mmol/L    Glucose 90 70 - 110 mg/dL    BUN, Bld 17 8 - 23 mg/dL    Creatinine 1.1 0.5 - 1.4 mg/dL    Calcium 9.9 8.7 - 10.5 mg/dL    Total Protein 7.2 6.0 - 8.4 g/dL    Albumin 4.1 3.5 - 5.2 g/dL    Total Bilirubin 0.5 0.1 - 1.0 mg/dL    Alkaline Phosphatase 83 55 - 135 U/L    AST 29 10 - 40 U/L    ALT 24 10 - 44 U/L    Anion Gap 14 8 - 16 mmol/L    eGFR if African American >60 >60 mL/min/1.73 m^2    eGFR if non African American >60 >60 mL/min/1.73 m^2   Troponin I   Result Value Ref Range    Troponin I 0.012 0.000 - 0.026 ng/mL   TSH   Result Value Ref Range    TSH 1.662 0.400 - 4.000 uIU/mL   Magnesium   Result Value Ref Range    Magnesium 1.4 (L) 1.6 - 2.6 mg/dL   Urinalysis, Reflex to Urine Culture Urine, Clean Catch    Specimen: Urine   Result Value Ref Range    Specimen UA Urine, Clean Catch     Color, UA Yellow Yellow, Straw, Avril    Appearance, UA Clear Clear    pH, UA 6.0 5.0 - 8.0    Specific Gravity, UA 1.025 1.005 - 1.030    Protein, UA Trace (A) Negative    Glucose, UA Negative Negative    Ketones, UA Trace (A) Negative    Bilirubin (UA) Negative Negative    Occult Blood UA Negative Negative    Nitrite, UA Negative Negative    Urobilinogen, UA 1.0 <2.0 EU/dL    Leukocytes, UA Negative Negative   Drug screen panel, emergency   Result Value Ref Range    Benzodiazepines Negative     Methadone metabolites Negative     Cocaine (Metab.) Negative     Opiate Scrn, Ur Negative     Barbiturate Screen, Ur Negative     Amphetamine Screen, Ur Negative     THC Negative     Phencyclidine Negative     Creatinine, Random Ur 319.0 23.0 - 375.0 mg/dL    Toxicology Information SEE COMMENT    D dimer, quantitative   Result Value Ref Range    D-Dimer 0.29 <0.50 mg/L FEU       Imaging Results:  Imaging Results    None          The EKG was ordered, reviewed, and independently interpreted by the ED provider.  Interpretation time: 1544  Rate: 127 BPM  Rhythm: sinus tachycardia with 1st degree AV block   Interpretation:  No acute ST changes. No STEMI.      EKG reviewed from Dr. Bennett (Cardiology):  Interpretation time: 11/20119 2222  Rate: 57 BPM  Rhythm: sinus bradycardia  Interpretation: Possible left atrial enlargment. No STEMI.    The Emergency Provider reviewed the vital signs and test results, which are outlined above.     ED Discussion   5:41 PM: Reassessed pt at this time. Discussed with pt all pertinent ED information and results. Discussed pt dx and plan of tx. Gave pt all f/u and return to the ED instructions. All questions and concerns were addressed at this time. Pt expresses understanding of information and instructions, and is comfortable with plan to discharge. Pt is stable for discharge.    I discussed with patient and/or family/caretaker that evaluation in the ED does not suggest any emergent or life threatening medical conditions requiring immediate intervention beyond what was provided in the ED, and I believe patient is safe for discharge.  Regardless, an unremarkable evaluation in the ED does not preclude the development or presence of a serious of life threatening condition. As such, patient was instructed to return immediately for any worsening or change in current symptoms.    5:44 PM   patient is stable nontoxic.  Has sinus tachycardia on his EKG which is slow down with IV fluids.  Patient does work outdoors.  He is stable and safe for discharge in my opinion.  I have advised to continue his medications and follow up with primary care doctor tomorrow for re-evaluation.  He is return if his symptoms worsen in any way.  Is return for any problems questions or concerns.  Patient is very reliable and verbalized understanding agreement with all instructions.     Medical Decision Making:   Clinical Tests:   Lab Tests: Ordered and Reviewed  Medical Tests: Ordered and Reviewed           ED Medication(s):  Medications - No data to display    New Prescriptions    No medications on file               Scribe  Attestation:   Scribe #1: I performed the above scribed service and the documentation accurately describes the services I performed. I attest to the accuracy of the note.     Attending:   Physician Attestation Statement for Scribe #1: I, Shay Dhillon Jr., MD, personally performed the services described in this documentation, as scribed by Deann Ordoñez/Jez Rucker, in my presence, and it is both accurate and complete.           Clinical Impression     No diagnosis found.    Disposition:   Disposition: Discharged  Condition: Stable       Shay Dhillon Jr., MD  06/24/20 0525

## 2020-06-26 ENCOUNTER — TELEPHONE (OUTPATIENT)
Dept: INTERNAL MEDICINE | Facility: CLINIC | Age: 70
End: 2020-06-26

## 2020-06-26 NOTE — TELEPHONE ENCOUNTER
----- Message from Alysa Garrett sent at 6/26/2020 11:08 AM CDT -----  Regarding: [phone]  .Type:  Sooner Apoointment Request    Caller is requesting a sooner appointment.  Caller declined first available appointment listed below.  Caller will not accept being placed on the waitlist and is requesting a message be sent to doctor.  Name of Caller:Deniz Paulino    When is the first available appointment?7/21/20  Symptoms:Hospital f/u - elevated bp and heart rate   Would the patient rather a call back or a response via [facility]? Call back   Best Call Back Number:.[phone]  Additional Information:       Thank You,   Alysa Garrett

## 2020-07-06 ENCOUNTER — LAB VISIT (OUTPATIENT)
Dept: LAB | Facility: HOSPITAL | Age: 70
End: 2020-07-06
Attending: PHYSICIAN ASSISTANT
Payer: MEDICARE

## 2020-07-06 ENCOUNTER — OFFICE VISIT (OUTPATIENT)
Dept: INTERNAL MEDICINE | Facility: CLINIC | Age: 70
End: 2020-07-06
Payer: MEDICARE

## 2020-07-06 VITALS
SYSTOLIC BLOOD PRESSURE: 140 MMHG | TEMPERATURE: 97 F | WEIGHT: 224.63 LBS | BODY MASS INDEX: 32.23 KG/M2 | DIASTOLIC BLOOD PRESSURE: 92 MMHG | OXYGEN SATURATION: 98 % | HEART RATE: 66 BPM

## 2020-07-06 DIAGNOSIS — I15.2 HYPERTENSION ASSOCIATED WITH DIABETES: Primary | ICD-10-CM

## 2020-07-06 DIAGNOSIS — E11.59 HYPERTENSION ASSOCIATED WITH DIABETES: Primary | ICD-10-CM

## 2020-07-06 DIAGNOSIS — E83.42 HYPOMAGNESEMIA: ICD-10-CM

## 2020-07-06 DIAGNOSIS — R00.0 TACHYCARDIA: ICD-10-CM

## 2020-07-06 DIAGNOSIS — I15.2 HYPERTENSION ASSOCIATED WITH DIABETES: ICD-10-CM

## 2020-07-06 DIAGNOSIS — E11.59 HYPERTENSION ASSOCIATED WITH DIABETES: ICD-10-CM

## 2020-07-06 LAB
ANION GAP SERPL CALC-SCNC: 11 MMOL/L (ref 8–16)
BUN SERPL-MCNC: 14 MG/DL (ref 8–23)
CALCIUM SERPL-MCNC: 9.6 MG/DL (ref 8.7–10.5)
CHLORIDE SERPL-SCNC: 100 MMOL/L (ref 95–110)
CO2 SERPL-SCNC: 30 MMOL/L (ref 23–29)
CREAT SERPL-MCNC: 1.1 MG/DL (ref 0.5–1.4)
EST. GFR  (AFRICAN AMERICAN): >60 ML/MIN/1.73 M^2
EST. GFR  (NON AFRICAN AMERICAN): >60 ML/MIN/1.73 M^2
GLUCOSE SERPL-MCNC: 100 MG/DL (ref 70–110)
POTASSIUM SERPL-SCNC: 3.7 MMOL/L (ref 3.5–5.1)
SODIUM SERPL-SCNC: 141 MMOL/L (ref 136–145)

## 2020-07-06 PROCEDURE — 99214 OFFICE O/P EST MOD 30 MIN: CPT | Mod: S$GLB,,, | Performed by: INTERNAL MEDICINE

## 2020-07-06 PROCEDURE — 1159F MED LIST DOCD IN RCRD: CPT | Mod: S$GLB,,, | Performed by: INTERNAL MEDICINE

## 2020-07-06 PROCEDURE — 36415 COLL VENOUS BLD VENIPUNCTURE: CPT

## 2020-07-06 PROCEDURE — 99999 PR PBB SHADOW E&M-EST. PATIENT-LVL V: CPT | Mod: PBBFAC,,, | Performed by: INTERNAL MEDICINE

## 2020-07-06 PROCEDURE — 99214 PR OFFICE/OUTPT VISIT, EST, LEVL IV, 30-39 MIN: ICD-10-PCS | Mod: S$GLB,,, | Performed by: INTERNAL MEDICINE

## 2020-07-06 PROCEDURE — 80048 BASIC METABOLIC PNL TOTAL CA: CPT

## 2020-07-06 PROCEDURE — 3044F HG A1C LEVEL LT 7.0%: CPT | Mod: CPTII,S$GLB,, | Performed by: INTERNAL MEDICINE

## 2020-07-06 PROCEDURE — 99999 PR PBB SHADOW E&M-EST. PATIENT-LVL V: ICD-10-PCS | Mod: PBBFAC,,, | Performed by: INTERNAL MEDICINE

## 2020-07-06 PROCEDURE — 3080F DIAST BP >= 90 MM HG: CPT | Mod: CPTII,S$GLB,, | Performed by: INTERNAL MEDICINE

## 2020-07-06 PROCEDURE — 3080F PR MOST RECENT DIASTOLIC BLOOD PRESSURE >= 90 MM HG: ICD-10-PCS | Mod: CPTII,S$GLB,, | Performed by: INTERNAL MEDICINE

## 2020-07-06 PROCEDURE — 3077F SYST BP >= 140 MM HG: CPT | Mod: CPTII,S$GLB,, | Performed by: INTERNAL MEDICINE

## 2020-07-06 PROCEDURE — 3044F PR MOST RECENT HEMOGLOBIN A1C LEVEL <7.0%: ICD-10-PCS | Mod: CPTII,S$GLB,, | Performed by: INTERNAL MEDICINE

## 2020-07-06 PROCEDURE — 1101F PT FALLS ASSESS-DOCD LE1/YR: CPT | Mod: CPTII,S$GLB,, | Performed by: INTERNAL MEDICINE

## 2020-07-06 PROCEDURE — 1159F PR MEDICATION LIST DOCUMENTED IN MEDICAL RECORD: ICD-10-PCS | Mod: S$GLB,,, | Performed by: INTERNAL MEDICINE

## 2020-07-06 PROCEDURE — 3008F BODY MASS INDEX DOCD: CPT | Mod: CPTII,S$GLB,, | Performed by: INTERNAL MEDICINE

## 2020-07-06 PROCEDURE — 1101F PR PT FALLS ASSESS DOC 0-1 FALLS W/OUT INJ PAST YR: ICD-10-PCS | Mod: CPTII,S$GLB,, | Performed by: INTERNAL MEDICINE

## 2020-07-06 PROCEDURE — 1126F AMNT PAIN NOTED NONE PRSNT: CPT | Mod: S$GLB,,, | Performed by: INTERNAL MEDICINE

## 2020-07-06 PROCEDURE — 3077F PR MOST RECENT SYSTOLIC BLOOD PRESSURE >= 140 MM HG: ICD-10-PCS | Mod: CPTII,S$GLB,, | Performed by: INTERNAL MEDICINE

## 2020-07-06 PROCEDURE — 3008F PR BODY MASS INDEX (BMI) DOCUMENTED: ICD-10-PCS | Mod: CPTII,S$GLB,, | Performed by: INTERNAL MEDICINE

## 2020-07-06 PROCEDURE — 1126F PR PAIN SEVERITY QUANTIFIED, NO PAIN PRESENT: ICD-10-PCS | Mod: S$GLB,,, | Performed by: INTERNAL MEDICINE

## 2020-07-06 NOTE — PROGRESS NOTES
Subjective:      Patient ID: Deniz Paulino is a 69 y.o. male.    Chief Complaint: Follow-up    HPI     68 yo with   Patient Active Problem List   Diagnosis    CAD (coronary artery disease)    Hypertension associated with diabetes    Hyperlipidemia associated with type 2 diabetes mellitus    S/P CABG (coronary artery bypass graft)    Old MI (myocardial infarction)    Diabetes mellitus    Hypomagnesemia     Past Medical History:   Diagnosis Date    Acute coronary syndrome     Coronary artery disease     Diabetes mellitus 4/24/2014    Hyperlipidemia     Hypertension     Old MI (myocardial infarction) 4/24/2014    S/P CABG (coronary artery bypass graft) 4/24/2014     Here today for ER follow-up visit.  Your records reviewed with patient.  Patient reports waking up in the middle of 1 night feeling as if his heart was pounding.  He was able to go back to sleep.  The next morning he was feeling overall well.  He did his usual blood pressure and pulse monitoring which revealed a blood pressure of 109-138 over  and a heart rate 128-131.  Patient denies significant symptoms at this time but states maybe he noticed a little heart racing or chest pressure.  He presented to the emergency room.  Lab results were normal with the exception of magnesium being low.  Heart rate decreased with IV fluids and patient was discharged from the emergency room without any new medications.  He reports to a moderate increase in activity the day prior to emergency room presentation.  He reports that he rode his exercise bike apart 10-15 minutes longer than usual outside.  He also did a few more repetitions with weights than usual.  2-3 days prior to emergency room presentation he mowed his lawn.  He reports that there was no significant change in his fluid intake.  He also reports that he may have missed his metoprolol for 3 consecutive days prior to episode.  He is feeling well in his usual state of health.  He has been  monitoring his blood pressure with digital hypertension.  There has been mild  Improvement but significant variability.  Review of Systems   Constitutional: Negative for chills and fever.   HENT: Negative for ear pain and sore throat.    Respiratory: Negative for cough.    Cardiovascular: Negative for chest pain.   Gastrointestinal: Negative for abdominal pain and blood in stool.   Genitourinary: Negative for dysuria and hematuria.   Neurological: Negative for seizures and syncope.     Objective:   BP (!) 140/92 (BP Location: Right arm, Patient Position: Sitting, BP Method: Large (Manual))   Pulse 66   Temp 96.5 °F (35.8 °C) (Tympanic)   Wt 101.9 kg (224 lb 10.4 oz)   SpO2 98%   BMI 32.23 kg/m²     Physical Exam  Constitutional:       General: He is not in acute distress.     Appearance: He is well-developed.   Cardiovascular:      Rate and Rhythm: Normal rate.   Pulmonary:      Effort: Pulmonary effort is normal.      Breath sounds: Normal breath sounds.   Feet:      Right foot:      Skin integrity: No ulcer or blister.      Left foot:      Skin integrity: No ulcer or blister.   Skin:     General: Skin is warm and dry.   Psychiatric:         Behavior: Behavior normal.         Assessment:     1. Hypertension associated with diabetes    2. Hypomagnesemia    3. Tachycardia      Plan:   Hypertension associated with diabetes  Difficult to control.  Check for secondary causes  Discuss further with Cardiology  -     5 HIAA, quantitative, Urine; Future  -     Catecholamines, fractionated, Urine; Future; Expected date: 07/06/2020  -     Cortisol, 8AM; Future; Expected date: 07/06/2020  -     Metanephrines, urine 24 Hours; Future; Expected date: 07/06/2020  -     VMA, urine; Future; Expected date: 07/06/2020    Hypomagnesemia  -     Magnesium; Future; Expected date: 07/20/2020    Tachycardia  Resolved.  He is now compliant with all his medications.  Possibly related to metoprolol withdrawal.  Improved with hydration in  emergency room however lab work not consistent with dehydration.  Discuss further with Cardiology    Pt needs next available appt with Dr. noe    Lab Frequency Next Occurrence   Hemoglobin A1C Once 11/22/2020   Basic metabolic panel Once 06/11/2020       Problem List Items Addressed This Visit        Cardiac/Vascular    Hypertension associated with diabetes - Primary    Relevant Orders    5 HIAA, quantitative, Urine    Catecholamines, fractionated, Urine    Cortisol, 8AM    Metanephrines, urine 24 Hours    VMA, urine       Renal/    Hypomagnesemia    Relevant Orders    Magnesium      Other Visit Diagnoses     Tachycardia              Follow up in about 4 weeks (around 8/3/2020), or if symptoms worsen or fail to improve.

## 2020-07-09 ENCOUNTER — PATIENT OUTREACH (OUTPATIENT)
Dept: OTHER | Facility: OTHER | Age: 70
End: 2020-07-09

## 2020-07-09 NOTE — PROGRESS NOTES
[facility]: Health  Follow-Up    Average blood pressure today is 136/85. He met with his PCP and he is not convinced that his tachycardia episode was caused by dehydration. He has an appt next week to meet with the cardiologist. His blood pressure and pulse are both lower now and look a lot better than they did a couple of weeks ago.     He reports that he doesn't eat a high salt diet and they don't do a lot of fried foods. He does most of the cooking. He doesn't get any exercise except for mowing the lawn once a week. He has a bike that he will occasionally ride but encouraged him to get out for more exercise throughout the week.     The history is provided by the patient. No  was used.   Follow Up  Follow-up reason(s): routine education      Routine Education Topics: eating patterns and physical activity        INTERVENTION(S)  recommended diet modifications, recommend physical activity and encouragement/support    PLAN  patient verbalizes understanding and continue monitoring    Will f/u in 6 weeks, sooner if concerns.       There are no preventive care reminders to display for this patient.    Last 5 Patient Entered Readings                                      Current 30 Day Average: 136/85     Recent Readings 7/9/2020 7/8/2020 7/6/2020 7/5/2020 7/4/2020    SBP (mmHg) 142 138 139 141 136    DBP (mmHg) 86 74 79 76 79    Pulse 58 59 57 66 60                      Diet Screening   Patient reports eating or drinking the following: fruit, water and fresh vegetablesHe has the following dietary restrictions: low sodium dietHe cooks for self.      He does most of the cooking. His wife is a meat and potatoes kind of person but he makes sure he cooks up some vegetables for him too. He doesn't eat a lot of salt in his diet and they don't do a lot of processed foods.     Intervention(s): low sodium diet education and reducing sodium intake    Physical Activity Screening   When asked if  exercising, patient responded: no    He identified the following barriers to physical activity: motivation    Patient doesn't seem motivated to exercise. Reviewed need to get some cardio in especially for blood pressure and heart health. He has a bike he can ride but only occasionally goes out on it.       SDOH

## 2020-07-13 ENCOUNTER — LAB VISIT (OUTPATIENT)
Dept: LAB | Facility: HOSPITAL | Age: 70
End: 2020-07-13
Attending: INTERNAL MEDICINE
Payer: MEDICARE

## 2020-07-13 DIAGNOSIS — E11.59 HYPERTENSION ASSOCIATED WITH DIABETES: ICD-10-CM

## 2020-07-13 DIAGNOSIS — I15.2 HYPERTENSION ASSOCIATED WITH DIABETES: ICD-10-CM

## 2020-07-13 PROCEDURE — 83497 ASSAY OF 5-HIAA: CPT

## 2020-07-13 PROCEDURE — 82384 ASSAY THREE CATECHOLAMINES: CPT

## 2020-07-13 PROCEDURE — 83835 ASSAY OF METANEPHRINES: CPT

## 2020-07-13 PROCEDURE — 84585 ASSAY OF URINE VMA: CPT

## 2020-07-16 ENCOUNTER — OFFICE VISIT (OUTPATIENT)
Dept: CARDIOLOGY | Facility: CLINIC | Age: 70
End: 2020-07-16
Payer: MEDICARE

## 2020-07-16 VITALS
WEIGHT: 227.06 LBS | SYSTOLIC BLOOD PRESSURE: 148 MMHG | BODY MASS INDEX: 32.58 KG/M2 | HEART RATE: 63 BPM | OXYGEN SATURATION: 98 % | DIASTOLIC BLOOD PRESSURE: 88 MMHG

## 2020-07-16 DIAGNOSIS — E78.5 HYPERLIPIDEMIA ASSOCIATED WITH TYPE 2 DIABETES MELLITUS: ICD-10-CM

## 2020-07-16 DIAGNOSIS — E11.59 HYPERTENSION ASSOCIATED WITH DIABETES: ICD-10-CM

## 2020-07-16 DIAGNOSIS — E08.00 DIABETES MELLITUS DUE TO UNDERLYING CONDITION WITH HYPEROSMOLARITY WITHOUT COMA, WITHOUT LONG-TERM CURRENT USE OF INSULIN: ICD-10-CM

## 2020-07-16 DIAGNOSIS — I15.2 HYPERTENSION ASSOCIATED WITH DIABETES: ICD-10-CM

## 2020-07-16 DIAGNOSIS — I25.2 OLD MI (MYOCARDIAL INFARCTION): ICD-10-CM

## 2020-07-16 DIAGNOSIS — E83.42 HYPOMAGNESEMIA: ICD-10-CM

## 2020-07-16 DIAGNOSIS — I25.10 CORONARY ARTERY DISEASE INVOLVING NATIVE CORONARY ARTERY OF NATIVE HEART WITHOUT ANGINA PECTORIS: Primary | ICD-10-CM

## 2020-07-16 DIAGNOSIS — Z95.1 S/P CABG (CORONARY ARTERY BYPASS GRAFT): ICD-10-CM

## 2020-07-16 DIAGNOSIS — E11.69 HYPERLIPIDEMIA ASSOCIATED WITH TYPE 2 DIABETES MELLITUS: ICD-10-CM

## 2020-07-16 PROCEDURE — 99214 PR OFFICE/OUTPT VISIT, EST, LEVL IV, 30-39 MIN: ICD-10-PCS | Mod: S$GLB,,, | Performed by: INTERNAL MEDICINE

## 2020-07-16 PROCEDURE — 1101F PT FALLS ASSESS-DOCD LE1/YR: CPT | Mod: CPTII,S$GLB,, | Performed by: INTERNAL MEDICINE

## 2020-07-16 PROCEDURE — 3008F BODY MASS INDEX DOCD: CPT | Mod: CPTII,S$GLB,, | Performed by: INTERNAL MEDICINE

## 2020-07-16 PROCEDURE — 1101F PR PT FALLS ASSESS DOC 0-1 FALLS W/OUT INJ PAST YR: ICD-10-PCS | Mod: CPTII,S$GLB,, | Performed by: INTERNAL MEDICINE

## 2020-07-16 PROCEDURE — 99499 RISK ADDL DX/OHS AUDIT: ICD-10-PCS | Mod: S$GLB,,, | Performed by: INTERNAL MEDICINE

## 2020-07-16 PROCEDURE — 99999 PR PBB SHADOW E&M-EST. PATIENT-LVL IV: CPT | Mod: PBBFAC,,, | Performed by: INTERNAL MEDICINE

## 2020-07-16 PROCEDURE — 3044F HG A1C LEVEL LT 7.0%: CPT | Mod: CPTII,S$GLB,, | Performed by: INTERNAL MEDICINE

## 2020-07-16 PROCEDURE — 99214 OFFICE O/P EST MOD 30 MIN: CPT | Mod: S$GLB,,, | Performed by: INTERNAL MEDICINE

## 2020-07-16 PROCEDURE — 1159F MED LIST DOCD IN RCRD: CPT | Mod: S$GLB,,, | Performed by: INTERNAL MEDICINE

## 2020-07-16 PROCEDURE — 99999 PR PBB SHADOW E&M-EST. PATIENT-LVL IV: ICD-10-PCS | Mod: PBBFAC,,, | Performed by: INTERNAL MEDICINE

## 2020-07-16 PROCEDURE — 3079F PR MOST RECENT DIASTOLIC BLOOD PRESSURE 80-89 MM HG: ICD-10-PCS | Mod: CPTII,S$GLB,, | Performed by: INTERNAL MEDICINE

## 2020-07-16 PROCEDURE — 3044F PR MOST RECENT HEMOGLOBIN A1C LEVEL <7.0%: ICD-10-PCS | Mod: CPTII,S$GLB,, | Performed by: INTERNAL MEDICINE

## 2020-07-16 PROCEDURE — 3079F DIAST BP 80-89 MM HG: CPT | Mod: CPTII,S$GLB,, | Performed by: INTERNAL MEDICINE

## 2020-07-16 PROCEDURE — 1159F PR MEDICATION LIST DOCUMENTED IN MEDICAL RECORD: ICD-10-PCS | Mod: S$GLB,,, | Performed by: INTERNAL MEDICINE

## 2020-07-16 PROCEDURE — 3077F PR MOST RECENT SYSTOLIC BLOOD PRESSURE >= 140 MM HG: ICD-10-PCS | Mod: CPTII,S$GLB,, | Performed by: INTERNAL MEDICINE

## 2020-07-16 PROCEDURE — 3077F SYST BP >= 140 MM HG: CPT | Mod: CPTII,S$GLB,, | Performed by: INTERNAL MEDICINE

## 2020-07-16 PROCEDURE — 99499 UNLISTED E&M SERVICE: CPT | Mod: S$GLB,,, | Performed by: INTERNAL MEDICINE

## 2020-07-16 PROCEDURE — 3008F PR BODY MASS INDEX (BMI) DOCUMENTED: ICD-10-PCS | Mod: CPTII,S$GLB,, | Performed by: INTERNAL MEDICINE

## 2020-07-16 RX ORDER — LISINOPRIL 10 MG/1
10 TABLET ORAL NIGHTLY
Qty: 30 TABLET | Refills: 6 | Status: SHIPPED | OUTPATIENT
Start: 2020-07-16 | End: 2020-11-16 | Stop reason: ALTCHOICE

## 2020-07-16 RX ORDER — LANOLIN ALCOHOL/MO/W.PET/CERES
400 CREAM (GRAM) TOPICAL DAILY
COMMUNITY

## 2020-07-16 NOTE — PROGRESS NOTES
Subjective:   Patient ID:  Deniz Paulino is a 69 y.o. male who presents for follow up of No chief complaint on file.      HPI  A 70 yo amle with cad s/p cabg htn hlp diabetes s/p cvabg is here for f/u had ane r visit due to htn and tachycardia he forgot his toprol for couple of doses. His hctz has been adjusted up he still has elevated bp. He is compliant with diet he walks but not as much he rides a bicycle but ahs slacked off he uses push mower. He has no chest pain no shortness of breath his tachycardia was 130/min  In er no ischemic changes. He is compliant with salt intake and meds . Lipids a1c on target.  Past Medical History:   Diagnosis Date    Acute coronary syndrome     Coronary artery disease     Diabetes mellitus 2014    Hyperlipidemia     Hypertension     Old MI (myocardial infarction) 2014    S/P CABG (coronary artery bypass graft) 2014       Past Surgical History:   Procedure Laterality Date    CARDIAC CATHETERIZATION      COLONOSCOPY N/A 2019    Procedure: COLONOSCOPY;  Surgeon: Opal Oshea MD;  Location: [facility];  Service: Endoscopy;  Laterality: N/A;    CORONARY ARTERY BYPASS GRAFT      ESOPHAGOGASTRODUODENOSCOPY N/A 2019    Procedure: ESOPHAGOGASTRODUODENOSCOPY (EGD);  Surgeon: Opal Oshea MD;  Location: [facility];  Service: Endoscopy;  Laterality: N/A;    SKIN CANCER EXCISION  2018       Social History     Tobacco Use    Smoking status: Former Smoker     Packs/day: 1.00     Years: 30.00     Pack years: 30.00     Types: Cigarettes     Quit date: 2003     Years since quittin.4    Smokeless tobacco: Never Used   Substance Use Topics    Alcohol use: Yes     Frequency: 4 or more times a week     Drinks per session: 5 or 6     Binge frequency: Monthly     Comment: socially    Drug use: Never       Family History   Problem Relation Age of Onset    Diabetes Mother     Heart murmur Mother     Cancer Mother         Breast    Stroke  Mother     Alcohol abuse Father     Heart attack Father 63    Cancer Father         Esophageal       Current Outpatient Medications   Medication Sig    amLODIPine (NORVASC) 5 MG tablet Take 1 tablet (5 mg total) by mouth once daily.    aspirin (ECOTRIN) 325 MG EC tablet Take 325 mg by mouth once daily.    atorvastatin (LIPITOR) 40 MG tablet Take 1 tablet by mouth once daily    coenzyme Q10 200 mg capsule Take 200 mg by mouth 2 (two) times daily.    cyanocobalamin (VITAMIN B-12) 1000 MCG tablet Take 1,000 mcg by mouth once daily.     hydroCHLOROthiazide (HYDRODIURIL) 12.5 MG Tab Take 2 tablets (25 mg total) by mouth once daily.    lisinopril (PRINIVIL,ZESTRIL) 20 MG tablet TAKE 1 TABLET BY MOUTH ONCE DAILY    LORATADINE (ALAVERT ORAL) Take 1 tablet by mouth as needed.    magnesium oxide (MAG-OX) 400 mg (241.3 mg magnesium) tablet Take 400 mg by mouth once daily.    metFORMIN (GLUCOPHAGE) 1000 MG tablet TAKE 1 TABLET BY MOUTH TWICE DAILY WITH MEALS    metoprolol succinate (TOPROL-XL) 50 MG 24 hr tablet Take 1 tablet by mouth once daily    pantoprazole (PROTONIX) 20 MG tablet Take 1 tablet (20 mg total) by mouth once daily.    vitamin D 1000 units Tab Take 1,000 Units by mouth once daily.     calcipotriene (DOVONOX) 0.005 % cream Apply topically 2 (two) times daily as needed. For pre skin cancer    ciclopirox (LOPROX) 0.77 % Crea Apply topically 2 (two) times daily.    clotrimazole (LOTRIMIN) 1 % cream Apply topically 2 (two) times daily.    fluorouracil (EFUDEX) 5 % cream Apply topically 2 (two) times daily as needed.     No current facility-administered medications for this visit.      Current Outpatient Medications on File Prior to Visit   Medication Sig    amLODIPine (NORVASC) 5 MG tablet Take 1 tablet (5 mg total) by mouth once daily.    aspirin (ECOTRIN) 325 MG EC tablet Take 325 mg by mouth once daily.    atorvastatin (LIPITOR) 40 MG tablet Take 1 tablet by mouth once daily    coenzyme  Q10 200 mg capsule Take 200 mg by mouth 2 (two) times daily.    cyanocobalamin (VITAMIN B-12) 1000 MCG tablet Take 1,000 mcg by mouth once daily.     hydroCHLOROthiazide (HYDRODIURIL) 12.5 MG Tab Take 2 tablets (25 mg total) by mouth once daily.    lisinopril (PRINIVIL,ZESTRIL) 20 MG tablet TAKE 1 TABLET BY MOUTH ONCE DAILY    LORATADINE (ALAVERT ORAL) Take 1 tablet by mouth as needed.    magnesium oxide (MAG-OX) 400 mg (241.3 mg magnesium) tablet Take 400 mg by mouth once daily.    metFORMIN (GLUCOPHAGE) 1000 MG tablet TAKE 1 TABLET BY MOUTH TWICE DAILY WITH MEALS    metoprolol succinate (TOPROL-XL) 50 MG 24 hr tablet Take 1 tablet by mouth once daily    pantoprazole (PROTONIX) 20 MG tablet Take 1 tablet (20 mg total) by mouth once daily.    vitamin D 1000 units Tab Take 1,000 Units by mouth once daily.     calcipotriene (DOVONOX) 0.005 % cream Apply topically 2 (two) times daily as needed. For pre skin cancer    ciclopirox (LOPROX) 0.77 % Crea Apply topically 2 (two) times daily.    clotrimazole (LOTRIMIN) 1 % cream Apply topically 2 (two) times daily.    fluorouracil (EFUDEX) 5 % cream Apply topically 2 (two) times daily as needed.     No current facility-administered medications on file prior to visit.      Review of patient's allergies indicates:  No Known Allergies  Review of Systems   Constitution: Negative for malaise/fatigue.   Eyes: Negative for blurred vision.   Cardiovascular: Negative for chest pain, claudication, cyanosis, dyspnea on exertion, irregular heartbeat, leg swelling, near-syncope, orthopnea, palpitations and paroxysmal nocturnal dyspnea.   Respiratory: Negative for cough, hemoptysis and shortness of breath.    Hematologic/Lymphatic: Negative for bleeding problem. Does not bruise/bleed easily.   Skin: Negative for dry skin and itching.   Musculoskeletal: Negative for falls, muscle weakness and myalgias.   Gastrointestinal: Negative for abdominal pain, diarrhea, heartburn,  hematemesis, hematochezia and melena.   Genitourinary: Negative for flank pain and hematuria.   Neurological: Negative for dizziness, focal weakness, headaches, light-headedness, numbness, paresthesias, seizures and weakness.   Psychiatric/Behavioral: Negative for altered mental status and memory loss. The patient is not nervous/anxious.    Allergic/Immunologic: Negative for hives.       Objective:   Physical Exam  Vitals:    07/16/20 1056 07/16/20 1059   BP: (!) 152/80 (!) 148/88   BP Location: Right arm Left arm   Patient Position: Sitting Sitting   BP Method: Medium (Manual) Medium (Manual)   Pulse: 63    SpO2: 98%    Weight: 103 kg (227 lb 1.2 oz)      Lab Results   Component Value Date    CHOL 142 05/18/2020    CHOL 131 05/20/2019    CHOL 145 11/19/2018     Lab Results   Component Value Date    HDL 50 05/18/2020    HDL 53 05/20/2019    HDL 42 11/19/2018     Lab Results   Component Value Date    LDLCALC 66.2 05/18/2020    LDLCALC 55.8 (L) 05/20/2019    LDLCALC 79.0 11/19/2018     Lab Results   Component Value Date    TRIG 129 05/18/2020    TRIG 111 05/20/2019    TRIG 120 11/19/2018     Lab Results   Component Value Date    CHOLHDL 35.2 05/18/2020    CHOLHDL 40.5 05/20/2019    CHOLHDL 29.0 11/19/2018       Chemistry        Component Value Date/Time     07/06/2020 1216    K 3.7 07/06/2020 1216     07/06/2020 1216    CO2 30 (H) 07/06/2020 1216    BUN 14 07/06/2020 1216    CREATININE 1.1 07/06/2020 1216     07/06/2020 1216        Component Value Date/Time    CALCIUM 9.6 07/06/2020 1216    ALKPHOS 83 06/24/2020 1601    AST 29 06/24/2020 1601    ALT 24 06/24/2020 1601    BILITOT 0.5 06/24/2020 1601    ESTGFRAFRICA >60.0 07/06/2020 1216    EGFRNONAA >60.0 07/06/2020 1216        Lab Results   Component Value Date    HGBA1C 5.5 05/18/2020       Lab Results   Component Value Date    TSH 1.662 06/24/2020     Lab Results   Component Value Date    INR 1.0 09/19/2009     Lab Results   Component Value Date     WBC 7.48 06/24/2020    HGB 15.2 06/24/2020    HCT 44.7 06/24/2020    MCV 94 06/24/2020     06/24/2020     BMP  Sodium   Date Value Ref Range Status   07/06/2020 141 136 - 145 mmol/L Final     Potassium   Date Value Ref Range Status   07/06/2020 3.7 3.5 - 5.1 mmol/L Final     Chloride   Date Value Ref Range Status   07/06/2020 100 95 - 110 mmol/L Final     CO2   Date Value Ref Range Status   07/06/2020 30 (H) 23 - 29 mmol/L Final     BUN, Bld   Date Value Ref Range Status   07/06/2020 14 8 - 23 mg/dL Final     Creatinine   Date Value Ref Range Status   07/06/2020 1.1 0.5 - 1.4 mg/dL Final     Calcium   Date Value Ref Range Status   07/06/2020 9.6 8.7 - 10.5 mg/dL Final     Anion Gap   Date Value Ref Range Status   07/06/2020 11 8 - 16 mmol/L Final     eGFR if    Date Value Ref Range Status   07/06/2020 >60.0 >60 mL/min/1.73 m^2 Final     eGFR if non    Date Value Ref Range Status   07/06/2020 >60.0 >60 mL/min/1.73 m^2 Final     Comment:     Calculation used to obtain the estimated glomerular filtration  rate (eGFR) is the CKD-EPI equation.        CrCl cannot be calculated (Patient's most recent lab result is older than the maximum 7 days allowed.).    Assessment:     1. Coronary artery disease involving native coronary artery of native heart without angina pectoris    2. Hypertension associated with diabetes    3. Hyperlipidemia associated with type 2 diabetes mellitus    4. S/P CABG (coronary artery bypass graft)    5. Old MI (myocardial infarction)    6. Diabetes mellitus due to underlying condition with hyperosmolarity without coma, without long-term current use of insulin    7. Hypomagnesemia      Cad asymptomatic   htn fluctuates low salt advised will add extra lisinopril 10 mg at nite .  diabetes on target a1c on target   Needs exercise more lose weight counseled.  Plan:     As p[er above  Continue current therapy  Cardiac low salt diet.  Risk factor modification and  excercise program.  F/u as scheduled./

## 2020-07-21 RX ORDER — PANTOPRAZOLE SODIUM 20 MG/1
20 TABLET, DELAYED RELEASE ORAL DAILY
Qty: 30 TABLET | Refills: 11 | OUTPATIENT
Start: 2020-07-21 | End: 2021-07-21

## 2020-07-28 ENCOUNTER — TELEPHONE (OUTPATIENT)
Dept: GASTROENTEROLOGY | Facility: CLINIC | Age: 70
End: 2020-07-28

## 2020-08-04 ENCOUNTER — OFFICE VISIT (OUTPATIENT)
Dept: INTERNAL MEDICINE | Facility: CLINIC | Age: 70
End: 2020-08-04
Payer: MEDICARE

## 2020-08-04 VITALS
SYSTOLIC BLOOD PRESSURE: 130 MMHG | OXYGEN SATURATION: 98 % | HEART RATE: 63 BPM | TEMPERATURE: 98 F | WEIGHT: 228.19 LBS | DIASTOLIC BLOOD PRESSURE: 82 MMHG | BODY MASS INDEX: 32.74 KG/M2

## 2020-08-04 DIAGNOSIS — I25.10 CORONARY ARTERY DISEASE INVOLVING NATIVE CORONARY ARTERY OF NATIVE HEART WITHOUT ANGINA PECTORIS: ICD-10-CM

## 2020-08-04 DIAGNOSIS — Z95.1 S/P CABG (CORONARY ARTERY BYPASS GRAFT): ICD-10-CM

## 2020-08-04 DIAGNOSIS — I15.2 HYPERTENSION ASSOCIATED WITH DIABETES: Primary | ICD-10-CM

## 2020-08-04 DIAGNOSIS — E11.59 HYPERTENSION ASSOCIATED WITH DIABETES: Primary | ICD-10-CM

## 2020-08-04 DIAGNOSIS — I10 ESSENTIAL HYPERTENSION: ICD-10-CM

## 2020-08-04 DIAGNOSIS — E08.00 DIABETES MELLITUS DUE TO UNDERLYING CONDITION WITH HYPEROSMOLARITY WITHOUT COMA, WITHOUT LONG-TERM CURRENT USE OF INSULIN: ICD-10-CM

## 2020-08-04 PROCEDURE — 1126F AMNT PAIN NOTED NONE PRSNT: CPT | Mod: S$GLB,,, | Performed by: INTERNAL MEDICINE

## 2020-08-04 PROCEDURE — 99499 RISK ADDL DX/OHS AUDIT: ICD-10-PCS | Mod: S$GLB,,, | Performed by: INTERNAL MEDICINE

## 2020-08-04 PROCEDURE — 1126F PR PAIN SEVERITY QUANTIFIED, NO PAIN PRESENT: ICD-10-PCS | Mod: S$GLB,,, | Performed by: INTERNAL MEDICINE

## 2020-08-04 PROCEDURE — 3044F HG A1C LEVEL LT 7.0%: CPT | Mod: CPTII,S$GLB,, | Performed by: INTERNAL MEDICINE

## 2020-08-04 PROCEDURE — 3079F PR MOST RECENT DIASTOLIC BLOOD PRESSURE 80-89 MM HG: ICD-10-PCS | Mod: CPTII,S$GLB,, | Performed by: INTERNAL MEDICINE

## 2020-08-04 PROCEDURE — 1101F PR PT FALLS ASSESS DOC 0-1 FALLS W/OUT INJ PAST YR: ICD-10-PCS | Mod: CPTII,S$GLB,, | Performed by: INTERNAL MEDICINE

## 2020-08-04 PROCEDURE — 3008F PR BODY MASS INDEX (BMI) DOCUMENTED: ICD-10-PCS | Mod: CPTII,S$GLB,, | Performed by: INTERNAL MEDICINE

## 2020-08-04 PROCEDURE — 99499 UNLISTED E&M SERVICE: CPT | Mod: S$GLB,,, | Performed by: INTERNAL MEDICINE

## 2020-08-04 PROCEDURE — 1159F PR MEDICATION LIST DOCUMENTED IN MEDICAL RECORD: ICD-10-PCS | Mod: S$GLB,,, | Performed by: INTERNAL MEDICINE

## 2020-08-04 PROCEDURE — 99213 OFFICE O/P EST LOW 20 MIN: CPT | Mod: S$GLB,,, | Performed by: INTERNAL MEDICINE

## 2020-08-04 PROCEDURE — 3075F PR MOST RECENT SYSTOLIC BLOOD PRESS GE 130-139MM HG: ICD-10-PCS | Mod: CPTII,S$GLB,, | Performed by: INTERNAL MEDICINE

## 2020-08-04 PROCEDURE — 1159F MED LIST DOCD IN RCRD: CPT | Mod: S$GLB,,, | Performed by: INTERNAL MEDICINE

## 2020-08-04 PROCEDURE — 99999 PR PBB SHADOW E&M-EST. PATIENT-LVL IV: ICD-10-PCS | Mod: PBBFAC,,, | Performed by: INTERNAL MEDICINE

## 2020-08-04 PROCEDURE — 99213 PR OFFICE/OUTPT VISIT, EST, LEVL III, 20-29 MIN: ICD-10-PCS | Mod: S$GLB,,, | Performed by: INTERNAL MEDICINE

## 2020-08-04 PROCEDURE — 3075F SYST BP GE 130 - 139MM HG: CPT | Mod: CPTII,S$GLB,, | Performed by: INTERNAL MEDICINE

## 2020-08-04 PROCEDURE — 1101F PT FALLS ASSESS-DOCD LE1/YR: CPT | Mod: CPTII,S$GLB,, | Performed by: INTERNAL MEDICINE

## 2020-08-04 PROCEDURE — 99999 PR PBB SHADOW E&M-EST. PATIENT-LVL IV: CPT | Mod: PBBFAC,,, | Performed by: INTERNAL MEDICINE

## 2020-08-04 PROCEDURE — 3008F BODY MASS INDEX DOCD: CPT | Mod: CPTII,S$GLB,, | Performed by: INTERNAL MEDICINE

## 2020-08-04 PROCEDURE — 3044F PR MOST RECENT HEMOGLOBIN A1C LEVEL <7.0%: ICD-10-PCS | Mod: CPTII,S$GLB,, | Performed by: INTERNAL MEDICINE

## 2020-08-04 PROCEDURE — 3079F DIAST BP 80-89 MM HG: CPT | Mod: CPTII,S$GLB,, | Performed by: INTERNAL MEDICINE

## 2020-08-04 RX ORDER — LISINOPRIL 20 MG/1
20 TABLET ORAL DAILY
Qty: 90 TABLET | Refills: 3 | Status: SHIPPED | OUTPATIENT
Start: 2020-08-04 | End: 2020-11-16 | Stop reason: SDUPTHER

## 2020-08-04 NOTE — PROGRESS NOTES
Subjective:      Patient ID: Deniz Paulino is a 69 y.o. male.    Chief Complaint: Follow-up    HPI     70 yo with   Patient Active Problem List   Diagnosis    CAD (coronary artery disease)    Hypertension associated with diabetes    Hyperlipidemia associated with type 2 diabetes mellitus    S/P CABG (coronary artery bypass graft)    Old MI (myocardial infarction)    Diabetes mellitus    Hypomagnesemia     Past Medical History:   Diagnosis Date    Acute coronary syndrome     Coronary artery disease     Diabetes mellitus 4/24/2014    Hyperlipidemia     Hypertension     Old MI (myocardial infarction) 4/24/2014    S/P CABG (coronary artery bypass graft) 4/24/2014     Here today for management of multiple medical problems as outlined below.  He reports compliance with his medications without significant side effects.  His lisinopril has been increased from 20-30 recently by his cardiologist.  He has no recurrence of previous symptoms now that he is back on his metoprolol.  Review of Systems   Constitutional: Negative for chills and fever.   HENT: Negative for ear pain and sore throat.    Respiratory: Negative for cough.    Cardiovascular: Negative for chest pain.   Gastrointestinal: Negative for abdominal pain and blood in stool.   Genitourinary: Negative for dysuria and hematuria.   Neurological: Negative for seizures and syncope.     Objective:   /82 (BP Location: Left arm, Patient Position: Sitting, BP Method: Large (Manual))   Pulse 63   Temp 97.6 °F (36.4 °C) (Tympanic)   Wt 103.5 kg (228 lb 2.8 oz)   SpO2 98%   BMI 32.74 kg/m²     Physical Exam  Constitutional:       General: He is not in acute distress.     Appearance: He is well-developed.   Cardiovascular:      Rate and Rhythm: Normal rate.   Pulmonary:      Effort: Pulmonary effort is normal.      Breath sounds: Normal breath sounds.   Skin:     General: Skin is warm and dry.   Psychiatric:         Behavior: Behavior normal.          Assessment:     1. Hypertension associated with diabetes    2. Diabetes mellitus due to underlying condition with hyperosmolarity without coma, without long-term current use of insulin    3. S/P CABG (coronary artery bypass graft)    4. Coronary artery disease involving native coronary artery of native heart without angina pectoris    5. Essential hypertension      Plan:   Hypertension associated with diabetes  Controlled.  Continue current med  Diabetes mellitus due to underlying condition with hyperosmolarity without coma, without long-term current use of insulin  Stable.  Continue current medications    S/P CABG (coronary artery bypass graft)  -     lisinopriL (PRINIVIL,ZESTRIL) 20 MG tablet; Take 1 tablet (20 mg total) by mouth once daily.  Dispense: 90 tablet; Refill: 3    Coronary artery disease involving native coronary artery of native heart without angina pectoris  -     lisinopriL (PRINIVIL,ZESTRIL) 20 MG tablet; Take 1 tablet (20 mg total) by mouth once daily.  Dispense: 90 tablet; Refill: 3    Essential hypertension  -     lisinopriL (PRINIVIL,ZESTRIL) 20 MG tablet; Take 1 tablet (20 mg total) by mouth once daily.  Dispense: 90 tablet; Refill: 3        Lab Frequency Next Occurrence   Hemoglobin A1C Once 11/22/2020   Cortisol, 8AM Once 07/06/2020   Magnesium Once 07/20/2020       Problem List Items Addressed This Visit        Cardiac/Vascular    CAD (coronary artery disease)    Overview     Sees Sabrina         Relevant Medications    lisinopriL (PRINIVIL,ZESTRIL) 20 MG tablet    Hypertension associated with diabetes - Primary    Relevant Medications    lisinopriL (PRINIVIL,ZESTRIL) 20 MG tablet    S/P CABG (coronary artery bypass graft)    Relevant Medications    lisinopriL (PRINIVIL,ZESTRIL) 20 MG tablet       Endocrine    Diabetes mellitus      Other Visit Diagnoses     Essential hypertension        Relevant Medications    lisinopriL (PRINIVIL,ZESTRIL) 20 MG tablet          Follow up in about 3  months (around 11/11/2020), or if symptoms worsen or fail to improve.

## 2020-08-11 LAB
5HIAA & CREATININE UR-IMP: NORMAL
5OH-INDOLEACETATE 24H UR-MCNC: 4.8 MG/L
5OH-INDOLEACETATE 24H UR-MRATE: 6 MG/D (ref 0–15)
5OH-INDOLEACETATE/CREAT 24H UR: 4 MG/GCR (ref 0–14)
CATECHOLS UR-IMP: NORMAL
COLLECT DURATION TIME SPEC: 24 HR
CREAT 24H UR-MRATE: 1647 MG/D (ref 800–2100)
CREAT UR-MCNC: 122 MG/DL
DOPAMINE 24H UR-MRATE: 176 UG/D (ref 71–485)
DOPAMINE UR-MCNC: 130 UG/L
DOPAMINE/CREAT UR: 107 UG/G CRT (ref 0–250)
EPINEPH 24H UR-MRATE: 4 UG/D (ref 1–14)
EPINEPH UR-MCNC: 3 UG/L
EPINEPH/CREAT UR: 2 UG/G CRT (ref 0–20)
METANEPH 24H UR-MCNC: 157 UG/L
METANEPH 24H UR-MRATE: 212 UG/D (ref 55–320)
METANEPH+NORMETANEPH UR-IMP: NORMAL
METANEPH/CREAT 24H UR: 129 UG/G CRT (ref 0–300)
NOREPINEPH 24H UR-MRATE: 27 UG/D (ref 14–120)
NOREPINEPH UR-MCNC: 20 UG/L
NOREPINEPH/CREAT UR: 16 UG/G CRT (ref 0–45)
NORMETANEPHRINE 24H UR-MCNC: 190 UG/L
NORMETANEPHRINE 24H UR-MRATE: 256 UG/D (ref 114–865)
NORMETANEPHRINE/CREAT 24H UR: 156 UG/G CRT (ref 0–400)
SPECIMEN VOL ?TM UR: 1350 ML
VMA & CREAT 24H UR-IMP: NORMAL
VMA 24H UR-MRATE: 2.8 MG/D (ref 0–7)
VMA UR-MCNC: 2.1 MG/L

## 2020-08-20 ENCOUNTER — PATIENT OUTREACH (OUTPATIENT)
Dept: OTHER | Facility: OTHER | Age: 70
End: 2020-08-20

## 2020-09-03 NOTE — PROGRESS NOTES
Digital Medicine: Health  Follow-Up    The history is provided by the patient.             Reason for review: Blood pressure not at goal        Topics Covered on Call: physical activity and Diet    Additional Follow-up details: Average blood pressure today is 136/82. He does have a handful of days that his readings are below goal but is unsure why. He is pretty consistent with his diet.         Diet-no change to diet    No change to diet.  Patient reports eating or drinking the following: He is still eating a healthy diet and keeping a low sodium diet.       Physical Activity-no change to routine  No change to exercise routine.       Additional physical activity details: He is still not exercising and not interested. Reviewed that could be why his readings are still higher. Recommend adding in some walking in the evening.       Medication Adherence-Medication adherence was assessed.      Substance, Sleep, Stress-Not assessed    Plan  There are no preventive care reminders to display for this patient.    Last 5 Patient Entered Readings                                      Current 30 Day Average: 136/82     Recent Readings 9/1/2020 8/29/2020 8/28/2020 8/27/2020 8/27/2020    SBP (mmHg) 128 146 139 133 147    DBP (mmHg) 72 84 84 82 89    Pulse 57 52 52 59 62

## 2020-09-17 DIAGNOSIS — I15.2 HYPERTENSION ASSOCIATED WITH DIABETES: ICD-10-CM

## 2020-09-17 DIAGNOSIS — E11.59 HYPERTENSION ASSOCIATED WITH DIABETES: ICD-10-CM

## 2020-09-17 RX ORDER — HYDROCHLOROTHIAZIDE 12.5 MG/1
25 TABLET ORAL DAILY
Qty: 90 TABLET | Refills: 1 | Status: CANCELLED
Start: 2020-09-17

## 2020-10-13 ENCOUNTER — PATIENT OUTREACH (OUTPATIENT)
Dept: OTHER | Facility: OTHER | Age: 70
End: 2020-10-13

## 2020-10-13 NOTE — PROGRESS NOTES
Digital Medicine: Clinician Follow-Up    I spoke with the patient today to follow-up on his blood pressure readings.  Most of his readings are at goal.  Today he says he has no questions or concerns.  He says that his PCP, his cardiologist, and his [facility] doctor typically answer all of his questions.    The history is provided by the patient.   Follow-up reason(s): routine follow up.     Hypertension    Patient's blood pressure is stable.   Patient is not experiencing signs/symptoms of hypotension.  Patient is not experiencing signs/symptoms of hypertension.            Last 5 Patient Entered Readings                                      Current 30 Day Average: 130/74     Recent Readings 10/12/2020 10/5/2020 10/2/2020 9/28/2020 9/25/2020    SBP (mmHg) 129 133 142 117 128    DBP (mmHg) 70 74 85 67 72    Pulse 58 59 56 58 62                    ASSESSMENT(S)  Patients BP average is 130/74 mmHg, which is at goal. Patient's BP goal is less than or equal to 130/80.    Hypertension Plan  Additional monitoring needed.  Continue current therapy.       Addressed patient questions and patient has my contact information if needed prior to next outreach. Patient verbalizes understanding.             There are no preventive care reminders to display for this patient.  There are no preventive care reminders to display for this patient.      Hypertension Medications             amLODIPine (NORVASC) 5 MG tablet Take 1 tablet (5 mg total) by mouth once daily.    hydroCHLOROthiazide (HYDRODIURIL) 12.5 MG Tab Take 1 tablet by mouth once daily    lisinopriL (PRINIVIL,ZESTRIL) 20 MG tablet Take 1 tablet (20 mg total) by mouth once daily.    lisinopriL 10 MG tablet Take 1 tablet (10 mg total) by mouth every evening.    metoprolol succinate (TOPROL-XL) 50 MG 24 hr tablet Take 1 tablet by mouth once daily

## 2020-10-29 ENCOUNTER — PATIENT OUTREACH (OUTPATIENT)
Dept: OTHER | Facility: OTHER | Age: 70
End: 2020-10-29

## 2020-10-29 NOTE — PROGRESS NOTES
Digital Medicine: Health  Follow-Up    The history is provided by the patient.             Reason for review: Blood pressure not at goal        Topics Covered on Call: physical activity and Diet    Additional Follow-up details: Average blood pressure is 136/78. Patient reports that his blood pressure has continued to stay stable.     He reports that he had covid in August but only had mild symptoms.             Diet-no change to diet    No change to diet.  Patient reports eating or drinking the following: Still working on low sodium diet.       Physical Activity-Change      He added biking to His physical activity routine.        Additional physical activity details: He has started riding his bike more now for exercise.       Medication Adherence-Medication adherence was assessed.      Substance, Sleep, Stress-Not assessed      Continue current diet/physical activity routine.       Addressed patient questions and patient has my contact information if needed prior to next outreach. Patient verbalizes understanding.             There are no preventive care reminders to display for this patient.      Last 5 Patient Entered Readings                                      Current 30 Day Average: 136/78     Recent Readings 10/27/2020 10/22/2020 10/12/2020 10/5/2020 10/2/2020    SBP (mmHg) 140 138 129 133 142    DBP (mmHg) 78 83 70 74 85    Pulse 57 58 58 59 56

## 2020-11-02 ENCOUNTER — PATIENT MESSAGE (OUTPATIENT)
Dept: ADMINISTRATIVE | Facility: OTHER | Age: 70
End: 2020-11-02

## 2020-11-09 ENCOUNTER — LAB VISIT (OUTPATIENT)
Dept: LAB | Facility: HOSPITAL | Age: 70
End: 2020-11-09
Attending: INTERNAL MEDICINE
Payer: MEDICARE

## 2020-11-09 DIAGNOSIS — E11.9 TYPE 2 DIABETES MELLITUS WITHOUT COMPLICATION, WITHOUT LONG-TERM CURRENT USE OF INSULIN: ICD-10-CM

## 2020-11-09 PROCEDURE — 83036 HEMOGLOBIN GLYCOSYLATED A1C: CPT

## 2020-11-09 PROCEDURE — 36415 COLL VENOUS BLD VENIPUNCTURE: CPT

## 2020-11-10 LAB
ESTIMATED AVG GLUCOSE: 126 MG/DL (ref 68–131)
HBA1C MFR BLD HPLC: 6 % (ref 4–5.6)

## 2020-11-16 ENCOUNTER — OFFICE VISIT (OUTPATIENT)
Dept: CARDIOLOGY | Facility: CLINIC | Age: 70
End: 2020-11-16
Payer: MEDICARE

## 2020-11-16 VITALS
SYSTOLIC BLOOD PRESSURE: 138 MMHG | HEART RATE: 64 BPM | HEIGHT: 70 IN | BODY MASS INDEX: 33 KG/M2 | OXYGEN SATURATION: 97 % | DIASTOLIC BLOOD PRESSURE: 86 MMHG | WEIGHT: 230.5 LBS

## 2020-11-16 DIAGNOSIS — Z95.1 S/P CABG (CORONARY ARTERY BYPASS GRAFT): ICD-10-CM

## 2020-11-16 DIAGNOSIS — E78.5 HYPERLIPIDEMIA ASSOCIATED WITH TYPE 2 DIABETES MELLITUS: ICD-10-CM

## 2020-11-16 DIAGNOSIS — E08.00 DIABETES MELLITUS DUE TO UNDERLYING CONDITION WITH HYPEROSMOLARITY WITHOUT COMA, WITHOUT LONG-TERM CURRENT USE OF INSULIN: ICD-10-CM

## 2020-11-16 DIAGNOSIS — E11.69 HYPERLIPIDEMIA ASSOCIATED WITH TYPE 2 DIABETES MELLITUS: ICD-10-CM

## 2020-11-16 DIAGNOSIS — E11.59 HYPERTENSION ASSOCIATED WITH DIABETES: ICD-10-CM

## 2020-11-16 DIAGNOSIS — I25.2 OLD MI (MYOCARDIAL INFARCTION): ICD-10-CM

## 2020-11-16 DIAGNOSIS — I10 ESSENTIAL HYPERTENSION: ICD-10-CM

## 2020-11-16 DIAGNOSIS — I25.10 CORONARY ARTERY DISEASE INVOLVING NATIVE CORONARY ARTERY OF NATIVE HEART WITHOUT ANGINA PECTORIS: Primary | ICD-10-CM

## 2020-11-16 DIAGNOSIS — I15.2 HYPERTENSION ASSOCIATED WITH DIABETES: ICD-10-CM

## 2020-11-16 PROCEDURE — 1101F PR PT FALLS ASSESS DOC 0-1 FALLS W/OUT INJ PAST YR: ICD-10-PCS | Mod: CPTII,S$GLB,, | Performed by: INTERNAL MEDICINE

## 2020-11-16 PROCEDURE — 1126F AMNT PAIN NOTED NONE PRSNT: CPT | Mod: S$GLB,,, | Performed by: INTERNAL MEDICINE

## 2020-11-16 PROCEDURE — 3008F BODY MASS INDEX DOCD: CPT | Mod: CPTII,S$GLB,, | Performed by: INTERNAL MEDICINE

## 2020-11-16 PROCEDURE — 99499 RISK ADDL DX/OHS AUDIT: ICD-10-PCS | Mod: S$GLB,,, | Performed by: INTERNAL MEDICINE

## 2020-11-16 PROCEDURE — 3079F PR MOST RECENT DIASTOLIC BLOOD PRESSURE 80-89 MM HG: ICD-10-PCS | Mod: CPTII,S$GLB,, | Performed by: INTERNAL MEDICINE

## 2020-11-16 PROCEDURE — 3288F FALL RISK ASSESSMENT DOCD: CPT | Mod: CPTII,S$GLB,, | Performed by: INTERNAL MEDICINE

## 2020-11-16 PROCEDURE — 3044F HG A1C LEVEL LT 7.0%: CPT | Mod: CPTII,S$GLB,, | Performed by: INTERNAL MEDICINE

## 2020-11-16 PROCEDURE — 3075F PR MOST RECENT SYSTOLIC BLOOD PRESS GE 130-139MM HG: ICD-10-PCS | Mod: CPTII,S$GLB,, | Performed by: INTERNAL MEDICINE

## 2020-11-16 PROCEDURE — 99214 PR OFFICE/OUTPT VISIT, EST, LEVL IV, 30-39 MIN: ICD-10-PCS | Mod: S$GLB,,, | Performed by: INTERNAL MEDICINE

## 2020-11-16 PROCEDURE — 1159F PR MEDICATION LIST DOCUMENTED IN MEDICAL RECORD: ICD-10-PCS | Mod: S$GLB,,, | Performed by: INTERNAL MEDICINE

## 2020-11-16 PROCEDURE — 99999 PR PBB SHADOW E&M-EST. PATIENT-LVL IV: ICD-10-PCS | Mod: PBBFAC,,, | Performed by: INTERNAL MEDICINE

## 2020-11-16 PROCEDURE — 99214 OFFICE O/P EST MOD 30 MIN: CPT | Mod: S$GLB,,, | Performed by: INTERNAL MEDICINE

## 2020-11-16 PROCEDURE — 3079F DIAST BP 80-89 MM HG: CPT | Mod: CPTII,S$GLB,, | Performed by: INTERNAL MEDICINE

## 2020-11-16 PROCEDURE — 99999 PR PBB SHADOW E&M-EST. PATIENT-LVL IV: CPT | Mod: PBBFAC,,, | Performed by: INTERNAL MEDICINE

## 2020-11-16 PROCEDURE — 3008F PR BODY MASS INDEX (BMI) DOCUMENTED: ICD-10-PCS | Mod: CPTII,S$GLB,, | Performed by: INTERNAL MEDICINE

## 2020-11-16 PROCEDURE — 99499 UNLISTED E&M SERVICE: CPT | Mod: S$GLB,,, | Performed by: INTERNAL MEDICINE

## 2020-11-16 PROCEDURE — 1159F MED LIST DOCD IN RCRD: CPT | Mod: S$GLB,,, | Performed by: INTERNAL MEDICINE

## 2020-11-16 PROCEDURE — 3075F SYST BP GE 130 - 139MM HG: CPT | Mod: CPTII,S$GLB,, | Performed by: INTERNAL MEDICINE

## 2020-11-16 PROCEDURE — 3044F PR MOST RECENT HEMOGLOBIN A1C LEVEL <7.0%: ICD-10-PCS | Mod: CPTII,S$GLB,, | Performed by: INTERNAL MEDICINE

## 2020-11-16 PROCEDURE — 1126F PR PAIN SEVERITY QUANTIFIED, NO PAIN PRESENT: ICD-10-PCS | Mod: S$GLB,,, | Performed by: INTERNAL MEDICINE

## 2020-11-16 PROCEDURE — 3288F PR FALLS RISK ASSESSMENT DOCUMENTED: ICD-10-PCS | Mod: CPTII,S$GLB,, | Performed by: INTERNAL MEDICINE

## 2020-11-16 PROCEDURE — 1101F PT FALLS ASSESS-DOCD LE1/YR: CPT | Mod: CPTII,S$GLB,, | Performed by: INTERNAL MEDICINE

## 2020-11-16 RX ORDER — LISINOPRIL 20 MG/1
40 TABLET ORAL DAILY
Qty: 180 TABLET | Refills: 3 | Status: SHIPPED | OUTPATIENT
Start: 2020-11-16 | End: 2020-12-09

## 2020-11-16 NOTE — PROGRESS NOTES
Subjective:   Patient ID:  Deniz Paulino is a 69 y.o. male who presents for follow up of No chief complaint on file.      HPI  A 68 YO MALE WITH CAD S/P CABG OLD MI HTN DIABETES HLP IS HERE FOR F/U . HE HAS NOT BEEN EXERCISING CLAIMS COMPLIANCE. HAD COVID MILD CASE NO ILL EFFECTS IN AUGUST HAS NO NEW CHEST PAIN SHORTNESS OF BREATH ORTHOPNEA PND. HE IS COMPLIANT WITH SALT. HE HA SNO OTHER CARDIOVASCULAR COMPLAINTS. NO CHANGE IN WEIGHT. A1C 6% LDL ON TARGET. HIS A1C IS INCREASED.   Past Medical History:   Diagnosis Date    Acute coronary syndrome     Coronary artery disease     Diabetes mellitus 2014    Hyperlipidemia     Hypertension     Old MI (myocardial infarction) 2014    S/P CABG (coronary artery bypass graft) 2014       Past Surgical History:   Procedure Laterality Date    CARDIAC CATHETERIZATION      COLONOSCOPY N/A 2019    Procedure: COLONOSCOPY;  Surgeon: Opal Oshea MD;  Location: [facility];  Service: Endoscopy;  Laterality: N/A;    CORONARY ARTERY BYPASS GRAFT      ESOPHAGOGASTRODUODENOSCOPY N/A 2019    Procedure: ESOPHAGOGASTRODUODENOSCOPY (EGD);  Surgeon: Opal Oshea MD;  Location: [facility];  Service: Endoscopy;  Laterality: N/A;    SKIN CANCER EXCISION  2018       Social History     Tobacco Use    Smoking status: Former Smoker     Packs/day: 1.00     Years: 30.00     Pack years: 30.00     Types: Cigarettes     Quit date: 2003     Years since quittin.8    Smokeless tobacco: Never Used   Substance Use Topics    Alcohol use: Yes     Frequency: 4 or more times a week     Drinks per session: 5 or 6     Binge frequency: Monthly     Comment: socially    Drug use: Never       Family History   Problem Relation Age of Onset    Diabetes Mother     Heart murmur Mother     Cancer Mother         Breast    Stroke Mother     Alcohol abuse Father     Heart attack Father 63    Cancer Father         Esophageal       Current Outpatient Medications    Medication Sig    amLODIPine (NORVASC) 5 MG tablet Take 1 tablet (5 mg total) by mouth once daily.    aspirin (ECOTRIN) 325 MG EC tablet Take 325 mg by mouth once daily.    atorvastatin (LIPITOR) 40 MG tablet Take 1 tablet by mouth once daily    clotrimazole (LOTRIMIN) 1 % cream Apply topically 2 (two) times daily.    coenzyme Q10 200 mg capsule Take 200 mg by mouth 2 (two) times daily.    cyanocobalamin (VITAMIN B-12) 1000 MCG tablet Take 1,000 mcg by mouth once daily.     hydroCHLOROthiazide (HYDRODIURIL) 12.5 MG Tab Take 1 tablet by mouth once daily    lisinopriL (PRINIVIL,ZESTRIL) 20 MG tablet Take 1 tablet (20 mg total) by mouth once daily.    lisinopriL 10 MG tablet Take 1 tablet (10 mg total) by mouth every evening.    LORATADINE (ALAVERT ORAL) Take 1 tablet by mouth as needed.    magnesium oxide (MAG-OX) 400 mg (241.3 mg magnesium) tablet Take 400 mg by mouth once daily.    metFORMIN (GLUCOPHAGE) 1000 MG tablet TAKE 1 TABLET BY MOUTH TWICE DAILY WITH MEALS    metoprolol succinate (TOPROL-XL) 50 MG 24 hr tablet Take 1 tablet by mouth once daily    pantoprazole (PROTONIX) 20 MG tablet Take 1 tablet (20 mg total) by mouth once daily.    vitamin D 1000 units Tab Take 1,000 Units by mouth once daily.     calcipotriene (DOVONOX) 0.005 % cream Apply topically 2 (two) times daily as needed. For pre skin cancer    ciclopirox (LOPROX) 0.77 % Crea Apply topically 2 (two) times daily. (Patient not taking: Reported on 11/16/2020)    fluorouracil (EFUDEX) 5 % cream Apply topically 2 (two) times daily as needed.     No current facility-administered medications for this visit.      Current Outpatient Medications on File Prior to Visit   Medication Sig    amLODIPine (NORVASC) 5 MG tablet Take 1 tablet (5 mg total) by mouth once daily.    aspirin (ECOTRIN) 325 MG EC tablet Take 325 mg by mouth once daily.    atorvastatin (LIPITOR) 40 MG tablet Take 1 tablet by mouth once daily    clotrimazole  (LOTRIMIN) 1 % cream Apply topically 2 (two) times daily.    coenzyme Q10 200 mg capsule Take 200 mg by mouth 2 (two) times daily.    cyanocobalamin (VITAMIN B-12) 1000 MCG tablet Take 1,000 mcg by mouth once daily.     hydroCHLOROthiazide (HYDRODIURIL) 12.5 MG Tab Take 1 tablet by mouth once daily    lisinopriL (PRINIVIL,ZESTRIL) 20 MG tablet Take 1 tablet (20 mg total) by mouth once daily.    lisinopriL 10 MG tablet Take 1 tablet (10 mg total) by mouth every evening.    LORATADINE (ALAVERT ORAL) Take 1 tablet by mouth as needed.    magnesium oxide (MAG-OX) 400 mg (241.3 mg magnesium) tablet Take 400 mg by mouth once daily.    metFORMIN (GLUCOPHAGE) 1000 MG tablet TAKE 1 TABLET BY MOUTH TWICE DAILY WITH MEALS    metoprolol succinate (TOPROL-XL) 50 MG 24 hr tablet Take 1 tablet by mouth once daily    pantoprazole (PROTONIX) 20 MG tablet Take 1 tablet (20 mg total) by mouth once daily.    vitamin D 1000 units Tab Take 1,000 Units by mouth once daily.     calcipotriene (DOVONOX) 0.005 % cream Apply topically 2 (two) times daily as needed. For pre skin cancer    ciclopirox (LOPROX) 0.77 % Crea Apply topically 2 (two) times daily. (Patient not taking: Reported on 11/16/2020)    fluorouracil (EFUDEX) 5 % cream Apply topically 2 (two) times daily as needed.     No current facility-administered medications on file prior to visit.      Review of patient's allergies indicates:  No Known Allergies  Review of Systems   Constitution: Negative for malaise/fatigue.   Eyes: Negative for blurred vision.   Cardiovascular: Negative for chest pain, claudication, cyanosis, dyspnea on exertion, irregular heartbeat, leg swelling, near-syncope, orthopnea, palpitations and paroxysmal nocturnal dyspnea.   Respiratory: Negative for cough, hemoptysis and shortness of breath.    Hematologic/Lymphatic: Negative for bleeding problem. Does not bruise/bleed easily.   Skin: Negative for dry skin and itching.   Musculoskeletal:  "Negative for falls, muscle weakness and myalgias.   Gastrointestinal: Negative for abdominal pain, diarrhea, heartburn, hematemesis, hematochezia and melena.   Genitourinary: Negative for flank pain and hematuria.   Neurological: Negative for dizziness, focal weakness, headaches, light-headedness, numbness, paresthesias, seizures and weakness.   Psychiatric/Behavioral: Negative for altered mental status and memory loss. The patient is not nervous/anxious.    Allergic/Immunologic: Negative for hives.       Objective:   Physical Exam   Constitutional: He is oriented to person, place, and time. He appears well-developed and well-nourished. No distress.   HENT:   Head: Normocephalic and atraumatic.   Eyes: Pupils are equal, round, and reactive to light. EOM are normal. Right eye exhibits no discharge. Left eye exhibits no discharge.   Neck: Neck supple. No JVD present. No thyromegaly present.   Cardiovascular: Normal rate, regular rhythm, normal heart sounds and intact distal pulses. Exam reveals no gallop and no friction rub.   No murmur heard.  Pulmonary/Chest: Effort normal and breath sounds normal. No respiratory distress. He has no wheezes. He has no rales. He exhibits no tenderness.   SCAR CABG WELL HEALED.    Abdominal: Soft. Bowel sounds are normal. He exhibits no distension. There is no abdominal tenderness.   Musculoskeletal: Normal range of motion.         General: No edema.   Neurological: He is alert and oriented to person, place, and time. No cranial nerve deficit.   Skin: Skin is warm and dry. No rash noted. He is not diaphoretic. No erythema.   Psychiatric: He has a normal mood and affect. His behavior is normal.   Nursing note and vitals reviewed.    Vitals:    11/16/20 1307 11/16/20 1308   BP: (!) 140/90 138/86   BP Location: Right arm Left arm   Patient Position: Sitting Sitting   BP Method: Large (Manual) Large (Manual)   Pulse: 64    SpO2: 97%    Weight: 104.5 kg (230 lb 7.9 oz)    Height: 5' 10" " (1.778 m)      Lab Results   Component Value Date    CHOL 142 05/18/2020    CHOL 131 05/20/2019    CHOL 145 11/19/2018     Lab Results   Component Value Date    HDL 50 05/18/2020    HDL 53 05/20/2019    HDL 42 11/19/2018     Lab Results   Component Value Date    LDLCALC 66.2 05/18/2020    LDLCALC 55.8 (L) 05/20/2019    LDLCALC 79.0 11/19/2018     Lab Results   Component Value Date    TRIG 129 05/18/2020    TRIG 111 05/20/2019    TRIG 120 11/19/2018     Lab Results   Component Value Date    CHOLHDL 35.2 05/18/2020    CHOLHDL 40.5 05/20/2019    CHOLHDL 29.0 11/19/2018       Chemistry        Component Value Date/Time     07/06/2020 1216    K 3.7 07/06/2020 1216     07/06/2020 1216    CO2 30 (H) 07/06/2020 1216    BUN 14 07/06/2020 1216    CREATININE 1.1 07/06/2020 1216     07/06/2020 1216        Component Value Date/Time    CALCIUM 9.6 07/06/2020 1216    ALKPHOS 83 06/24/2020 1601    AST 29 06/24/2020 1601    ALT 24 06/24/2020 1601    BILITOT 0.5 06/24/2020 1601    ESTGFRAFRICA >60.0 07/06/2020 1216    EGFRNONAA >60.0 07/06/2020 1216        Lab Results   Component Value Date    HGBA1C 6.0 (H) 11/09/2020         Lab Results   Component Value Date    TSH 1.662 06/24/2020     Lab Results   Component Value Date    INR 1.0 09/19/2009     Lab Results   Component Value Date    WBC 7.48 06/24/2020    HGB 15.2 06/24/2020    HCT 44.7 06/24/2020    MCV 94 06/24/2020     06/24/2020     BMP  Sodium   Date Value Ref Range Status   07/06/2020 141 136 - 145 mmol/L Final     Potassium   Date Value Ref Range Status   07/06/2020 3.7 3.5 - 5.1 mmol/L Final     Chloride   Date Value Ref Range Status   07/06/2020 100 95 - 110 mmol/L Final     CO2   Date Value Ref Range Status   07/06/2020 30 (H) 23 - 29 mmol/L Final     BUN   Date Value Ref Range Status   07/06/2020 14 8 - 23 mg/dL Final     Creatinine   Date Value Ref Range Status   07/06/2020 1.1 0.5 - 1.4 mg/dL Final     Calcium   Date Value Ref Range Status    07/06/2020 9.6 8.7 - 10.5 mg/dL Final     Anion Gap   Date Value Ref Range Status   07/06/2020 11 8 - 16 mmol/L Final     eGFR if    Date Value Ref Range Status   07/06/2020 >60.0 >60 mL/min/1.73 m^2 Final     eGFR if non    Date Value Ref Range Status   07/06/2020 >60.0 >60 mL/min/1.73 m^2 Final     Comment:     Calculation used to obtain the estimated glomerular filtration  rate (eGFR) is the CKD-EPI equation.        CrCl cannot be calculated (Patient's most recent lab result is older than the maximum 7 days allowed.).    Assessment:     1. Coronary artery disease involving native coronary artery of native heart without angina pectoris    2. Hypertension associated with diabetes    3. Hyperlipidemia associated with type 2 diabetes mellitus    4. S/P CABG (coronary artery bypass graft)    5. Old MI (myocardial infarction)    6. Diabetes mellitus due to underlying condition with hyperosmolarity without coma, without long-term current use of insulin      DIABETES NEEDS BETTER COMPLIANCE . HIS ISSUE IS EXERCISE AND WEIGHT LOSS . HE WILL BENEFIT FROM RESUMING REGULAR ACTIVITIES. AND EXERCISE PROGRAM.   LIPID SON TARGET  CAD ASYMPTOMATIC.   HTN NOT WELL CONTROLLED WILL INCREASE LISINOPRIL TO 40 MG PO DAILY. COUNSELED ABOUT WEIGHT LOSS DIET EXERCISE.   Plan:   AS PER ABOVE   LISINOPRIL TO 40 MG PO DAILY  LOW SALT SIET   WEIGHT LOSS EXERCISE   F/U IN 2-3 WEEKS WITH MID LEVEL.

## 2020-11-17 ENCOUNTER — OFFICE VISIT (OUTPATIENT)
Dept: INTERNAL MEDICINE | Facility: CLINIC | Age: 70
End: 2020-11-17
Payer: MEDICARE

## 2020-11-17 VITALS
HEART RATE: 69 BPM | WEIGHT: 229.5 LBS | TEMPERATURE: 96 F | BODY MASS INDEX: 32.93 KG/M2 | DIASTOLIC BLOOD PRESSURE: 86 MMHG | SYSTOLIC BLOOD PRESSURE: 114 MMHG | OXYGEN SATURATION: 97 %

## 2020-11-17 DIAGNOSIS — I15.2 HYPERTENSION ASSOCIATED WITH DIABETES: ICD-10-CM

## 2020-11-17 DIAGNOSIS — Z12.5 ENCOUNTER FOR SCREENING FOR MALIGNANT NEOPLASM OF PROSTATE: ICD-10-CM

## 2020-11-17 DIAGNOSIS — Z00.00 PREVENTATIVE HEALTH CARE: ICD-10-CM

## 2020-11-17 DIAGNOSIS — E08.00 DIABETES MELLITUS DUE TO UNDERLYING CONDITION WITH HYPEROSMOLARITY WITHOUT COMA, WITHOUT LONG-TERM CURRENT USE OF INSULIN: Primary | ICD-10-CM

## 2020-11-17 DIAGNOSIS — E11.59 HYPERTENSION ASSOCIATED WITH DIABETES: ICD-10-CM

## 2020-11-17 PROCEDURE — 1159F PR MEDICATION LIST DOCUMENTED IN MEDICAL RECORD: ICD-10-PCS | Mod: S$GLB,,, | Performed by: INTERNAL MEDICINE

## 2020-11-17 PROCEDURE — 3288F FALL RISK ASSESSMENT DOCD: CPT | Mod: CPTII,S$GLB,, | Performed by: INTERNAL MEDICINE

## 2020-11-17 PROCEDURE — 3288F PR FALLS RISK ASSESSMENT DOCUMENTED: ICD-10-PCS | Mod: CPTII,S$GLB,, | Performed by: INTERNAL MEDICINE

## 2020-11-17 PROCEDURE — 1159F MED LIST DOCD IN RCRD: CPT | Mod: S$GLB,,, | Performed by: INTERNAL MEDICINE

## 2020-11-17 PROCEDURE — 99999 PR PBB SHADOW E&M-EST. PATIENT-LVL IV: ICD-10-PCS | Mod: PBBFAC,,, | Performed by: INTERNAL MEDICINE

## 2020-11-17 PROCEDURE — 3074F PR MOST RECENT SYSTOLIC BLOOD PRESSURE < 130 MM HG: ICD-10-PCS | Mod: CPTII,S$GLB,, | Performed by: INTERNAL MEDICINE

## 2020-11-17 PROCEDURE — 3044F PR MOST RECENT HEMOGLOBIN A1C LEVEL <7.0%: ICD-10-PCS | Mod: CPTII,S$GLB,, | Performed by: INTERNAL MEDICINE

## 2020-11-17 PROCEDURE — 3008F PR BODY MASS INDEX (BMI) DOCUMENTED: ICD-10-PCS | Mod: CPTII,S$GLB,, | Performed by: INTERNAL MEDICINE

## 2020-11-17 PROCEDURE — 3079F DIAST BP 80-89 MM HG: CPT | Mod: CPTII,S$GLB,, | Performed by: INTERNAL MEDICINE

## 2020-11-17 PROCEDURE — 99213 PR OFFICE/OUTPT VISIT, EST, LEVL III, 20-29 MIN: ICD-10-PCS | Mod: S$GLB,,, | Performed by: INTERNAL MEDICINE

## 2020-11-17 PROCEDURE — 3074F SYST BP LT 130 MM HG: CPT | Mod: CPTII,S$GLB,, | Performed by: INTERNAL MEDICINE

## 2020-11-17 PROCEDURE — 99213 OFFICE O/P EST LOW 20 MIN: CPT | Mod: S$GLB,,, | Performed by: INTERNAL MEDICINE

## 2020-11-17 PROCEDURE — 1101F PR PT FALLS ASSESS DOC 0-1 FALLS W/OUT INJ PAST YR: ICD-10-PCS | Mod: CPTII,S$GLB,, | Performed by: INTERNAL MEDICINE

## 2020-11-17 PROCEDURE — 99499 UNLISTED E&M SERVICE: CPT | Mod: S$GLB,,, | Performed by: INTERNAL MEDICINE

## 2020-11-17 PROCEDURE — 1126F PR PAIN SEVERITY QUANTIFIED, NO PAIN PRESENT: ICD-10-PCS | Mod: S$GLB,,, | Performed by: INTERNAL MEDICINE

## 2020-11-17 PROCEDURE — 3079F PR MOST RECENT DIASTOLIC BLOOD PRESSURE 80-89 MM HG: ICD-10-PCS | Mod: CPTII,S$GLB,, | Performed by: INTERNAL MEDICINE

## 2020-11-17 PROCEDURE — 1126F AMNT PAIN NOTED NONE PRSNT: CPT | Mod: S$GLB,,, | Performed by: INTERNAL MEDICINE

## 2020-11-17 PROCEDURE — 99999 PR PBB SHADOW E&M-EST. PATIENT-LVL IV: CPT | Mod: PBBFAC,,, | Performed by: INTERNAL MEDICINE

## 2020-11-17 PROCEDURE — 99499 RISK ADDL DX/OHS AUDIT: ICD-10-PCS | Mod: S$GLB,,, | Performed by: INTERNAL MEDICINE

## 2020-11-17 PROCEDURE — 3044F HG A1C LEVEL LT 7.0%: CPT | Mod: CPTII,S$GLB,, | Performed by: INTERNAL MEDICINE

## 2020-11-17 PROCEDURE — 1101F PT FALLS ASSESS-DOCD LE1/YR: CPT | Mod: CPTII,S$GLB,, | Performed by: INTERNAL MEDICINE

## 2020-11-17 PROCEDURE — 3008F BODY MASS INDEX DOCD: CPT | Mod: CPTII,S$GLB,, | Performed by: INTERNAL MEDICINE

## 2020-11-17 NOTE — PROGRESS NOTES
Subjective:      Patient ID: Deniz Paulino is a 70 y.o. male.    Chief Complaint: Follow-up    HPI     70 yo with   Patient Active Problem List   Diagnosis    CAD (coronary artery disease)    Hypertension associated with diabetes    Hyperlipidemia associated with type 2 diabetes mellitus    S/P CABG (coronary artery bypass graft)    Old MI (myocardial infarction)    Diabetes mellitus    Hypomagnesemia     Past Medical History:   Diagnosis Date    Acute coronary syndrome     Coronary artery disease     Diabetes mellitus 4/24/2014    Hyperlipidemia     Hypertension     Old MI (myocardial infarction) 4/24/2014    S/P CABG (coronary artery bypass graft) 4/24/2014     Here today for management of mult med problems as outlined below. Present for years.   Lisinopril increased to 40 with cards yesterday.       Review of Systems   Constitutional: Negative for chills and fever.   HENT: Negative for ear pain and sore throat.    Respiratory: Negative for cough.    Cardiovascular: Negative for chest pain.   Gastrointestinal: Negative for abdominal pain and blood in stool.   Genitourinary: Negative for dysuria and hematuria.   Neurological: Negative for seizures and syncope.     Objective:   /86 (BP Location: Right arm, Patient Position: Sitting, BP Method: Large (Manual))   Pulse 69   Temp 96.4 °F (35.8 °C) (Temporal)   Wt 104.1 kg (229 lb 8 oz)   SpO2 97%   BMI 32.93 kg/m²     Physical Exam  Constitutional:       General: He is not in acute distress.     Appearance: He is well-developed.   HENT:      Head: Normocephalic and atraumatic.      Right Ear: External ear normal.      Left Ear: External ear normal.   Eyes:      Pupils: Pupils are equal, round, and reactive to light.   Neck:      Musculoskeletal: Neck supple.      Thyroid: No thyromegaly.   Cardiovascular:      Rate and Rhythm: Normal rate and regular rhythm.   Pulmonary:      Breath sounds: Normal breath sounds. No wheezing or rales.    Abdominal:      General: Bowel sounds are normal.      Palpations: Abdomen is soft.      Tenderness: There is no abdominal tenderness.   Musculoskeletal:         General: No swelling.   Lymphadenopathy:      Cervical: No cervical adenopathy.   Skin:     General: Skin is warm and dry.   Neurological:      Mental Status: He is alert and oriented to person, place, and time.   Psychiatric:         Behavior: Behavior normal.       Lab Visit on 11/09/2020   Component Date Value Ref Range Status    Hemoglobin A1C 11/09/2020 6.0* 4.0 - 5.6 % Final    Comment: ADA Screening Guidelines:  5.7-6.4%  Consistent with prediabetes  >or=6.5%  Consistent with diabetes  High levels of fetal hemoglobin interfere with the HbA1C  assay. Heterozygous hemoglobin variants (HbS, HgC, etc)do  not significantly interfere with this assay.   However, presence of multiple variants may affect accuracy.      Estimated Avg Glucose 11/09/2020 126  68 - 131 mg/dL Final       Assessment:     1. Diabetes mellitus due to underlying condition with hyperosmolarity without coma, without long-term current use of insulin    2. Hypertension associated with diabetes    3. Preventative health care    4. Encounter for screening for malignant neoplasm of prostate      Plan:   Diabetes mellitus due to underlying condition with hyperosmolarity without coma, without long-term current use of insulin  Stable  Cont current meds  -     Comprehensive Metabolic Panel; Future; Expected date: 05/16/2021  -     Lipid Panel; Future; Expected date: 05/16/2021  -     Hemoglobin A1C; Future; Expected date: 05/16/2021    Hypertension associated with diabetes  Controlled  Cont current meds  -     CBC Auto Differential; Future; Expected date: 05/16/2021  -     TSH; Future; Expected date: 05/16/2021    Preventative health care    Encounter for screening for malignant neoplasm of prostate  -     PSA, Screening; Future; Expected date: 05/16/2021        Lab Frequency Next Occurrence    Magnesium Once 07/20/2020       Problem List Items Addressed This Visit        Cardiac/Vascular    Hypertension associated with diabetes    Relevant Orders    CBC Auto Differential    TSH       Endocrine    Diabetes mellitus - Primary    Relevant Orders    Comprehensive Metabolic Panel    Lipid Panel    Hemoglobin A1C      Other Visit Diagnoses     Preventative health care        Encounter for screening for malignant neoplasm of prostate        Relevant Orders    PSA, Screening          Follow up in about 6 months (around 5/17/2021), or if symptoms worsen or fail to improve.

## 2020-11-18 ENCOUNTER — PATIENT MESSAGE (OUTPATIENT)
Dept: ADMINISTRATIVE | Facility: OTHER | Age: 70
End: 2020-11-18

## 2020-12-04 ENCOUNTER — PATIENT OUTREACH (OUTPATIENT)
Dept: OTHER | Facility: OTHER | Age: 70
End: 2020-12-04

## 2020-12-04 NOTE — PROGRESS NOTES
Digital Medicine: Clinician Follow-Up    I spoke with the patient today to f/u on his elevated BP readings His cardiologist increase the dose of his lisinopril to 40mg about 2 weeks ago. Today she says he is tolerating the dose increase well. He says he he does not eat much sodium. He has a f/u cardio appt next week.     The history is provided by the patient.   Follow-up reason(s): medication change follow-up.     Hypertension    Patient's blood pressure is stable.   Patient is not experiencing signs/symptoms of hypotension.  Patient is not experiencing signs/symptoms of hypertension.            Last 5 Patient Entered Readings                                      Current 30 Day Average: 142/84     Recent Readings 12/3/2020 12/3/2020 12/2/2020 11/23/2020 11/19/2020    SBP (mmHg) 149 150 139 145 141    DBP (mmHg) 88 90 81 83 83    Pulse 60 63 63 64 50                 Depression Screening  Did not address depression screening.    Sleep Apnea Screening    Did not address sleep apnea screening.     Medication Affordability Screening  Did not address medication affordability screening.     Medication Adherence-Medication Adherence not addressed.          ASSESSMENT(S)  Patients BP average is 142/84 mmHg, which is above goal. Patient's BP goal is less than or equal to 130/80.     Hypertension Plan  Additional monitoring needed.  Continue current therapy.  He would like benefit from an increase in his HCTZ but will defer med changes to cardio since he has a visit next week.     Addressed patient questions and patient has my contact information if needed prior to next outreach. Patient verbalizes understanding.             There are no preventive care reminders to display for this patient.  There are no preventive care reminders to display for this patient.      Hypertension Medications             amLODIPine (NORVASC) 5 MG tablet Take 1 tablet (5 mg total) by mouth once daily.    hydroCHLOROthiazide (HYDRODIURIL) 12.5 MG  Tab Take 1 tablet by mouth once daily    lisinopriL (PRINIVIL,ZESTRIL) 20 MG tablet Take 2 tablets (40 mg total) by mouth once daily.    metoprolol succinate (TOPROL-XL) 50 MG 24 hr tablet Take 1 tablet by mouth once daily

## 2020-12-05 ENCOUNTER — PATIENT MESSAGE (OUTPATIENT)
Dept: ADMINISTRATIVE | Facility: OTHER | Age: 70
End: 2020-12-05

## 2020-12-09 ENCOUNTER — OFFICE VISIT (OUTPATIENT)
Dept: CARDIOLOGY | Facility: CLINIC | Age: 70
End: 2020-12-09
Payer: MEDICARE

## 2020-12-09 VITALS
OXYGEN SATURATION: 99 % | HEIGHT: 70 IN | SYSTOLIC BLOOD PRESSURE: 166 MMHG | HEART RATE: 71 BPM | BODY MASS INDEX: 33.23 KG/M2 | DIASTOLIC BLOOD PRESSURE: 90 MMHG | WEIGHT: 232.13 LBS

## 2020-12-09 DIAGNOSIS — I25.10 CORONARY ARTERY DISEASE INVOLVING NATIVE CORONARY ARTERY OF NATIVE HEART WITHOUT ANGINA PECTORIS: Primary | ICD-10-CM

## 2020-12-09 DIAGNOSIS — I25.2 OLD MI (MYOCARDIAL INFARCTION): ICD-10-CM

## 2020-12-09 DIAGNOSIS — E78.5 HYPERLIPIDEMIA ASSOCIATED WITH TYPE 2 DIABETES MELLITUS: ICD-10-CM

## 2020-12-09 DIAGNOSIS — E11.69 HYPERLIPIDEMIA ASSOCIATED WITH TYPE 2 DIABETES MELLITUS: ICD-10-CM

## 2020-12-09 DIAGNOSIS — I15.2 HYPERTENSION ASSOCIATED WITH DIABETES: ICD-10-CM

## 2020-12-09 DIAGNOSIS — I10 ESSENTIAL HYPERTENSION: ICD-10-CM

## 2020-12-09 DIAGNOSIS — Z95.1 S/P CABG (CORONARY ARTERY BYPASS GRAFT): ICD-10-CM

## 2020-12-09 DIAGNOSIS — E11.59 HYPERTENSION ASSOCIATED WITH DIABETES: ICD-10-CM

## 2020-12-09 PROCEDURE — 99999 PR PBB SHADOW E&M-EST. PATIENT-LVL III: CPT | Mod: PBBFAC,,, | Performed by: NURSE PRACTITIONER

## 2020-12-09 PROCEDURE — 1126F PR PAIN SEVERITY QUANTIFIED, NO PAIN PRESENT: ICD-10-PCS | Mod: S$GLB,,, | Performed by: NURSE PRACTITIONER

## 2020-12-09 PROCEDURE — 3080F DIAST BP >= 90 MM HG: CPT | Mod: CPTII,S$GLB,, | Performed by: NURSE PRACTITIONER

## 2020-12-09 PROCEDURE — 3008F PR BODY MASS INDEX (BMI) DOCUMENTED: ICD-10-PCS | Mod: CPTII,S$GLB,, | Performed by: NURSE PRACTITIONER

## 2020-12-09 PROCEDURE — 3077F SYST BP >= 140 MM HG: CPT | Mod: CPTII,S$GLB,, | Performed by: NURSE PRACTITIONER

## 2020-12-09 PROCEDURE — 3008F BODY MASS INDEX DOCD: CPT | Mod: CPTII,S$GLB,, | Performed by: NURSE PRACTITIONER

## 2020-12-09 PROCEDURE — 3080F PR MOST RECENT DIASTOLIC BLOOD PRESSURE >= 90 MM HG: ICD-10-PCS | Mod: CPTII,S$GLB,, | Performed by: NURSE PRACTITIONER

## 2020-12-09 PROCEDURE — 99214 OFFICE O/P EST MOD 30 MIN: CPT | Mod: S$GLB,,, | Performed by: NURSE PRACTITIONER

## 2020-12-09 PROCEDURE — 1159F MED LIST DOCD IN RCRD: CPT | Mod: S$GLB,,, | Performed by: NURSE PRACTITIONER

## 2020-12-09 PROCEDURE — 1126F AMNT PAIN NOTED NONE PRSNT: CPT | Mod: S$GLB,,, | Performed by: NURSE PRACTITIONER

## 2020-12-09 PROCEDURE — 3044F PR MOST RECENT HEMOGLOBIN A1C LEVEL <7.0%: ICD-10-PCS | Mod: CPTII,S$GLB,, | Performed by: NURSE PRACTITIONER

## 2020-12-09 PROCEDURE — 99999 PR PBB SHADOW E&M-EST. PATIENT-LVL III: ICD-10-PCS | Mod: PBBFAC,,, | Performed by: NURSE PRACTITIONER

## 2020-12-09 PROCEDURE — 3077F PR MOST RECENT SYSTOLIC BLOOD PRESSURE >= 140 MM HG: ICD-10-PCS | Mod: CPTII,S$GLB,, | Performed by: NURSE PRACTITIONER

## 2020-12-09 PROCEDURE — 1159F PR MEDICATION LIST DOCUMENTED IN MEDICAL RECORD: ICD-10-PCS | Mod: S$GLB,,, | Performed by: NURSE PRACTITIONER

## 2020-12-09 PROCEDURE — 3044F HG A1C LEVEL LT 7.0%: CPT | Mod: CPTII,S$GLB,, | Performed by: NURSE PRACTITIONER

## 2020-12-09 PROCEDURE — 99214 PR OFFICE/OUTPT VISIT, EST, LEVL IV, 30-39 MIN: ICD-10-PCS | Mod: S$GLB,,, | Performed by: NURSE PRACTITIONER

## 2020-12-09 RX ORDER — AMLODIPINE BESYLATE 10 MG/1
10 TABLET ORAL DAILY
Qty: 90 TABLET | Refills: 3 | Status: SHIPPED | OUTPATIENT
Start: 2020-12-09 | End: 2021-09-01 | Stop reason: SDUPTHER

## 2020-12-09 RX ORDER — LISINOPRIL 40 MG/1
40 TABLET ORAL DAILY
Qty: 90 TABLET | Refills: 3 | Status: SHIPPED | OUTPATIENT
Start: 2020-12-09 | End: 2021-12-23 | Stop reason: SDUPTHER

## 2020-12-09 NOTE — PROGRESS NOTES
Subjective:   Patient ID:  Deniz Paulino is a 70 y.o. male who presents for follow up of Coronary Artery Disease, Hypertension, and Hyperlipidemia      HPI  Mr. Paulino is a 71yo male with PMH of CAD, old MI s/p CABG, HTN, HLP. Had mild case of COVID in 2020. Presents to clinic today for BP check after increasing Lisinopril to 40mg daily for HTN control.     Denies any chest pain, SOB, GARRIDO,  orthopnea, PND, dizziness, palpitations,  near syncope, syncope or edema . Has no symptoms concerning for angina or equivalent. No CNS Complaints to suggest TIA or CVA.   Does snore and spouse has reported apnea episodes. Wants to talk to wife before doing sleep evaluation   Does well with limiting sodium intake.       Past Medical History:   Diagnosis Date    Acute coronary syndrome     Coronary artery disease     Diabetes mellitus 2014    Hyperlipidemia     Hypertension     Old MI (myocardial infarction) 2014    S/P CABG (coronary artery bypass graft) 2014       Past Surgical History:   Procedure Laterality Date    CARDIAC CATHETERIZATION      COLONOSCOPY N/A 2019    Procedure: COLONOSCOPY;  Surgeon: Opal Oshea MD;  Location: [facility];  Service: Endoscopy;  Laterality: N/A;    CORONARY ARTERY BYPASS GRAFT      ESOPHAGOGASTRODUODENOSCOPY N/A 2019    Procedure: ESOPHAGOGASTRODUODENOSCOPY (EGD);  Surgeon: Opal Oshea MD;  Location: [facility];  Service: Endoscopy;  Laterality: N/A;    SKIN CANCER EXCISION  2018       Social History     Tobacco Use    Smoking status: Former Smoker     Packs/day: 1.00     Years: 30.00     Pack years: 30.00     Types: Cigarettes     Quit date: 2003     Years since quittin.8    Smokeless tobacco: Never Used   Substance Use Topics    Alcohol use: Yes     Frequency: 4 or more times a week     Drinks per session: 5 or 6     Binge frequency: Monthly     Comment: socially    Drug use: Never       Family History   Problem Relation  Age of Onset    Diabetes Mother     Heart murmur Mother     Cancer Mother         Breast    Stroke Mother     Alcohol abuse Father     Heart attack Father 63    Cancer Father         Esophageal       Current Outpatient Medications   Medication Sig    amLODIPine (NORVASC) 10 MG tablet Take 1 tablet (10 mg total) by mouth once daily.    aspirin (ECOTRIN) 325 MG EC tablet Take 325 mg by mouth once daily.    atorvastatin (LIPITOR) 40 MG tablet Take 1 tablet by mouth once daily    coenzyme Q10 200 mg capsule Take 200 mg by mouth 2 (two) times daily.    cyanocobalamin (VITAMIN B-12) 1000 MCG tablet Take 1,000 mcg by mouth once daily.     hydroCHLOROthiazide (HYDRODIURIL) 12.5 MG Tab Take 1 tablet by mouth once daily    lisinopriL (PRINIVIL,ZESTRIL) 40 MG tablet Take 1 tablet (40 mg total) by mouth once daily.    LORATADINE (ALAVERT ORAL) Take 1 tablet by mouth as needed.    magnesium oxide (MAG-OX) 400 mg (241.3 mg magnesium) tablet Take 400 mg by mouth once daily.    metFORMIN (GLUCOPHAGE) 1000 MG tablet TAKE 1 TABLET BY MOUTH TWICE DAILY WITH MEALS    metoprolol succinate (TOPROL-XL) 50 MG 24 hr tablet Take 1 tablet by mouth once daily    pantoprazole (PROTONIX) 20 MG tablet Take 1 tablet (20 mg total) by mouth once daily.    vitamin D 1000 units Tab Take 1,000 Units by mouth once daily.      No current facility-administered medications for this visit.      Current Outpatient Medications on File Prior to Visit   Medication Sig    aspirin (ECOTRIN) 325 MG EC tablet Take 325 mg by mouth once daily.    atorvastatin (LIPITOR) 40 MG tablet Take 1 tablet by mouth once daily    coenzyme Q10 200 mg capsule Take 200 mg by mouth 2 (two) times daily.    cyanocobalamin (VITAMIN B-12) 1000 MCG tablet Take 1,000 mcg by mouth once daily.     hydroCHLOROthiazide (HYDRODIURIL) 12.5 MG Tab Take 1 tablet by mouth once daily    LORATADINE (ALAVERT ORAL) Take 1 tablet by mouth as needed.    magnesium oxide  (MAG-OX) 400 mg (241.3 mg magnesium) tablet Take 400 mg by mouth once daily.    metFORMIN (GLUCOPHAGE) 1000 MG tablet TAKE 1 TABLET BY MOUTH TWICE DAILY WITH MEALS    metoprolol succinate (TOPROL-XL) 50 MG 24 hr tablet Take 1 tablet by mouth once daily    pantoprazole (PROTONIX) 20 MG tablet Take 1 tablet (20 mg total) by mouth once daily.    vitamin D 1000 units Tab Take 1,000 Units by mouth once daily.     [DISCONTINUED] amLODIPine (NORVASC) 5 MG tablet Take 1 tablet (5 mg total) by mouth once daily.    [DISCONTINUED] lisinopriL (PRINIVIL,ZESTRIL) 20 MG tablet Take 2 tablets (40 mg total) by mouth once daily.     No current facility-administered medications on file prior to visit.        Review of Systems   Constitution: Negative for diaphoresis, malaise/fatigue, weight gain and weight loss.   HENT: Negative for congestion and nosebleeds.    Cardiovascular: Negative for chest pain, claudication, cyanosis, dyspnea on exertion, irregular heartbeat, leg swelling, near-syncope, orthopnea, palpitations, paroxysmal nocturnal dyspnea and syncope.   Respiratory: Negative for cough, hemoptysis, shortness of breath, sleep disturbances due to breathing, snoring, sputum production and wheezing.    Hematologic/Lymphatic: Negative for bleeding problem. Does not bruise/bleed easily.   Skin: Negative for rash.   Musculoskeletal: Negative for arthritis, back pain, falls, joint pain, muscle cramps and muscle weakness.   Gastrointestinal: Negative for abdominal pain, constipation, diarrhea, heartburn, hematemesis, hematochezia, melena, nausea and vomiting.   Genitourinary: Negative for dysuria, hematuria and nocturia.   Neurological: Negative for excessive daytime sleepiness, dizziness, headaches, light-headedness, loss of balance, numbness, vertigo and weakness.     STOP - BANG Questionnaire:     1. Snoring : Do you snore loudly ?    Yes    2. Tired : Do you often feel tired, fatigued, or sleepy during daytime? Yes    3.  Observed: Has anyone observed you stop breathing during your sleep?   Yes     4. Blood pressure : Do you have or are you being treated for high blood pressure?   Yes    5. BMI :BMI more than 35 kg/m2?   No    6. Age : Age over 50 yr old?   Yes    7. Neck circumference: Neck circumference greater than 40 cm?   unknown     8. Gender: Gender male?   Yes    High risk of GIGI: Yes 5 - 8  Intermediate risk of GIGI: Yes 3 - 4  Low risk of GIGI: Yes 0 - 2      References:   STOP Questionnaire   A Tool to Screen Patients for Obstructive Sleep Apnea: BALJINDER Pierce.C.P.C., Rico Nowak M.B.B.S., Vish Stevenson M.D.,Breann Arevalo, Ph.D., ADA Bundy.B.B.S.,_ Giovana Easley.,_ Davide Barone M.D., Isak Astorga F.R.C.P.C.; Anesthesiology 2008; 108:812-21 Copyright © 2008, the American Society of Anesthesiologists, Inc. [employer]      EPWORTH SLEEPINESS SCALE    Sitting and reading ____  2  Watching TV ____  2  Sitting inactive in a public place ____  0  Being a passenger in a motor vehicle for an hour or more ____  0  Lying down in the afternoon  ____  3  Sitting and talking to someone  ____  0  Sitting quietly after lunch (no alcohol) ____  1  Stopped for a few minutes in traffic while driving  ____  0    Total score   ____  8    Interpretation:  0-7: Normal  sleepiness  8-9: Average amount of daytime sleepiness.  10-15:Excessively sleepiness depending on the situation. Medical attention suggested.  16-24: Excessive sleepiness and medical attention recommended.    Reference: Estiven MOROCHO. A new method for measuring daytime sleepiness: The [place]  Sleepiness Scale. Sleep 1991; 14(6):540-5.  Objective:   Physical Exam   Constitutional: He is oriented to person, place, and time. He appears well-developed and well-nourished. No distress.   HENT:   Head: Normocephalic and atraumatic.   Eyes: Pupils are equal, round, and reactive to light. EOM are normal. Right eye exhibits no  "discharge. Left eye exhibits no discharge.   Neck: Neck supple. No JVD present. No thyromegaly present.   Cardiovascular: Normal rate, regular rhythm, normal heart sounds and intact distal pulses. Exam reveals no gallop and no friction rub.   No murmur heard.  Pulmonary/Chest: Effort normal and breath sounds normal. No respiratory distress. He has no wheezes. He has no rales. He exhibits no tenderness.   SCAR CABG WELL HEALED.    Abdominal: Soft. Bowel sounds are normal. He exhibits no distension. There is no abdominal tenderness.   Musculoskeletal: Normal range of motion.         General: No edema.   Neurological: He is alert and oriented to person, place, and time. No cranial nerve deficit.   Skin: Skin is warm and dry. No rash noted. He is not diaphoretic. No erythema.   Psychiatric: He has a normal mood and affect. His behavior is normal.   Nursing note and vitals reviewed.    Vitals:    12/09/20 0927   BP: (!) 166/90   BP Location: Left arm   Patient Position: Sitting   BP Method: Medium (Manual)   Pulse: 71   SpO2: 99%   Weight: 105.3 kg (232 lb 2.3 oz)   Height: 5' 10" (1.778 m)     Lab Results   Component Value Date    CHOL 142 05/18/2020    CHOL 131 05/20/2019    CHOL 145 11/19/2018     Lab Results   Component Value Date    HDL 50 05/18/2020    HDL 53 05/20/2019    HDL 42 11/19/2018     Lab Results   Component Value Date    LDLCALC 66.2 05/18/2020    LDLCALC 55.8 (L) 05/20/2019    LDLCALC 79.0 11/19/2018     Lab Results   Component Value Date    TRIG 129 05/18/2020    TRIG 111 05/20/2019    TRIG 120 11/19/2018     Lab Results   Component Value Date    CHOLHDL 35.2 05/18/2020    CHOLHDL 40.5 05/20/2019    CHOLHDL 29.0 11/19/2018       Chemistry        Component Value Date/Time     07/06/2020 1216    K 3.7 07/06/2020 1216     07/06/2020 1216    CO2 30 (H) 07/06/2020 1216    BUN 14 07/06/2020 1216    CREATININE 1.1 07/06/2020 1216     07/06/2020 1216        Component Value Date/Time    " CALCIUM 9.6 07/06/2020 1216    ALKPHOS 83 06/24/2020 1601    AST 29 06/24/2020 1601    ALT 24 06/24/2020 1601    BILITOT 0.5 06/24/2020 1601    ESTGFRAFRICA >60.0 07/06/2020 1216    EGFRNONAA >60.0 07/06/2020 1216          Lab Results   Component Value Date    TSH 1.662 06/24/2020     Lab Results   Component Value Date    INR 1.0 09/19/2009     Lab Results   Component Value Date    WBC 7.48 06/24/2020    HGB 15.2 06/24/2020    HCT 44.7 06/24/2020    MCV 94 06/24/2020     06/24/2020     BMP  Sodium   Date Value Ref Range Status   07/06/2020 141 136 - 145 mmol/L Final     Potassium   Date Value Ref Range Status   07/06/2020 3.7 3.5 - 5.1 mmol/L Final     Chloride   Date Value Ref Range Status   07/06/2020 100 95 - 110 mmol/L Final     CO2   Date Value Ref Range Status   07/06/2020 30 (H) 23 - 29 mmol/L Final     BUN   Date Value Ref Range Status   07/06/2020 14 8 - 23 mg/dL Final     Creatinine   Date Value Ref Range Status   07/06/2020 1.1 0.5 - 1.4 mg/dL Final     Calcium   Date Value Ref Range Status   07/06/2020 9.6 8.7 - 10.5 mg/dL Final     Anion Gap   Date Value Ref Range Status   07/06/2020 11 8 - 16 mmol/L Final     eGFR if    Date Value Ref Range Status   07/06/2020 >60.0 >60 mL/min/1.73 m^2 Final     eGFR if non    Date Value Ref Range Status   07/06/2020 >60.0 >60 mL/min/1.73 m^2 Final     Comment:     Calculation used to obtain the estimated glomerular filtration  rate (eGFR) is the CKD-EPI equation.        CrCl cannot be calculated (Patient's most recent lab result is older than the maximum 7 days allowed.).    Assessment:     1. Coronary artery disease involving native coronary artery of native heart without angina pectoris    2. Hypertension associated with diabetes    3. S/P CABG (coronary artery bypass graft)    4. Hyperlipidemia associated with type 2 diabetes mellitus    5. Old MI (myocardial infarction)    6. Essential hypertension        Plan:   Increase  amlodipine to 10mg daily   Continue lisinopril 40mg daily   HCTZ 12.5mg daily   Toprol 50mg daily   Virtual visit in 3 weeks    Recommend sleep evaluation. He wants to discuss with his wife before ordering. He will let me know if he wants to proceed.

## 2020-12-16 DIAGNOSIS — I15.2 HYPERTENSION ASSOCIATED WITH DIABETES: ICD-10-CM

## 2020-12-16 DIAGNOSIS — E11.59 HYPERTENSION ASSOCIATED WITH DIABETES: ICD-10-CM

## 2020-12-16 RX ORDER — HYDROCHLOROTHIAZIDE 12.5 MG/1
12.5 TABLET ORAL DAILY
Qty: 90 TABLET | Refills: 0 | Status: SHIPPED | OUTPATIENT
Start: 2020-12-16 | End: 2021-03-19 | Stop reason: SDUPTHER

## 2020-12-17 ENCOUNTER — PATIENT OUTREACH (OUTPATIENT)
Dept: OTHER | Facility: OTHER | Age: 70
End: 2020-12-17

## 2020-12-23 ENCOUNTER — PATIENT OUTREACH (OUTPATIENT)
Dept: OTHER | Facility: OTHER | Age: 70
End: 2020-12-23

## 2020-12-23 NOTE — PROGRESS NOTES
"Digital Medicine: Health  Follow-Up    The history is provided by the patient.             Reason for review: Blood pressure not at goal        Topics Covered on Call: physical activity and Diet    Additional Follow-up details: Average blood pressure today is 144/85. Blood pressure has been elevated and trending down since cardio increased his lisinopril.             Diet-no change to diet    No change to diet.  Patient reports eating or drinking the following: He is on "holiday" right now and not worrying about his diet. I encouraged him to increase his water intake, focus on portions and get protein and veggies in. He said he will get back on track after the new year.       Physical Activity-no change to routine  No change to exercise routine.       Additional physical activity details: He is not exercising right now and doesn't want to because he's on holiday. I encouraged him to get some walking in when he can.       Medication Adherence-Medication adherence was assessed.      Substance, Sleep, Stress-Not assessed      Provided patient education.       Addressed patient questions and patient has my contact information if needed prior to next outreach. Patient verbalizes understanding.             There are no preventive care reminders to display for this patient.      Last 5 Patient Entered Readings                                      Current 30 Day Average: 144/85     Recent Readings 12/19/2020 12/18/2020 12/18/2020 12/15/2020 12/14/2020    SBP (mmHg) 137 137 142 144 148    DBP (mmHg) 81 84 84 85 86    Pulse 66 63 66 61 64               "

## 2021-01-04 ENCOUNTER — TELEPHONE (OUTPATIENT)
Dept: CARDIOLOGY | Facility: CLINIC | Age: 71
End: 2021-01-04

## 2021-01-04 ENCOUNTER — OFFICE VISIT (OUTPATIENT)
Dept: CARDIOLOGY | Facility: CLINIC | Age: 71
End: 2021-01-04
Payer: MEDICARE

## 2021-01-04 DIAGNOSIS — I25.10 CORONARY ARTERY DISEASE INVOLVING NATIVE CORONARY ARTERY OF NATIVE HEART WITHOUT ANGINA PECTORIS: ICD-10-CM

## 2021-01-04 DIAGNOSIS — E11.59 HYPERTENSION ASSOCIATED WITH DIABETES: Primary | ICD-10-CM

## 2021-01-04 DIAGNOSIS — I25.2 OLD MI (MYOCARDIAL INFARCTION): ICD-10-CM

## 2021-01-04 DIAGNOSIS — E83.42 HYPOMAGNESEMIA: ICD-10-CM

## 2021-01-04 DIAGNOSIS — E78.5 HYPERLIPIDEMIA ASSOCIATED WITH TYPE 2 DIABETES MELLITUS: ICD-10-CM

## 2021-01-04 DIAGNOSIS — E11.69 HYPERLIPIDEMIA ASSOCIATED WITH TYPE 2 DIABETES MELLITUS: Primary | ICD-10-CM

## 2021-01-04 DIAGNOSIS — Z95.1 S/P CABG (CORONARY ARTERY BYPASS GRAFT): ICD-10-CM

## 2021-01-04 DIAGNOSIS — E08.00 DIABETES MELLITUS DUE TO UNDERLYING CONDITION WITH HYPEROSMOLARITY WITHOUT COMA, WITHOUT LONG-TERM CURRENT USE OF INSULIN: ICD-10-CM

## 2021-01-04 DIAGNOSIS — I15.2 HYPERTENSION ASSOCIATED WITH DIABETES: Primary | ICD-10-CM

## 2021-01-04 DIAGNOSIS — E11.69 HYPERLIPIDEMIA ASSOCIATED WITH TYPE 2 DIABETES MELLITUS: ICD-10-CM

## 2021-01-04 DIAGNOSIS — E78.5 HYPERLIPIDEMIA ASSOCIATED WITH TYPE 2 DIABETES MELLITUS: Primary | ICD-10-CM

## 2021-01-04 PROCEDURE — 3044F HG A1C LEVEL LT 7.0%: CPT | Mod: CPTII,95,, | Performed by: PHYSICIAN ASSISTANT

## 2021-01-04 PROCEDURE — 1159F MED LIST DOCD IN RCRD: CPT | Mod: 95,,, | Performed by: PHYSICIAN ASSISTANT

## 2021-01-04 PROCEDURE — 3044F PR MOST RECENT HEMOGLOBIN A1C LEVEL <7.0%: ICD-10-PCS | Mod: CPTII,95,, | Performed by: PHYSICIAN ASSISTANT

## 2021-01-04 PROCEDURE — 1159F PR MEDICATION LIST DOCUMENTED IN MEDICAL RECORD: ICD-10-PCS | Mod: 95,,, | Performed by: PHYSICIAN ASSISTANT

## 2021-01-04 PROCEDURE — 99213 PR OFFICE/OUTPT VISIT, EST, LEVL III, 20-29 MIN: ICD-10-PCS | Mod: 95,,, | Performed by: PHYSICIAN ASSISTANT

## 2021-01-04 PROCEDURE — 99213 OFFICE O/P EST LOW 20 MIN: CPT | Mod: 95,,, | Performed by: PHYSICIAN ASSISTANT

## 2021-01-06 ENCOUNTER — PATIENT OUTREACH (OUTPATIENT)
Dept: ADMINISTRATIVE | Facility: HOSPITAL | Age: 71
End: 2021-01-06

## 2021-01-13 ENCOUNTER — IMMUNIZATION (OUTPATIENT)
Dept: INTERNAL MEDICINE | Facility: CLINIC | Age: 71
End: 2021-01-13
Payer: MEDICARE

## 2021-01-13 DIAGNOSIS — Z23 NEED FOR VACCINATION: ICD-10-CM

## 2021-01-13 PROCEDURE — 91300 COVID-19, MRNA, LNP-S, PF, 30 MCG/0.3 ML DOSE VACCINE: CPT | Mod: PBBFAC | Performed by: FAMILY MEDICINE

## 2021-02-03 ENCOUNTER — IMMUNIZATION (OUTPATIENT)
Dept: INTERNAL MEDICINE | Facility: CLINIC | Age: 71
End: 2021-02-03
Payer: MEDICARE

## 2021-02-03 DIAGNOSIS — Z23 NEED FOR VACCINATION: Primary | ICD-10-CM

## 2021-02-03 PROCEDURE — 91300 COVID-19, MRNA, LNP-S, PF, 30 MCG/0.3 ML DOSE VACCINE: CPT | Mod: PBBFAC | Performed by: FAMILY MEDICINE

## 2021-02-03 PROCEDURE — 0002A COVID-19, MRNA, LNP-S, PF, 30 MCG/0.3 ML DOSE VACCINE: CPT | Mod: PBBFAC | Performed by: FAMILY MEDICINE

## 2021-03-19 DIAGNOSIS — E11.59 HYPERTENSION ASSOCIATED WITH DIABETES: ICD-10-CM

## 2021-03-19 DIAGNOSIS — I15.2 HYPERTENSION ASSOCIATED WITH DIABETES: ICD-10-CM

## 2021-03-22 RX ORDER — HYDROCHLOROTHIAZIDE 12.5 MG/1
12.5 TABLET ORAL DAILY
Qty: 90 TABLET | Refills: 0 | Status: SHIPPED | OUTPATIENT
Start: 2021-03-22 | End: 2021-05-17 | Stop reason: SDUPTHER

## 2021-05-10 ENCOUNTER — LAB VISIT (OUTPATIENT)
Dept: LAB | Facility: HOSPITAL | Age: 71
End: 2021-05-10
Attending: INTERNAL MEDICINE
Payer: MEDICARE

## 2021-05-10 DIAGNOSIS — E08.00 DIABETES MELLITUS DUE TO UNDERLYING CONDITION WITH HYPEROSMOLARITY WITHOUT COMA, WITHOUT LONG-TERM CURRENT USE OF INSULIN: ICD-10-CM

## 2021-05-10 DIAGNOSIS — E11.59 HYPERTENSION ASSOCIATED WITH DIABETES: ICD-10-CM

## 2021-05-10 DIAGNOSIS — I15.2 HYPERTENSION ASSOCIATED WITH DIABETES: ICD-10-CM

## 2021-05-10 DIAGNOSIS — Z12.5 ENCOUNTER FOR SCREENING FOR MALIGNANT NEOPLASM OF PROSTATE: ICD-10-CM

## 2021-05-10 DIAGNOSIS — E83.42 HYPOMAGNESEMIA: ICD-10-CM

## 2021-05-10 LAB
ALBUMIN SERPL BCP-MCNC: 3.9 G/DL (ref 3.5–5.2)
ALP SERPL-CCNC: 89 U/L (ref 55–135)
ALT SERPL W/O P-5'-P-CCNC: 22 U/L (ref 10–44)
ANION GAP SERPL CALC-SCNC: 9 MMOL/L (ref 8–16)
AST SERPL-CCNC: 24 U/L (ref 10–40)
BASOPHILS # BLD AUTO: 0.01 K/UL (ref 0–0.2)
BASOPHILS NFR BLD: 0.2 % (ref 0–1.9)
BILIRUB SERPL-MCNC: 0.6 MG/DL (ref 0.1–1)
BUN SERPL-MCNC: 19 MG/DL (ref 8–23)
CALCIUM SERPL-MCNC: 10 MG/DL (ref 8.7–10.5)
CHLORIDE SERPL-SCNC: 101 MMOL/L (ref 95–110)
CHOLEST SERPL-MCNC: 135 MG/DL (ref 120–199)
CHOLEST/HDLC SERPL: 2.7 {RATIO} (ref 2–5)
CO2 SERPL-SCNC: 31 MMOL/L (ref 23–29)
COMPLEXED PSA SERPL-MCNC: 1.4 NG/ML (ref 0–4)
CREAT SERPL-MCNC: 0.9 MG/DL (ref 0.5–1.4)
DIFFERENTIAL METHOD: ABNORMAL
EOSINOPHIL # BLD AUTO: 0.1 K/UL (ref 0–0.5)
EOSINOPHIL NFR BLD: 1.7 % (ref 0–8)
ERYTHROCYTE [DISTWIDTH] IN BLOOD BY AUTOMATED COUNT: 12.4 % (ref 11.5–14.5)
EST. GFR  (AFRICAN AMERICAN): >60 ML/MIN/1.73 M^2
EST. GFR  (NON AFRICAN AMERICAN): >60 ML/MIN/1.73 M^2
ESTIMATED AVG GLUCOSE: 114 MG/DL (ref 68–131)
GLUCOSE SERPL-MCNC: 100 MG/DL (ref 70–110)
HBA1C MFR BLD: 5.6 % (ref 4–5.6)
HCT VFR BLD AUTO: 40.6 % (ref 40–54)
HDLC SERPL-MCNC: 50 MG/DL (ref 40–75)
HDLC SERPL: 37 % (ref 20–50)
HGB BLD-MCNC: 14 G/DL (ref 14–18)
IMM GRANULOCYTES # BLD AUTO: 0.02 K/UL (ref 0–0.04)
IMM GRANULOCYTES NFR BLD AUTO: 0.3 % (ref 0–0.5)
LDLC SERPL CALC-MCNC: 61.8 MG/DL (ref 63–159)
LYMPHOCYTES # BLD AUTO: 1.7 K/UL (ref 1–4.8)
LYMPHOCYTES NFR BLD: 27.6 % (ref 18–48)
MAGNESIUM SERPL-MCNC: 1.5 MG/DL (ref 1.6–2.6)
MCH RBC QN AUTO: 32.3 PG (ref 27–31)
MCHC RBC AUTO-ENTMCNC: 34.5 G/DL (ref 32–36)
MCV RBC AUTO: 94 FL (ref 82–98)
MONOCYTES # BLD AUTO: 0.6 K/UL (ref 0.3–1)
MONOCYTES NFR BLD: 10.1 % (ref 4–15)
NEUTROPHILS # BLD AUTO: 3.6 K/UL (ref 1.8–7.7)
NEUTROPHILS NFR BLD: 60.1 % (ref 38–73)
NONHDLC SERPL-MCNC: 85 MG/DL
NRBC BLD-RTO: 0 /100 WBC
PLATELET # BLD AUTO: 194 K/UL (ref 150–450)
PMV BLD AUTO: 10.2 FL (ref 9.2–12.9)
POTASSIUM SERPL-SCNC: 4.4 MMOL/L (ref 3.5–5.1)
PROT SERPL-MCNC: 7.1 G/DL (ref 6–8.4)
RBC # BLD AUTO: 4.34 M/UL (ref 4.6–6.2)
SODIUM SERPL-SCNC: 141 MMOL/L (ref 136–145)
TRIGL SERPL-MCNC: 116 MG/DL (ref 30–150)
TSH SERPL DL<=0.005 MIU/L-ACNC: 2.09 UIU/ML (ref 0.4–4)
WBC # BLD AUTO: 6.05 K/UL (ref 3.9–12.7)

## 2021-05-10 PROCEDURE — 83735 ASSAY OF MAGNESIUM: CPT | Performed by: INTERNAL MEDICINE

## 2021-05-10 PROCEDURE — 85025 COMPLETE CBC W/AUTO DIFF WBC: CPT | Performed by: INTERNAL MEDICINE

## 2021-05-10 PROCEDURE — 36415 COLL VENOUS BLD VENIPUNCTURE: CPT | Performed by: INTERNAL MEDICINE

## 2021-05-10 PROCEDURE — 83036 HEMOGLOBIN GLYCOSYLATED A1C: CPT | Performed by: INTERNAL MEDICINE

## 2021-05-10 PROCEDURE — 80061 LIPID PANEL: CPT | Performed by: INTERNAL MEDICINE

## 2021-05-10 PROCEDURE — 84443 ASSAY THYROID STIM HORMONE: CPT | Performed by: INTERNAL MEDICINE

## 2021-05-10 PROCEDURE — 80053 COMPREHEN METABOLIC PANEL: CPT | Performed by: INTERNAL MEDICINE

## 2021-05-10 PROCEDURE — 84153 ASSAY OF PSA TOTAL: CPT | Mod: GA | Performed by: INTERNAL MEDICINE

## 2021-05-17 ENCOUNTER — OFFICE VISIT (OUTPATIENT)
Dept: INTERNAL MEDICINE | Facility: CLINIC | Age: 71
End: 2021-05-17
Payer: MEDICARE

## 2021-05-17 VITALS
TEMPERATURE: 97 F | DIASTOLIC BLOOD PRESSURE: 76 MMHG | SYSTOLIC BLOOD PRESSURE: 136 MMHG | BODY MASS INDEX: 31.88 KG/M2 | HEART RATE: 64 BPM | OXYGEN SATURATION: 98 % | WEIGHT: 222.69 LBS | HEIGHT: 70 IN | RESPIRATION RATE: 18 BRPM

## 2021-05-17 DIAGNOSIS — M62.838 MUSCLE SPASM: ICD-10-CM

## 2021-05-17 DIAGNOSIS — E11.59 HYPERTENSION ASSOCIATED WITH DIABETES: ICD-10-CM

## 2021-05-17 DIAGNOSIS — I25.10 CORONARY ARTERY DISEASE INVOLVING NATIVE CORONARY ARTERY OF NATIVE HEART WITHOUT ANGINA PECTORIS: ICD-10-CM

## 2021-05-17 DIAGNOSIS — E11.69 HYPERLIPIDEMIA ASSOCIATED WITH TYPE 2 DIABETES MELLITUS: ICD-10-CM

## 2021-05-17 DIAGNOSIS — I15.2 HYPERTENSION ASSOCIATED WITH DIABETES: ICD-10-CM

## 2021-05-17 DIAGNOSIS — E11.9 TYPE 2 DIABETES MELLITUS WITHOUT COMPLICATION, WITHOUT LONG-TERM CURRENT USE OF INSULIN: ICD-10-CM

## 2021-05-17 DIAGNOSIS — E78.5 HYPERLIPIDEMIA ASSOCIATED WITH TYPE 2 DIABETES MELLITUS: ICD-10-CM

## 2021-05-17 DIAGNOSIS — Z00.00 ROUTINE GENERAL MEDICAL EXAMINATION AT A HEALTH CARE FACILITY: Primary | ICD-10-CM

## 2021-05-17 DIAGNOSIS — E08.00 DIABETES MELLITUS DUE TO UNDERLYING CONDITION WITH HYPEROSMOLARITY WITHOUT COMA, WITHOUT LONG-TERM CURRENT USE OF INSULIN: ICD-10-CM

## 2021-05-17 PROCEDURE — 99999 PR PBB SHADOW E&M-EST. PATIENT-LVL IV: ICD-10-PCS | Mod: PBBFAC,,, | Performed by: INTERNAL MEDICINE

## 2021-05-17 PROCEDURE — 99214 OFFICE O/P EST MOD 30 MIN: CPT | Mod: S$GLB,,, | Performed by: INTERNAL MEDICINE

## 2021-05-17 PROCEDURE — 99499 RISK ADDL DX/OHS AUDIT: ICD-10-PCS | Mod: S$GLB,,, | Performed by: INTERNAL MEDICINE

## 2021-05-17 PROCEDURE — 3288F FALL RISK ASSESSMENT DOCD: CPT | Mod: CPTII,S$GLB,, | Performed by: INTERNAL MEDICINE

## 2021-05-17 PROCEDURE — 99214 PR OFFICE/OUTPT VISIT, EST, LEVL IV, 30-39 MIN: ICD-10-PCS | Mod: S$GLB,,, | Performed by: INTERNAL MEDICINE

## 2021-05-17 PROCEDURE — 3288F PR FALLS RISK ASSESSMENT DOCUMENTED: ICD-10-PCS | Mod: CPTII,S$GLB,, | Performed by: INTERNAL MEDICINE

## 2021-05-17 PROCEDURE — 3008F BODY MASS INDEX DOCD: CPT | Mod: CPTII,S$GLB,, | Performed by: INTERNAL MEDICINE

## 2021-05-17 PROCEDURE — 99499 UNLISTED E&M SERVICE: CPT | Mod: S$GLB,,, | Performed by: INTERNAL MEDICINE

## 2021-05-17 PROCEDURE — 1126F AMNT PAIN NOTED NONE PRSNT: CPT | Mod: S$GLB,,, | Performed by: INTERNAL MEDICINE

## 2021-05-17 PROCEDURE — 99999 PR PBB SHADOW E&M-EST. PATIENT-LVL IV: CPT | Mod: PBBFAC,,, | Performed by: INTERNAL MEDICINE

## 2021-05-17 PROCEDURE — 1101F PR PT FALLS ASSESS DOC 0-1 FALLS W/OUT INJ PAST YR: ICD-10-PCS | Mod: CPTII,S$GLB,, | Performed by: INTERNAL MEDICINE

## 2021-05-17 PROCEDURE — 3008F PR BODY MASS INDEX (BMI) DOCUMENTED: ICD-10-PCS | Mod: CPTII,S$GLB,, | Performed by: INTERNAL MEDICINE

## 2021-05-17 PROCEDURE — 1101F PT FALLS ASSESS-DOCD LE1/YR: CPT | Mod: CPTII,S$GLB,, | Performed by: INTERNAL MEDICINE

## 2021-05-17 PROCEDURE — 1126F PR PAIN SEVERITY QUANTIFIED, NO PAIN PRESENT: ICD-10-PCS | Mod: S$GLB,,, | Performed by: INTERNAL MEDICINE

## 2021-05-17 RX ORDER — METFORMIN HYDROCHLORIDE 1000 MG/1
1000 TABLET ORAL 2 TIMES DAILY WITH MEALS
Qty: 180 TABLET | Refills: 1 | Status: SHIPPED | OUTPATIENT
Start: 2021-05-17 | End: 2022-05-18 | Stop reason: SDUPTHER

## 2021-05-17 RX ORDER — METHOCARBAMOL 500 MG/1
500 TABLET, FILM COATED ORAL 4 TIMES DAILY PRN
Qty: 30 TABLET | Refills: 0 | Status: SHIPPED | OUTPATIENT
Start: 2021-05-17 | End: 2021-11-17 | Stop reason: SDUPTHER

## 2021-05-17 RX ORDER — HYDROCHLOROTHIAZIDE 12.5 MG/1
12.5 TABLET ORAL DAILY
Qty: 90 TABLET | Refills: 1 | Status: SHIPPED | OUTPATIENT
Start: 2021-05-17 | End: 2021-11-12 | Stop reason: SDUPTHER

## 2021-05-19 DIAGNOSIS — I25.10 CORONARY ARTERY DISEASE INVOLVING NATIVE CORONARY ARTERY OF NATIVE HEART WITHOUT ANGINA PECTORIS: ICD-10-CM

## 2021-05-19 DIAGNOSIS — Z95.1 S/P CABG (CORONARY ARTERY BYPASS GRAFT): ICD-10-CM

## 2021-05-19 RX ORDER — METOPROLOL SUCCINATE 50 MG/1
50 TABLET, EXTENDED RELEASE ORAL DAILY
Qty: 30 TABLET | Refills: 11 | Status: SHIPPED | OUTPATIENT
Start: 2021-05-19 | End: 2022-05-23

## 2021-07-14 ENCOUNTER — OFFICE VISIT (OUTPATIENT)
Dept: CARDIOLOGY | Facility: CLINIC | Age: 71
End: 2021-07-14
Payer: MEDICARE

## 2021-07-14 VITALS
HEART RATE: 68 BPM | HEIGHT: 70 IN | SYSTOLIC BLOOD PRESSURE: 130 MMHG | OXYGEN SATURATION: 97 % | DIASTOLIC BLOOD PRESSURE: 76 MMHG | WEIGHT: 225.75 LBS | BODY MASS INDEX: 32.32 KG/M2

## 2021-07-14 DIAGNOSIS — E08.00 DIABETES MELLITUS DUE TO UNDERLYING CONDITION WITH HYPEROSMOLARITY WITHOUT COMA, WITHOUT LONG-TERM CURRENT USE OF INSULIN: ICD-10-CM

## 2021-07-14 DIAGNOSIS — I25.2 OLD MI (MYOCARDIAL INFARCTION): ICD-10-CM

## 2021-07-14 DIAGNOSIS — E11.69 HYPERLIPIDEMIA ASSOCIATED WITH TYPE 2 DIABETES MELLITUS: ICD-10-CM

## 2021-07-14 DIAGNOSIS — E11.59 HYPERTENSION ASSOCIATED WITH DIABETES: ICD-10-CM

## 2021-07-14 DIAGNOSIS — I25.10 CORONARY ARTERY DISEASE INVOLVING NATIVE CORONARY ARTERY OF NATIVE HEART WITHOUT ANGINA PECTORIS: Primary | ICD-10-CM

## 2021-07-14 DIAGNOSIS — E78.5 HYPERLIPIDEMIA ASSOCIATED WITH TYPE 2 DIABETES MELLITUS: ICD-10-CM

## 2021-07-14 DIAGNOSIS — I15.2 HYPERTENSION ASSOCIATED WITH DIABETES: ICD-10-CM

## 2021-07-14 DIAGNOSIS — Z95.1 S/P CABG (CORONARY ARTERY BYPASS GRAFT): ICD-10-CM

## 2021-07-14 DIAGNOSIS — E83.42 HYPOMAGNESEMIA: ICD-10-CM

## 2021-07-14 PROCEDURE — 1159F MED LIST DOCD IN RCRD: CPT | Mod: S$GLB,,, | Performed by: INTERNAL MEDICINE

## 2021-07-14 PROCEDURE — 1101F PT FALLS ASSESS-DOCD LE1/YR: CPT | Mod: CPTII,S$GLB,, | Performed by: INTERNAL MEDICINE

## 2021-07-14 PROCEDURE — 3288F FALL RISK ASSESSMENT DOCD: CPT | Mod: CPTII,S$GLB,, | Performed by: INTERNAL MEDICINE

## 2021-07-14 PROCEDURE — 3008F PR BODY MASS INDEX (BMI) DOCUMENTED: ICD-10-PCS | Mod: CPTII,S$GLB,, | Performed by: INTERNAL MEDICINE

## 2021-07-14 PROCEDURE — 1126F PR PAIN SEVERITY QUANTIFIED, NO PAIN PRESENT: ICD-10-PCS | Mod: S$GLB,,, | Performed by: INTERNAL MEDICINE

## 2021-07-14 PROCEDURE — 1101F PR PT FALLS ASSESS DOC 0-1 FALLS W/OUT INJ PAST YR: ICD-10-PCS | Mod: CPTII,S$GLB,, | Performed by: INTERNAL MEDICINE

## 2021-07-14 PROCEDURE — 3078F DIAST BP <80 MM HG: CPT | Mod: CPTII,S$GLB,, | Performed by: INTERNAL MEDICINE

## 2021-07-14 PROCEDURE — 3044F HG A1C LEVEL LT 7.0%: CPT | Mod: CPTII,S$GLB,, | Performed by: INTERNAL MEDICINE

## 2021-07-14 PROCEDURE — 1159F PR MEDICATION LIST DOCUMENTED IN MEDICAL RECORD: ICD-10-PCS | Mod: S$GLB,,, | Performed by: INTERNAL MEDICINE

## 2021-07-14 PROCEDURE — 99203 PR OFFICE/OUTPT VISIT, NEW, LEVL III, 30-44 MIN: ICD-10-PCS | Mod: S$GLB,,, | Performed by: INTERNAL MEDICINE

## 2021-07-14 PROCEDURE — 3044F PR MOST RECENT HEMOGLOBIN A1C LEVEL <7.0%: ICD-10-PCS | Mod: CPTII,S$GLB,, | Performed by: INTERNAL MEDICINE

## 2021-07-14 PROCEDURE — 99499 UNLISTED E&M SERVICE: CPT | Mod: S$GLB,,, | Performed by: INTERNAL MEDICINE

## 2021-07-14 PROCEDURE — 99999 PR PBB SHADOW E&M-EST. PATIENT-LVL IV: ICD-10-PCS | Mod: PBBFAC,,, | Performed by: INTERNAL MEDICINE

## 2021-07-14 PROCEDURE — 3078F PR MOST RECENT DIASTOLIC BLOOD PRESSURE < 80 MM HG: ICD-10-PCS | Mod: CPTII,S$GLB,, | Performed by: INTERNAL MEDICINE

## 2021-07-14 PROCEDURE — 99203 OFFICE O/P NEW LOW 30 MIN: CPT | Mod: S$GLB,,, | Performed by: INTERNAL MEDICINE

## 2021-07-14 PROCEDURE — 99999 PR PBB SHADOW E&M-EST. PATIENT-LVL IV: CPT | Mod: PBBFAC,,, | Performed by: INTERNAL MEDICINE

## 2021-07-14 PROCEDURE — 3288F PR FALLS RISK ASSESSMENT DOCUMENTED: ICD-10-PCS | Mod: CPTII,S$GLB,, | Performed by: INTERNAL MEDICINE

## 2021-07-14 PROCEDURE — 3075F SYST BP GE 130 - 139MM HG: CPT | Mod: CPTII,S$GLB,, | Performed by: INTERNAL MEDICINE

## 2021-07-14 PROCEDURE — 3008F BODY MASS INDEX DOCD: CPT | Mod: CPTII,S$GLB,, | Performed by: INTERNAL MEDICINE

## 2021-07-14 PROCEDURE — 1126F AMNT PAIN NOTED NONE PRSNT: CPT | Mod: S$GLB,,, | Performed by: INTERNAL MEDICINE

## 2021-07-14 PROCEDURE — 3075F PR MOST RECENT SYSTOLIC BLOOD PRESS GE 130-139MM HG: ICD-10-PCS | Mod: CPTII,S$GLB,, | Performed by: INTERNAL MEDICINE

## 2021-07-14 PROCEDURE — 99499 RISK ADDL DX/OHS AUDIT: ICD-10-PCS | Mod: S$GLB,,, | Performed by: INTERNAL MEDICINE

## 2021-07-28 ENCOUNTER — PATIENT MESSAGE (OUTPATIENT)
Dept: GASTROENTEROLOGY | Facility: CLINIC | Age: 71
End: 2021-07-28

## 2021-07-29 RX ORDER — PANTOPRAZOLE SODIUM 20 MG/1
20 TABLET, DELAYED RELEASE ORAL DAILY
Qty: 30 TABLET | Refills: 11 | OUTPATIENT
Start: 2021-07-29 | End: 2022-07-29

## 2021-07-30 ENCOUNTER — PATIENT MESSAGE (OUTPATIENT)
Dept: GASTROENTEROLOGY | Facility: CLINIC | Age: 71
End: 2021-07-30

## 2021-07-30 RX ORDER — PANTOPRAZOLE SODIUM 20 MG/1
20 TABLET, DELAYED RELEASE ORAL DAILY
Qty: 30 TABLET | Refills: 11 | OUTPATIENT
Start: 2021-07-30 | End: 2022-07-30

## 2021-08-02 ENCOUNTER — TELEPHONE (OUTPATIENT)
Dept: GASTROENTEROLOGY | Facility: CLINIC | Age: 71
End: 2021-08-02

## 2021-09-01 DIAGNOSIS — E11.59 HYPERTENSION ASSOCIATED WITH DIABETES: Primary | ICD-10-CM

## 2021-09-01 DIAGNOSIS — E11.69 HYPERLIPIDEMIA ASSOCIATED WITH TYPE 2 DIABETES MELLITUS: ICD-10-CM

## 2021-09-01 DIAGNOSIS — I15.2 HYPERTENSION ASSOCIATED WITH DIABETES: Primary | ICD-10-CM

## 2021-09-01 DIAGNOSIS — I10 ESSENTIAL HYPERTENSION: ICD-10-CM

## 2021-09-01 DIAGNOSIS — E78.5 HYPERLIPIDEMIA ASSOCIATED WITH TYPE 2 DIABETES MELLITUS: ICD-10-CM

## 2021-09-02 RX ORDER — AMLODIPINE BESYLATE 10 MG/1
10 TABLET ORAL DAILY
Qty: 90 TABLET | Refills: 3 | Status: SHIPPED | OUTPATIENT
Start: 2021-09-02 | End: 2022-09-02 | Stop reason: SDUPTHER

## 2021-09-02 RX ORDER — ATORVASTATIN CALCIUM 40 MG/1
40 TABLET, FILM COATED ORAL DAILY
Qty: 90 TABLET | Refills: 3 | Status: SHIPPED | OUTPATIENT
Start: 2021-09-02 | End: 2022-09-02 | Stop reason: SDUPTHER

## 2021-10-07 ENCOUNTER — IMMUNIZATION (OUTPATIENT)
Dept: PHARMACY | Facility: CLINIC | Age: 71
End: 2021-10-07
Payer: MEDICARE

## 2021-10-07 DIAGNOSIS — Z23 NEED FOR VACCINATION: Primary | ICD-10-CM

## 2021-10-22 ENCOUNTER — TELEPHONE (OUTPATIENT)
Dept: ADMINISTRATIVE | Facility: HOSPITAL | Age: 71
End: 2021-10-22

## 2021-11-04 ENCOUNTER — LAB VISIT (OUTPATIENT)
Dept: LAB | Facility: HOSPITAL | Age: 71
End: 2021-11-04
Attending: INTERNAL MEDICINE
Payer: MEDICARE

## 2021-11-04 DIAGNOSIS — I25.10 CORONARY ARTERY DISEASE INVOLVING NATIVE CORONARY ARTERY OF NATIVE HEART WITHOUT ANGINA PECTORIS: ICD-10-CM

## 2021-11-04 DIAGNOSIS — E08.00 DIABETES MELLITUS DUE TO UNDERLYING CONDITION WITH HYPEROSMOLARITY WITHOUT COMA, WITHOUT LONG-TERM CURRENT USE OF INSULIN: ICD-10-CM

## 2021-11-04 DIAGNOSIS — I25.2 OLD MI (MYOCARDIAL INFARCTION): ICD-10-CM

## 2021-11-04 DIAGNOSIS — E78.5 HYPERLIPIDEMIA ASSOCIATED WITH TYPE 2 DIABETES MELLITUS: ICD-10-CM

## 2021-11-04 DIAGNOSIS — E11.69 HYPERLIPIDEMIA ASSOCIATED WITH TYPE 2 DIABETES MELLITUS: ICD-10-CM

## 2021-11-04 LAB
ALBUMIN SERPL BCP-MCNC: 4 G/DL (ref 3.5–5.2)
ALP SERPL-CCNC: 84 U/L (ref 55–135)
ALT SERPL W/O P-5'-P-CCNC: 27 U/L (ref 10–44)
ANION GAP SERPL CALC-SCNC: 13 MMOL/L (ref 8–16)
AST SERPL-CCNC: 23 U/L (ref 10–40)
BILIRUB SERPL-MCNC: 0.9 MG/DL (ref 0.1–1)
BUN SERPL-MCNC: 15 MG/DL (ref 8–23)
CALCIUM SERPL-MCNC: 9.7 MG/DL (ref 8.7–10.5)
CHLORIDE SERPL-SCNC: 100 MMOL/L (ref 95–110)
CHOLEST SERPL-MCNC: 125 MG/DL (ref 120–199)
CHOLEST/HDLC SERPL: 2.8 {RATIO} (ref 2–5)
CO2 SERPL-SCNC: 28 MMOL/L (ref 23–29)
CREAT SERPL-MCNC: 1 MG/DL (ref 0.5–1.4)
EST. GFR  (AFRICAN AMERICAN): >60 ML/MIN/1.73 M^2
EST. GFR  (NON AFRICAN AMERICAN): >60 ML/MIN/1.73 M^2
ESTIMATED AVG GLUCOSE: 123 MG/DL (ref 68–131)
GLUCOSE SERPL-MCNC: 131 MG/DL (ref 70–110)
HBA1C MFR BLD: 5.9 % (ref 4–5.6)
HDLC SERPL-MCNC: 45 MG/DL (ref 40–75)
HDLC SERPL: 36 % (ref 20–50)
LDLC SERPL CALC-MCNC: 57.2 MG/DL (ref 63–159)
NONHDLC SERPL-MCNC: 80 MG/DL
POTASSIUM SERPL-SCNC: 4.5 MMOL/L (ref 3.5–5.1)
PROT SERPL-MCNC: 6.9 G/DL (ref 6–8.4)
SODIUM SERPL-SCNC: 141 MMOL/L (ref 136–145)
TRIGL SERPL-MCNC: 114 MG/DL (ref 30–150)

## 2021-11-04 PROCEDURE — 80061 LIPID PANEL: CPT | Performed by: PHYSICIAN ASSISTANT

## 2021-11-04 PROCEDURE — 80053 COMPREHEN METABOLIC PANEL: CPT | Performed by: PHYSICIAN ASSISTANT

## 2021-11-04 PROCEDURE — 83036 HEMOGLOBIN GLYCOSYLATED A1C: CPT | Performed by: PHYSICIAN ASSISTANT

## 2021-11-04 PROCEDURE — 36415 COLL VENOUS BLD VENIPUNCTURE: CPT | Performed by: PHYSICIAN ASSISTANT

## 2021-11-11 ENCOUNTER — TELEPHONE (OUTPATIENT)
Dept: CARDIOLOGY | Facility: HOSPITAL | Age: 71
End: 2021-11-11
Payer: MEDICARE

## 2021-11-12 DIAGNOSIS — E11.59 HYPERTENSION ASSOCIATED WITH DIABETES: ICD-10-CM

## 2021-11-12 DIAGNOSIS — I15.2 HYPERTENSION ASSOCIATED WITH DIABETES: ICD-10-CM

## 2021-11-12 RX ORDER — HYDROCHLOROTHIAZIDE 12.5 MG/1
12.5 TABLET ORAL DAILY
Qty: 90 TABLET | Refills: 1 | Status: SHIPPED | OUTPATIENT
Start: 2021-11-12 | End: 2022-05-18 | Stop reason: SDUPTHER

## 2021-11-17 ENCOUNTER — OFFICE VISIT (OUTPATIENT)
Dept: INTERNAL MEDICINE | Facility: CLINIC | Age: 71
End: 2021-11-17
Payer: MEDICARE

## 2021-11-17 VITALS
DIASTOLIC BLOOD PRESSURE: 82 MMHG | TEMPERATURE: 99 F | HEART RATE: 58 BPM | OXYGEN SATURATION: 98 % | WEIGHT: 217 LBS | SYSTOLIC BLOOD PRESSURE: 122 MMHG | RESPIRATION RATE: 16 BRPM | BODY MASS INDEX: 31.07 KG/M2 | HEIGHT: 70 IN

## 2021-11-17 DIAGNOSIS — E11.69 HYPERLIPIDEMIA ASSOCIATED WITH TYPE 2 DIABETES MELLITUS: ICD-10-CM

## 2021-11-17 DIAGNOSIS — Z12.5 ENCOUNTER FOR SCREENING FOR MALIGNANT NEOPLASM OF PROSTATE: ICD-10-CM

## 2021-11-17 DIAGNOSIS — M62.838 MUSCLE SPASM: ICD-10-CM

## 2021-11-17 DIAGNOSIS — M79.672 PAIN IN BOTH FEET: ICD-10-CM

## 2021-11-17 DIAGNOSIS — E78.5 HYPERLIPIDEMIA ASSOCIATED WITH TYPE 2 DIABETES MELLITUS: ICD-10-CM

## 2021-11-17 DIAGNOSIS — E08.00 DIABETES MELLITUS DUE TO UNDERLYING CONDITION WITH HYPEROSMOLARITY WITHOUT COMA, WITHOUT LONG-TERM CURRENT USE OF INSULIN: Primary | ICD-10-CM

## 2021-11-17 DIAGNOSIS — I15.2 HYPERTENSION ASSOCIATED WITH DIABETES: ICD-10-CM

## 2021-11-17 DIAGNOSIS — Z23 NEED FOR INFLUENZA VACCINATION: ICD-10-CM

## 2021-11-17 DIAGNOSIS — E11.59 HYPERTENSION ASSOCIATED WITH DIABETES: ICD-10-CM

## 2021-11-17 DIAGNOSIS — M79.671 PAIN IN BOTH FEET: ICD-10-CM

## 2021-11-17 PROCEDURE — 4010F PR ACE/ARB THEARPY RXD/TAKEN: ICD-10-PCS | Mod: CPTII,S$GLB,, | Performed by: INTERNAL MEDICINE

## 2021-11-17 PROCEDURE — G0008 FLU VACCINE - QUADRIVALENT - ADJUVANTED: ICD-10-PCS | Mod: S$GLB,,, | Performed by: INTERNAL MEDICINE

## 2021-11-17 PROCEDURE — 99999 PR PBB SHADOW E&M-EST. PATIENT-LVL V: ICD-10-PCS | Mod: PBBFAC,,, | Performed by: INTERNAL MEDICINE

## 2021-11-17 PROCEDURE — 90694 VACC AIIV4 NO PRSRV 0.5ML IM: CPT | Mod: S$GLB,,, | Performed by: INTERNAL MEDICINE

## 2021-11-17 PROCEDURE — G0008 ADMIN INFLUENZA VIRUS VAC: HCPCS | Mod: S$GLB,,, | Performed by: INTERNAL MEDICINE

## 2021-11-17 PROCEDURE — 99999 PR PBB SHADOW E&M-EST. PATIENT-LVL V: CPT | Mod: PBBFAC,,, | Performed by: INTERNAL MEDICINE

## 2021-11-17 PROCEDURE — 4010F ACE/ARB THERAPY RXD/TAKEN: CPT | Mod: CPTII,S$GLB,, | Performed by: INTERNAL MEDICINE

## 2021-11-17 PROCEDURE — 99499 RISK ADDL DX/OHS AUDIT: ICD-10-PCS | Mod: S$GLB,,, | Performed by: INTERNAL MEDICINE

## 2021-11-17 PROCEDURE — 90694 FLU VACCINE - QUADRIVALENT - ADJUVANTED: ICD-10-PCS | Mod: S$GLB,,, | Performed by: INTERNAL MEDICINE

## 2021-11-17 PROCEDURE — 99499 UNLISTED E&M SERVICE: CPT | Mod: S$GLB,,, | Performed by: INTERNAL MEDICINE

## 2021-11-17 PROCEDURE — 99214 OFFICE O/P EST MOD 30 MIN: CPT | Mod: 25,S$GLB,, | Performed by: INTERNAL MEDICINE

## 2021-11-17 PROCEDURE — 99214 PR OFFICE/OUTPT VISIT, EST, LEVL IV, 30-39 MIN: ICD-10-PCS | Mod: 25,S$GLB,, | Performed by: INTERNAL MEDICINE

## 2021-11-17 RX ORDER — PANTOPRAZOLE SODIUM 20 MG/1
20 TABLET, DELAYED RELEASE ORAL DAILY
Qty: 30 TABLET | Refills: 11 | Status: SHIPPED | OUTPATIENT
Start: 2021-11-17 | End: 2022-12-09 | Stop reason: SDUPTHER

## 2021-11-17 RX ORDER — METHOCARBAMOL 500 MG/1
500 TABLET, FILM COATED ORAL 4 TIMES DAILY PRN
Qty: 30 TABLET | Refills: 0 | Status: SHIPPED | OUTPATIENT
Start: 2021-11-17 | End: 2022-05-18 | Stop reason: SDUPTHER

## 2021-12-02 ENCOUNTER — OFFICE VISIT (OUTPATIENT)
Dept: CARDIOLOGY | Facility: CLINIC | Age: 71
End: 2021-12-02
Payer: MEDICARE

## 2021-12-02 VITALS
DIASTOLIC BLOOD PRESSURE: 78 MMHG | OXYGEN SATURATION: 96 % | SYSTOLIC BLOOD PRESSURE: 130 MMHG | HEIGHT: 70 IN | WEIGHT: 227.5 LBS | HEART RATE: 67 BPM | BODY MASS INDEX: 32.57 KG/M2

## 2021-12-02 DIAGNOSIS — E78.5 HYPERLIPIDEMIA ASSOCIATED WITH TYPE 2 DIABETES MELLITUS: ICD-10-CM

## 2021-12-02 DIAGNOSIS — Z95.1 S/P CABG (CORONARY ARTERY BYPASS GRAFT): ICD-10-CM

## 2021-12-02 DIAGNOSIS — E11.69 HYPERLIPIDEMIA ASSOCIATED WITH TYPE 2 DIABETES MELLITUS: ICD-10-CM

## 2021-12-02 DIAGNOSIS — E11.59 HYPERTENSION ASSOCIATED WITH DIABETES: ICD-10-CM

## 2021-12-02 DIAGNOSIS — E83.42 HYPOMAGNESEMIA: ICD-10-CM

## 2021-12-02 DIAGNOSIS — I25.10 CORONARY ARTERY DISEASE INVOLVING NATIVE CORONARY ARTERY OF NATIVE HEART WITHOUT ANGINA PECTORIS: Primary | ICD-10-CM

## 2021-12-02 DIAGNOSIS — I25.2 OLD MI (MYOCARDIAL INFARCTION): ICD-10-CM

## 2021-12-02 DIAGNOSIS — I15.2 HYPERTENSION ASSOCIATED WITH DIABETES: ICD-10-CM

## 2021-12-02 DIAGNOSIS — E08.00 DIABETES MELLITUS DUE TO UNDERLYING CONDITION WITH HYPEROSMOLARITY WITHOUT COMA, WITHOUT LONG-TERM CURRENT USE OF INSULIN: ICD-10-CM

## 2021-12-02 PROCEDURE — 99999 PR PBB SHADOW E&M-EST. PATIENT-LVL IV: ICD-10-PCS | Mod: PBBFAC,,, | Performed by: INTERNAL MEDICINE

## 2021-12-02 PROCEDURE — 4010F PR ACE/ARB THEARPY RXD/TAKEN: ICD-10-PCS | Mod: CPTII,S$GLB,, | Performed by: INTERNAL MEDICINE

## 2021-12-02 PROCEDURE — 99213 OFFICE O/P EST LOW 20 MIN: CPT | Mod: S$GLB,,, | Performed by: INTERNAL MEDICINE

## 2021-12-02 PROCEDURE — 99999 PR PBB SHADOW E&M-EST. PATIENT-LVL IV: CPT | Mod: PBBFAC,,, | Performed by: INTERNAL MEDICINE

## 2021-12-02 PROCEDURE — 99213 PR OFFICE/OUTPT VISIT, EST, LEVL III, 20-29 MIN: ICD-10-PCS | Mod: S$GLB,,, | Performed by: INTERNAL MEDICINE

## 2021-12-02 PROCEDURE — 4010F ACE/ARB THERAPY RXD/TAKEN: CPT | Mod: CPTII,S$GLB,, | Performed by: INTERNAL MEDICINE

## 2021-12-06 ENCOUNTER — OFFICE VISIT (OUTPATIENT)
Dept: PODIATRY | Facility: CLINIC | Age: 71
End: 2021-12-06
Payer: MEDICARE

## 2021-12-06 VITALS — BODY MASS INDEX: 32.57 KG/M2 | HEIGHT: 70 IN | WEIGHT: 227.5 LBS

## 2021-12-06 DIAGNOSIS — M79.671 PAIN IN BOTH FEET: ICD-10-CM

## 2021-12-06 DIAGNOSIS — M79.672 PAIN IN BOTH FEET: ICD-10-CM

## 2021-12-06 DIAGNOSIS — E11.49 DIABETES MELLITUS TYPE 2 WITH NEUROLOGICAL MANIFESTATIONS: Primary | ICD-10-CM

## 2021-12-06 PROCEDURE — 4010F PR ACE/ARB THEARPY RXD/TAKEN: ICD-10-PCS | Mod: CPTII,S$GLB,, | Performed by: PODIATRIST

## 2021-12-06 PROCEDURE — 99999 PR PBB SHADOW E&M-EST. PATIENT-LVL III: CPT | Mod: PBBFAC,,, | Performed by: PODIATRIST

## 2021-12-06 PROCEDURE — 4010F ACE/ARB THERAPY RXD/TAKEN: CPT | Mod: CPTII,S$GLB,, | Performed by: PODIATRIST

## 2021-12-06 PROCEDURE — 99203 PR OFFICE/OUTPT VISIT, NEW, LEVL III, 30-44 MIN: ICD-10-PCS | Mod: S$GLB,,, | Performed by: PODIATRIST

## 2021-12-06 PROCEDURE — 99999 PR PBB SHADOW E&M-EST. PATIENT-LVL III: ICD-10-PCS | Mod: PBBFAC,,, | Performed by: PODIATRIST

## 2021-12-06 PROCEDURE — 99203 OFFICE O/P NEW LOW 30 MIN: CPT | Mod: S$GLB,,, | Performed by: PODIATRIST

## 2022-01-28 ENCOUNTER — PES CALL (OUTPATIENT)
Dept: ADMINISTRATIVE | Facility: CLINIC | Age: 72
End: 2022-01-28
Payer: MEDICARE

## 2022-02-16 ENCOUNTER — PES CALL (OUTPATIENT)
Dept: ADMINISTRATIVE | Facility: CLINIC | Age: 72
End: 2022-02-16
Payer: MEDICARE

## 2022-04-19 ENCOUNTER — PATIENT OUTREACH (OUTPATIENT)
Dept: ADMINISTRATIVE | Facility: HOSPITAL | Age: 72
End: 2022-04-19
Payer: MEDICARE

## 2022-04-19 ENCOUNTER — PATIENT MESSAGE (OUTPATIENT)
Dept: ADMINISTRATIVE | Facility: HOSPITAL | Age: 72
End: 2022-04-19
Payer: MEDICARE

## 2022-04-19 NOTE — PROGRESS NOTES
Working PH[facility] eye exam report; I called pt to schedule overdue eye exam; LVM for patient to call back - Portal message sent

## 2022-04-26 ENCOUNTER — PATIENT MESSAGE (OUTPATIENT)
Dept: ADMINISTRATIVE | Facility: OTHER | Age: 72
End: 2022-04-26
Payer: MEDICARE

## 2022-04-26 ENCOUNTER — PATIENT MESSAGE (OUTPATIENT)
Dept: ADMINISTRATIVE | Facility: HOSPITAL | Age: 72
End: 2022-04-26
Payer: MEDICARE

## 2022-05-04 ENCOUNTER — LAB VISIT (OUTPATIENT)
Dept: LAB | Facility: HOSPITAL | Age: 72
End: 2022-05-04
Attending: INTERNAL MEDICINE
Payer: MEDICARE

## 2022-05-04 DIAGNOSIS — Z12.5 ENCOUNTER FOR SCREENING FOR MALIGNANT NEOPLASM OF PROSTATE: ICD-10-CM

## 2022-05-04 DIAGNOSIS — E11.69 HYPERLIPIDEMIA ASSOCIATED WITH TYPE 2 DIABETES MELLITUS: ICD-10-CM

## 2022-05-04 DIAGNOSIS — E08.00 DIABETES MELLITUS DUE TO UNDERLYING CONDITION WITH HYPEROSMOLARITY WITHOUT COMA, WITHOUT LONG-TERM CURRENT USE OF INSULIN: ICD-10-CM

## 2022-05-04 DIAGNOSIS — E78.5 HYPERLIPIDEMIA ASSOCIATED WITH TYPE 2 DIABETES MELLITUS: ICD-10-CM

## 2022-05-04 LAB
ALBUMIN SERPL BCP-MCNC: 4 G/DL (ref 3.5–5.2)
ALBUMIN SERPL BCP-MCNC: 4 G/DL (ref 3.5–5.2)
ALP SERPL-CCNC: 88 U/L (ref 55–135)
ALP SERPL-CCNC: 88 U/L (ref 55–135)
ALT SERPL W/O P-5'-P-CCNC: 19 U/L (ref 10–44)
ALT SERPL W/O P-5'-P-CCNC: 19 U/L (ref 10–44)
ANION GAP SERPL CALC-SCNC: 8 MMOL/L (ref 8–16)
ANION GAP SERPL CALC-SCNC: 8 MMOL/L (ref 8–16)
AST SERPL-CCNC: 26 U/L (ref 10–40)
AST SERPL-CCNC: 26 U/L (ref 10–40)
BILIRUB SERPL-MCNC: 0.8 MG/DL (ref 0.1–1)
BILIRUB SERPL-MCNC: 0.8 MG/DL (ref 0.1–1)
BUN SERPL-MCNC: 13 MG/DL (ref 8–23)
BUN SERPL-MCNC: 13 MG/DL (ref 8–23)
CALCIUM SERPL-MCNC: 10 MG/DL (ref 8.7–10.5)
CALCIUM SERPL-MCNC: 10 MG/DL (ref 8.7–10.5)
CHLORIDE SERPL-SCNC: 101 MMOL/L (ref 95–110)
CHLORIDE SERPL-SCNC: 101 MMOL/L (ref 95–110)
CHOLEST SERPL-MCNC: 129 MG/DL (ref 120–199)
CHOLEST/HDLC SERPL: 2.6 {RATIO} (ref 2–5)
CO2 SERPL-SCNC: 31 MMOL/L (ref 23–29)
CO2 SERPL-SCNC: 31 MMOL/L (ref 23–29)
COMPLEXED PSA SERPL-MCNC: 1.5 NG/ML (ref 0–4)
CREAT SERPL-MCNC: 1 MG/DL (ref 0.5–1.4)
CREAT SERPL-MCNC: 1 MG/DL (ref 0.5–1.4)
EST. GFR  (AFRICAN AMERICAN): >60 ML/MIN/1.73 M^2
EST. GFR  (AFRICAN AMERICAN): >60 ML/MIN/1.73 M^2
EST. GFR  (NON AFRICAN AMERICAN): >60 ML/MIN/1.73 M^2
EST. GFR  (NON AFRICAN AMERICAN): >60 ML/MIN/1.73 M^2
ESTIMATED AVG GLUCOSE: 117 MG/DL (ref 68–131)
ESTIMATED AVG GLUCOSE: 117 MG/DL (ref 68–131)
GLUCOSE SERPL-MCNC: 109 MG/DL (ref 70–110)
GLUCOSE SERPL-MCNC: 109 MG/DL (ref 70–110)
HBA1C MFR BLD: 5.7 % (ref 4–5.6)
HBA1C MFR BLD: 5.7 % (ref 4–5.6)
HDLC SERPL-MCNC: 50 MG/DL (ref 40–75)
HDLC SERPL: 38.8 % (ref 20–50)
LDLC SERPL CALC-MCNC: 56.8 MG/DL (ref 63–159)
NONHDLC SERPL-MCNC: 79 MG/DL
POTASSIUM SERPL-SCNC: 4.9 MMOL/L (ref 3.5–5.1)
POTASSIUM SERPL-SCNC: 4.9 MMOL/L (ref 3.5–5.1)
PROT SERPL-MCNC: 6.5 G/DL (ref 6–8.4)
PROT SERPL-MCNC: 6.5 G/DL (ref 6–8.4)
SODIUM SERPL-SCNC: 140 MMOL/L (ref 136–145)
SODIUM SERPL-SCNC: 140 MMOL/L (ref 136–145)
TRIGL SERPL-MCNC: 111 MG/DL (ref 30–150)
TSH SERPL DL<=0.005 MIU/L-ACNC: 2.31 UIU/ML (ref 0.4–4)

## 2022-05-04 PROCEDURE — 84443 ASSAY THYROID STIM HORMONE: CPT | Performed by: INTERNAL MEDICINE

## 2022-05-04 PROCEDURE — 36415 COLL VENOUS BLD VENIPUNCTURE: CPT | Performed by: INTERNAL MEDICINE

## 2022-05-04 PROCEDURE — 84153 ASSAY OF PSA TOTAL: CPT | Performed by: INTERNAL MEDICINE

## 2022-05-04 PROCEDURE — 80061 LIPID PANEL: CPT | Performed by: INTERNAL MEDICINE

## 2022-05-04 PROCEDURE — 80053 COMPREHEN METABOLIC PANEL: CPT | Performed by: INTERNAL MEDICINE

## 2022-05-04 PROCEDURE — 83036 HEMOGLOBIN GLYCOSYLATED A1C: CPT | Performed by: INTERNAL MEDICINE

## 2022-05-05 ENCOUNTER — TELEPHONE (OUTPATIENT)
Dept: CARDIOLOGY | Facility: CLINIC | Age: 72
End: 2022-05-05
Payer: MEDICARE

## 2022-05-05 NOTE — TELEPHONE ENCOUNTER
Patient was notified of results. All questions were answered. Pt verbalized understanding. Pt will call back with any other questions or concerns.----- Message from Kimberly Bennett MD sent at 5/5/2022  7:12 AM CDT -----  On target with labs.

## 2022-05-18 ENCOUNTER — OFFICE VISIT (OUTPATIENT)
Dept: INTERNAL MEDICINE | Facility: CLINIC | Age: 72
End: 2022-05-18
Payer: MEDICARE

## 2022-05-18 VITALS
WEIGHT: 219.38 LBS | OXYGEN SATURATION: 98 % | HEART RATE: 64 BPM | BODY MASS INDEX: 31.47 KG/M2 | SYSTOLIC BLOOD PRESSURE: 122 MMHG | DIASTOLIC BLOOD PRESSURE: 82 MMHG | TEMPERATURE: 97 F

## 2022-05-18 DIAGNOSIS — Z00.00 ROUTINE GENERAL MEDICAL EXAMINATION AT A HEALTH CARE FACILITY: Primary | ICD-10-CM

## 2022-05-18 DIAGNOSIS — E11.59 HYPERTENSION ASSOCIATED WITH DIABETES: ICD-10-CM

## 2022-05-18 DIAGNOSIS — M62.838 MUSCLE SPASM: ICD-10-CM

## 2022-05-18 DIAGNOSIS — E11.9 TYPE 2 DIABETES MELLITUS WITHOUT COMPLICATION, WITHOUT LONG-TERM CURRENT USE OF INSULIN: ICD-10-CM

## 2022-05-18 DIAGNOSIS — E08.00 DIABETES MELLITUS DUE TO UNDERLYING CONDITION WITH HYPEROSMOLARITY WITHOUT COMA, WITHOUT LONG-TERM CURRENT USE OF INSULIN: ICD-10-CM

## 2022-05-18 DIAGNOSIS — E78.5 HYPERLIPIDEMIA ASSOCIATED WITH TYPE 2 DIABETES MELLITUS: ICD-10-CM

## 2022-05-18 DIAGNOSIS — I25.10 CORONARY ARTERY DISEASE INVOLVING NATIVE CORONARY ARTERY OF NATIVE HEART WITHOUT ANGINA PECTORIS: ICD-10-CM

## 2022-05-18 DIAGNOSIS — E11.69 HYPERLIPIDEMIA ASSOCIATED WITH TYPE 2 DIABETES MELLITUS: ICD-10-CM

## 2022-05-18 DIAGNOSIS — I15.2 HYPERTENSION ASSOCIATED WITH DIABETES: ICD-10-CM

## 2022-05-18 PROCEDURE — 3060F PR POS MICROALBUMINURIA RESULT DOCUMENTED/REVIEW: ICD-10-PCS | Mod: CPTII,S$GLB,, | Performed by: INTERNAL MEDICINE

## 2022-05-18 PROCEDURE — 99499 UNLISTED E&M SERVICE: CPT | Mod: S$GLB,,, | Performed by: INTERNAL MEDICINE

## 2022-05-18 PROCEDURE — 99397 PER PM REEVAL EST PAT 65+ YR: CPT | Mod: GY,S$GLB,, | Performed by: INTERNAL MEDICINE

## 2022-05-18 PROCEDURE — 3074F SYST BP LT 130 MM HG: CPT | Mod: CPTII,S$GLB,, | Performed by: INTERNAL MEDICINE

## 2022-05-18 PROCEDURE — 3044F HG A1C LEVEL LT 7.0%: CPT | Mod: CPTII,S$GLB,, | Performed by: INTERNAL MEDICINE

## 2022-05-18 PROCEDURE — 99499 RISK ADDL DX/OHS AUDIT: ICD-10-PCS | Mod: S$GLB,,, | Performed by: INTERNAL MEDICINE

## 2022-05-18 PROCEDURE — 3066F NEPHROPATHY DOC TX: CPT | Mod: CPTII,S$GLB,, | Performed by: INTERNAL MEDICINE

## 2022-05-18 PROCEDURE — 3288F FALL RISK ASSESSMENT DOCD: CPT | Mod: CPTII,S$GLB,, | Performed by: INTERNAL MEDICINE

## 2022-05-18 PROCEDURE — 1159F MED LIST DOCD IN RCRD: CPT | Mod: CPTII,S$GLB,, | Performed by: INTERNAL MEDICINE

## 2022-05-18 PROCEDURE — 99397 PR PREVENTIVE VISIT,EST,65 & OVER: ICD-10-PCS | Mod: GY,S$GLB,, | Performed by: INTERNAL MEDICINE

## 2022-05-18 PROCEDURE — 99999 PR PBB SHADOW E&M-EST. PATIENT-LVL IV: CPT | Mod: PBBFAC,,, | Performed by: INTERNAL MEDICINE

## 2022-05-18 PROCEDURE — 3044F PR MOST RECENT HEMOGLOBIN A1C LEVEL <7.0%: ICD-10-PCS | Mod: CPTII,S$GLB,, | Performed by: INTERNAL MEDICINE

## 2022-05-18 PROCEDURE — 4010F ACE/ARB THERAPY RXD/TAKEN: CPT | Mod: CPTII,S$GLB,, | Performed by: INTERNAL MEDICINE

## 2022-05-18 PROCEDURE — 3060F POS MICROALBUMINURIA REV: CPT | Mod: CPTII,S$GLB,, | Performed by: INTERNAL MEDICINE

## 2022-05-18 PROCEDURE — 1126F AMNT PAIN NOTED NONE PRSNT: CPT | Mod: CPTII,S$GLB,, | Performed by: INTERNAL MEDICINE

## 2022-05-18 PROCEDURE — 3079F PR MOST RECENT DIASTOLIC BLOOD PRESSURE 80-89 MM HG: ICD-10-PCS | Mod: CPTII,S$GLB,, | Performed by: INTERNAL MEDICINE

## 2022-05-18 PROCEDURE — 1126F PR PAIN SEVERITY QUANTIFIED, NO PAIN PRESENT: ICD-10-PCS | Mod: CPTII,S$GLB,, | Performed by: INTERNAL MEDICINE

## 2022-05-18 PROCEDURE — 99999 PR PBB SHADOW E&M-EST. PATIENT-LVL IV: ICD-10-PCS | Mod: PBBFAC,,, | Performed by: INTERNAL MEDICINE

## 2022-05-18 PROCEDURE — 3008F PR BODY MASS INDEX (BMI) DOCUMENTED: ICD-10-PCS | Mod: CPTII,S$GLB,, | Performed by: INTERNAL MEDICINE

## 2022-05-18 PROCEDURE — 3074F PR MOST RECENT SYSTOLIC BLOOD PRESSURE < 130 MM HG: ICD-10-PCS | Mod: CPTII,S$GLB,, | Performed by: INTERNAL MEDICINE

## 2022-05-18 PROCEDURE — 3066F PR DOCUMENTATION OF TREATMENT FOR NEPHROPATHY: ICD-10-PCS | Mod: CPTII,S$GLB,, | Performed by: INTERNAL MEDICINE

## 2022-05-18 PROCEDURE — 3008F BODY MASS INDEX DOCD: CPT | Mod: CPTII,S$GLB,, | Performed by: INTERNAL MEDICINE

## 2022-05-18 PROCEDURE — 1159F PR MEDICATION LIST DOCUMENTED IN MEDICAL RECORD: ICD-10-PCS | Mod: CPTII,S$GLB,, | Performed by: INTERNAL MEDICINE

## 2022-05-18 PROCEDURE — 4010F PR ACE/ARB THEARPY RXD/TAKEN: ICD-10-PCS | Mod: CPTII,S$GLB,, | Performed by: INTERNAL MEDICINE

## 2022-05-18 PROCEDURE — 1101F PT FALLS ASSESS-DOCD LE1/YR: CPT | Mod: CPTII,S$GLB,, | Performed by: INTERNAL MEDICINE

## 2022-05-18 PROCEDURE — 3079F DIAST BP 80-89 MM HG: CPT | Mod: CPTII,S$GLB,, | Performed by: INTERNAL MEDICINE

## 2022-05-18 PROCEDURE — 3288F PR FALLS RISK ASSESSMENT DOCUMENTED: ICD-10-PCS | Mod: CPTII,S$GLB,, | Performed by: INTERNAL MEDICINE

## 2022-05-18 PROCEDURE — 1101F PR PT FALLS ASSESS DOC 0-1 FALLS W/OUT INJ PAST YR: ICD-10-PCS | Mod: CPTII,S$GLB,, | Performed by: INTERNAL MEDICINE

## 2022-05-18 RX ORDER — METFORMIN HYDROCHLORIDE 1000 MG/1
1000 TABLET ORAL 2 TIMES DAILY WITH MEALS
Qty: 180 TABLET | Refills: 1 | Status: SHIPPED | OUTPATIENT
Start: 2022-05-18 | End: 2023-01-12 | Stop reason: SDUPTHER

## 2022-05-18 RX ORDER — METHOCARBAMOL 500 MG/1
500 TABLET, FILM COATED ORAL 4 TIMES DAILY PRN
Qty: 30 TABLET | Refills: 0 | Status: SHIPPED | OUTPATIENT
Start: 2022-05-18 | End: 2024-02-16 | Stop reason: SDUPTHER

## 2022-05-18 RX ORDER — HYDROCHLOROTHIAZIDE 12.5 MG/1
12.5 TABLET ORAL DAILY
Qty: 90 TABLET | Refills: 1 | Status: SHIPPED | OUTPATIENT
Start: 2022-05-18 | End: 2022-11-21

## 2022-05-18 NOTE — PROGRESS NOTES
Subjective:      Patient ID: Deniz Paulino is a 71 y.o. male.    Chief Complaint: Follow-up    HPI     70 yo with   Patient Active Problem List   Diagnosis    CAD (coronary artery disease)    Hypertension associated with diabetes    Hyperlipidemia associated with type 2 diabetes mellitus    S/P CABG (coronary artery bypass graft)    Old MI (myocardial infarction)    Diabetes mellitus    Hypomagnesemia     Past Medical History:   Diagnosis Date    Acute coronary syndrome     Coronary artery disease     Diabetes mellitus 4/24/2014    Hyperlipidemia     Hypertension     Old MI (myocardial infarction) 4/24/2014    S/P CABG (coronary artery bypass graft) 4/24/2014     Here today for annual prev exam.  Compliant with meds without significant side effects. Energy and appetite are good.     Pt requests refill of Robaxin that he uses very occasionally.  He is also interested in seeing [facility].    Review of Systems   Constitutional: Negative for chills and fever.   HENT: Negative for ear pain and sore throat.    Respiratory: Negative for cough.    Cardiovascular: Negative for chest pain.   Gastrointestinal: Negative for abdominal pain and blood in stool.   Genitourinary: Negative for dysuria and hematuria.   Neurological: Negative for seizures and syncope.     Objective:   /82 (BP Location: Left arm, Patient Position: Sitting, BP Method: Large (Manual))   Pulse 64   Temp 96.7 °F (35.9 °C) (Tympanic)   Wt 99.5 kg (219 lb 5.7 oz)   SpO2 98%   BMI 31.47 kg/m²     Physical Exam  Constitutional:       General: He is not in acute distress.     Appearance: He is well-developed.   HENT:      Head: Normocephalic and atraumatic.   Eyes:      Pupils: Pupils are equal, round, and reactive to light.   Neck:      Thyroid: No thyromegaly.   Cardiovascular:      Rate and Rhythm: Normal rate and regular rhythm.   Pulmonary:      Breath sounds: Normal breath sounds. No wheezing or rales.   Abdominal:       General: Bowel sounds are normal.      Palpations: Abdomen is soft.      Tenderness: There is no abdominal tenderness.   Musculoskeletal:      Cervical back: Neck supple.   Lymphadenopathy:      Cervical: No cervical adenopathy.   Skin:     General: Skin is warm and dry.   Neurological:      Mental Status: He is alert and oriented to person, place, and time.   Psychiatric:         Behavior: Behavior normal.         Assessment:     1. Routine general medical examination at a health care facility    2. Coronary artery disease involving native coronary artery of native heart without angina pectoris    3. Hypertension associated with diabetes    4. Hyperlipidemia associated with type 2 diabetes mellitus    5. Diabetes mellitus due to underlying condition with hyperosmolarity without coma, without long-term current use of insulin    6. Type 2 diabetes mellitus without complication, without long-term current use of insulin    7. Muscle spasm      Plan:   Routine general medical examination at a health care facility  Heart healthy diet and reg exercise  HM reviewed  Coronary artery disease involving native coronary artery of native heart without angina pectoris    Hypertension associated with diabetes  controlled  -     hydroCHLOROthiazide (HYDRODIURIL) 12.5 MG Tab; Take 1 tablet (12.5 mg total) by mouth once daily.  Dispense: 90 tablet; Refill: 1    Hyperlipidemia associated with type 2 diabetes mellitus  Controlled on statin    Diabetes mellitus due to underlying condition with hyperosmolarity without coma, without long-term current use of insulin  Controlled    Type 2 diabetes mellitus without complication, without long-term current use of insulin  -     metFORMIN (GLUCOPHAGE) 1000 MG tablet; Take 1 tablet (1,000 mg total) by mouth 2 (two) times daily with meals.  Dispense: 180 tablet; Refill: 1  -     Hemoglobin A1C; Future; Expected date: 11/14/2022    Muscle spasm  -     methocarbamoL (ROBAXIN) 500 MG Tab; Take 1  tablet (500 mg total) by mouth 4 (four) times daily as needed (muscle spasm).  Dispense: 30 tablet; Refill: 0            Problem List Items Addressed This Visit     CAD (coronary artery disease)    Overview     Sees Sabrina           Hypertension associated with diabetes    Relevant Medications    metFORMIN (GLUCOPHAGE) 1000 MG tablet    hydroCHLOROthiazide (HYDRODIURIL) 12.5 MG Tab    Hyperlipidemia associated with type 2 diabetes mellitus    Relevant Medications    metFORMIN (GLUCOPHAGE) 1000 MG tablet    Diabetes mellitus    Relevant Medications    metFORMIN (GLUCOPHAGE) 1000 MG tablet    Other Relevant Orders    Ambulatory referral/consult to Optometry    Hemoglobin A1C      Other Visit Diagnoses     Routine general medical examination at a health care facility    -  Primary    Muscle spasm        Relevant Medications    methocarbamoL (ROBAXIN) 500 MG Tab          Follow up in about 6 months (around 11/18/2022), or if symptoms worsen or fail to improve.

## 2022-06-01 ENCOUNTER — TELEPHONE (OUTPATIENT)
Dept: OPHTHALMOLOGY | Facility: CLINIC | Age: 72
End: 2022-06-01
Payer: MEDICARE

## 2022-06-01 NOTE — TELEPHONE ENCOUNTER
I Called and Spoke to Mr Paulino advised him he is going to be Dilated for his exam.  ------ Message from Lexie Garrett sent at 6/1/2022 11:07 AM CDT -----  Contact: [phone]  Patient is calling regarding appt , need more information about appt also need to know if wife have to drive him . Please call back at [phone] . Thanks/dj

## 2022-06-03 ENCOUNTER — OFFICE VISIT (OUTPATIENT)
Dept: OPHTHALMOLOGY | Facility: CLINIC | Age: 72
End: 2022-06-03
Payer: MEDICARE

## 2022-06-03 ENCOUNTER — PATIENT MESSAGE (OUTPATIENT)
Dept: OPHTHALMOLOGY | Facility: CLINIC | Age: 72
End: 2022-06-03

## 2022-06-03 DIAGNOSIS — E11.9 DIABETES MELLITUS TYPE 2 WITHOUT RETINOPATHY: Primary | ICD-10-CM

## 2022-06-03 DIAGNOSIS — E11.36 DIABETIC CATARACT: ICD-10-CM

## 2022-06-03 DIAGNOSIS — H52.7 REFRACTIVE ERRORS: ICD-10-CM

## 2022-06-03 DIAGNOSIS — E08.00 DIABETES MELLITUS DUE TO UNDERLYING CONDITION WITH HYPEROSMOLARITY WITHOUT COMA, WITHOUT LONG-TERM CURRENT USE OF INSULIN: ICD-10-CM

## 2022-06-03 PROCEDURE — 4010F ACE/ARB THERAPY RXD/TAKEN: CPT | Mod: CPTII,S$GLB,, | Performed by: OPTOMETRIST

## 2022-06-03 PROCEDURE — 99999 PR PBB SHADOW E&M-EST. PATIENT-LVL III: CPT | Mod: PBBFAC,,, | Performed by: OPTOMETRIST

## 2022-06-03 PROCEDURE — 99499 UNLISTED E&M SERVICE: CPT | Mod: S$GLB,,, | Performed by: OPTOMETRIST

## 2022-06-03 PROCEDURE — 3066F PR DOCUMENTATION OF TREATMENT FOR NEPHROPATHY: ICD-10-PCS | Mod: CPTII,S$GLB,, | Performed by: OPTOMETRIST

## 2022-06-03 PROCEDURE — 1159F MED LIST DOCD IN RCRD: CPT | Mod: CPTII,S$GLB,, | Performed by: OPTOMETRIST

## 2022-06-03 PROCEDURE — 2023F PR DILATED RETINAL EXAM W/O EVID OF RETINOPATHY: ICD-10-PCS | Mod: CPTII,S$GLB,, | Performed by: OPTOMETRIST

## 2022-06-03 PROCEDURE — 92004 PR EYE EXAM, NEW PATIENT,COMPREHESV: ICD-10-PCS | Mod: S$GLB,,, | Performed by: OPTOMETRIST

## 2022-06-03 PROCEDURE — 92015 DETERMINE REFRACTIVE STATE: CPT | Mod: S$GLB,,, | Performed by: OPTOMETRIST

## 2022-06-03 PROCEDURE — 92004 COMPRE OPH EXAM NEW PT 1/>: CPT | Mod: S$GLB,,, | Performed by: OPTOMETRIST

## 2022-06-03 PROCEDURE — 3060F PR POS MICROALBUMINURIA RESULT DOCUMENTED/REVIEW: ICD-10-PCS | Mod: CPTII,S$GLB,, | Performed by: OPTOMETRIST

## 2022-06-03 PROCEDURE — 3060F POS MICROALBUMINURIA REV: CPT | Mod: CPTII,S$GLB,, | Performed by: OPTOMETRIST

## 2022-06-03 PROCEDURE — 99999 PR PBB SHADOW E&M-EST. PATIENT-LVL III: ICD-10-PCS | Mod: PBBFAC,,, | Performed by: OPTOMETRIST

## 2022-06-03 PROCEDURE — 1159F PR MEDICATION LIST DOCUMENTED IN MEDICAL RECORD: ICD-10-PCS | Mod: CPTII,S$GLB,, | Performed by: OPTOMETRIST

## 2022-06-03 PROCEDURE — 3066F NEPHROPATHY DOC TX: CPT | Mod: CPTII,S$GLB,, | Performed by: OPTOMETRIST

## 2022-06-03 PROCEDURE — 4010F PR ACE/ARB THEARPY RXD/TAKEN: ICD-10-PCS | Mod: CPTII,S$GLB,, | Performed by: OPTOMETRIST

## 2022-06-03 PROCEDURE — 92015 PR REFRACTION: ICD-10-PCS | Mod: S$GLB,,, | Performed by: OPTOMETRIST

## 2022-06-03 PROCEDURE — 3044F HG A1C LEVEL LT 7.0%: CPT | Mod: CPTII,S$GLB,, | Performed by: OPTOMETRIST

## 2022-06-03 PROCEDURE — 99499 RISK ADDL DX/OHS AUDIT: ICD-10-PCS | Mod: S$GLB,,, | Performed by: OPTOMETRIST

## 2022-06-03 PROCEDURE — 3044F PR MOST RECENT HEMOGLOBIN A1C LEVEL <7.0%: ICD-10-PCS | Mod: CPTII,S$GLB,, | Performed by: OPTOMETRIST

## 2022-06-03 PROCEDURE — 2023F DILAT RTA XM W/O RTNOPTHY: CPT | Mod: CPTII,S$GLB,, | Performed by: OPTOMETRIST

## 2022-06-03 NOTE — PROGRESS NOTES
HPI     New Patient Annual Eye Exam  Decrease In Near and Distance Vision      Last edited by Dena Nowak MA on 6/3/2022 10:40 AM. (History)            Assessment /Plan     For exam results, see Encounter Report.    Diabetes mellitus type 2 without retinopathy    Diabetes mellitus due to underlying condition with hyperosmolarity without coma, without long-term current use of insulin  -     Ambulatory referral/consult to Optometry    Diabetic cataract    Refractive errors      No Background Diabetic Retinopathy    Mild cataracts OU, not surgical.    Dispense Final Rx for glasses.  RTC 1 year  Discussed above and answered questions.

## 2022-06-09 DIAGNOSIS — Z95.1 S/P CABG (CORONARY ARTERY BYPASS GRAFT): ICD-10-CM

## 2022-06-09 DIAGNOSIS — I25.10 CORONARY ARTERY DISEASE INVOLVING NATIVE CORONARY ARTERY OF NATIVE HEART WITHOUT ANGINA PECTORIS: ICD-10-CM

## 2022-06-10 RX ORDER — METOPROLOL SUCCINATE 50 MG/1
50 TABLET, EXTENDED RELEASE ORAL DAILY
Qty: 90 TABLET | Refills: 3 | Status: SHIPPED | OUTPATIENT
Start: 2022-06-10 | End: 2023-08-14

## 2022-07-12 ENCOUNTER — OFFICE VISIT (OUTPATIENT)
Dept: CARDIOLOGY | Facility: CLINIC | Age: 72
End: 2022-07-12
Payer: MEDICARE

## 2022-07-12 VITALS
OXYGEN SATURATION: 97 % | WEIGHT: 222 LBS | SYSTOLIC BLOOD PRESSURE: 126 MMHG | BODY MASS INDEX: 31.78 KG/M2 | HEART RATE: 68 BPM | HEIGHT: 70 IN | DIASTOLIC BLOOD PRESSURE: 80 MMHG

## 2022-07-12 DIAGNOSIS — I25.10 CORONARY ARTERY DISEASE INVOLVING NATIVE CORONARY ARTERY OF NATIVE HEART WITHOUT ANGINA PECTORIS: Primary | ICD-10-CM

## 2022-07-12 DIAGNOSIS — I25.2 OLD MI (MYOCARDIAL INFARCTION): ICD-10-CM

## 2022-07-12 DIAGNOSIS — E78.5 HYPERLIPIDEMIA ASSOCIATED WITH TYPE 2 DIABETES MELLITUS: ICD-10-CM

## 2022-07-12 DIAGNOSIS — E11.59 HYPERTENSION ASSOCIATED WITH DIABETES: ICD-10-CM

## 2022-07-12 DIAGNOSIS — Z95.1 S/P CABG (CORONARY ARTERY BYPASS GRAFT): ICD-10-CM

## 2022-07-12 DIAGNOSIS — E08.00 DIABETES MELLITUS DUE TO UNDERLYING CONDITION WITH HYPEROSMOLARITY WITHOUT COMA, WITHOUT LONG-TERM CURRENT USE OF INSULIN: ICD-10-CM

## 2022-07-12 DIAGNOSIS — I15.2 HYPERTENSION ASSOCIATED WITH DIABETES: ICD-10-CM

## 2022-07-12 DIAGNOSIS — E11.69 HYPERLIPIDEMIA ASSOCIATED WITH TYPE 2 DIABETES MELLITUS: ICD-10-CM

## 2022-07-12 PROCEDURE — 3044F HG A1C LEVEL LT 7.0%: CPT | Mod: CPTII,S$GLB,, | Performed by: INTERNAL MEDICINE

## 2022-07-12 PROCEDURE — 3008F PR BODY MASS INDEX (BMI) DOCUMENTED: ICD-10-PCS | Mod: CPTII,S$GLB,, | Performed by: INTERNAL MEDICINE

## 2022-07-12 PROCEDURE — 3079F PR MOST RECENT DIASTOLIC BLOOD PRESSURE 80-89 MM HG: ICD-10-PCS | Mod: CPTII,S$GLB,, | Performed by: INTERNAL MEDICINE

## 2022-07-12 PROCEDURE — 3288F PR FALLS RISK ASSESSMENT DOCUMENTED: ICD-10-PCS | Mod: CPTII,S$GLB,, | Performed by: INTERNAL MEDICINE

## 2022-07-12 PROCEDURE — 3288F FALL RISK ASSESSMENT DOCD: CPT | Mod: CPTII,S$GLB,, | Performed by: INTERNAL MEDICINE

## 2022-07-12 PROCEDURE — 99214 OFFICE O/P EST MOD 30 MIN: CPT | Mod: S$GLB,,, | Performed by: INTERNAL MEDICINE

## 2022-07-12 PROCEDURE — 1160F PR REVIEW ALL MEDS BY PRESCRIBER/CLIN PHARMACIST DOCUMENTED: ICD-10-PCS | Mod: CPTII,S$GLB,, | Performed by: INTERNAL MEDICINE

## 2022-07-12 PROCEDURE — 1160F RVW MEDS BY RX/DR IN RCRD: CPT | Mod: CPTII,S$GLB,, | Performed by: INTERNAL MEDICINE

## 2022-07-12 PROCEDURE — 3008F BODY MASS INDEX DOCD: CPT | Mod: CPTII,S$GLB,, | Performed by: INTERNAL MEDICINE

## 2022-07-12 PROCEDURE — 3074F PR MOST RECENT SYSTOLIC BLOOD PRESSURE < 130 MM HG: ICD-10-PCS | Mod: CPTII,S$GLB,, | Performed by: INTERNAL MEDICINE

## 2022-07-12 PROCEDURE — 3074F SYST BP LT 130 MM HG: CPT | Mod: CPTII,S$GLB,, | Performed by: INTERNAL MEDICINE

## 2022-07-12 PROCEDURE — 3066F PR DOCUMENTATION OF TREATMENT FOR NEPHROPATHY: ICD-10-PCS | Mod: CPTII,S$GLB,, | Performed by: INTERNAL MEDICINE

## 2022-07-12 PROCEDURE — 4010F PR ACE/ARB THEARPY RXD/TAKEN: ICD-10-PCS | Mod: CPTII,S$GLB,, | Performed by: INTERNAL MEDICINE

## 2022-07-12 PROCEDURE — 99214 PR OFFICE/OUTPT VISIT, EST, LEVL IV, 30-39 MIN: ICD-10-PCS | Mod: S$GLB,,, | Performed by: INTERNAL MEDICINE

## 2022-07-12 PROCEDURE — 1126F PR PAIN SEVERITY QUANTIFIED, NO PAIN PRESENT: ICD-10-PCS | Mod: CPTII,S$GLB,, | Performed by: INTERNAL MEDICINE

## 2022-07-12 PROCEDURE — 99499 UNLISTED E&M SERVICE: CPT | Mod: S$GLB,,, | Performed by: INTERNAL MEDICINE

## 2022-07-12 PROCEDURE — 1159F MED LIST DOCD IN RCRD: CPT | Mod: CPTII,S$GLB,, | Performed by: INTERNAL MEDICINE

## 2022-07-12 PROCEDURE — 1101F PR PT FALLS ASSESS DOC 0-1 FALLS W/OUT INJ PAST YR: ICD-10-PCS | Mod: CPTII,S$GLB,, | Performed by: INTERNAL MEDICINE

## 2022-07-12 PROCEDURE — 1101F PT FALLS ASSESS-DOCD LE1/YR: CPT | Mod: CPTII,S$GLB,, | Performed by: INTERNAL MEDICINE

## 2022-07-12 PROCEDURE — 3060F POS MICROALBUMINURIA REV: CPT | Mod: CPTII,S$GLB,, | Performed by: INTERNAL MEDICINE

## 2022-07-12 PROCEDURE — 3066F NEPHROPATHY DOC TX: CPT | Mod: CPTII,S$GLB,, | Performed by: INTERNAL MEDICINE

## 2022-07-12 PROCEDURE — 1126F AMNT PAIN NOTED NONE PRSNT: CPT | Mod: CPTII,S$GLB,, | Performed by: INTERNAL MEDICINE

## 2022-07-12 PROCEDURE — 3060F PR POS MICROALBUMINURIA RESULT DOCUMENTED/REVIEW: ICD-10-PCS | Mod: CPTII,S$GLB,, | Performed by: INTERNAL MEDICINE

## 2022-07-12 PROCEDURE — 3044F PR MOST RECENT HEMOGLOBIN A1C LEVEL <7.0%: ICD-10-PCS | Mod: CPTII,S$GLB,, | Performed by: INTERNAL MEDICINE

## 2022-07-12 PROCEDURE — 99499 RISK ADDL DX/OHS AUDIT: ICD-10-PCS | Mod: S$GLB,,, | Performed by: INTERNAL MEDICINE

## 2022-07-12 PROCEDURE — 4010F ACE/ARB THERAPY RXD/TAKEN: CPT | Mod: CPTII,S$GLB,, | Performed by: INTERNAL MEDICINE

## 2022-07-12 PROCEDURE — 1159F PR MEDICATION LIST DOCUMENTED IN MEDICAL RECORD: ICD-10-PCS | Mod: CPTII,S$GLB,, | Performed by: INTERNAL MEDICINE

## 2022-07-12 PROCEDURE — 99999 PR PBB SHADOW E&M-EST. PATIENT-LVL IV: CPT | Mod: PBBFAC,,, | Performed by: INTERNAL MEDICINE

## 2022-07-12 PROCEDURE — 99999 PR PBB SHADOW E&M-EST. PATIENT-LVL IV: ICD-10-PCS | Mod: PBBFAC,,, | Performed by: INTERNAL MEDICINE

## 2022-07-12 PROCEDURE — 3079F DIAST BP 80-89 MM HG: CPT | Mod: CPTII,S$GLB,, | Performed by: INTERNAL MEDICINE

## 2022-07-12 NOTE — PROGRESS NOTES
Subjective:   Patient ID:  Deniz Paulino is a 71 y.o. male who presents for follow up of No chief complaint on file.      HPI  11/16/2020  A 68 YO MALE WITH CAD S/P CABG OLD MI HTN DIABETES HLP IS HERE FOR F/U . HE HAS NOT BEEN EXERCISING CLAIMS COMPLIANCE. HAD COVID MILD CASE NO ILL EFFECTS IN AUGUST HAS NO NEW CHEST PAIN SHORTNESS OF BREATH ORTHOPNEA PND. HE IS COMPLIANT WITH SALT. HE HA SNO OTHER CARDIOVASCULAR COMPLAINTS. NO CHANGE IN WEIGHT. A1C 6% LDL ON TARGET. HIS A1C IS INCREASED.      7/14/2021  HERE FROF /U NO NEW SYMPTOMS HE HAS NOT BEEN EXERCISES HE TRIES TO BE COMPLIANT WITH DIET WEIGHT UNCHANGED HAS NO CARDIOVASCULAR SYMPTOMS.      12/2/2021    has  been eating lately for the holidays has been on the sedentary side takes meds regularily asymptomatic cardiovascular wise.     7/12/2022  Here for f /u has rt upper extremity numbness at nite at times has h/o carpal tunnel surgery no associated weakness .has no angina no shortness of breath had nose bleed that stopped he is taking his asa . tries to be compliant with diet.has numbness in feet.   Past Medical History:   Diagnosis Date    Acute coronary syndrome     Coronary artery disease     Diabetes mellitus 4/24/2014    Hyperlipidemia     Hypertension     Old MI (myocardial infarction) 4/24/2014    S/P CABG (coronary artery bypass graft) 4/24/2014       Past Surgical History:   Procedure Laterality Date    CARDIAC CATHETERIZATION      COLONOSCOPY N/A 07/26/2019    Procedure: COLONOSCOPY;  Surgeon: Opal Oshea MD;  Location: [facility] ENDO;  Service: Endoscopy;  Laterality: N/A;    CORONARY ARTERY BYPASS GRAFT      ESOPHAGOGASTRODUODENOSCOPY N/A 07/26/2019    Procedure: ESOPHAGOGASTRODUODENOSCOPY (EGD);  Surgeon: Opal Oshea MD;  Location: [facility];  Service: Endoscopy;  Laterality: N/A;    SKIN CANCER EXCISION  09/2018    VASECTOMY         Social History     Tobacco Use    Smoking status: Former Smoker     Packs/day: 1.00      Years: 30.00     Pack years: 30.00     Types: Cigarettes     Quit date: 2003     Years since quittin.4    Smokeless tobacco: Never Used   Substance Use Topics    Alcohol use: Yes     Comment: socially    Drug use: Never       Family History   Problem Relation Age of Onset    Diabetes Mother     Heart murmur Mother     Cancer Mother         Breast    Stroke Mother     Alcohol abuse Father     Heart attack Father 63    Cancer Father         Esophageal    Diabetes Brother        Current Outpatient Medications   Medication Sig    amLODIPine (NORVASC) 10 MG tablet Take 1 tablet (10 mg total) by mouth once daily.    aspirin (ECOTRIN) 325 MG EC tablet Take 325 mg by mouth once daily.    atorvastatin (LIPITOR) 40 MG tablet Take 1 tablet (40 mg total) by mouth once daily.    coenzyme Q10 200 mg capsule Take 200 mg by mouth 2 (two) times daily.    cyanocobalamin (VITAMIN B-12) 1000 MCG tablet Take 1,000 mcg by mouth once daily.     hydroCHLOROthiazide (HYDRODIURIL) 12.5 MG Tab Take 1 tablet (12.5 mg total) by mouth once daily.    lisinopriL (PRINIVIL,ZESTRIL) 40 MG tablet Take 1 tablet (40 mg total) by mouth once daily.    LORATADINE (ALAVERT ORAL) Take 1 tablet by mouth as needed.    magnesium oxide (MAG-OX) 400 mg (241.3 mg magnesium) tablet Take 400 mg by mouth once daily.    metFORMIN (GLUCOPHAGE) 1000 MG tablet Take 1 tablet (1,000 mg total) by mouth 2 (two) times daily with meals.    methocarbamoL (ROBAXIN) 500 MG Tab Take 1 tablet (500 mg total) by mouth 4 (four) times daily as needed (muscle spasm).    metoprolol succinate (TOPROL-XL) 50 MG 24 hr tablet Take 1 tablet (50 mg total) by mouth once daily.    pantoprazole (PROTONIX) 20 MG tablet Take 1 tablet (20 mg total) by mouth once daily.    vitamin D 1000 units Tab Take 1,000 Units by mouth once daily.      No current facility-administered medications for this visit.     Current Outpatient Medications on File Prior to Visit    Medication Sig    amLODIPine (NORVASC) 10 MG tablet Take 1 tablet (10 mg total) by mouth once daily.    aspirin (ECOTRIN) 325 MG EC tablet Take 325 mg by mouth once daily.    atorvastatin (LIPITOR) 40 MG tablet Take 1 tablet (40 mg total) by mouth once daily.    coenzyme Q10 200 mg capsule Take 200 mg by mouth 2 (two) times daily.    cyanocobalamin (VITAMIN B-12) 1000 MCG tablet Take 1,000 mcg by mouth once daily.     hydroCHLOROthiazide (HYDRODIURIL) 12.5 MG Tab Take 1 tablet (12.5 mg total) by mouth once daily.    lisinopriL (PRINIVIL,ZESTRIL) 40 MG tablet Take 1 tablet (40 mg total) by mouth once daily.    LORATADINE (ALAVERT ORAL) Take 1 tablet by mouth as needed.    magnesium oxide (MAG-OX) 400 mg (241.3 mg magnesium) tablet Take 400 mg by mouth once daily.    metFORMIN (GLUCOPHAGE) 1000 MG tablet Take 1 tablet (1,000 mg total) by mouth 2 (two) times daily with meals.    methocarbamoL (ROBAXIN) 500 MG Tab Take 1 tablet (500 mg total) by mouth 4 (four) times daily as needed (muscle spasm).    metoprolol succinate (TOPROL-XL) 50 MG 24 hr tablet Take 1 tablet (50 mg total) by mouth once daily.    pantoprazole (PROTONIX) 20 MG tablet Take 1 tablet (20 mg total) by mouth once daily.    vitamin D 1000 units Tab Take 1,000 Units by mouth once daily.      No current facility-administered medications on file prior to visit.     Review of patient's allergies indicates:  No Known Allergies  Review of Systems   Constitutional: Negative for malaise/fatigue.   HENT: Positive for nosebleeds.    Eyes: Negative for blurred vision.   Cardiovascular: Negative for chest pain, claudication, cyanosis, dyspnea on exertion, irregular heartbeat, leg swelling, near-syncope, orthopnea, palpitations and paroxysmal nocturnal dyspnea.   Respiratory: Negative for cough, hemoptysis and shortness of breath.    Hematologic/Lymphatic: Negative for bleeding problem. Does not bruise/bleed easily.   Skin: Negative for dry skin and  itching.   Musculoskeletal: Negative for falls, muscle weakness and myalgias.   Gastrointestinal: Negative for abdominal pain, diarrhea, heartburn, hematemesis, hematochezia and melena.   Genitourinary: Negative for flank pain and hematuria.   Neurological: Positive for numbness and paresthesias. Negative for dizziness, focal weakness, headaches, light-headedness, seizures and weakness.   Psychiatric/Behavioral: Negative for altered mental status and memory loss. The patient is not nervous/anxious.    Allergic/Immunologic: Negative for hives.       Objective:   Physical Exam  Vitals and nursing note reviewed.   Constitutional:       General: He is not in acute distress.     Appearance: He is well-developed. He is not diaphoretic.   HENT:      Head: Normocephalic and atraumatic.   Eyes:      General:         Right eye: No discharge.         Left eye: No discharge.      Pupils: Pupils are equal, round, and reactive to light.   Neck:      Thyroid: No thyromegaly.      Vascular: No JVD.   Cardiovascular:      Rate and Rhythm: Normal rate and regular rhythm.      Pulses: Intact distal pulses.      Heart sounds: Normal heart sounds. No murmur heard.    No friction rub. No gallop.   Pulmonary:      Effort: Pulmonary effort is normal. No respiratory distress.      Breath sounds: Normal breath sounds. No wheezing or rales.      Comments: Scar cabg well healed.   Chest:      Chest wall: No tenderness.   Abdominal:      General: Bowel sounds are normal. There is no distension.      Palpations: Abdomen is soft.      Tenderness: There is no abdominal tenderness.   Musculoskeletal:         General: Normal range of motion.      Cervical back: Neck supple.      Right lower leg: No edema.      Left lower leg: No edema.      Comments: Scar venous harvest site well healed.    Skin:     General: Skin is warm and dry.      Findings: No erythema or rash.   Neurological:      Mental Status: He is alert and oriented to person, place, and  "time.      Cranial Nerves: No cranial nerve deficit.   Psychiatric:         Behavior: Behavior normal.         Judgment: Judgment normal.       Vitals:    07/12/22 1447 07/12/22 1450   BP: 128/78 126/80   BP Location: Left arm Right arm   Patient Position: Sitting Sitting   BP Method: Large (Manual) Large (Manual)   Pulse: 68    SpO2: 97%    Weight: 100.7 kg (222 lb 0.1 oz)    Height: 5' 10" (1.778 m)      Lab Results   Component Value Date    CHOL 129 05/04/2022    CHOL 125 11/04/2021    CHOL 135 05/10/2021     Lab Results   Component Value Date    HDL 50 05/04/2022    HDL 45 11/04/2021    HDL 50 05/10/2021     Lab Results   Component Value Date    LDLCALC 56.8 (L) 05/04/2022    LDLCALC 57.2 (L) 11/04/2021    LDLCALC 61.8 (L) 05/10/2021     Lab Results   Component Value Date    TRIG 111 05/04/2022    TRIG 114 11/04/2021    TRIG 116 05/10/2021     Lab Results   Component Value Date    CHOLHDL 38.8 05/04/2022    CHOLHDL 36.0 11/04/2021    CHOLHDL 37.0 05/10/2021       Chemistry        Component Value Date/Time     05/04/2022 0806     05/04/2022 0806    K 4.9 05/04/2022 0806    K 4.9 05/04/2022 0806     05/04/2022 0806     05/04/2022 0806    CO2 31 (H) 05/04/2022 0806    CO2 31 (H) 05/04/2022 0806    BUN 13 05/04/2022 0806    BUN 13 05/04/2022 0806    CREATININE 1.0 05/04/2022 0806    CREATININE 1.0 05/04/2022 0806     05/04/2022 0806     05/04/2022 0806        Component Value Date/Time    CALCIUM 10.0 05/04/2022 0806    CALCIUM 10.0 05/04/2022 0806    ALKPHOS 88 05/04/2022 0806    ALKPHOS 88 05/04/2022 0806    AST 26 05/04/2022 0806    AST 26 05/04/2022 0806    ALT 19 05/04/2022 0806    ALT 19 05/04/2022 0806    BILITOT 0.8 05/04/2022 0806    BILITOT 0.8 05/04/2022 0806    ESTGFRAFRICA >60.0 05/04/2022 0806    ESTGFRAFRICA >60.0 05/04/2022 0806    EGFRNONAA >60.0 05/04/2022 0806    EGFRNONAA >60.0 05/04/2022 0806        Lab Results   Component Value Date    HGBA1C 5.7 (H) " 05/04/2022    HGBA1C 5.7 (H) 05/04/2022       Lab Results   Component Value Date    TSH 2.314 05/04/2022     Lab Results   Component Value Date    INR 1.0 09/19/2009     Lab Results   Component Value Date    WBC 6.05 05/10/2021    HGB 14.0 05/10/2021    HCT 40.6 05/10/2021    MCV 94 05/10/2021     05/10/2021     BMP  Sodium   Date Value Ref Range Status   05/04/2022 140 136 - 145 mmol/L Final   05/04/2022 140 136 - 145 mmol/L Final     Potassium   Date Value Ref Range Status   05/04/2022 4.9 3.5 - 5.1 mmol/L Final   05/04/2022 4.9 3.5 - 5.1 mmol/L Final     Chloride   Date Value Ref Range Status   05/04/2022 101 95 - 110 mmol/L Final   05/04/2022 101 95 - 110 mmol/L Final     CO2   Date Value Ref Range Status   05/04/2022 31 (H) 23 - 29 mmol/L Final   05/04/2022 31 (H) 23 - 29 mmol/L Final     BUN   Date Value Ref Range Status   05/04/2022 13 8 - 23 mg/dL Final   05/04/2022 13 8 - 23 mg/dL Final     Creatinine   Date Value Ref Range Status   05/04/2022 1.0 0.5 - 1.4 mg/dL Final   05/04/2022 1.0 0.5 - 1.4 mg/dL Final     Calcium   Date Value Ref Range Status   05/04/2022 10.0 8.7 - 10.5 mg/dL Final   05/04/2022 10.0 8.7 - 10.5 mg/dL Final     Anion Gap   Date Value Ref Range Status   05/04/2022 8 8 - 16 mmol/L Final   05/04/2022 8 8 - 16 mmol/L Final     eGFR if    Date Value Ref Range Status   05/04/2022 >60.0 >60 mL/min/1.73 m^2 Final   05/04/2022 >60.0 >60 mL/min/1.73 m^2 Final     eGFR if non    Date Value Ref Range Status   05/04/2022 >60.0 >60 mL/min/1.73 m^2 Final     Comment:     Calculation used to obtain the estimated glomerular filtration  rate (eGFR) is the CKD-EPI equation.      05/04/2022 >60.0 >60 mL/min/1.73 m^2 Final     Comment:     Calculation used to obtain the estimated glomerular filtration  rate (eGFR) is the CKD-EPI equation.        CrCl cannot be calculated (Patient's most recent lab result is older than the maximum 7 days allowed.).    Assessment:      1. Coronary artery disease involving native coronary artery of native heart without angina pectoris    2. Hyperlipidemia associated with type 2 diabetes mellitus    3. Hypertension associated with diabetes    4. Old MI (myocardial infarction)    5. S/P CABG (coronary artery bypass graft)    6. Diabetes mellitus due to underlying condition with hyperosmolarity without coma, without long-term current use of insulin      Cad s/p cabg asymptomatic good exercise tolerance suggest continued exercise weight loss.    htn controlled continue same    diabetes on target continue same.  hlp on target continue same tolerating meds.    counseled him about weight loss exercise to modify risk factors.   Has neuropathy symptoms form diabetes in lower extremity he will discuss with pcp  Has numbness rt hand arm suggested nerve conduction study neuro eval he elects to discuss with hios pcop on f/u will seek care earlier if symptoms worse. May be diabetic neuropathy vs carpal tunnel recurrence.  Plan:     reepat lipids cmp a1c in November.  Continue current therapy  Cardiac low salt diet.  Risk factor modification and excercise program./weight loss  F/u in 6 months with lipid cmp a1c

## 2022-09-02 DIAGNOSIS — E78.5 HYPERLIPIDEMIA ASSOCIATED WITH TYPE 2 DIABETES MELLITUS: ICD-10-CM

## 2022-09-02 DIAGNOSIS — I15.2 HYPERTENSION ASSOCIATED WITH DIABETES: ICD-10-CM

## 2022-09-02 DIAGNOSIS — I10 ESSENTIAL HYPERTENSION: ICD-10-CM

## 2022-09-02 DIAGNOSIS — E11.69 HYPERLIPIDEMIA ASSOCIATED WITH TYPE 2 DIABETES MELLITUS: ICD-10-CM

## 2022-09-02 DIAGNOSIS — E11.59 HYPERTENSION ASSOCIATED WITH DIABETES: ICD-10-CM

## 2022-09-02 RX ORDER — AMLODIPINE BESYLATE 10 MG/1
10 TABLET ORAL DAILY
Qty: 90 TABLET | Refills: 3 | Status: SHIPPED | OUTPATIENT
Start: 2022-09-02 | End: 2022-12-12

## 2022-09-02 RX ORDER — ATORVASTATIN CALCIUM 40 MG/1
40 TABLET, FILM COATED ORAL DAILY
Qty: 90 TABLET | Refills: 3 | Status: SHIPPED | OUTPATIENT
Start: 2022-09-02 | End: 2023-08-28

## 2022-11-14 ENCOUNTER — LAB VISIT (OUTPATIENT)
Dept: LAB | Facility: HOSPITAL | Age: 72
End: 2022-11-14
Attending: INTERNAL MEDICINE
Payer: MEDICARE

## 2022-11-14 DIAGNOSIS — E11.9 TYPE 2 DIABETES MELLITUS WITHOUT COMPLICATION, WITHOUT LONG-TERM CURRENT USE OF INSULIN: ICD-10-CM

## 2022-11-14 DIAGNOSIS — E08.00 DIABETES MELLITUS DUE TO UNDERLYING CONDITION WITH HYPEROSMOLARITY WITHOUT COMA, WITHOUT LONG-TERM CURRENT USE OF INSULIN: ICD-10-CM

## 2022-11-14 DIAGNOSIS — I25.10 CORONARY ARTERY DISEASE INVOLVING NATIVE CORONARY ARTERY OF NATIVE HEART WITHOUT ANGINA PECTORIS: ICD-10-CM

## 2022-11-14 LAB
ALBUMIN SERPL BCP-MCNC: 3.9 G/DL (ref 3.5–5.2)
ALP SERPL-CCNC: 80 U/L (ref 55–135)
ALT SERPL W/O P-5'-P-CCNC: 23 U/L (ref 10–44)
ANION GAP SERPL CALC-SCNC: 9 MMOL/L (ref 8–16)
AST SERPL-CCNC: 22 U/L (ref 10–40)
BILIRUB SERPL-MCNC: 0.9 MG/DL (ref 0.1–1)
BUN SERPL-MCNC: 13 MG/DL (ref 8–23)
CALCIUM SERPL-MCNC: 9.5 MG/DL (ref 8.7–10.5)
CHLORIDE SERPL-SCNC: 101 MMOL/L (ref 95–110)
CHOLEST SERPL-MCNC: 131 MG/DL (ref 120–199)
CHOLEST/HDLC SERPL: 2.5 {RATIO} (ref 2–5)
CO2 SERPL-SCNC: 29 MMOL/L (ref 23–29)
CREAT SERPL-MCNC: 1.1 MG/DL (ref 0.5–1.4)
EST. GFR  (NO RACE VARIABLE): >60 ML/MIN/1.73 M^2
ESTIMATED AVG GLUCOSE: 120 MG/DL (ref 68–131)
GLUCOSE SERPL-MCNC: 119 MG/DL (ref 70–110)
HBA1C MFR BLD: 5.8 % (ref 4–5.6)
HDLC SERPL-MCNC: 52 MG/DL (ref 40–75)
HDLC SERPL: 39.7 % (ref 20–50)
LDLC SERPL CALC-MCNC: 58 MG/DL (ref 63–159)
NONHDLC SERPL-MCNC: 79 MG/DL
POTASSIUM SERPL-SCNC: 4 MMOL/L (ref 3.5–5.1)
PROT SERPL-MCNC: 6.6 G/DL (ref 6–8.4)
SODIUM SERPL-SCNC: 139 MMOL/L (ref 136–145)
TRIGL SERPL-MCNC: 105 MG/DL (ref 30–150)

## 2022-11-14 PROCEDURE — 80053 COMPREHEN METABOLIC PANEL: CPT | Performed by: INTERNAL MEDICINE

## 2022-11-14 PROCEDURE — 80061 LIPID PANEL: CPT | Performed by: INTERNAL MEDICINE

## 2022-11-14 PROCEDURE — 83036 HEMOGLOBIN GLYCOSYLATED A1C: CPT | Performed by: INTERNAL MEDICINE

## 2022-11-14 PROCEDURE — 36415 COLL VENOUS BLD VENIPUNCTURE: CPT | Performed by: INTERNAL MEDICINE

## 2022-11-17 ENCOUNTER — LAB VISIT (OUTPATIENT)
Dept: LAB | Facility: HOSPITAL | Age: 72
End: 2022-11-17
Attending: INTERNAL MEDICINE
Payer: MEDICARE

## 2022-11-17 ENCOUNTER — OFFICE VISIT (OUTPATIENT)
Dept: INTERNAL MEDICINE | Facility: CLINIC | Age: 72
End: 2022-11-17
Payer: MEDICARE

## 2022-11-17 VITALS
HEART RATE: 52 BPM | DIASTOLIC BLOOD PRESSURE: 80 MMHG | HEIGHT: 70 IN | BODY MASS INDEX: 32.35 KG/M2 | WEIGHT: 226 LBS | TEMPERATURE: 96 F | SYSTOLIC BLOOD PRESSURE: 138 MMHG | OXYGEN SATURATION: 97 %

## 2022-11-17 DIAGNOSIS — R42 DIZZINESS: Primary | ICD-10-CM

## 2022-11-17 DIAGNOSIS — E08.00 DIABETES MELLITUS DUE TO UNDERLYING CONDITION WITH HYPEROSMOLARITY WITHOUT COMA, WITHOUT LONG-TERM CURRENT USE OF INSULIN: ICD-10-CM

## 2022-11-17 DIAGNOSIS — E11.69 HYPERLIPIDEMIA ASSOCIATED WITH TYPE 2 DIABETES MELLITUS: ICD-10-CM

## 2022-11-17 DIAGNOSIS — I15.2 HYPERTENSION ASSOCIATED WITH DIABETES: ICD-10-CM

## 2022-11-17 DIAGNOSIS — Z12.5 ENCOUNTER FOR SCREENING FOR MALIGNANT NEOPLASM OF PROSTATE: ICD-10-CM

## 2022-11-17 DIAGNOSIS — R42 DIZZINESS: ICD-10-CM

## 2022-11-17 DIAGNOSIS — I25.10 CORONARY ARTERY DISEASE INVOLVING NATIVE CORONARY ARTERY OF NATIVE HEART WITHOUT ANGINA PECTORIS: ICD-10-CM

## 2022-11-17 DIAGNOSIS — E78.5 HYPERLIPIDEMIA ASSOCIATED WITH TYPE 2 DIABETES MELLITUS: ICD-10-CM

## 2022-11-17 DIAGNOSIS — E11.59 HYPERTENSION ASSOCIATED WITH DIABETES: ICD-10-CM

## 2022-11-17 LAB — TSH SERPL DL<=0.005 MIU/L-ACNC: 1.99 UIU/ML (ref 0.4–4)

## 2022-11-17 PROCEDURE — 99499 RISK ADDL DX/OHS AUDIT: ICD-10-PCS | Mod: S$GLB,,, | Performed by: INTERNAL MEDICINE

## 2022-11-17 PROCEDURE — 1101F PR PT FALLS ASSESS DOC 0-1 FALLS W/OUT INJ PAST YR: ICD-10-PCS | Mod: CPTII,S$GLB,, | Performed by: INTERNAL MEDICINE

## 2022-11-17 PROCEDURE — 3060F POS MICROALBUMINURIA REV: CPT | Mod: CPTII,S$GLB,, | Performed by: INTERNAL MEDICINE

## 2022-11-17 PROCEDURE — 99214 OFFICE O/P EST MOD 30 MIN: CPT | Mod: S$GLB,,, | Performed by: INTERNAL MEDICINE

## 2022-11-17 PROCEDURE — 3066F NEPHROPATHY DOC TX: CPT | Mod: CPTII,S$GLB,, | Performed by: INTERNAL MEDICINE

## 2022-11-17 PROCEDURE — 3060F PR POS MICROALBUMINURIA RESULT DOCUMENTED/REVIEW: ICD-10-PCS | Mod: CPTII,S$GLB,, | Performed by: INTERNAL MEDICINE

## 2022-11-17 PROCEDURE — 3044F PR MOST RECENT HEMOGLOBIN A1C LEVEL <7.0%: ICD-10-PCS | Mod: CPTII,S$GLB,, | Performed by: INTERNAL MEDICINE

## 2022-11-17 PROCEDURE — 99999 PR PBB SHADOW E&M-EST. PATIENT-LVL V: ICD-10-PCS | Mod: PBBFAC,,, | Performed by: INTERNAL MEDICINE

## 2022-11-17 PROCEDURE — 99214 PR OFFICE/OUTPT VISIT, EST, LEVL IV, 30-39 MIN: ICD-10-PCS | Mod: S$GLB,,, | Performed by: INTERNAL MEDICINE

## 2022-11-17 PROCEDURE — 3075F PR MOST RECENT SYSTOLIC BLOOD PRESS GE 130-139MM HG: ICD-10-PCS | Mod: CPTII,S$GLB,, | Performed by: INTERNAL MEDICINE

## 2022-11-17 PROCEDURE — 85025 COMPLETE CBC W/AUTO DIFF WBC: CPT | Performed by: INTERNAL MEDICINE

## 2022-11-17 PROCEDURE — 4010F ACE/ARB THERAPY RXD/TAKEN: CPT | Mod: CPTII,S$GLB,, | Performed by: INTERNAL MEDICINE

## 2022-11-17 PROCEDURE — 99999 PR PBB SHADOW E&M-EST. PATIENT-LVL V: CPT | Mod: PBBFAC,,, | Performed by: INTERNAL MEDICINE

## 2022-11-17 PROCEDURE — 3008F BODY MASS INDEX DOCD: CPT | Mod: CPTII,S$GLB,, | Performed by: INTERNAL MEDICINE

## 2022-11-17 PROCEDURE — 3075F SYST BP GE 130 - 139MM HG: CPT | Mod: CPTII,S$GLB,, | Performed by: INTERNAL MEDICINE

## 2022-11-17 PROCEDURE — 36415 COLL VENOUS BLD VENIPUNCTURE: CPT | Performed by: INTERNAL MEDICINE

## 2022-11-17 PROCEDURE — 1126F AMNT PAIN NOTED NONE PRSNT: CPT | Mod: CPTII,S$GLB,, | Performed by: INTERNAL MEDICINE

## 2022-11-17 PROCEDURE — 1126F PR PAIN SEVERITY QUANTIFIED, NO PAIN PRESENT: ICD-10-PCS | Mod: CPTII,S$GLB,, | Performed by: INTERNAL MEDICINE

## 2022-11-17 PROCEDURE — 3288F FALL RISK ASSESSMENT DOCD: CPT | Mod: CPTII,S$GLB,, | Performed by: INTERNAL MEDICINE

## 2022-11-17 PROCEDURE — 99499 UNLISTED E&M SERVICE: CPT | Mod: S$GLB,,, | Performed by: INTERNAL MEDICINE

## 2022-11-17 PROCEDURE — 4010F PR ACE/ARB THEARPY RXD/TAKEN: ICD-10-PCS | Mod: CPTII,S$GLB,, | Performed by: INTERNAL MEDICINE

## 2022-11-17 PROCEDURE — 1101F PT FALLS ASSESS-DOCD LE1/YR: CPT | Mod: CPTII,S$GLB,, | Performed by: INTERNAL MEDICINE

## 2022-11-17 PROCEDURE — 3288F PR FALLS RISK ASSESSMENT DOCUMENTED: ICD-10-PCS | Mod: CPTII,S$GLB,, | Performed by: INTERNAL MEDICINE

## 2022-11-17 PROCEDURE — 84443 ASSAY THYROID STIM HORMONE: CPT | Performed by: INTERNAL MEDICINE

## 2022-11-17 PROCEDURE — 3066F PR DOCUMENTATION OF TREATMENT FOR NEPHROPATHY: ICD-10-PCS | Mod: CPTII,S$GLB,, | Performed by: INTERNAL MEDICINE

## 2022-11-17 PROCEDURE — 3044F HG A1C LEVEL LT 7.0%: CPT | Mod: CPTII,S$GLB,, | Performed by: INTERNAL MEDICINE

## 2022-11-17 PROCEDURE — 3008F PR BODY MASS INDEX (BMI) DOCUMENTED: ICD-10-PCS | Mod: CPTII,S$GLB,, | Performed by: INTERNAL MEDICINE

## 2022-11-17 PROCEDURE — 3079F DIAST BP 80-89 MM HG: CPT | Mod: CPTII,S$GLB,, | Performed by: INTERNAL MEDICINE

## 2022-11-17 PROCEDURE — 3079F PR MOST RECENT DIASTOLIC BLOOD PRESSURE 80-89 MM HG: ICD-10-PCS | Mod: CPTII,S$GLB,, | Performed by: INTERNAL MEDICINE

## 2022-11-17 RX ORDER — MECLIZINE HYDROCHLORIDE 25 MG/1
25 TABLET ORAL 3 TIMES DAILY PRN
Qty: 15 TABLET | Refills: 0 | Status: SHIPPED | OUTPATIENT
Start: 2022-11-17 | End: 2023-05-17 | Stop reason: ALTCHOICE

## 2022-11-17 NOTE — PATIENT INSTRUCTIONS
Take your alavert daily.       Try [employer] Exercises.       Covid vaccine phone number is [phone].

## 2022-11-17 NOTE — PROGRESS NOTES
Subjective:      Patient ID: Deniz Paulino is a 72 y.o. male.    Chief Complaint: Dizziness    73 yo with   Patient Active Problem List   Diagnosis    CAD (coronary artery disease)    Hypertension associated with diabetes    Hyperlipidemia associated with type 2 diabetes mellitus    S/P CABG (coronary artery bypass graft)    Old MI (myocardial infarction)    Diabetes mellitus    Hypomagnesemia     Past Medical History:   Diagnosis Date    Acute coronary syndrome     Coronary artery disease     Diabetes mellitus 4/24/2014    Hyperlipidemia     Hypertension     Old MI (myocardial infarction) 4/24/2014    S/P CABG (coronary artery bypass graft) 4/24/2014     Here today for management of mult med problems as outlined below.  Compliant with meds without significant side effects. Energy and appetite good.   Pt also c/o dizziness.     Dizziness:   Chronicity:  New  Onset: 3 to 4 days.  Progression since onset:  Unchanged  Frequency:  Constantly  Pain Scale:  0/10  Dizziness characteristics:  Sensation of movement  Frequency of Spells:  Daily  Dizziness duration: minutes.no hearing loss, no ear pain, no fever, no headaches, no tinnitus, no nausea, no vomiting, no diaphoresis, no weakness, no visual disturbances, no palpitations, no facial weakness, no slurred speech and no numbness in extremities.  Aggravated by:  Getting up and position changes  Review of Systems   Constitutional:  Negative for diaphoresis and fever.   HENT:  Positive for congestion (nasal), postnasal drip and rhinorrhea. Negative for ear pain, hearing loss, sinus pressure, sinus pain and tinnitus.    Eyes:  Negative for visual disturbance.   Cardiovascular:  Negative for palpitations.   Gastrointestinal:  Negative for nausea and vomiting.   Neurological:  Positive for dizziness. Negative for weakness and headaches.     70 yo with   Patient Active Problem List   Diagnosis    CAD (coronary artery disease)    Hypertension associated with diabetes     "Hyperlipidemia associated with type 2 diabetes mellitus    S/P CABG (coronary artery bypass graft)    Old MI (myocardial infarction)    Diabetes mellitus    Hypomagnesemia     Past Medical History:   Diagnosis Date    Acute coronary syndrome     Coronary artery disease     Diabetes mellitus 4/24/2014    Hyperlipidemia     Hypertension     Old MI (myocardial infarction) 4/24/2014    S/P CABG (coronary artery bypass graft) 4/24/2014     Here today for management of mult med problems as outlined below.  Compliant with meds without significant side effects. Energy and appetite good.     Reports flu vaccine up to date.       Objective:   /80 (BP Location: Left arm, Patient Position: Sitting, BP Method: Large (Manual))   Pulse (!) 52   Temp 96.1 °F (35.6 °C) (Tympanic)   Ht 5' 10" (1.778 m)   Wt 102.5 kg (225 lb 15.5 oz)   SpO2 97%   BMI 32.42 kg/m²     Physical Exam  Constitutional:       General: He is not in acute distress.     Appearance: He is well-developed.   HENT:      Head: Normocephalic and atraumatic.   Eyes:      Extraocular Movements: Extraocular movements intact.   Neck:      Thyroid: No thyromegaly.   Cardiovascular:      Rate and Rhythm: Normal rate and regular rhythm.   Pulmonary:      Breath sounds: Normal breath sounds. No wheezing or rales.   Abdominal:      General: Bowel sounds are normal.      Palpations: Abdomen is soft.      Tenderness: There is no abdominal tenderness.   Musculoskeletal:         General: No swelling.      Cervical back: Neck supple. No rigidity.   Lymphadenopathy:      Cervical: No cervical adenopathy.   Skin:     General: Skin is warm and dry.   Neurological:      General: No focal deficit present.      Mental Status: He is alert and oriented to person, place, and time.      Cranial Nerves: No cranial nerve deficit.      Motor: No weakness.      Gait: Gait normal.   Psychiatric:         Mood and Affect: Mood normal.         Behavior: Behavior normal.         Thought " Content: Thought content normal.       Lab Results   Component Value Date    WBC 6.63 11/17/2022    HGB 13.8 (L) 11/17/2022    HGB 14.0 05/10/2021    HGB 15.2 06/24/2020    HCT 41.7 11/17/2022    MCV 98 11/17/2022    MCV 94 05/10/2021    MCV 94 06/24/2020     11/17/2022    CHOL 131 11/14/2022    TRIG 105 11/14/2022    HDL 52 11/14/2022    LDLCALC 58.0 (L) 11/14/2022    LDLCALC 56.8 (L) 05/04/2022    LDLCALC 57.2 (L) 11/04/2021    ALT 23 11/14/2022    AST 22 11/14/2022     11/14/2022    K 4.0 11/14/2022     11/14/2022    CO2 29 11/14/2022    BUN 13 11/14/2022    CREATININE 1.1 11/14/2022    CREATININE 1.0 05/04/2022    CREATININE 1.0 05/04/2022    EGFRNORACEVR >60.0 11/14/2022    TSH 1.991 11/17/2022    TSH 2.314 05/04/2022    TSH 2.089 05/10/2021    PSA 1.5 05/04/2022    PSA 1.4 05/10/2021    PSA 1.3 05/18/2020     (H) 11/14/2022    HGBA1C 5.8 (H) 11/14/2022    HGBA1C 5.7 (H) 05/04/2022    HGBA1C 5.7 (H) 05/04/2022    [id number] 43 05/22/2017          The ASCVD Risk score (Guillermo DK, et al., 2019) failed to calculate for the following reasons:    The patient has a prior MI or stroke diagnosis     Assessment:     1. Dizziness    2. Coronary artery disease involving native coronary artery of native heart without angina pectoris    3. Hypertension associated with diabetes    4. Hyperlipidemia associated with type 2 diabetes mellitus    5. Diabetes mellitus due to underlying condition with hyperosmolarity without coma, without long-term current use of insulin    6. Encounter for screening for malignant neoplasm of prostate      Plan:   1. Dizziness  -     meclizine (ANTIVERT) 25 mg tablet; Take 1 tablet (25 mg total) by mouth 3 (three) times daily as needed for Dizziness.  Dispense: 15 tablet; Refill: 0  -     CBC Auto Differential; Future; Expected date: 11/17/2022  -     Ambulatory referral/consult to ENT; Future; Expected date: 11/24/2022    2. Coronary artery disease involving native  coronary artery of native heart without angina pectoris  Overview:  Vickie Bennett      3. Hypertension associated with diabetes  Controlled    4. Hyperlipidemia associated with type 2 diabetes mellitus  -     TSH; Future; Expected date: 11/17/2022    5. Diabetes mellitus due to underlying condition with hyperosmolarity without coma, without long-term current use of insulin  -     Hemoglobin A1C; Future; Expected date: 05/16/2023    6. Encounter for screening for malignant neoplasm of prostate  -     PSA, Screening; Future; Expected date: 05/16/2023          Future Appointments   Date Time Provider Department Center   12/23/2022 10:15 AM ECHOCARDIOGRAM, [facility]   12/23/2022 11:45 AM [facility] NM2 CAM1 CARDIAC [facility]   12/23/2022 12:45 PM TREADMILL, NUCLEAR MEDICINE [facility]   12/23/2022  1:45 PM [facility] NM2 CAM1 CARDIAC [facility] [place]   1/19/2023  9:00 AM Kimberly Bennett MD [facility] CARDIO  [facility]   5/10/2023  8:30 AM LABORATORY, [facility] [place]   5/17/2023 10:00 AM Hiro Kraft MD [facility]           Follow up in about 6 months (around 5/17/2023).         Patient Instructions   Take your alavert daily.       Try Carr-Dar[employer] Exercises.       Covid vaccine phone number is [phone].

## 2022-11-18 LAB
BASOPHILS # BLD AUTO: 0.03 K/UL (ref 0–0.2)
BASOPHILS NFR BLD: 0.5 % (ref 0–1.9)
DIFFERENTIAL METHOD: ABNORMAL
EOSINOPHIL # BLD AUTO: 0.1 K/UL (ref 0–0.5)
EOSINOPHIL NFR BLD: 1.2 % (ref 0–8)
ERYTHROCYTE [DISTWIDTH] IN BLOOD BY AUTOMATED COUNT: 13.4 % (ref 11.5–14.5)
HCT VFR BLD AUTO: 41.7 % (ref 40–54)
HGB BLD-MCNC: 13.8 G/DL (ref 14–18)
IMM GRANULOCYTES # BLD AUTO: 0.04 K/UL (ref 0–0.04)
IMM GRANULOCYTES NFR BLD AUTO: 0.6 % (ref 0–0.5)
LYMPHOCYTES # BLD AUTO: 1.4 K/UL (ref 1–4.8)
LYMPHOCYTES NFR BLD: 20.8 % (ref 18–48)
MCH RBC QN AUTO: 32.5 PG (ref 27–31)
MCHC RBC AUTO-ENTMCNC: 33.1 G/DL (ref 32–36)
MCV RBC AUTO: 98 FL (ref 82–98)
MONOCYTES # BLD AUTO: 0.8 K/UL (ref 0.3–1)
MONOCYTES NFR BLD: 12.2 % (ref 4–15)
NEUTROPHILS # BLD AUTO: 4.3 K/UL (ref 1.8–7.7)
NEUTROPHILS NFR BLD: 64.7 % (ref 38–73)
NRBC BLD-RTO: 0 /100 WBC
PLATELET # BLD AUTO: 171 K/UL (ref 150–450)
PMV BLD AUTO: 10.8 FL (ref 9.2–12.9)
RBC # BLD AUTO: 4.24 M/UL (ref 4.6–6.2)
WBC # BLD AUTO: 6.63 K/UL (ref 3.9–12.7)

## 2022-12-12 ENCOUNTER — OFFICE VISIT (OUTPATIENT)
Dept: CARDIOLOGY | Facility: CLINIC | Age: 72
End: 2022-12-12
Payer: MEDICARE

## 2022-12-12 VITALS
DIASTOLIC BLOOD PRESSURE: 84 MMHG | BODY MASS INDEX: 32.77 KG/M2 | SYSTOLIC BLOOD PRESSURE: 150 MMHG | OXYGEN SATURATION: 98 % | HEART RATE: 59 BPM | WEIGHT: 228.38 LBS

## 2022-12-12 DIAGNOSIS — E78.5 HYPERLIPIDEMIA ASSOCIATED WITH TYPE 2 DIABETES MELLITUS: ICD-10-CM

## 2022-12-12 DIAGNOSIS — E11.59 HYPERTENSION ASSOCIATED WITH DIABETES: ICD-10-CM

## 2022-12-12 DIAGNOSIS — E08.00 DIABETES MELLITUS DUE TO UNDERLYING CONDITION WITH HYPEROSMOLARITY WITHOUT COMA, WITHOUT LONG-TERM CURRENT USE OF INSULIN: ICD-10-CM

## 2022-12-12 DIAGNOSIS — I25.2 OLD MI (MYOCARDIAL INFARCTION): ICD-10-CM

## 2022-12-12 DIAGNOSIS — I25.10 CORONARY ARTERY DISEASE INVOLVING NATIVE CORONARY ARTERY OF NATIVE HEART WITHOUT ANGINA PECTORIS: ICD-10-CM

## 2022-12-12 DIAGNOSIS — Z95.1 S/P CABG (CORONARY ARTERY BYPASS GRAFT): Primary | ICD-10-CM

## 2022-12-12 DIAGNOSIS — E11.69 HYPERLIPIDEMIA ASSOCIATED WITH TYPE 2 DIABETES MELLITUS: ICD-10-CM

## 2022-12-12 DIAGNOSIS — R07.9 CHEST PAIN, UNSPECIFIED TYPE: ICD-10-CM

## 2022-12-12 DIAGNOSIS — I15.2 HYPERTENSION ASSOCIATED WITH DIABETES: ICD-10-CM

## 2022-12-12 PROCEDURE — 3077F PR MOST RECENT SYSTOLIC BLOOD PRESSURE >= 140 MM HG: ICD-10-PCS | Mod: CPTII,S$GLB,, | Performed by: STUDENT IN AN ORGANIZED HEALTH CARE EDUCATION/TRAINING PROGRAM

## 2022-12-12 PROCEDURE — 99214 OFFICE O/P EST MOD 30 MIN: CPT | Mod: S$GLB,,, | Performed by: STUDENT IN AN ORGANIZED HEALTH CARE EDUCATION/TRAINING PROGRAM

## 2022-12-12 PROCEDURE — 4010F PR ACE/ARB THEARPY RXD/TAKEN: ICD-10-PCS | Mod: CPTII,S$GLB,, | Performed by: STUDENT IN AN ORGANIZED HEALTH CARE EDUCATION/TRAINING PROGRAM

## 2022-12-12 PROCEDURE — 3079F PR MOST RECENT DIASTOLIC BLOOD PRESSURE 80-89 MM HG: ICD-10-PCS | Mod: CPTII,S$GLB,, | Performed by: STUDENT IN AN ORGANIZED HEALTH CARE EDUCATION/TRAINING PROGRAM

## 2022-12-12 PROCEDURE — 3079F DIAST BP 80-89 MM HG: CPT | Mod: CPTII,S$GLB,, | Performed by: STUDENT IN AN ORGANIZED HEALTH CARE EDUCATION/TRAINING PROGRAM

## 2022-12-12 PROCEDURE — 4010F ACE/ARB THERAPY RXD/TAKEN: CPT | Mod: CPTII,S$GLB,, | Performed by: STUDENT IN AN ORGANIZED HEALTH CARE EDUCATION/TRAINING PROGRAM

## 2022-12-12 PROCEDURE — 3044F PR MOST RECENT HEMOGLOBIN A1C LEVEL <7.0%: ICD-10-PCS | Mod: CPTII,S$GLB,, | Performed by: STUDENT IN AN ORGANIZED HEALTH CARE EDUCATION/TRAINING PROGRAM

## 2022-12-12 PROCEDURE — 1126F AMNT PAIN NOTED NONE PRSNT: CPT | Mod: CPTII,S$GLB,, | Performed by: STUDENT IN AN ORGANIZED HEALTH CARE EDUCATION/TRAINING PROGRAM

## 2022-12-12 PROCEDURE — 1101F PT FALLS ASSESS-DOCD LE1/YR: CPT | Mod: CPTII,S$GLB,, | Performed by: STUDENT IN AN ORGANIZED HEALTH CARE EDUCATION/TRAINING PROGRAM

## 2022-12-12 PROCEDURE — 3066F NEPHROPATHY DOC TX: CPT | Mod: CPTII,S$GLB,, | Performed by: STUDENT IN AN ORGANIZED HEALTH CARE EDUCATION/TRAINING PROGRAM

## 2022-12-12 PROCEDURE — 99999 PR PBB SHADOW E&M-EST. PATIENT-LVL III: CPT | Mod: PBBFAC,,, | Performed by: STUDENT IN AN ORGANIZED HEALTH CARE EDUCATION/TRAINING PROGRAM

## 2022-12-12 PROCEDURE — 3060F PR POS MICROALBUMINURIA RESULT DOCUMENTED/REVIEW: ICD-10-PCS | Mod: CPTII,S$GLB,, | Performed by: STUDENT IN AN ORGANIZED HEALTH CARE EDUCATION/TRAINING PROGRAM

## 2022-12-12 PROCEDURE — 3008F PR BODY MASS INDEX (BMI) DOCUMENTED: ICD-10-PCS | Mod: CPTII,S$GLB,, | Performed by: STUDENT IN AN ORGANIZED HEALTH CARE EDUCATION/TRAINING PROGRAM

## 2022-12-12 PROCEDURE — 99214 PR OFFICE/OUTPT VISIT, EST, LEVL IV, 30-39 MIN: ICD-10-PCS | Mod: S$GLB,,, | Performed by: STUDENT IN AN ORGANIZED HEALTH CARE EDUCATION/TRAINING PROGRAM

## 2022-12-12 PROCEDURE — 3044F HG A1C LEVEL LT 7.0%: CPT | Mod: CPTII,S$GLB,, | Performed by: STUDENT IN AN ORGANIZED HEALTH CARE EDUCATION/TRAINING PROGRAM

## 2022-12-12 PROCEDURE — 3288F PR FALLS RISK ASSESSMENT DOCUMENTED: ICD-10-PCS | Mod: CPTII,S$GLB,, | Performed by: STUDENT IN AN ORGANIZED HEALTH CARE EDUCATION/TRAINING PROGRAM

## 2022-12-12 PROCEDURE — 3288F FALL RISK ASSESSMENT DOCD: CPT | Mod: CPTII,S$GLB,, | Performed by: STUDENT IN AN ORGANIZED HEALTH CARE EDUCATION/TRAINING PROGRAM

## 2022-12-12 PROCEDURE — 1101F PR PT FALLS ASSESS DOC 0-1 FALLS W/OUT INJ PAST YR: ICD-10-PCS | Mod: CPTII,S$GLB,, | Performed by: STUDENT IN AN ORGANIZED HEALTH CARE EDUCATION/TRAINING PROGRAM

## 2022-12-12 PROCEDURE — 99999 PR PBB SHADOW E&M-EST. PATIENT-LVL III: ICD-10-PCS | Mod: PBBFAC,,, | Performed by: STUDENT IN AN ORGANIZED HEALTH CARE EDUCATION/TRAINING PROGRAM

## 2022-12-12 PROCEDURE — 3077F SYST BP >= 140 MM HG: CPT | Mod: CPTII,S$GLB,, | Performed by: STUDENT IN AN ORGANIZED HEALTH CARE EDUCATION/TRAINING PROGRAM

## 2022-12-12 PROCEDURE — 1126F PR PAIN SEVERITY QUANTIFIED, NO PAIN PRESENT: ICD-10-PCS | Mod: CPTII,S$GLB,, | Performed by: STUDENT IN AN ORGANIZED HEALTH CARE EDUCATION/TRAINING PROGRAM

## 2022-12-12 PROCEDURE — 3060F POS MICROALBUMINURIA REV: CPT | Mod: CPTII,S$GLB,, | Performed by: STUDENT IN AN ORGANIZED HEALTH CARE EDUCATION/TRAINING PROGRAM

## 2022-12-12 PROCEDURE — 3066F PR DOCUMENTATION OF TREATMENT FOR NEPHROPATHY: ICD-10-PCS | Mod: CPTII,S$GLB,, | Performed by: STUDENT IN AN ORGANIZED HEALTH CARE EDUCATION/TRAINING PROGRAM

## 2022-12-12 PROCEDURE — 3008F BODY MASS INDEX DOCD: CPT | Mod: CPTII,S$GLB,, | Performed by: STUDENT IN AN ORGANIZED HEALTH CARE EDUCATION/TRAINING PROGRAM

## 2022-12-12 RX ORDER — NIFEDIPINE 60 MG/1
60 TABLET, EXTENDED RELEASE ORAL DAILY
Qty: 30 TABLET | Refills: 11 | Status: SHIPPED | OUTPATIENT
Start: 2022-12-12 | End: 2023-05-17 | Stop reason: ALTCHOICE

## 2022-12-12 NOTE — PROGRESS NOTES
Subjective:   Patient ID:  Deniz Paulino is a 72 y.o. male who presents for follow up of No chief complaint on file.      HPI  11/16/2020  A 68 YO MALE WITH CAD S/P CABG OLD MI HTN DIABETES HLP IS HERE FOR F/U . HE HAS NOT BEEN EXERCISING CLAIMS COMPLIANCE. HAD COVID MILD CASE NO ILL EFFECTS IN AUGUST HAS NO NEW CHEST PAIN SHORTNESS OF BREATH ORTHOPNEA PND. HE IS COMPLIANT WITH SALT. HE HA SNO OTHER CARDIOVASCULAR COMPLAINTS. NO CHANGE IN WEIGHT. A1C 6% LDL ON TARGET. HIS A1C IS INCREASED.      7/14/2021  HERE FROF /U NO NEW SYMPTOMS HE HAS NOT BEEN EXERCISES HE TRIES TO BE COMPLIANT WITH DIET WEIGHT UNCHANGED HAS NO CARDIOVASCULAR SYMPTOMS.      12/2/2021    has  been eating lately for the holidays has been on the sedentary side takes meds regularily asymptomatic cardiovascular wise.     7/12/2022  Here for f /u has rt upper extremity numbness at nite at times has h/o carpal tunnel surgery no associated weakness .has no angina no shortness of breath had nose bleed that stopped he is taking his asa . tries to be compliant with diet.has numbness in feet.       12/12/22  Sees Dr. Bennett in clinic   Comes in with wife  Comes in with chest pain, 1.5 months ago, comes in rest sitting or laying flat  Not on exertion, lasts 10-15 sec  Denies SOB, diaphoresis, nauseous, palpitations   Does not exercise regularly - has gained 6 lbs since 7/22  Stopped smoking 20 years ago  BP elevated today, a little elevated on digtial medicine recently     4vCABG was 20 years ago      Past Medical History:   Diagnosis Date    Acute coronary syndrome     Coronary artery disease     Diabetes mellitus 4/24/2014    Hyperlipidemia     Hypertension     Old MI (myocardial infarction) 4/24/2014    S/P CABG (coronary artery bypass graft) 4/24/2014       Past Surgical History:   Procedure Laterality Date    CARDIAC CATHETERIZATION      COLONOSCOPY N/A 07/26/2019    Procedure: COLONOSCOPY;  Surgeon: Opal Oshea MD;  Location: [facility];   Service: Endoscopy;  Laterality: N/A;    CORONARY ARTERY BYPASS GRAFT      ESOPHAGOGASTRODUODENOSCOPY N/A 2019    Procedure: ESOPHAGOGASTRODUODENOSCOPY (EGD);  Surgeon: Opal Oshea MD;  Location[facility];  Service: Endoscopy;  Laterality: N/A;    SKIN CANCER EXCISION  2018    VASECTOMY         Social History     Tobacco Use    Smoking status: Former     Packs/day: 1.00     Years: 30.00     Pack years: 30.00     Types: Cigarettes     Quit date: 2003     Years since quittin.8    Smokeless tobacco: Never   Substance Use Topics    Alcohol use: Yes     Comment: socially    Drug use: Never       Family History   Problem Relation Age of Onset    Diabetes Mother     Heart murmur Mother     Cancer Mother         Breast    Stroke Mother     Alcohol abuse Father     Heart attack Father 63    Cancer Father         Esophageal    Diabetes Brother        Current Outpatient Medications   Medication Sig    aspirin (ECOTRIN) 325 MG EC tablet Take 325 mg by mouth once daily.    atorvastatin (LIPITOR) 40 MG tablet Take 1 tablet (40 mg total) by mouth once daily.    coenzyme Q10 200 mg capsule Take 200 mg by mouth 2 (two) times daily.    cyanocobalamin (VITAMIN B-12) 1000 MCG tablet Take 1,000 mcg by mouth once daily.     hydroCHLOROthiazide (HYDRODIURIL) 12.5 MG Tab Take 1 tablet (12.5 mg total) by mouth once daily.    lisinopriL (PRINIVIL,ZESTRIL) 40 MG tablet Take 1 tablet (40 mg total) by mouth once daily.    LORATADINE (ALAVERT ORAL) Take 1 tablet by mouth as needed.    magnesium oxide (MAG-OX) 400 mg (241.3 mg magnesium) tablet Take 400 mg by mouth once daily.    meclizine (ANTIVERT) 25 mg tablet Take 1 tablet (25 mg total) by mouth 3 (three) times daily as needed for Dizziness.    metFORMIN (GLUCOPHAGE) 1000 MG tablet Take 1 tablet (1,000 mg total) by mouth 2 (two) times daily with meals.    methocarbamoL (ROBAXIN) 500 MG Tab Take 1 tablet (500 mg total) by mouth 4 (four) times daily as needed (muscle  spasm).    metoprolol succinate (TOPROL-XL) 50 MG 24 hr tablet Take 1 tablet (50 mg total) by mouth once daily.    pantoprazole (PROTONIX) 20 MG tablet Take 1 tablet (20 mg total) by mouth once daily.    vitamin D 1000 units Tab Take 1,000 Units by mouth once daily.     NIFEdipine (PROCARDIA-XL) 60 MG (OSM) 24 hr tablet Take 1 tablet (60 mg total) by mouth once daily.     No current facility-administered medications for this visit.     Current Outpatient Medications on File Prior to Visit   Medication Sig    aspirin (ECOTRIN) 325 MG EC tablet Take 325 mg by mouth once daily.    atorvastatin (LIPITOR) 40 MG tablet Take 1 tablet (40 mg total) by mouth once daily.    coenzyme Q10 200 mg capsule Take 200 mg by mouth 2 (two) times daily.    cyanocobalamin (VITAMIN B-12) 1000 MCG tablet Take 1,000 mcg by mouth once daily.     hydroCHLOROthiazide (HYDRODIURIL) 12.5 MG Tab Take 1 tablet (12.5 mg total) by mouth once daily.    lisinopriL (PRINIVIL,ZESTRIL) 40 MG tablet Take 1 tablet (40 mg total) by mouth once daily.    LORATADINE (ALAVERT ORAL) Take 1 tablet by mouth as needed.    magnesium oxide (MAG-OX) 400 mg (241.3 mg magnesium) tablet Take 400 mg by mouth once daily.    meclizine (ANTIVERT) 25 mg tablet Take 1 tablet (25 mg total) by mouth 3 (three) times daily as needed for Dizziness.    metFORMIN (GLUCOPHAGE) 1000 MG tablet Take 1 tablet (1,000 mg total) by mouth 2 (two) times daily with meals.    methocarbamoL (ROBAXIN) 500 MG Tab Take 1 tablet (500 mg total) by mouth 4 (four) times daily as needed (muscle spasm).    metoprolol succinate (TOPROL-XL) 50 MG 24 hr tablet Take 1 tablet (50 mg total) by mouth once daily.    pantoprazole (PROTONIX) 20 MG tablet Take 1 tablet (20 mg total) by mouth once daily.    vitamin D 1000 units Tab Take 1,000 Units by mouth once daily.     [DISCONTINUED] amLODIPine (NORVASC) 10 MG tablet Take 1 tablet (10 mg total) by mouth once daily.     No current facility-administered medications  on file prior to visit.     Review of patient's allergies indicates:  No Known Allergies  Review of Systems   Constitutional: Negative for malaise/fatigue.   Eyes:  Negative for blurred vision.   Cardiovascular:  Positive for chest pain. Negative for claudication, cyanosis, dyspnea on exertion, irregular heartbeat, leg swelling, near-syncope, orthopnea, palpitations and paroxysmal nocturnal dyspnea.   Respiratory:  Negative for cough, hemoptysis and shortness of breath.    Hematologic/Lymphatic: Negative for bleeding problem. Does not bruise/bleed easily.   Skin:  Negative for dry skin and itching.   Musculoskeletal:  Negative for falls, muscle weakness and myalgias.   Gastrointestinal:  Negative for abdominal pain, diarrhea, heartburn, hematemesis, hematochezia and melena.   Genitourinary:  Negative for flank pain and hematuria.   Neurological:  Negative for dizziness, focal weakness, headaches, light-headedness, seizures and weakness.   Psychiatric/Behavioral:  Negative for altered mental status and memory loss. The patient is not nervous/anxious.    Allergic/Immunologic: Negative for hives.     Objective:   Physical Exam  Vitals and nursing note reviewed.   Constitutional:       General: He is not in acute distress.     Appearance: He is well-developed. He is not diaphoretic.   HENT:      Head: Normocephalic and atraumatic.   Eyes:      General:         Right eye: No discharge.         Left eye: No discharge.      Pupils: Pupils are equal, round, and reactive to light.   Neck:      Thyroid: No thyromegaly.      Vascular: No JVD.   Cardiovascular:      Rate and Rhythm: Normal rate and regular rhythm.      Pulses: Intact distal pulses.      Heart sounds: Normal heart sounds. No murmur heard.    No friction rub. No gallop.   Pulmonary:      Effort: Pulmonary effort is normal. No respiratory distress.      Breath sounds: Normal breath sounds. No wheezing or rales.      Comments: Scar cabg well healed.   Chest:       Chest wall: No tenderness.   Abdominal:      General: Bowel sounds are normal. There is no distension.      Palpations: Abdomen is soft.      Tenderness: There is no abdominal tenderness.   Musculoskeletal:         General: Normal range of motion.      Cervical back: Neck supple.      Right lower leg: No edema.      Left lower leg: No edema.      Comments: Scar venous harvest site well healed.    Skin:     General: Skin is warm and dry.      Findings: No erythema or rash.   Neurological:      Mental Status: He is alert and oriented to person, place, and time.      Cranial Nerves: No cranial nerve deficit.   Psychiatric:         Behavior: Behavior normal.         Judgment: Judgment normal.     Vitals:    12/12/22 1016   BP: (!) 150/84   Pulse: (!) 59   SpO2: 98%   Weight: 103.6 kg (228 lb 6.3 oz)       Lab Results   Component Value Date    CHOL 131 11/14/2022    CHOL 129 05/04/2022    CHOL 125 11/04/2021     Lab Results   Component Value Date    HDL 52 11/14/2022    HDL 50 05/04/2022    HDL 45 11/04/2021     Lab Results   Component Value Date    LDLCALC 58.0 (L) 11/14/2022    LDLCALC 56.8 (L) 05/04/2022    LDLCALC 57.2 (L) 11/04/2021     Lab Results   Component Value Date    TRIG 105 11/14/2022    TRIG 111 05/04/2022    TRIG 114 11/04/2021     Lab Results   Component Value Date    CHOLHDL 39.7 11/14/2022    CHOLHDL 38.8 05/04/2022    CHOLHDL 36.0 11/04/2021       Chemistry        Component Value Date/Time     11/14/2022 0905    K 4.0 11/14/2022 0905     11/14/2022 0905    CO2 29 11/14/2022 0905    BUN 13 11/14/2022 0905    CREATININE 1.1 11/14/2022 0905     (H) 11/14/2022 0905        Component Value Date/Time    CALCIUM 9.5 11/14/2022 0905    ALKPHOS 80 11/14/2022 0905    AST 22 11/14/2022 0905    ALT 23 11/14/2022 0905    BILITOT 0.9 11/14/2022 0905    ESTGFRAFRICA >60.0 05/04/2022 0806    ESTGFRAFRICA >60.0 05/04/2022 0806    EGFRNONAA >60.0 05/04/2022 0806    EGFRNONAA >60.0 05/04/2022 0806         Lab Results   Component Value Date    HGBA1C 5.8 (H) 11/14/2022       Lab Results   Component Value Date    TSH 1.991 11/17/2022     Lab Results   Component Value Date    INR 1.0 09/19/2009     Lab Results   Component Value Date    WBC 6.63 11/17/2022    HGB 13.8 (L) 11/17/2022    HCT 41.7 11/17/2022    MCV 98 11/17/2022     11/17/2022     BMP  Sodium   Date Value Ref Range Status   11/14/2022 139 136 - 145 mmol/L Final     Potassium   Date Value Ref Range Status   11/14/2022 4.0 3.5 - 5.1 mmol/L Final     Chloride   Date Value Ref Range Status   11/14/2022 101 95 - 110 mmol/L Final     CO2   Date Value Ref Range Status   11/14/2022 29 23 - 29 mmol/L Final     BUN   Date Value Ref Range Status   11/14/2022 13 8 - 23 mg/dL Final     Creatinine   Date Value Ref Range Status   11/14/2022 1.1 0.5 - 1.4 mg/dL Final     Calcium   Date Value Ref Range Status   11/14/2022 9.5 8.7 - 10.5 mg/dL Final     Anion Gap   Date Value Ref Range Status   11/14/2022 9 8 - 16 mmol/L Final     eGFR if    Date Value Ref Range Status   05/04/2022 >60.0 >60 mL/min/1.73 m^2 Final   05/04/2022 >60.0 >60 mL/min/1.73 m^2 Final     eGFR if non    Date Value Ref Range Status   05/04/2022 >60.0 >60 mL/min/1.73 m^2 Final     Comment:     Calculation used to obtain the estimated glomerular filtration  rate (eGFR) is the CKD-EPI equation.      05/04/2022 >60.0 >60 mL/min/1.73 m^2 Final     Comment:     Calculation used to obtain the estimated glomerular filtration  rate (eGFR) is the CKD-EPI equation.        CrCl cannot be calculated (Patient's most recent lab result is older than the maximum 7 days allowed.).    Assessment:     1. S/P CABG (coronary artery bypass graft)    2. Old MI (myocardial infarction)    3. Hypertension associated with diabetes    4. Hyperlipidemia associated with type 2 diabetes mellitus    5. Coronary artery disease involving native coronary artery of native heart without angina  pectoris    6. Diabetes mellitus due to underlying condition with hyperosmolarity without coma, without long-term current use of insulin    7. Chest pain, unspecified type      Having intermittent chest pain.  Blood pressure has been elevated recently seen on digital Medicine and in clinic today.  Last stress test 2013.     Plan:     Obtain nuclear stress test  Obtain echocardiogram  Switch amlodipine to nifedipine for better blood pressure control  LDL 58 as of 11/22- controlled   Continue current therapy for HTN, DM, HLD  Cardiac low salt diet.  Risk factor modification and excercise program./weight loss      Rtc with Dr. Bennett

## 2022-12-13 ENCOUNTER — TELEPHONE (OUTPATIENT)
Dept: CARDIOLOGY | Facility: HOSPITAL | Age: 72
End: 2022-12-13
Payer: MEDICARE

## 2022-12-20 DIAGNOSIS — Z95.1 S/P CABG (CORONARY ARTERY BYPASS GRAFT): ICD-10-CM

## 2022-12-20 DIAGNOSIS — I10 ESSENTIAL HYPERTENSION: ICD-10-CM

## 2022-12-20 DIAGNOSIS — I25.10 CORONARY ARTERY DISEASE INVOLVING NATIVE CORONARY ARTERY OF NATIVE HEART WITHOUT ANGINA PECTORIS: ICD-10-CM

## 2022-12-21 RX ORDER — LISINOPRIL 40 MG/1
40 TABLET ORAL DAILY
Qty: 90 TABLET | Refills: 3 | Status: SHIPPED | OUTPATIENT
Start: 2022-12-21 | End: 2023-12-15

## 2022-12-23 ENCOUNTER — HOSPITAL ENCOUNTER (OUTPATIENT)
Dept: CARDIOLOGY | Facility: HOSPITAL | Age: 72
Discharge: HOME | End: 2022-12-23
Attending: STUDENT IN AN ORGANIZED HEALTH CARE EDUCATION/TRAINING PROGRAM
Payer: MEDICARE

## 2022-12-23 ENCOUNTER — HOSPITAL ENCOUNTER (OUTPATIENT)
Dept: RADIOLOGY | Facility: HOSPITAL | Age: 72
Discharge: HOME | End: 2022-12-23
Attending: STUDENT IN AN ORGANIZED HEALTH CARE EDUCATION/TRAINING PROGRAM
Payer: MEDICARE

## 2022-12-23 VITALS
BODY MASS INDEX: 32.64 KG/M2 | SYSTOLIC BLOOD PRESSURE: 150 MMHG | DIASTOLIC BLOOD PRESSURE: 84 MMHG | WEIGHT: 228 LBS | HEIGHT: 70 IN

## 2022-12-23 DIAGNOSIS — I15.2 HYPERTENSION ASSOCIATED WITH DIABETES: ICD-10-CM

## 2022-12-23 DIAGNOSIS — I25.10 CORONARY ARTERY DISEASE INVOLVING NATIVE CORONARY ARTERY OF NATIVE HEART WITHOUT ANGINA PECTORIS: ICD-10-CM

## 2022-12-23 DIAGNOSIS — I25.2 OLD MI (MYOCARDIAL INFARCTION): ICD-10-CM

## 2022-12-23 DIAGNOSIS — E08.00 DIABETES MELLITUS DUE TO UNDERLYING CONDITION WITH HYPEROSMOLARITY WITHOUT COMA, WITHOUT LONG-TERM CURRENT USE OF INSULIN: ICD-10-CM

## 2022-12-23 DIAGNOSIS — R07.9 CHEST PAIN, UNSPECIFIED TYPE: ICD-10-CM

## 2022-12-23 DIAGNOSIS — E11.69 HYPERLIPIDEMIA ASSOCIATED WITH TYPE 2 DIABETES MELLITUS: ICD-10-CM

## 2022-12-23 DIAGNOSIS — E11.59 HYPERTENSION ASSOCIATED WITH DIABETES: ICD-10-CM

## 2022-12-23 DIAGNOSIS — Z95.1 S/P CABG (CORONARY ARTERY BYPASS GRAFT): ICD-10-CM

## 2022-12-23 DIAGNOSIS — E78.5 HYPERLIPIDEMIA ASSOCIATED WITH TYPE 2 DIABETES MELLITUS: ICD-10-CM

## 2022-12-23 LAB
AORTIC ROOT ANNULUS: 3.15 CM
AV INDEX (PROSTH): 0.69
AV MEAN GRADIENT: 4 MMHG
AV PEAK GRADIENT: 7 MMHG
AV VALVE AREA: 2.18 CM2
AV VELOCITY RATIO: 0.71
BSA FOR ECHO PROCEDURE: 2.26 M2
CV ECHO LV RWT: 0.39 CM
CV STRESS BASE HR: 58 BPM
DIASTOLIC BLOOD PRESSURE: 83 MMHG
DOP CALC AO PEAK VEL: 1.36 M/S
DOP CALC AO VTI: 30.5 CM
DOP CALC LVOT AREA: 3.2 CM2
DOP CALC LVOT DIAMETER: 2.01 CM
DOP CALC LVOT PEAK VEL: 0.97 M/S
DOP CALC LVOT STROKE VOLUME: 66.6 CM3
DOP CALC RVOT PEAK VEL: 0.86 M/S
DOP CALC RVOT VTI: 20.4 CM
DOP CALCLVOT PEAK VEL VTI: 21 CM
E WAVE DECELERATION TIME: 195.29 MSEC
E/A RATIO: 0.65
E/E' RATIO: 7 M/S
ECHO LV POSTERIOR WALL: 0.91 CM (ref 0.6–1.1)
EJECTION FRACTION: 60 %
FRACTIONAL SHORTENING: 27 % (ref 28–44)
INTERVENTRICULAR SEPTUM: 0.97 CM (ref 0.6–1.1)
IVRT: 79.92 MSEC
LA MAJOR: 4.98 CM
LA MINOR: 4.71 CM
LA WIDTH: 2.6 CM
LEFT ATRIUM SIZE: 3.92 CM
LEFT ATRIUM VOLUME INDEX MOD: 24.4 ML/M2
LEFT ATRIUM VOLUME INDEX: 19 ML/M2
LEFT ATRIUM VOLUME MOD: 54 CM3
LEFT ATRIUM VOLUME: 41.94 CM3
LEFT INTERNAL DIMENSION IN SYSTOLE: 3.44 CM (ref 2.1–4)
LEFT VENTRICLE DIASTOLIC VOLUME INDEX: 46.42 ML/M2
LEFT VENTRICLE DIASTOLIC VOLUME: 102.59 ML
LEFT VENTRICLE MASS INDEX: 68 G/M2
LEFT VENTRICLE SYSTOLIC VOLUME INDEX: 22 ML/M2
LEFT VENTRICLE SYSTOLIC VOLUME: 48.66 ML
LEFT VENTRICULAR INTERNAL DIMENSION IN DIASTOLE: 4.7 CM (ref 3.5–6)
LEFT VENTRICULAR MASS: 151.25 G
LV LATERAL E/E' RATIO: 6.36 M/S
LV SEPTAL E/E' RATIO: 7.78 M/S
LVOT MG: 1.94 MMHG
LVOT MV: 0.66 CM/S
MV PEAK A VEL: 1.07 M/S
MV PEAK E VEL: 0.7 M/S
MV STENOSIS PRESSURE HALF TIME: 56.64 MS
MV VALVE AREA P 1/2 METHOD: 3.88 CM2
NUC REST EJECTION FRACTION: 60
NUC STRESS EJECTION FRACTION: 64 %
OHS CV CPX 85 PERCENT MAX PREDICTED HEART RATE MALE: 126
OHS CV CPX ESTIMATED METS: 1
OHS CV CPX MAX PREDICTED HEART RATE: 148
OHS CV CPX PATIENT IS FEMALE: 0
OHS CV CPX PATIENT IS MALE: 1
OHS CV CPX PEAK DIASTOLIC BLOOD PRESSURE: 85 MMHG
OHS CV CPX PEAK HEAR RATE: 71 BPM
OHS CV CPX PEAK RATE PRESSURE PRODUCT: NORMAL
OHS CV CPX PEAK SYSTOLIC BLOOD PRESSURE: 146 MMHG
OHS CV CPX PERCENT MAX PREDICTED HEART RATE ACHIEVED: 48
OHS CV CPX RATE PRESSURE PRODUCT PRESENTING: 8120
PISA TR MAX VEL: 2.66 M/S
PV MEAN GRADIENT: 1.61 MMHG
PV MV: 0.98 M/S
PV PEAK VELOCITY: 1.35 CM/S
RA MAJOR: 4.92 CM
RA WIDTH: 2.48 CM
RIGHT VENTRICULAR END-DIASTOLIC DIMENSION: 2.68 CM
SINUS: 2.86 CM
STJ: 2.77 CM
STRESS ECHO POST EXERCISE DUR SEC: 47 SECONDS
SYSTOLIC BLOOD PRESSURE: 140 MMHG
TDI LATERAL: 0.11 M/S
TDI SEPTAL: 0.09 M/S
TDI: 0.1 M/S
TR MAX PG: 28 MMHG
TRICUSPID ANNULAR PLANE SYSTOLIC EXCURSION: 2.27 CM

## 2022-12-23 PROCEDURE — 93016 NUCLEAR STRESS - CARDIOLOGY INTERPRETED (CUPID ONLY): ICD-10-PCS | Mod: ,,, | Performed by: INTERNAL MEDICINE

## 2022-12-23 PROCEDURE — 93017 CV STRESS TEST TRACING ONLY: CPT

## 2022-12-23 PROCEDURE — 93306 ECHO (CUPID ONLY): ICD-10-PCS | Mod: 26,,, | Performed by: INTERNAL MEDICINE

## 2022-12-23 PROCEDURE — 93306 TTE W/DOPPLER COMPLETE: CPT

## 2022-12-23 PROCEDURE — 78452 HT MUSCLE IMAGE SPECT MULT: CPT

## 2022-12-23 PROCEDURE — 93018 NUCLEAR STRESS - CARDIOLOGY INTERPRETED (CUPID ONLY): ICD-10-PCS | Mod: ,,, | Performed by: INTERNAL MEDICINE

## 2022-12-23 PROCEDURE — 93306 TTE W/DOPPLER COMPLETE: CPT | Mod: 26,,, | Performed by: INTERNAL MEDICINE

## 2022-12-23 PROCEDURE — 93016 CV STRESS TEST SUPVJ ONLY: CPT | Mod: ,,, | Performed by: INTERNAL MEDICINE

## 2022-12-23 PROCEDURE — 93018 CV STRESS TEST I&R ONLY: CPT | Mod: ,,, | Performed by: INTERNAL MEDICINE

## 2022-12-23 PROCEDURE — 78452 NUCLEAR STRESS - CARDIOLOGY INTERPRETED (CUPID ONLY): ICD-10-PCS | Mod: 26,,, | Performed by: INTERNAL MEDICINE

## 2022-12-23 PROCEDURE — 78452 HT MUSCLE IMAGE SPECT MULT: CPT | Mod: 26,,, | Performed by: INTERNAL MEDICINE

## 2022-12-23 PROCEDURE — 63600175 PHARM REV CODE 636 W HCPCS: Performed by: STUDENT IN AN ORGANIZED HEALTH CARE EDUCATION/TRAINING PROGRAM

## 2022-12-23 RX ORDER — REGADENOSON 0.08 MG/ML
0.4 INJECTION, SOLUTION INTRAVENOUS ONCE
Status: COMPLETED | OUTPATIENT
Start: 2022-12-23 | End: 2022-12-23

## 2022-12-23 RX ADMIN — REGADENOSON 0.4 MG: 0.08 INJECTION, SOLUTION INTRAVENOUS at 11:12

## 2023-01-18 DIAGNOSIS — Z95.1 S/P CABG (CORONARY ARTERY BYPASS GRAFT): Primary | ICD-10-CM

## 2023-01-19 ENCOUNTER — OFFICE VISIT (OUTPATIENT)
Dept: CARDIOLOGY | Facility: CLINIC | Age: 73
End: 2023-01-19
Payer: MEDICARE

## 2023-01-19 ENCOUNTER — HOSPITAL ENCOUNTER (OUTPATIENT)
Dept: CARDIOLOGY | Facility: HOSPITAL | Age: 73
Discharge: HOME | End: 2023-01-19
Attending: INTERNAL MEDICINE
Payer: MEDICARE

## 2023-01-19 ENCOUNTER — IMMUNIZATION (OUTPATIENT)
Dept: PRIMARY CARE CLINIC | Facility: CLINIC | Age: 73
End: 2023-01-19
Payer: MEDICARE

## 2023-01-19 VITALS
DIASTOLIC BLOOD PRESSURE: 86 MMHG | HEIGHT: 70 IN | SYSTOLIC BLOOD PRESSURE: 142 MMHG | HEART RATE: 57 BPM | OXYGEN SATURATION: 99 % | WEIGHT: 229.75 LBS | BODY MASS INDEX: 32.89 KG/M2

## 2023-01-19 DIAGNOSIS — I25.10 CORONARY ARTERY DISEASE INVOLVING NATIVE CORONARY ARTERY OF NATIVE HEART WITHOUT ANGINA PECTORIS: Primary | ICD-10-CM

## 2023-01-19 DIAGNOSIS — E78.5 HYPERLIPIDEMIA ASSOCIATED WITH TYPE 2 DIABETES MELLITUS: ICD-10-CM

## 2023-01-19 DIAGNOSIS — E11.69 HYPERLIPIDEMIA ASSOCIATED WITH TYPE 2 DIABETES MELLITUS: ICD-10-CM

## 2023-01-19 DIAGNOSIS — I15.2 HYPERTENSION ASSOCIATED WITH DIABETES: ICD-10-CM

## 2023-01-19 DIAGNOSIS — I25.2 OLD MI (MYOCARDIAL INFARCTION): ICD-10-CM

## 2023-01-19 DIAGNOSIS — Z95.1 S/P CABG (CORONARY ARTERY BYPASS GRAFT): ICD-10-CM

## 2023-01-19 DIAGNOSIS — E08.00 DIABETES MELLITUS DUE TO UNDERLYING CONDITION WITH HYPEROSMOLARITY WITHOUT COMA, WITHOUT LONG-TERM CURRENT USE OF INSULIN: ICD-10-CM

## 2023-01-19 DIAGNOSIS — Z23 NEED FOR VACCINATION: Primary | ICD-10-CM

## 2023-01-19 DIAGNOSIS — E11.59 HYPERTENSION ASSOCIATED WITH DIABETES: ICD-10-CM

## 2023-01-19 PROCEDURE — 93010 EKG 12-LEAD: ICD-10-PCS | Mod: ,,, | Performed by: INTERNAL MEDICINE

## 2023-01-19 PROCEDURE — 3288F PR FALLS RISK ASSESSMENT DOCUMENTED: ICD-10-PCS | Mod: CPTII,S$GLB,, | Performed by: INTERNAL MEDICINE

## 2023-01-19 PROCEDURE — 3008F PR BODY MASS INDEX (BMI) DOCUMENTED: ICD-10-PCS | Mod: CPTII,S$GLB,, | Performed by: INTERNAL MEDICINE

## 2023-01-19 PROCEDURE — 1101F PR PT FALLS ASSESS DOC 0-1 FALLS W/OUT INJ PAST YR: ICD-10-PCS | Mod: CPTII,S$GLB,, | Performed by: INTERNAL MEDICINE

## 2023-01-19 PROCEDURE — 3072F PR LOW RISK FOR RETINOPATHY: ICD-10-PCS | Mod: CPTII,S$GLB,, | Performed by: INTERNAL MEDICINE

## 2023-01-19 PROCEDURE — 1160F PR REVIEW ALL MEDS BY PRESCRIBER/CLIN PHARMACIST DOCUMENTED: ICD-10-PCS | Mod: CPTII,S$GLB,, | Performed by: INTERNAL MEDICINE

## 2023-01-19 PROCEDURE — 3288F FALL RISK ASSESSMENT DOCD: CPT | Mod: CPTII,S$GLB,, | Performed by: INTERNAL MEDICINE

## 2023-01-19 PROCEDURE — 3077F SYST BP >= 140 MM HG: CPT | Mod: CPTII,S$GLB,, | Performed by: INTERNAL MEDICINE

## 2023-01-19 PROCEDURE — 1126F AMNT PAIN NOTED NONE PRSNT: CPT | Mod: CPTII,S$GLB,, | Performed by: INTERNAL MEDICINE

## 2023-01-19 PROCEDURE — 1159F PR MEDICATION LIST DOCUMENTED IN MEDICAL RECORD: ICD-10-PCS | Mod: CPTII,S$GLB,, | Performed by: INTERNAL MEDICINE

## 2023-01-19 PROCEDURE — 3079F PR MOST RECENT DIASTOLIC BLOOD PRESSURE 80-89 MM HG: ICD-10-PCS | Mod: CPTII,S$GLB,, | Performed by: INTERNAL MEDICINE

## 2023-01-19 PROCEDURE — 99999 PR PBB SHADOW E&M-EST. PATIENT-LVL IV: CPT | Mod: PBBFAC,,, | Performed by: INTERNAL MEDICINE

## 2023-01-19 PROCEDURE — 93005 ELECTROCARDIOGRAM TRACING: CPT

## 2023-01-19 PROCEDURE — 3072F LOW RISK FOR RETINOPATHY: CPT | Mod: CPTII,S$GLB,, | Performed by: INTERNAL MEDICINE

## 2023-01-19 PROCEDURE — 3008F BODY MASS INDEX DOCD: CPT | Mod: CPTII,S$GLB,, | Performed by: INTERNAL MEDICINE

## 2023-01-19 PROCEDURE — 1160F RVW MEDS BY RX/DR IN RCRD: CPT | Mod: CPTII,S$GLB,, | Performed by: INTERNAL MEDICINE

## 2023-01-19 PROCEDURE — 0124A COVID-19, MRNA, LNP-S, BIVALENT BOOSTER, PF, 30 MCG/0.3 ML DOSE: CPT | Mod: CV19,PBBFAC | Performed by: FAMILY MEDICINE

## 2023-01-19 PROCEDURE — 91312 COVID-19, MRNA, LNP-S, BIVALENT BOOSTER, PF, 30 MCG/0.3 ML DOSE: CPT | Mod: PBBFAC | Performed by: FAMILY MEDICINE

## 2023-01-19 PROCEDURE — 99214 PR OFFICE/OUTPT VISIT, EST, LEVL IV, 30-39 MIN: ICD-10-PCS | Mod: S$GLB,,, | Performed by: INTERNAL MEDICINE

## 2023-01-19 PROCEDURE — 1159F MED LIST DOCD IN RCRD: CPT | Mod: CPTII,S$GLB,, | Performed by: INTERNAL MEDICINE

## 2023-01-19 PROCEDURE — 1101F PT FALLS ASSESS-DOCD LE1/YR: CPT | Mod: CPTII,S$GLB,, | Performed by: INTERNAL MEDICINE

## 2023-01-19 PROCEDURE — 99999 PR PBB SHADOW E&M-EST. PATIENT-LVL IV: ICD-10-PCS | Mod: PBBFAC,,, | Performed by: INTERNAL MEDICINE

## 2023-01-19 PROCEDURE — 1126F PR PAIN SEVERITY QUANTIFIED, NO PAIN PRESENT: ICD-10-PCS | Mod: CPTII,S$GLB,, | Performed by: INTERNAL MEDICINE

## 2023-01-19 PROCEDURE — 3077F PR MOST RECENT SYSTOLIC BLOOD PRESSURE >= 140 MM HG: ICD-10-PCS | Mod: CPTII,S$GLB,, | Performed by: INTERNAL MEDICINE

## 2023-01-19 PROCEDURE — 93010 ELECTROCARDIOGRAM REPORT: CPT | Mod: ,,, | Performed by: INTERNAL MEDICINE

## 2023-01-19 PROCEDURE — 3079F DIAST BP 80-89 MM HG: CPT | Mod: CPTII,S$GLB,, | Performed by: INTERNAL MEDICINE

## 2023-01-19 PROCEDURE — 99214 OFFICE O/P EST MOD 30 MIN: CPT | Mod: S$GLB,,, | Performed by: INTERNAL MEDICINE

## 2023-01-19 NOTE — PROGRESS NOTES
Subjective:   Patient ID:  Deniz Paulino is a 72 y.o. male who presents for follow up of No chief complaint on file.      HPI  11/16/2020  A 68 YO MALE WITH CAD S/P CABG OLD MI HTN DIABETES HLP IS HERE FOR F/U . HE HAS NOT BEEN EXERCISING CLAIMS COMPLIANCE. HAD COVID MILD CASE NO ILL EFFECTS IN AUGUST HAS NO NEW CHEST PAIN SHORTNESS OF BREATH ORTHOPNEA PND. HE IS COMPLIANT WITH SALT. HE HA SNO OTHER CARDIOVASCULAR COMPLAINTS. NO CHANGE IN WEIGHT. A1C 6% LDL ON TARGET. HIS A1C IS INCREASED.      7/14/2021  HERE FROF /U NO NEW SYMPTOMS HE HAS NOT BEEN EXERCISES HE TRIES TO BE COMPLIANT WITH DIET WEIGHT UNCHANGED HAS NO CARDIOVASCULAR SYMPTOMS.      12/2/2021    has  been eating lately for the holidays has been on the sedentary side takes meds regularily asymptomatic cardiovascular wise.      7/12/2022  Here for f /u has rt upper extremity numbness at nite at times has h/o carpal tunnel surgery no associated weakness .has no angina no shortness of breath had nose bleed that stopped he is taking his asa . tries to be compliant with diet.has numbness in feet.         12/12/22  Sees Dr. Bennett in clinic   Comes in with wife  Comes in with chest pain, 1.5 months ago, comes in rest sitting or laying flat  Not on exertion, lasts 10-15 sec  Denies SOB, diaphoresis, nauseous, palpitations   Does not exercise regularly - has gained 6 lbs since 7/22  Stopped smoking 20 years ago  BP elevated today, a little elevated on digtial medicine recently      4vCABG was 20 years ago    1/19/2023  Here for f/u . He has meds switched. His pain resolved after nifedipine. He has normal lvf by echo cardiolite negative for ischemia his bp control impoprved. Has no shortness of breath no leg swelling   Past Medical History:   Diagnosis Date    Acute coronary syndrome     Coronary artery disease     Diabetes mellitus 4/24/2014    Hyperlipidemia     Hypertension     Old MI (myocardial infarction) 4/24/2014    S/P CABG (coronary artery bypass  graft) 2014       Past Surgical History:   Procedure Laterality Date    CARDIAC CATHETERIZATION      COLONOSCOPY N/A 2019    Procedure: COLONOSCOPY;  Surgeon: Opal Oshea MD;  Location: [facility];  Service: Endoscopy;  Laterality: N/A;    CORONARY ARTERY BYPASS GRAFT      ESOPHAGOGASTRODUODENOSCOPY N/A 2019    Procedure: ESOPHAGOGASTRODUODENOSCOPY (EGD);  Surgeon: Opal Oshea MD;  Location: [facility];  Service: Endoscopy;  Laterality: N/A;    SKIN CANCER EXCISION  2018    VASECTOMY         Social History     Tobacco Use    Smoking status: Former     Packs/day: 1.00     Years: 30.00     Pack years: 30.00     Types: Cigarettes     Quit date: 2003     Years since quittin.9    Smokeless tobacco: Never   Substance Use Topics    Alcohol use: Yes     Comment: socially    Drug use: Never       Family History   Problem Relation Age of Onset    Diabetes Mother     Heart murmur Mother     Cancer Mother         Breast    Stroke Mother     Alcohol abuse Father     Heart attack Father 63    Cancer Father         Esophageal    Diabetes Brother        Current Outpatient Medications   Medication Sig    aspirin (ECOTRIN) 325 MG EC tablet Take 325 mg by mouth once daily.    atorvastatin (LIPITOR) 40 MG tablet Take 1 tablet (40 mg total) by mouth once daily.    coenzyme Q10 200 mg capsule Take 200 mg by mouth 2 (two) times daily.    cyanocobalamin (VITAMIN B-12) 1000 MCG tablet Take 1,000 mcg by mouth once daily.     hydroCHLOROthiazide (HYDRODIURIL) 12.5 MG Tab Take 1 tablet (12.5 mg total) by mouth once daily.    lisinopriL (PRINIVIL,ZESTRIL) 40 MG tablet Take 1 tablet (40 mg total) by mouth once daily.    LORATADINE (ALAVERT ORAL) Take 1 tablet by mouth as needed.    magnesium oxide (MAG-OX) 400 mg (241.3 mg magnesium) tablet Take 400 mg by mouth once daily.    meclizine (ANTIVERT) 25 mg tablet Take 1 tablet (25 mg total) by mouth 3 (three) times daily as needed for Dizziness.    metFORMIN  (GLUCOPHAGE) 1000 MG tablet Take 1 tablet (1,000 mg total) by mouth 2 (two) times daily with meals.    methocarbamoL (ROBAXIN) 500 MG Tab Take 1 tablet (500 mg total) by mouth 4 (four) times daily as needed (muscle spasm).    metoprolol succinate (TOPROL-XL) 50 MG 24 hr tablet Take 1 tablet (50 mg total) by mouth once daily.    NIFEdipine (PROCARDIA-XL) 60 MG (OSM) 24 hr tablet Take 1 tablet (60 mg total) by mouth once daily.    pantoprazole (PROTONIX) 20 MG tablet Take 1 tablet (20 mg total) by mouth once daily.    vitamin D 1000 units Tab Take 1,000 Units by mouth once daily.      No current facility-administered medications for this visit.     Current Outpatient Medications on File Prior to Visit   Medication Sig    aspirin (ECOTRIN) 325 MG EC tablet Take 325 mg by mouth once daily.    atorvastatin (LIPITOR) 40 MG tablet Take 1 tablet (40 mg total) by mouth once daily.    coenzyme Q10 200 mg capsule Take 200 mg by mouth 2 (two) times daily.    cyanocobalamin (VITAMIN B-12) 1000 MCG tablet Take 1,000 mcg by mouth once daily.     hydroCHLOROthiazide (HYDRODIURIL) 12.5 MG Tab Take 1 tablet (12.5 mg total) by mouth once daily.    lisinopriL (PRINIVIL,ZESTRIL) 40 MG tablet Take 1 tablet (40 mg total) by mouth once daily.    LORATADINE (ALAVERT ORAL) Take 1 tablet by mouth as needed.    magnesium oxide (MAG-OX) 400 mg (241.3 mg magnesium) tablet Take 400 mg by mouth once daily.    meclizine (ANTIVERT) 25 mg tablet Take 1 tablet (25 mg total) by mouth 3 (three) times daily as needed for Dizziness.    metFORMIN (GLUCOPHAGE) 1000 MG tablet Take 1 tablet (1,000 mg total) by mouth 2 (two) times daily with meals.    methocarbamoL (ROBAXIN) 500 MG Tab Take 1 tablet (500 mg total) by mouth 4 (four) times daily as needed (muscle spasm).    metoprolol succinate (TOPROL-XL) 50 MG 24 hr tablet Take 1 tablet (50 mg total) by mouth once daily.    NIFEdipine (PROCARDIA-XL) 60 MG (OSM) 24 hr tablet Take 1 tablet (60 mg total) by mouth  once daily.    pantoprazole (PROTONIX) 20 MG tablet Take 1 tablet (20 mg total) by mouth once daily.    vitamin D 1000 units Tab Take 1,000 Units by mouth once daily.      No current facility-administered medications on file prior to visit.     Review of patient's allergies indicates:  No Known Allergies   Review of Systems   Constitutional: Negative for malaise/fatigue.   Eyes:  Negative for blurred vision.   Cardiovascular:  Negative for chest pain, claudication, cyanosis, dyspnea on exertion, irregular heartbeat, leg swelling, near-syncope, orthopnea, palpitations and paroxysmal nocturnal dyspnea.   Respiratory:  Negative for cough, hemoptysis and shortness of breath.    Hematologic/Lymphatic: Negative for bleeding problem. Does not bruise/bleed easily.   Skin:  Negative for dry skin and itching.   Musculoskeletal:  Negative for falls, muscle weakness and myalgias.   Gastrointestinal:  Negative for abdominal pain, diarrhea, heartburn, hematemesis, hematochezia and melena.   Genitourinary:  Negative for flank pain and hematuria.   Neurological:  Negative for dizziness, focal weakness, headaches, light-headedness, numbness, paresthesias, seizures and weakness.   Psychiatric/Behavioral:  Negative for altered mental status and memory loss. The patient is not nervous/anxious.    Allergic/Immunologic: Negative for hives.     Objective:   Physical Exam  Vitals and nursing note reviewed.   Constitutional:       General: He is not in acute distress.     Appearance: He is well-developed. He is not diaphoretic.   HENT:      Head: Normocephalic and atraumatic.   Eyes:      General:         Right eye: No discharge.         Left eye: No discharge.      Pupils: Pupils are equal, round, and reactive to light.   Neck:      Thyroid: No thyromegaly.      Vascular: No JVD.   Cardiovascular:      Rate and Rhythm: Normal rate and regular rhythm.      Pulses: Intact distal pulses.      Heart sounds: Normal heart sounds. No murmur  "heard.    No friction rub. No gallop.   Pulmonary:      Effort: Pulmonary effort is normal. No respiratory distress.      Breath sounds: Normal breath sounds. No wheezing or rales.      Comments: Scar cabg well healed.   Chest:      Chest wall: No tenderness.   Abdominal:      General: Bowel sounds are normal. There is no distension.      Palpations: Abdomen is soft.      Tenderness: There is no abdominal tenderness.   Musculoskeletal:         General: Normal range of motion.      Cervical back: Neck supple.      Right lower leg: No edema.      Left lower leg: No edema.      Comments: Scar venous harvest site well healed.   Skin:     General: Skin is warm and dry.      Findings: No erythema or rash.   Neurological:      Mental Status: He is alert and oriented to person, place, and time.      Cranial Nerves: No cranial nerve deficit.   Psychiatric:         Mood and Affect: Mood normal.         Behavior: Behavior normal.     Vitals:    01/19/23 0857 01/19/23 0858   BP: 138/80 (!) 142/86   BP Location: Right arm Left arm   Patient Position: Sitting Sitting   BP Method: Medium (Automatic) Medium (Automatic)   Pulse: (!) 55 (!) 57   SpO2: 99%    Weight: 104.2 kg (229 lb 11.5 oz)    Height: 5' 10" (1.778 m)      Lab Results   Component Value Date    CHOL 131 11/14/2022    CHOL 129 05/04/2022    CHOL 125 11/04/2021     Lab Results   Component Value Date    HDL 52 11/14/2022    HDL 50 05/04/2022    HDL 45 11/04/2021     Lab Results   Component Value Date    LDLCALC 58.0 (L) 11/14/2022    LDLCALC 56.8 (L) 05/04/2022    LDLCALC 57.2 (L) 11/04/2021     Lab Results   Component Value Date    TRIG 105 11/14/2022    TRIG 111 05/04/2022    TRIG 114 11/04/2021     Lab Results   Component Value Date    CHOLHDL 39.7 11/14/2022    CHOLHDL 38.8 05/04/2022    CHOLHDL 36.0 11/04/2021       Chemistry        Component Value Date/Time     11/14/2022 0905    K 4.0 11/14/2022 0905     11/14/2022 0905    CO2 29 11/14/2022 0905    " BUN 13 11/14/2022 0905    CREATININE 1.1 11/14/2022 0905     (H) 11/14/2022 0905        Component Value Date/Time    CALCIUM 9.5 11/14/2022 0905    ALKPHOS 80 11/14/2022 0905    AST 22 11/14/2022 0905    ALT 23 11/14/2022 0905    BILITOT 0.9 11/14/2022 0905    ESTGFRAFRICA >60.0 05/04/2022 0806    ESTGFRAFRICA >60.0 05/04/2022 0806    EGFRNONAA >60.0 05/04/2022 0806    EGFRNONAA >60.0 05/04/2022 0806          Lab Results   Component Value Date    TSH 1.991 11/17/2022     Lab Results   Component Value Date    INR 1.0 09/19/2009     Lab Results   Component Value Date    WBC 6.63 11/17/2022    HGB 13.8 (L) 11/17/2022    HCT 41.7 11/17/2022    MCV 98 11/17/2022     11/17/2022     BMP  Sodium   Date Value Ref Range Status   11/14/2022 139 136 - 145 mmol/L Final     Potassium   Date Value Ref Range Status   11/14/2022 4.0 3.5 - 5.1 mmol/L Final     Chloride   Date Value Ref Range Status   11/14/2022 101 95 - 110 mmol/L Final     CO2   Date Value Ref Range Status   11/14/2022 29 23 - 29 mmol/L Final     BUN   Date Value Ref Range Status   11/14/2022 13 8 - 23 mg/dL Final     Creatinine   Date Value Ref Range Status   11/14/2022 1.1 0.5 - 1.4 mg/dL Final     Calcium   Date Value Ref Range Status   11/14/2022 9.5 8.7 - 10.5 mg/dL Final     Anion Gap   Date Value Ref Range Status   11/14/2022 9 8 - 16 mmol/L Final     eGFR if    Date Value Ref Range Status   05/04/2022 >60.0 >60 mL/min/1.73 m^2 Final   05/04/2022 >60.0 >60 mL/min/1.73 m^2 Final     eGFR if non    Date Value Ref Range Status   05/04/2022 >60.0 >60 mL/min/1.73 m^2 Final     Comment:     Calculation used to obtain the estimated glomerular filtration  rate (eGFR) is the CKD-EPI equation.      05/04/2022 >60.0 >60 mL/min/1.73 m^2 Final     Comment:     Calculation used to obtain the estimated glomerular filtration  rate (eGFR) is the CKD-EPI equation.        CrCl cannot be calculated (Patient's most recent lab result  is older than the maximum 7 days allowed.).  Conclusion         Abnormal myocardial perfusion scan.    There is a moderate to severe intensity, fixed perfusion abnormality consistent with scar in the apical  wall(s).    The gated perfusion images showed an ejection fraction of 60% at rest. The gated perfusion images showed an ejection fraction of 64% post stress.    There is severe apical hypokinesis at rest and stress.    LV cavity size is normal at rest and normal at stress.    The ECG portion of the study is negative for ischemia.    There were no arrhythmias during stress.    Summary    Normal systolic function.  The estimated ejection fraction is 60%.  Normal left ventricular diastolic function.  Normal right ventricular size with normal right ventricular systolic function.     Assessment:     1. Coronary artery disease involving native coronary artery of native heart without angina pectoris    2. Hyperlipidemia associated with type 2 diabetes mellitus    3. Hypertension associated with diabetes    4. Old MI (myocardial infarction)    5. S/P CABG (coronary artery bypass graft)    6. Diabetes mellitus due to underlying condition with hyperosmolarity without coma, without long-term current use of insulin    His symptoms resolved with htn control and adjusted medical therapy to nifedipine. He is compliant with salt. He has no side effects from meds he ahs been sedentary counseled about weight low exercise to help htn control. Reviewed values from digital htn they are controlled since the latter part of December.   The other issue discussed is the higher frequency of leg edema from nifedipine for which using compression socks and taking meds at [facility].    He was counseled about diet control weight loss exercise.    Plan:     Continue current therapy  Cardiac low salt diet.  Risk factor modification and excercise program./weight loss  F/u in 3 months to check bp and progress if feels ok he will cancel.   Otherwise he  will follow as scheduled with his labs in MAY

## 2023-04-06 ENCOUNTER — PES CALL (OUTPATIENT)
Dept: ADMINISTRATIVE | Facility: CLINIC | Age: 73
End: 2023-04-06
Payer: MEDICARE

## 2023-04-20 ENCOUNTER — LAB VISIT (OUTPATIENT)
Dept: LAB | Facility: HOSPITAL | Age: 73
End: 2023-04-20
Attending: INTERNAL MEDICINE
Payer: MEDICARE

## 2023-04-20 ENCOUNTER — OFFICE VISIT (OUTPATIENT)
Dept: CARDIOLOGY | Facility: CLINIC | Age: 73
End: 2023-04-20
Payer: MEDICARE

## 2023-04-20 VITALS
HEART RATE: 57 BPM | BODY MASS INDEX: 32.99 KG/M2 | SYSTOLIC BLOOD PRESSURE: 135 MMHG | WEIGHT: 229.94 LBS | OXYGEN SATURATION: 96 % | DIASTOLIC BLOOD PRESSURE: 80 MMHG

## 2023-04-20 DIAGNOSIS — I25.2 OLD MI (MYOCARDIAL INFARCTION): ICD-10-CM

## 2023-04-20 DIAGNOSIS — I15.2 HYPERTENSION ASSOCIATED WITH DIABETES: ICD-10-CM

## 2023-04-20 DIAGNOSIS — I25.10 CORONARY ARTERY DISEASE INVOLVING NATIVE CORONARY ARTERY OF NATIVE HEART WITHOUT ANGINA PECTORIS: ICD-10-CM

## 2023-04-20 DIAGNOSIS — R07.9 CHEST PAIN, UNSPECIFIED TYPE: Primary | ICD-10-CM

## 2023-04-20 DIAGNOSIS — R07.9 CHEST PAIN, UNSPECIFIED TYPE: ICD-10-CM

## 2023-04-20 DIAGNOSIS — I25.10 CORONARY ARTERY DISEASE INVOLVING NATIVE CORONARY ARTERY OF NATIVE HEART WITHOUT ANGINA PECTORIS: Primary | ICD-10-CM

## 2023-04-20 DIAGNOSIS — E78.5 HYPERLIPIDEMIA ASSOCIATED WITH TYPE 2 DIABETES MELLITUS: ICD-10-CM

## 2023-04-20 DIAGNOSIS — E08.00 DIABETES MELLITUS DUE TO UNDERLYING CONDITION WITH HYPEROSMOLARITY WITHOUT COMA, WITHOUT LONG-TERM CURRENT USE OF INSULIN: ICD-10-CM

## 2023-04-20 DIAGNOSIS — E11.59 HYPERTENSION ASSOCIATED WITH DIABETES: ICD-10-CM

## 2023-04-20 DIAGNOSIS — Z95.1 S/P CABG (CORONARY ARTERY BYPASS GRAFT): ICD-10-CM

## 2023-04-20 DIAGNOSIS — E11.69 HYPERLIPIDEMIA ASSOCIATED WITH TYPE 2 DIABETES MELLITUS: ICD-10-CM

## 2023-04-20 LAB
ANION GAP SERPL CALC-SCNC: 14 MMOL/L (ref 8–16)
BASOPHILS # BLD AUTO: 0.03 K/UL (ref 0–0.2)
BASOPHILS NFR BLD: 0.5 % (ref 0–1.9)
BUN SERPL-MCNC: 12 MG/DL (ref 8–23)
CALCIUM SERPL-MCNC: 9.4 MG/DL (ref 8.7–10.5)
CHLORIDE SERPL-SCNC: 100 MMOL/L (ref 95–110)
CO2 SERPL-SCNC: 28 MMOL/L (ref 23–29)
CREAT SERPL-MCNC: 1 MG/DL (ref 0.5–1.4)
DIFFERENTIAL METHOD: ABNORMAL
EOSINOPHIL # BLD AUTO: 0.1 K/UL (ref 0–0.5)
EOSINOPHIL NFR BLD: 1 % (ref 0–8)
ERYTHROCYTE [DISTWIDTH] IN BLOOD BY AUTOMATED COUNT: 12.5 % (ref 11.5–14.5)
EST. GFR  (NO RACE VARIABLE): >60 ML/MIN/1.73 M^2
GLUCOSE SERPL-MCNC: 123 MG/DL (ref 70–110)
HCT VFR BLD AUTO: 42.4 % (ref 40–54)
HGB BLD-MCNC: 14.2 G/DL (ref 14–18)
IMM GRANULOCYTES # BLD AUTO: 0.02 K/UL (ref 0–0.04)
IMM GRANULOCYTES NFR BLD AUTO: 0.3 % (ref 0–0.5)
LYMPHOCYTES # BLD AUTO: 1.3 K/UL (ref 1–4.8)
LYMPHOCYTES NFR BLD: 21.5 % (ref 18–48)
MCH RBC QN AUTO: 31.2 PG (ref 27–31)
MCHC RBC AUTO-ENTMCNC: 33.5 G/DL (ref 32–36)
MCV RBC AUTO: 93 FL (ref 82–98)
MONOCYTES # BLD AUTO: 0.7 K/UL (ref 0.3–1)
MONOCYTES NFR BLD: 11.1 % (ref 4–15)
NEUTROPHILS # BLD AUTO: 4 K/UL (ref 1.8–7.7)
NEUTROPHILS NFR BLD: 65.6 % (ref 38–73)
NRBC BLD-RTO: 0 /100 WBC
PLATELET # BLD AUTO: 189 K/UL (ref 150–450)
PMV BLD AUTO: 10.9 FL (ref 9.2–12.9)
POTASSIUM SERPL-SCNC: 3.9 MMOL/L (ref 3.5–5.1)
RBC # BLD AUTO: 4.55 M/UL (ref 4.6–6.2)
SODIUM SERPL-SCNC: 142 MMOL/L (ref 136–145)
WBC # BLD AUTO: 6.14 K/UL (ref 3.9–12.7)

## 2023-04-20 PROCEDURE — 36415 COLL VENOUS BLD VENIPUNCTURE: CPT | Performed by: INTERNAL MEDICINE

## 2023-04-20 PROCEDURE — 3072F PR LOW RISK FOR RETINOPATHY: ICD-10-PCS | Mod: CPTII,S$GLB,, | Performed by: INTERNAL MEDICINE

## 2023-04-20 PROCEDURE — 3008F BODY MASS INDEX DOCD: CPT | Mod: CPTII,S$GLB,, | Performed by: INTERNAL MEDICINE

## 2023-04-20 PROCEDURE — 1160F RVW MEDS BY RX/DR IN RCRD: CPT | Mod: CPTII,S$GLB,, | Performed by: INTERNAL MEDICINE

## 2023-04-20 PROCEDURE — 1101F PT FALLS ASSESS-DOCD LE1/YR: CPT | Mod: CPTII,S$GLB,, | Performed by: INTERNAL MEDICINE

## 2023-04-20 PROCEDURE — 1101F PR PT FALLS ASSESS DOC 0-1 FALLS W/OUT INJ PAST YR: ICD-10-PCS | Mod: CPTII,S$GLB,, | Performed by: INTERNAL MEDICINE

## 2023-04-20 PROCEDURE — 4010F PR ACE/ARB THEARPY RXD/TAKEN: ICD-10-PCS | Mod: CPTII,S$GLB,, | Performed by: INTERNAL MEDICINE

## 2023-04-20 PROCEDURE — 3072F LOW RISK FOR RETINOPATHY: CPT | Mod: CPTII,S$GLB,, | Performed by: INTERNAL MEDICINE

## 2023-04-20 PROCEDURE — 99214 OFFICE O/P EST MOD 30 MIN: CPT | Mod: S$GLB,,, | Performed by: INTERNAL MEDICINE

## 2023-04-20 PROCEDURE — 99214 PR OFFICE/OUTPT VISIT, EST, LEVL IV, 30-39 MIN: ICD-10-PCS | Mod: S$GLB,,, | Performed by: INTERNAL MEDICINE

## 2023-04-20 PROCEDURE — 3075F PR MOST RECENT SYSTOLIC BLOOD PRESS GE 130-139MM HG: ICD-10-PCS | Mod: CPTII,S$GLB,, | Performed by: INTERNAL MEDICINE

## 2023-04-20 PROCEDURE — 85730 THROMBOPLASTIN TIME PARTIAL: CPT | Performed by: INTERNAL MEDICINE

## 2023-04-20 PROCEDURE — 99999 PR PBB SHADOW E&M-EST. PATIENT-LVL IV: CPT | Mod: PBBFAC,,, | Performed by: INTERNAL MEDICINE

## 2023-04-20 PROCEDURE — 80048 BASIC METABOLIC PNL TOTAL CA: CPT | Performed by: INTERNAL MEDICINE

## 2023-04-20 PROCEDURE — 3288F FALL RISK ASSESSMENT DOCD: CPT | Mod: CPTII,S$GLB,, | Performed by: INTERNAL MEDICINE

## 2023-04-20 PROCEDURE — 1126F AMNT PAIN NOTED NONE PRSNT: CPT | Mod: CPTII,S$GLB,, | Performed by: INTERNAL MEDICINE

## 2023-04-20 PROCEDURE — 3008F PR BODY MASS INDEX (BMI) DOCUMENTED: ICD-10-PCS | Mod: CPTII,S$GLB,, | Performed by: INTERNAL MEDICINE

## 2023-04-20 PROCEDURE — 3079F PR MOST RECENT DIASTOLIC BLOOD PRESSURE 80-89 MM HG: ICD-10-PCS | Mod: CPTII,S$GLB,, | Performed by: INTERNAL MEDICINE

## 2023-04-20 PROCEDURE — 85610 PROTHROMBIN TIME: CPT | Performed by: INTERNAL MEDICINE

## 2023-04-20 PROCEDURE — 3075F SYST BP GE 130 - 139MM HG: CPT | Mod: CPTII,S$GLB,, | Performed by: INTERNAL MEDICINE

## 2023-04-20 PROCEDURE — 3079F DIAST BP 80-89 MM HG: CPT | Mod: CPTII,S$GLB,, | Performed by: INTERNAL MEDICINE

## 2023-04-20 PROCEDURE — 1159F MED LIST DOCD IN RCRD: CPT | Mod: CPTII,S$GLB,, | Performed by: INTERNAL MEDICINE

## 2023-04-20 PROCEDURE — 1160F PR REVIEW ALL MEDS BY PRESCRIBER/CLIN PHARMACIST DOCUMENTED: ICD-10-PCS | Mod: CPTII,S$GLB,, | Performed by: INTERNAL MEDICINE

## 2023-04-20 PROCEDURE — 3288F PR FALLS RISK ASSESSMENT DOCUMENTED: ICD-10-PCS | Mod: CPTII,S$GLB,, | Performed by: INTERNAL MEDICINE

## 2023-04-20 PROCEDURE — 4010F ACE/ARB THERAPY RXD/TAKEN: CPT | Mod: CPTII,S$GLB,, | Performed by: INTERNAL MEDICINE

## 2023-04-20 PROCEDURE — 1159F PR MEDICATION LIST DOCUMENTED IN MEDICAL RECORD: ICD-10-PCS | Mod: CPTII,S$GLB,, | Performed by: INTERNAL MEDICINE

## 2023-04-20 PROCEDURE — 1126F PR PAIN SEVERITY QUANTIFIED, NO PAIN PRESENT: ICD-10-PCS | Mod: CPTII,S$GLB,, | Performed by: INTERNAL MEDICINE

## 2023-04-20 PROCEDURE — 85025 COMPLETE CBC W/AUTO DIFF WBC: CPT | Performed by: INTERNAL MEDICINE

## 2023-04-20 PROCEDURE — 99999 PR PBB SHADOW E&M-EST. PATIENT-LVL IV: ICD-10-PCS | Mod: PBBFAC,,, | Performed by: INTERNAL MEDICINE

## 2023-04-20 RX ORDER — NITROGLYCERIN 40 MG/1
1 PATCH TRANSDERMAL DAILY
Qty: 30 PATCH | Refills: 11 | Status: SHIPPED | OUTPATIENT
Start: 2023-04-20 | End: 2024-04-19

## 2023-04-20 RX ORDER — NITROGLYCERIN 0.4 MG/1
0.4 TABLET SUBLINGUAL EVERY 5 MIN PRN
Qty: 30 TABLET | Refills: 6 | Status: SHIPPED | OUTPATIENT
Start: 2023-04-20 | End: 2024-04-19

## 2023-04-20 NOTE — PROGRESS NOTES
Subjective:   Patient ID:  Deniz Paulino is a 72 y.o. male who presents for follow up of Follow-up (Follow up on blood pressures)      HPI  11/16/2020  A 68 YO MALE WITH CAD S/P CABG OLD MI HTN DIABETES HLP IS HERE FOR F/U . HE HAS NOT BEEN EXERCISING CLAIMS COMPLIANCE. HAD COVID MILD CASE NO ILL EFFECTS IN AUGUST HAS NO NEW CHEST PAIN SHORTNESS OF BREATH ORTHOPNEA PND. HE IS COMPLIANT WITH SALT. HE HA SNO OTHER CARDIOVASCULAR COMPLAINTS. NO CHANGE IN WEIGHT. A1C 6% LDL ON TARGET. HIS A1C IS INCREASED.      7/14/2021  HERE FROF /U NO NEW SYMPTOMS HE HAS NOT BEEN EXERCISES HE TRIES TO BE COMPLIANT WITH DIET WEIGHT UNCHANGED HAS NO CARDIOVASCULAR SYMPTOMS.      12/2/2021    has  been eating lately for the holidays has been on the sedentary side takes meds regularily asymptomatic cardiovascular wise.      7/12/2022  Here for f /u has rt upper extremity numbness at nite at times has h/o carpal tunnel surgery no associated weakness .has no angina no shortness of breath had nose bleed that stopped he is taking his asa . tries to be compliant with diet.has numbness in feet.         12/12/22  Sees Dr. Bennett in clinic   Comes in with wife  Comes in with chest pain, 1.5 months ago, comes in rest sitting or laying flat  Not on exertion, lasts 10-15 sec  Denies SOB, diaphoresis, nauseous, palpitations   Does not exercise regularly - has gained 6 lbs since 7/22  Stopped smoking 20 years ago  BP elevated today, a little elevated on digtial medicine recently      4vCABG was 20 years ago     1/19/2023  Here for f/u . He has meds switched. His pain resolved after nifedipine. He has normal lvf by echo cardiolite negative for ischemia his bp control impoprved. Has no shortness of breath no leg swelling     4/20/2023   Here for 3 months f/u his bp control improved he ahs less chest pain he feels a minor cramp in the middle opf chest similar to his previous presentation but less intense priro to cabg. He is compliant with salt intake  and meds. He does not carry nitro s/l.     His cabg  lima to lad svg sequential to d1 d2   Svg to pda    His anatomy showed non obs cx occluded lad and rca (2009)    I am concerned that he ahs progressed his lcx disease and that hsi stress test did not show the ischemia   Past Medical History:   Diagnosis Date    Acute coronary syndrome     Coronary artery disease     Diabetes mellitus 2014    Hyperlipidemia     Hypertension     Old MI (myocardial infarction) 2014    S/P CABG (coronary artery bypass graft) 2014       Past Surgical History:   Procedure Laterality Date    CARDIAC CATHETERIZATION      COLONOSCOPY N/A 2019    Procedure: COLONOSCOPY;  Surgeon: Opal Oshea MD;  Location: [facility] ENDO;  Service: Endoscopy;  Laterality: N/A;    CORONARY ARTERY BYPASS GRAFT      ESOPHAGOGASTRODUODENOSCOPY N/A 2019    Procedure: ESOPHAGOGASTRODUODENOSCOPY (EGD);  Surgeon: Opal Oshea MD;  Location: [facility] ENDO;  Service: Endoscopy;  Laterality: N/A;    SKIN CANCER EXCISION  2018    VASECTOMY         Social History     Tobacco Use    Smoking status: Former     Packs/day: 1.00     Years: 30.00     Pack years: 30.00     Types: Cigarettes     Quit date: 2003     Years since quittin.2    Smokeless tobacco: Never   Substance Use Topics    Alcohol use: Yes     Comment: socially    Drug use: Never       Family History   Problem Relation Age of Onset    Diabetes Mother     Heart murmur Mother     Cancer Mother         Breast    Stroke Mother     Alcohol abuse Father     Heart attack Father 63    Cancer Father         Esophageal    Diabetes Brother        Current Outpatient Medications   Medication Sig    aspirin (ECOTRIN) 325 MG EC tablet Take 325 mg by mouth once daily.    atorvastatin (LIPITOR) 40 MG tablet Take 1 tablet (40 mg total) by mouth once daily.    coenzyme Q10 200 mg capsule Take 200 mg by mouth 2 (two) times daily.    cyanocobalamin (VITAMIN B-12) 1000 MCG tablet Take 1,000  mcg by mouth once daily.     hydroCHLOROthiazide (HYDRODIURIL) 12.5 MG Tab Take 1 tablet (12.5 mg total) by mouth once daily.    lisinopriL (PRINIVIL,ZESTRIL) 40 MG tablet Take 1 tablet (40 mg total) by mouth once daily.    LORATADINE (ALAVERT ORAL) Take 1 tablet by mouth as needed.    magnesium oxide (MAG-OX) 400 mg (241.3 mg magnesium) tablet Take 400 mg by mouth once daily.    meclizine (ANTIVERT) 25 mg tablet Take 1 tablet (25 mg total) by mouth 3 (three) times daily as needed for Dizziness.    metFORMIN (GLUCOPHAGE) 1000 MG tablet Take 1 tablet (1,000 mg total) by mouth 2 (two) times daily with meals.    methocarbamoL (ROBAXIN) 500 MG Tab Take 1 tablet (500 mg total) by mouth 4 (four) times daily as needed (muscle spasm).    metoprolol succinate (TOPROL-XL) 50 MG 24 hr tablet Take 1 tablet (50 mg total) by mouth once daily.    NIFEdipine (PROCARDIA-XL) 60 MG (OSM) 24 hr tablet Take 1 tablet (60 mg total) by mouth once daily.    pantoprazole (PROTONIX) 20 MG tablet Take 1 tablet (20 mg total) by mouth once daily.    vitamin D 1000 units Tab Take 1,000 Units by mouth once daily.      No current facility-administered medications for this visit.     Current Outpatient Medications on File Prior to Visit   Medication Sig    aspirin (ECOTRIN) 325 MG EC tablet Take 325 mg by mouth once daily.    atorvastatin (LIPITOR) 40 MG tablet Take 1 tablet (40 mg total) by mouth once daily.    coenzyme Q10 200 mg capsule Take 200 mg by mouth 2 (two) times daily.    cyanocobalamin (VITAMIN B-12) 1000 MCG tablet Take 1,000 mcg by mouth once daily.     hydroCHLOROthiazide (HYDRODIURIL) 12.5 MG Tab Take 1 tablet (12.5 mg total) by mouth once daily.    lisinopriL (PRINIVIL,ZESTRIL) 40 MG tablet Take 1 tablet (40 mg total) by mouth once daily.    LORATADINE (ALAVERT ORAL) Take 1 tablet by mouth as needed.    magnesium oxide (MAG-OX) 400 mg (241.3 mg magnesium) tablet Take 400 mg by mouth once daily.    meclizine (ANTIVERT) 25 mg tablet  Take 1 tablet (25 mg total) by mouth 3 (three) times daily as needed for Dizziness.    metFORMIN (GLUCOPHAGE) 1000 MG tablet Take 1 tablet (1,000 mg total) by mouth 2 (two) times daily with meals.    methocarbamoL (ROBAXIN) 500 MG Tab Take 1 tablet (500 mg total) by mouth 4 (four) times daily as needed (muscle spasm).    metoprolol succinate (TOPROL-XL) 50 MG 24 hr tablet Take 1 tablet (50 mg total) by mouth once daily.    NIFEdipine (PROCARDIA-XL) 60 MG (OSM) 24 hr tablet Take 1 tablet (60 mg total) by mouth once daily.    pantoprazole (PROTONIX) 20 MG tablet Take 1 tablet (20 mg total) by mouth once daily.    vitamin D 1000 units Tab Take 1,000 Units by mouth once daily.      No current facility-administered medications on file prior to visit.     Review of patient's allergies indicates:  No Known Allergies   Review of Systems   Constitutional: Negative for malaise/fatigue.   Eyes:  Negative for blurred vision.   Cardiovascular:  Positive for chest pain. Negative for claudication, cyanosis, dyspnea on exertion, irregular heartbeat, leg swelling, near-syncope, orthopnea, palpitations and paroxysmal nocturnal dyspnea.   Respiratory:  Negative for cough, hemoptysis and shortness of breath.    Hematologic/Lymphatic: Negative for bleeding problem. Does not bruise/bleed easily.   Skin:  Negative for dry skin and itching.   Musculoskeletal:  Negative for falls, muscle weakness and myalgias.   Gastrointestinal:  Negative for abdominal pain, diarrhea, heartburn, hematemesis, hematochezia and melena.   Genitourinary:  Negative for flank pain and hematuria.   Neurological:  Negative for dizziness, focal weakness, headaches, light-headedness, numbness, paresthesias, seizures and weakness.   Psychiatric/Behavioral:  Negative for altered mental status and memory loss. The patient is not nervous/anxious.    Allergic/Immunologic: Negative for hives.     Objective:   Physical Exam  Vitals and nursing note reviewed.    Constitutional:       General: He is not in acute distress.     Appearance: He is well-developed. He is not diaphoretic.   HENT:      Head: Normocephalic and atraumatic.   Eyes:      General:         Right eye: No discharge.         Left eye: No discharge.      Pupils: Pupils are equal, round, and reactive to light.   Neck:      Thyroid: No thyromegaly.      Vascular: No JVD.   Cardiovascular:      Rate and Rhythm: Normal rate and regular rhythm.      Pulses: Normal pulses and intact distal pulses.      Heart sounds: Normal heart sounds. No murmur heard.    No friction rub. No gallop.   Pulmonary:      Effort: Pulmonary effort is normal. No respiratory distress.      Breath sounds: Normal breath sounds. No wheezing or rales.      Comments: Scar cabg well healed.   Chest:      Chest wall: No tenderness.   Abdominal:      General: Bowel sounds are normal. There is no distension.      Palpations: Abdomen is soft.      Tenderness: There is no abdominal tenderness.   Musculoskeletal:         General: Normal range of motion.      Cervical back: Neck supple.      Right lower leg: No edema.      Left lower leg: No edema.   Skin:     General: Skin is warm and dry.      Findings: No erythema or rash.   Neurological:      Mental Status: He is alert and oriented to person, place, and time.      Cranial Nerves: No cranial nerve deficit.   Psychiatric:         Behavior: Behavior normal.         Judgment: Judgment normal.     Vitals:    04/20/23 0906 04/20/23 0908   BP: 130/80 135/80   BP Location: Left arm Right arm   Patient Position: Sitting Sitting   Pulse: (!) 57    SpO2: 96%    Weight: 104.3 kg (229 lb 15 oz)      Lab Results   Component Value Date    CHOL 131 11/14/2022    CHOL 129 05/04/2022    CHOL 125 11/04/2021      Body mass index is 32.99 kg/m².   Lab Results   Component Value Date    HGBA1C 5.8 (H) 11/14/2022      BMP  Lab Results   Component Value Date     11/14/2022    K 4.0 11/14/2022     11/14/2022     CO2 29 11/14/2022    BUN 13 11/14/2022    CREATININE 1.1 11/14/2022    CALCIUM 9.5 11/14/2022    ANIONGAP 9 11/14/2022    EGFRNORACEVR >60.0 11/14/2022      Lab Results   Component Value Date    HDL 52 11/14/2022    HDL 50 05/04/2022    HDL 45 11/04/2021     Lab Results   Component Value Date    LDLCALC 58.0 (L) 11/14/2022    LDLCALC 56.8 (L) 05/04/2022    LDLCALC 57.2 (L) 11/04/2021     Lab Results   Component Value Date    TRIG 105 11/14/2022    TRIG 111 05/04/2022    TRIG 114 11/04/2021     Lab Results   Component Value Date    CHOLHDL 39.7 11/14/2022    CHOLHDL 38.8 05/04/2022    CHOLHDL 36.0 11/04/2021       Chemistry        Component Value Date/Time     11/14/2022 0905    K 4.0 11/14/2022 0905     11/14/2022 0905    CO2 29 11/14/2022 0905    BUN 13 11/14/2022 0905    CREATININE 1.1 11/14/2022 0905     (H) 11/14/2022 0905        Component Value Date/Time    CALCIUM 9.5 11/14/2022 0905    ALKPHOS 80 11/14/2022 0905    AST 22 11/14/2022 0905    ALT 23 11/14/2022 0905    BILITOT 0.9 11/14/2022 0905    ESTGFRAFRICA >60.0 05/04/2022 0806    ESTGFRAFRICA >60.0 05/04/2022 0806    EGFRNONAA >60.0 05/04/2022 0806    EGFRNONAA >60.0 05/04/2022 0806          Lab Results   Component Value Date    TSH 1.991 11/17/2022     Lab Results   Component Value Date    INR 1.0 09/19/2009     Lab Results   Component Value Date    WBC 6.63 11/17/2022    HGB 13.8 (L) 11/17/2022    HCT 41.7 11/17/2022    MCV 98 11/17/2022     11/17/2022     BMP  Sodium   Date Value Ref Range Status   11/14/2022 139 136 - 145 mmol/L Final     Potassium   Date Value Ref Range Status   11/14/2022 4.0 3.5 - 5.1 mmol/L Final     Chloride   Date Value Ref Range Status   11/14/2022 101 95 - 110 mmol/L Final     CO2   Date Value Ref Range Status   11/14/2022 29 23 - 29 mmol/L Final     BUN   Date Value Ref Range Status   11/14/2022 13 8 - 23 mg/dL Final     Creatinine   Date Value Ref Range Status   11/14/2022 1.1 0.5 - 1.4 mg/dL  Final     Calcium   Date Value Ref Range Status   11/14/2022 9.5 8.7 - 10.5 mg/dL Final     Anion Gap   Date Value Ref Range Status   11/14/2022 9 8 - 16 mmol/L Final     eGFR if    Date Value Ref Range Status   05/04/2022 >60.0 >60 mL/min/1.73 m^2 Final   05/04/2022 >60.0 >60 mL/min/1.73 m^2 Final     eGFR if non    Date Value Ref Range Status   05/04/2022 >60.0 >60 mL/min/1.73 m^2 Final     Comment:     Calculation used to obtain the estimated glomerular filtration  rate (eGFR) is the CKD-EPI equation.      05/04/2022 >60.0 >60 mL/min/1.73 m^2 Final     Comment:     Calculation used to obtain the estimated glomerular filtration  rate (eGFR) is the CKD-EPI equation.        CrCl cannot be calculated (Patient's most recent lab result is older than the maximum 7 days allowed.).    Assessment:     1. Coronary artery disease involving native coronary artery of native heart without angina pectoris    2. Hyperlipidemia associated with type 2 diabetes mellitus    3. Hypertension associated with diabetes    4. Old MI (myocardial infarction)    5. S/P CABG (coronary artery bypass graft)    6. Diabetes mellitus due to underlying condition with hyperosmolarity without coma, without long-term current use of insulin      He continues to have anginal symptoms despite negative cardiolite and htn control I am concerned that he may have progressed his cad and specially in lcx distribution that is not bypassed in addition to graft disease since his surgery is more than 15 years ago. He will also have more medical therapy added with nitropach and will give nitro s/l for emergency and plan on [facility]   Plan:   As per above    I have explained the risks, benefits , and alternatives of the procedure in detail.the patient voices understanding and all questions have been answered.the patient agrees to proceed as planned.

## 2023-04-20 NOTE — H&P (VIEW-ONLY)
Subjective:   Patient ID:  Deniz Paulino is a 72 y.o. male who presents for follow up of Follow-up (Follow up on blood pressures)      HPI  11/16/2020  A 68 YO MALE WITH CAD S/P CABG OLD MI HTN DIABETES HLP IS HERE FOR F/U . HE HAS NOT BEEN EXERCISING CLAIMS COMPLIANCE. HAD COVID MILD CASE NO ILL EFFECTS IN AUGUST HAS NO NEW CHEST PAIN SHORTNESS OF BREATH ORTHOPNEA PND. HE IS COMPLIANT WITH SALT. HE HA SNO OTHER CARDIOVASCULAR COMPLAINTS. NO CHANGE IN WEIGHT. A1C 6% LDL ON TARGET. HIS A1C IS INCREASED.      7/14/2021  HERE FROF /U NO NEW SYMPTOMS HE HAS NOT BEEN EXERCISES HE TRIES TO BE COMPLIANT WITH DIET WEIGHT UNCHANGED HAS NO CARDIOVASCULAR SYMPTOMS.      12/2/2021    has  been eating lately for the holidays has been on the sedentary side takes meds regularily asymptomatic cardiovascular wise.      7/12/2022  Here for f /u has rt upper extremity numbness at nite at times has h/o carpal tunnel surgery no associated weakness .has no angina no shortness of breath had nose bleed that stopped he is taking his asa . tries to be compliant with diet.has numbness in feet.         12/12/22  Sees Dr. Bennett in clinic   Comes in with wife  Comes in with chest pain, 1.5 months ago, comes in rest sitting or laying flat  Not on exertion, lasts 10-15 sec  Denies SOB, diaphoresis, nauseous, palpitations   Does not exercise regularly - has gained 6 lbs since 7/22  Stopped smoking 20 years ago  BP elevated today, a little elevated on digtial medicine recently      4vCABG was 20 years ago     1/19/2023  Here for f/u . He has meds switched. His pain resolved after nifedipine. He has normal lvf by echo cardiolite negative for ischemia his bp control impoprved. Has no shortness of breath no leg swelling     4/20/2023   Here for 3 months f/u his bp control improved he ahs less chest pain he feels a minor cramp in the middle opf chest similar to his previous presentation but less intense priro to cabg. He is compliant with salt intake  and meds. He does not carry nitro s/l.     His cabg  lima to lad svg sequential to d1 d2   Svg to pda    His anatomy showed non obs cx occluded lad and rca (2009)    I am concerned that he ahs progressed his lcx disease and that hsi stress test did not show the ischemia   Past Medical History:   Diagnosis Date    Acute coronary syndrome     Coronary artery disease     Diabetes mellitus 2014    Hyperlipidemia     Hypertension     Old MI (myocardial infarction) 2014    S/P CABG (coronary artery bypass graft) 2014       Past Surgical History:   Procedure Laterality Date    CARDIAC CATHETERIZATION      COLONOSCOPY N/A 2019    Procedure: COLONOSCOPY;  Surgeon: Opal Oshea MD;  Location: [facility] ENDO;  Service: Endoscopy;  Laterality: N/A;    CORONARY ARTERY BYPASS GRAFT      ESOPHAGOGASTRODUODENOSCOPY N/A 2019    Procedure: ESOPHAGOGASTRODUODENOSCOPY (EGD);  Surgeon: Opal Oshea MD;  Location: [facility] ENDO;  Service: Endoscopy;  Laterality: N/A;    SKIN CANCER EXCISION  2018    VASECTOMY         Social History     Tobacco Use    Smoking status: Former     Packs/day: 1.00     Years: 30.00     Pack years: 30.00     Types: Cigarettes     Quit date: 2003     Years since quittin.2    Smokeless tobacco: Never   Substance Use Topics    Alcohol use: Yes     Comment: socially    Drug use: Never       Family History   Problem Relation Age of Onset    Diabetes Mother     Heart murmur Mother     Cancer Mother         Breast    Stroke Mother     Alcohol abuse Father     Heart attack Father 63    Cancer Father         Esophageal    Diabetes Brother        Current Outpatient Medications   Medication Sig    aspirin (ECOTRIN) 325 MG EC tablet Take 325 mg by mouth once daily.    atorvastatin (LIPITOR) 40 MG tablet Take 1 tablet (40 mg total) by mouth once daily.    coenzyme Q10 200 mg capsule Take 200 mg by mouth 2 (two) times daily.    cyanocobalamin (VITAMIN B-12) 1000 MCG tablet Take 1,000  mcg by mouth once daily.     hydroCHLOROthiazide (HYDRODIURIL) 12.5 MG Tab Take 1 tablet (12.5 mg total) by mouth once daily.    lisinopriL (PRINIVIL,ZESTRIL) 40 MG tablet Take 1 tablet (40 mg total) by mouth once daily.    LORATADINE (ALAVERT ORAL) Take 1 tablet by mouth as needed.    magnesium oxide (MAG-OX) 400 mg (241.3 mg magnesium) tablet Take 400 mg by mouth once daily.    meclizine (ANTIVERT) 25 mg tablet Take 1 tablet (25 mg total) by mouth 3 (three) times daily as needed for Dizziness.    metFORMIN (GLUCOPHAGE) 1000 MG tablet Take 1 tablet (1,000 mg total) by mouth 2 (two) times daily with meals.    methocarbamoL (ROBAXIN) 500 MG Tab Take 1 tablet (500 mg total) by mouth 4 (four) times daily as needed (muscle spasm).    metoprolol succinate (TOPROL-XL) 50 MG 24 hr tablet Take 1 tablet (50 mg total) by mouth once daily.    NIFEdipine (PROCARDIA-XL) 60 MG (OSM) 24 hr tablet Take 1 tablet (60 mg total) by mouth once daily.    pantoprazole (PROTONIX) 20 MG tablet Take 1 tablet (20 mg total) by mouth once daily.    vitamin D 1000 units Tab Take 1,000 Units by mouth once daily.      No current facility-administered medications for this visit.     Current Outpatient Medications on File Prior to Visit   Medication Sig    aspirin (ECOTRIN) 325 MG EC tablet Take 325 mg by mouth once daily.    atorvastatin (LIPITOR) 40 MG tablet Take 1 tablet (40 mg total) by mouth once daily.    coenzyme Q10 200 mg capsule Take 200 mg by mouth 2 (two) times daily.    cyanocobalamin (VITAMIN B-12) 1000 MCG tablet Take 1,000 mcg by mouth once daily.     hydroCHLOROthiazide (HYDRODIURIL) 12.5 MG Tab Take 1 tablet (12.5 mg total) by mouth once daily.    lisinopriL (PRINIVIL,ZESTRIL) 40 MG tablet Take 1 tablet (40 mg total) by mouth once daily.    LORATADINE (ALAVERT ORAL) Take 1 tablet by mouth as needed.    magnesium oxide (MAG-OX) 400 mg (241.3 mg magnesium) tablet Take 400 mg by mouth once daily.    meclizine (ANTIVERT) 25 mg tablet  Take 1 tablet (25 mg total) by mouth 3 (three) times daily as needed for Dizziness.    metFORMIN (GLUCOPHAGE) 1000 MG tablet Take 1 tablet (1,000 mg total) by mouth 2 (two) times daily with meals.    methocarbamoL (ROBAXIN) 500 MG Tab Take 1 tablet (500 mg total) by mouth 4 (four) times daily as needed (muscle spasm).    metoprolol succinate (TOPROL-XL) 50 MG 24 hr tablet Take 1 tablet (50 mg total) by mouth once daily.    NIFEdipine (PROCARDIA-XL) 60 MG (OSM) 24 hr tablet Take 1 tablet (60 mg total) by mouth once daily.    pantoprazole (PROTONIX) 20 MG tablet Take 1 tablet (20 mg total) by mouth once daily.    vitamin D 1000 units Tab Take 1,000 Units by mouth once daily.      No current facility-administered medications on file prior to visit.     Review of patient's allergies indicates:  No Known Allergies   Review of Systems   Constitutional: Negative for malaise/fatigue.   Eyes:  Negative for blurred vision.   Cardiovascular:  Positive for chest pain. Negative for claudication, cyanosis, dyspnea on exertion, irregular heartbeat, leg swelling, near-syncope, orthopnea, palpitations and paroxysmal nocturnal dyspnea.   Respiratory:  Negative for cough, hemoptysis and shortness of breath.    Hematologic/Lymphatic: Negative for bleeding problem. Does not bruise/bleed easily.   Skin:  Negative for dry skin and itching.   Musculoskeletal:  Negative for falls, muscle weakness and myalgias.   Gastrointestinal:  Negative for abdominal pain, diarrhea, heartburn, hematemesis, hematochezia and melena.   Genitourinary:  Negative for flank pain and hematuria.   Neurological:  Negative for dizziness, focal weakness, headaches, light-headedness, numbness, paresthesias, seizures and weakness.   Psychiatric/Behavioral:  Negative for altered mental status and memory loss. The patient is not nervous/anxious.    Allergic/Immunologic: Negative for hives.     Objective:   Physical Exam  Vitals and nursing note reviewed.    Constitutional:       General: He is not in acute distress.     Appearance: He is well-developed. He is not diaphoretic.   HENT:      Head: Normocephalic and atraumatic.   Eyes:      General:         Right eye: No discharge.         Left eye: No discharge.      Pupils: Pupils are equal, round, and reactive to light.   Neck:      Thyroid: No thyromegaly.      Vascular: No JVD.   Cardiovascular:      Rate and Rhythm: Normal rate and regular rhythm.      Pulses: Normal pulses and intact distal pulses.      Heart sounds: Normal heart sounds. No murmur heard.    No friction rub. No gallop.   Pulmonary:      Effort: Pulmonary effort is normal. No respiratory distress.      Breath sounds: Normal breath sounds. No wheezing or rales.      Comments: Scar cabg well healed.   Chest:      Chest wall: No tenderness.   Abdominal:      General: Bowel sounds are normal. There is no distension.      Palpations: Abdomen is soft.      Tenderness: There is no abdominal tenderness.   Musculoskeletal:         General: Normal range of motion.      Cervical back: Neck supple.      Right lower leg: No edema.      Left lower leg: No edema.   Skin:     General: Skin is warm and dry.      Findings: No erythema or rash.   Neurological:      Mental Status: He is alert and oriented to person, place, and time.      Cranial Nerves: No cranial nerve deficit.   Psychiatric:         Behavior: Behavior normal.         Judgment: Judgment normal.     Vitals:    04/20/23 0906 04/20/23 0908   BP: 130/80 135/80   BP Location: Left arm Right arm   Patient Position: Sitting Sitting   Pulse: (!) 57    SpO2: 96%    Weight: 104.3 kg (229 lb 15 oz)      Lab Results   Component Value Date    CHOL 131 11/14/2022    CHOL 129 05/04/2022    CHOL 125 11/04/2021      Body mass index is 32.99 kg/m².   Lab Results   Component Value Date    HGBA1C 5.8 (H) 11/14/2022      BMP  Lab Results   Component Value Date     11/14/2022    K 4.0 11/14/2022     11/14/2022     CO2 29 11/14/2022    BUN 13 11/14/2022    CREATININE 1.1 11/14/2022    CALCIUM 9.5 11/14/2022    ANIONGAP 9 11/14/2022    EGFRNORACEVR >60.0 11/14/2022      Lab Results   Component Value Date    HDL 52 11/14/2022    HDL 50 05/04/2022    HDL 45 11/04/2021     Lab Results   Component Value Date    LDLCALC 58.0 (L) 11/14/2022    LDLCALC 56.8 (L) 05/04/2022    LDLCALC 57.2 (L) 11/04/2021     Lab Results   Component Value Date    TRIG 105 11/14/2022    TRIG 111 05/04/2022    TRIG 114 11/04/2021     Lab Results   Component Value Date    CHOLHDL 39.7 11/14/2022    CHOLHDL 38.8 05/04/2022    CHOLHDL 36.0 11/04/2021       Chemistry        Component Value Date/Time     11/14/2022 0905    K 4.0 11/14/2022 0905     11/14/2022 0905    CO2 29 11/14/2022 0905    BUN 13 11/14/2022 0905    CREATININE 1.1 11/14/2022 0905     (H) 11/14/2022 0905        Component Value Date/Time    CALCIUM 9.5 11/14/2022 0905    ALKPHOS 80 11/14/2022 0905    AST 22 11/14/2022 0905    ALT 23 11/14/2022 0905    BILITOT 0.9 11/14/2022 0905    ESTGFRAFRICA >60.0 05/04/2022 0806    ESTGFRAFRICA >60.0 05/04/2022 0806    EGFRNONAA >60.0 05/04/2022 0806    EGFRNONAA >60.0 05/04/2022 0806          Lab Results   Component Value Date    TSH 1.991 11/17/2022     Lab Results   Component Value Date    INR 1.0 09/19/2009     Lab Results   Component Value Date    WBC 6.63 11/17/2022    HGB 13.8 (L) 11/17/2022    HCT 41.7 11/17/2022    MCV 98 11/17/2022     11/17/2022     BMP  Sodium   Date Value Ref Range Status   11/14/2022 139 136 - 145 mmol/L Final     Potassium   Date Value Ref Range Status   11/14/2022 4.0 3.5 - 5.1 mmol/L Final     Chloride   Date Value Ref Range Status   11/14/2022 101 95 - 110 mmol/L Final     CO2   Date Value Ref Range Status   11/14/2022 29 23 - 29 mmol/L Final     BUN   Date Value Ref Range Status   11/14/2022 13 8 - 23 mg/dL Final     Creatinine   Date Value Ref Range Status   11/14/2022 1.1 0.5 - 1.4 mg/dL  Final     Calcium   Date Value Ref Range Status   11/14/2022 9.5 8.7 - 10.5 mg/dL Final     Anion Gap   Date Value Ref Range Status   11/14/2022 9 8 - 16 mmol/L Final     eGFR if    Date Value Ref Range Status   05/04/2022 >60.0 >60 mL/min/1.73 m^2 Final   05/04/2022 >60.0 >60 mL/min/1.73 m^2 Final     eGFR if non    Date Value Ref Range Status   05/04/2022 >60.0 >60 mL/min/1.73 m^2 Final     Comment:     Calculation used to obtain the estimated glomerular filtration  rate (eGFR) is the CKD-EPI equation.      05/04/2022 >60.0 >60 mL/min/1.73 m^2 Final     Comment:     Calculation used to obtain the estimated glomerular filtration  rate (eGFR) is the CKD-EPI equation.        CrCl cannot be calculated (Patient's most recent lab result is older than the maximum 7 days allowed.).    Assessment:     1. Coronary artery disease involving native coronary artery of native heart without angina pectoris    2. Hyperlipidemia associated with type 2 diabetes mellitus    3. Hypertension associated with diabetes    4. Old MI (myocardial infarction)    5. S/P CABG (coronary artery bypass graft)    6. Diabetes mellitus due to underlying condition with hyperosmolarity without coma, without long-term current use of insulin      He continues to have anginal symptoms despite negative cardiolite and htn control I am concerned that he may have progressed his cad and specially in lcx distribution that is not bypassed in addition to graft disease since his surgery is more than 15 years ago. He will also have more medical therapy added with nitropach and will give nitro s/l for emergency and plan on [facility]   Plan:   As per above    I have explained the risks, benefits , and alternatives of the procedure in detail.the patient voices understanding and all questions have been answered.the patient agrees to proceed as planned.

## 2023-04-21 LAB
APTT PPP: 25.3 SEC (ref 21–32)
INR PPP: 1.1 (ref 0.8–1.2)
PROTHROMBIN TIME: 11 SEC (ref 9–12.5)

## 2023-04-30 PROBLEM — I20.89 EFFORT ANGINA: Status: ACTIVE | Noted: 2023-04-30

## 2023-05-01 ENCOUNTER — HOSPITAL ENCOUNTER (OUTPATIENT)
Facility: HOSPITAL | Age: 73
Discharge: HOME | End: 2023-05-01
Attending: INTERNAL MEDICINE | Admitting: INTERNAL MEDICINE
Payer: MEDICARE

## 2023-05-01 DIAGNOSIS — I25.720: ICD-10-CM

## 2023-05-01 DIAGNOSIS — I20.89 EFFORT ANGINA: Primary | ICD-10-CM

## 2023-05-01 DIAGNOSIS — I25.10 CAD, MULTIPLE VESSEL: ICD-10-CM

## 2023-05-01 DIAGNOSIS — E11.9 TYPE 2 DIABETES MELLITUS WITHOUT COMPLICATION, WITHOUT LONG-TERM CURRENT USE OF INSULIN: ICD-10-CM

## 2023-05-01 LAB
CATH EF QUANTITATIVE: 55 %
POCT GLUCOSE: 136 MG/DL (ref 70–110)

## 2023-05-01 PROCEDURE — 63600175 PHARM REV CODE 636 W HCPCS: Performed by: INTERNAL MEDICINE

## 2023-05-01 PROCEDURE — 99152 MOD SED SAME PHYS/QHP 5/>YRS: CPT | Performed by: INTERNAL MEDICINE

## 2023-05-01 PROCEDURE — 25500020 PHARM REV CODE 255: Performed by: INTERNAL MEDICINE

## 2023-05-01 PROCEDURE — 25000003 PHARM REV CODE 250: Performed by: INTERNAL MEDICINE

## 2023-05-01 PROCEDURE — 99152 MOD SED SAME PHYS/QHP 5/>YRS: CPT | Mod: ,,, | Performed by: INTERNAL MEDICINE

## 2023-05-01 PROCEDURE — 99152 PR MOD CONSCIOUS SEDATION, SAME PHYS, 5+ YRS, FIRST 15 MIN: ICD-10-PCS | Mod: ,,, | Performed by: INTERNAL MEDICINE

## 2023-05-01 PROCEDURE — 93459: ICD-10-PCS | Mod: 26,,, | Performed by: INTERNAL MEDICINE

## 2023-05-01 PROCEDURE — 93459 L HRT ART/GRFT ANGIO: CPT | Performed by: INTERNAL MEDICINE

## 2023-05-01 PROCEDURE — 99153 MOD SED SAME PHYS/QHP EA: CPT | Performed by: INTERNAL MEDICINE

## 2023-05-01 PROCEDURE — 27201423 OPTIME MED/SURG SUP & DEVICES STERILE SUPPLY: Performed by: INTERNAL MEDICINE

## 2023-05-01 PROCEDURE — 93459 L HRT ART/GRFT ANGIO: CPT | Mod: 26,,, | Performed by: INTERNAL MEDICINE

## 2023-05-01 PROCEDURE — C1769 GUIDE WIRE: HCPCS | Performed by: INTERNAL MEDICINE

## 2023-05-01 PROCEDURE — C1894 INTRO/SHEATH, NON-LASER: HCPCS | Performed by: INTERNAL MEDICINE

## 2023-05-01 RX ORDER — NAPROXEN SODIUM 220 MG/1
81 TABLET, FILM COATED ORAL ONCE
Status: COMPLETED | OUTPATIENT
Start: 2023-05-01 | End: 2023-05-01

## 2023-05-01 RX ORDER — MIDAZOLAM HYDROCHLORIDE 1 MG/ML
INJECTION, SOLUTION INTRAMUSCULAR; INTRAVENOUS
Status: DISCONTINUED | OUTPATIENT
Start: 2023-05-01 | End: 2023-05-01 | Stop reason: HOSPADM

## 2023-05-01 RX ORDER — DIAZEPAM 5 MG/1
5 TABLET ORAL
Status: DISCONTINUED | OUTPATIENT
Start: 2023-05-01 | End: 2023-05-01 | Stop reason: HOSPADM

## 2023-05-01 RX ORDER — SODIUM CHLORIDE 9 MG/ML
INJECTION, SOLUTION INTRAVENOUS CONTINUOUS
Status: ACTIVE | OUTPATIENT
Start: 2023-05-01 | End: 2023-05-01

## 2023-05-01 RX ORDER — DIPHENHYDRAMINE HCL 50 MG
50 CAPSULE ORAL ONCE
Status: COMPLETED | OUTPATIENT
Start: 2023-05-01 | End: 2023-05-01

## 2023-05-01 RX ORDER — SODIUM CHLORIDE 0.9 % (FLUSH) 0.9 %
10 SYRINGE (ML) INJECTION
Status: DISCONTINUED | OUTPATIENT
Start: 2023-05-01 | End: 2023-05-01 | Stop reason: HOSPADM

## 2023-05-01 RX ORDER — HEPARIN SODIUM 1000 [USP'U]/ML
INJECTION, SOLUTION INTRAVENOUS; SUBCUTANEOUS
Status: DISCONTINUED | OUTPATIENT
Start: 2023-05-01 | End: 2023-05-01 | Stop reason: HOSPADM

## 2023-05-01 RX ORDER — LIDOCAINE HYDROCHLORIDE 20 MG/ML
INJECTION, SOLUTION INFILTRATION; PERINEURAL
Status: DISCONTINUED | OUTPATIENT
Start: 2023-05-01 | End: 2023-05-01 | Stop reason: HOSPADM

## 2023-05-01 RX ORDER — VERAPAMIL HYDROCHLORIDE 2.5 MG/ML
INJECTION, SOLUTION INTRAVENOUS
Status: DISCONTINUED | OUTPATIENT
Start: 2023-05-01 | End: 2023-05-01 | Stop reason: HOSPADM

## 2023-05-01 RX ORDER — NITROGLYCERIN 5 MG/ML
INJECTION, SOLUTION INTRAVENOUS
Status: DISCONTINUED | OUTPATIENT
Start: 2023-05-01 | End: 2023-05-01 | Stop reason: HOSPADM

## 2023-05-01 RX ORDER — FENTANYL CITRATE 50 UG/ML
INJECTION, SOLUTION INTRAMUSCULAR; INTRAVENOUS
Status: DISCONTINUED | OUTPATIENT
Start: 2023-05-01 | End: 2023-05-01 | Stop reason: HOSPADM

## 2023-05-01 RX ORDER — RANOLAZINE 500 MG/1
500 TABLET, EXTENDED RELEASE ORAL 2 TIMES DAILY
Qty: 60 TABLET | Refills: 11 | Status: ON HOLD | OUTPATIENT
Start: 2023-05-01 | End: 2024-03-16 | Stop reason: HOSPADM

## 2023-05-01 RX ORDER — METFORMIN HYDROCHLORIDE 1000 MG/1
1000 TABLET ORAL 2 TIMES DAILY WITH MEALS
Qty: 180 TABLET | Refills: 1 | Status: SHIPPED | OUTPATIENT
Start: 2023-05-04 | End: 2023-11-17 | Stop reason: SDUPTHER

## 2023-05-01 RX ORDER — SODIUM CHLORIDE 9 MG/ML
INJECTION, SOLUTION INTRAVENOUS CONTINUOUS
Status: DISCONTINUED | OUTPATIENT
Start: 2023-05-01 | End: 2023-05-01 | Stop reason: HOSPADM

## 2023-05-01 RX ORDER — ONDANSETRON 8 MG/1
8 TABLET, ORALLY DISINTEGRATING ORAL EVERY 8 HOURS PRN
Status: DISCONTINUED | OUTPATIENT
Start: 2023-05-01 | End: 2023-05-01 | Stop reason: HOSPADM

## 2023-05-01 RX ORDER — ACETAMINOPHEN 325 MG/1
650 TABLET ORAL EVERY 4 HOURS PRN
Status: DISCONTINUED | OUTPATIENT
Start: 2023-05-01 | End: 2023-05-01 | Stop reason: HOSPADM

## 2023-05-01 RX ADMIN — DIPHENHYDRAMINE HYDROCHLORIDE 50 MG: 50 CAPSULE ORAL at 08:05

## 2023-05-01 RX ADMIN — SODIUM CHLORIDE: 9 INJECTION, SOLUTION INTRAVENOUS at 08:05

## 2023-05-01 RX ADMIN — DIAZEPAM 5 MG: 5 TABLET ORAL at 08:05

## 2023-05-01 RX ADMIN — ASPIRIN 81 MG CHEWABLE TABLET 81 MG: 81 TABLET CHEWABLE at 08:05

## 2023-05-01 NOTE — Clinical Note
The catheter was repositioned into the left subclavian artery. An angiography was performed of the LIMA to LAD. Multiple views were taken.

## 2023-05-01 NOTE — INTERVAL H&P NOTE
The patient has been examined and the H&P has been reviewed:    I concur with the findings and changes have been noted since the H&P was written: continues to have chest pain for sue/ptca    Procedure risks, benefits and alternative options discussed and understood by patient/family.          Active Hospital Problems    Diagnosis  POA    *Effort angina [I20.8]  Yes    S/P CABG (coronary artery bypass graft) [Z95.1]  Not Applicable    Old MI (myocardial infarction) [I25.2]  Not Applicable    Hypertension associated with diabetes [E11.59, I15.2]  Yes    CAD (coronary artery disease) [I25.10]  Yes     Sees Sabrina        Hyperlipidemia associated with type 2 diabetes mellitus [E11.69, E78.5]  Yes    Diabetes mellitus [E11.9]  Yes      Resolved Hospital Problems   No resolved problems to display.

## 2023-05-01 NOTE — Clinical Note
The DP pulses were 2+ bilaterally. The PT pulses were 2+ bilaterally. The left radial pulse was allens test negative.

## 2023-05-01 NOTE — Clinical Note
150 ml of contrast were injected throughout the case. 0 mL of contrast was the total wasted during the case. 150 mL was the total amount used during the case.

## 2023-05-01 NOTE — DISCHARGE INSTRUCTIONS
"Post-op Heart Catheterization    1. DIET: It is advisable for you to follow a diet that limits the intake of salt, sugar, saturated fats and cholesterol.     2. DRIVING: Due to sedation you received during your procedure, DO NOT drive or operate machinery for 24 hours. Avoid making critical decisions or signing legal documents until tomorrow.    3. ACTIVITY: AVOID activities that require bending of the affected arm/wrist for 3 days and submerging the site in water for 3 days. REMOVE the dressing the day after  the procedure, you may apply a bandaid to the site if you desire. You may RESUME       your normal activities or prescribed exercise program as instructed by your physician after 3 days.                                                                                                                 4. WOUND CARE: It is not unusual to have a small amount of bruising to appear at or near the puncture site. It is also common to have a tender "knot" develop beneath the skin at the puncture site of the wrist/arm. This is usually scar tissue and is not a cause for concern or alarm. This tender knot may take several weeks to fully resolve. The bruise will usually spread over several days. However, if the lump gets bigger, call your doctor immediately.    5. DISCOMFORT: For general discomfort at the puncture site, you may take 1 or 2 Acetaminophen (Tylenol) tablets every 4 - 6 hours as needed. (Do not take more than 4000 mg a day)    6. SIGNS AND SYMPTOMS TO REPORT:  Call your physician immediately if any of the following occur:                                            1. Loss of feeling, warmth or color to the affected arm/wrist                                                                                                          2. Mild beeding from the site                 3. Pain that is sudden, sharp or persistent in the affected arm/wrist                 4. Swelling or a change in "lump" size, increased " redness or drainage at                               the puncture site                                                                               5. High fever (101 degrees or higher)    7. GO TO  THE EMERGENCY ROOM OR CALL 911 IF YOU HAVE: Chest pains or discomforts not relieved with 3 nitroglycerin doses (sublingual tablets or spray), numbness or severe pain of limb, if your limb becomes cold or discolored or if you develop uncontrolled bleeding from the puncture site (quickly apply firm, direct pressure above the site).

## 2023-05-01 NOTE — Clinical Note
The catheter was inserted into the SVG to Seq D1 and D2 graft. An angiography was performed of the graft. Multiple views were taken.

## 2023-05-01 NOTE — OP NOTE
INPATIENT Operative Note         SUMMARY     Surgery Date: 5/1/2023     Surgeon(s) and Role:     * Kimberly Bennett MD - Primary    ASSISTANT:none    Pre-op Diagnosis:  Athscl autologous artery CABG w unstable angina pectoris [I25.720]  CAD, multiple vessel [I25.10]      Post-op Diagnosis:  Athscl autologous artery CABG w unstable angina pectoris [I25.720]  CAD, multiple vessel [I25.10]    Procedure(s) (LRB):  Left heart cath (Left)  Bypass graft study    COMPLICATION:none    Anesthesia: RN IV Sedation    Findings/Key Components:  Occluded mid lad   Patent lima to lad    Svg to d1 and d2 with tim1 flow ostial 805 STENOSIS AND SUBTOTAL BODY AND DISTAL LESION.   OM2 40%    RCA OCCLUDED    EVG TO RCA PATENT   PLB HAS 70% DISTAL.   LVEDP 17    EF 55%    Estimated Blood Loss: < 50 ML.         SPECIMEN: NONE    Devices/Prostetics: None    PLAN:   MAXIMIZE MEDICAL THERAPY

## 2023-05-01 NOTE — Clinical Note
The catheter was inserted into the SVG to PDA graft. An angiography was performed of the graft. Multiple views were taken.

## 2023-05-02 NOTE — DISCHARGE SUMMARY
[facility] - Cath Lab (Hospital)  Discharge Note  Short Stay    Procedure(s) (LRB):  Left heart cath (Left)  Bypass graft study      OUTCOME: Patient tolerated treatment/procedure well without complication and is now ready for discharge.    DISPOSITION: Home or Self Care    FINAL DIAGNOSIS:  Effort angina    FOLLOWUP: In clinic    DISCHARGE INSTRUCTIONS:    Discharge Procedure Orders   Diet general     Call MD for:  temperature >100.4     Call MD for:  persistent nausea and vomiting     Call MD for:  severe uncontrolled pain     Call MD for:  difficulty breathing, headache or visual disturbances     Call MD for:  redness, tenderness, or signs of infection (pain, swelling, redness, odor or green/yellow discharge around incision site)     Call MD for:  hives     Call MD for:  persistent dizziness or light-headedness     Call MD for:  extreme fatigue        TIME SPENT ON DISCHARGE: 15  minutes

## 2023-05-04 VITALS
WEIGHT: 224 LBS | BODY MASS INDEX: 32.07 KG/M2 | DIASTOLIC BLOOD PRESSURE: 74 MMHG | RESPIRATION RATE: 16 BRPM | HEIGHT: 70 IN | HEART RATE: 49 BPM | TEMPERATURE: 98 F | OXYGEN SATURATION: 97 % | SYSTOLIC BLOOD PRESSURE: 122 MMHG

## 2023-05-10 ENCOUNTER — LAB VISIT (OUTPATIENT)
Dept: LAB | Facility: HOSPITAL | Age: 73
End: 2023-05-10
Attending: INTERNAL MEDICINE
Payer: MEDICARE

## 2023-05-10 DIAGNOSIS — Z12.5 ENCOUNTER FOR SCREENING FOR MALIGNANT NEOPLASM OF PROSTATE: ICD-10-CM

## 2023-05-10 DIAGNOSIS — E08.00 DIABETES MELLITUS DUE TO UNDERLYING CONDITION WITH HYPEROSMOLARITY WITHOUT COMA, WITHOUT LONG-TERM CURRENT USE OF INSULIN: ICD-10-CM

## 2023-05-10 LAB
COMPLEXED PSA SERPL-MCNC: 1.2 NG/ML (ref 0–4)
ESTIMATED AVG GLUCOSE: 131 MG/DL (ref 68–131)
HBA1C MFR BLD: 6.2 % (ref 4–5.6)

## 2023-05-10 PROCEDURE — 36415 COLL VENOUS BLD VENIPUNCTURE: CPT | Performed by: INTERNAL MEDICINE

## 2023-05-10 PROCEDURE — 84153 ASSAY OF PSA TOTAL: CPT | Performed by: INTERNAL MEDICINE

## 2023-05-10 PROCEDURE — 83036 HEMOGLOBIN GLYCOSYLATED A1C: CPT | Performed by: INTERNAL MEDICINE

## 2023-05-17 ENCOUNTER — OFFICE VISIT (OUTPATIENT)
Dept: INTERNAL MEDICINE | Facility: CLINIC | Age: 73
End: 2023-05-17
Payer: MEDICARE

## 2023-05-17 VITALS
HEIGHT: 70 IN | OXYGEN SATURATION: 98 % | BODY MASS INDEX: 32.28 KG/M2 | SYSTOLIC BLOOD PRESSURE: 112 MMHG | WEIGHT: 225.5 LBS | HEART RATE: 66 BPM | TEMPERATURE: 97 F | DIASTOLIC BLOOD PRESSURE: 80 MMHG

## 2023-05-17 DIAGNOSIS — I25.10 CORONARY ARTERY DISEASE INVOLVING NATIVE CORONARY ARTERY OF NATIVE HEART WITHOUT ANGINA PECTORIS: ICD-10-CM

## 2023-05-17 DIAGNOSIS — Z12.11 COLON CANCER SCREENING: ICD-10-CM

## 2023-05-17 DIAGNOSIS — E11.69 HYPERLIPIDEMIA ASSOCIATED WITH TYPE 2 DIABETES MELLITUS: ICD-10-CM

## 2023-05-17 DIAGNOSIS — Z12.5 ENCOUNTER FOR SCREENING FOR MALIGNANT NEOPLASM OF PROSTATE: ICD-10-CM

## 2023-05-17 DIAGNOSIS — I20.89 EFFORT ANGINA: ICD-10-CM

## 2023-05-17 DIAGNOSIS — E78.5 HYPERLIPIDEMIA ASSOCIATED WITH TYPE 2 DIABETES MELLITUS: ICD-10-CM

## 2023-05-17 DIAGNOSIS — Z95.1 S/P CABG (CORONARY ARTERY BYPASS GRAFT): ICD-10-CM

## 2023-05-17 DIAGNOSIS — E11.59 HYPERTENSION ASSOCIATED WITH DIABETES: Primary | ICD-10-CM

## 2023-05-17 DIAGNOSIS — I15.2 HYPERTENSION ASSOCIATED WITH DIABETES: Primary | ICD-10-CM

## 2023-05-17 DIAGNOSIS — E08.00 DIABETES MELLITUS DUE TO UNDERLYING CONDITION WITH HYPEROSMOLARITY WITHOUT COMA, WITHOUT LONG-TERM CURRENT USE OF INSULIN: ICD-10-CM

## 2023-05-17 PROBLEM — Z00.00 ROUTINE GENERAL MEDICAL EXAMINATION AT A HEALTH CARE FACILITY: Status: ACTIVE | Noted: 2023-05-17

## 2023-05-17 PROCEDURE — 4010F PR ACE/ARB THEARPY RXD/TAKEN: ICD-10-PCS | Mod: CPTII,,, | Performed by: INTERNAL MEDICINE

## 2023-05-17 PROCEDURE — 3079F PR MOST RECENT DIASTOLIC BLOOD PRESSURE 80-89 MM HG: ICD-10-PCS | Mod: CPTII,,, | Performed by: INTERNAL MEDICINE

## 2023-05-17 PROCEDURE — 3288F FALL RISK ASSESSMENT DOCD: CPT | Mod: CPTII,,, | Performed by: INTERNAL MEDICINE

## 2023-05-17 PROCEDURE — 3074F SYST BP LT 130 MM HG: CPT | Mod: CPTII,,, | Performed by: INTERNAL MEDICINE

## 2023-05-17 PROCEDURE — 1159F MED LIST DOCD IN RCRD: CPT | Mod: CPTII,,, | Performed by: INTERNAL MEDICINE

## 2023-05-17 PROCEDURE — 3072F PR LOW RISK FOR RETINOPATHY: ICD-10-PCS | Mod: CPTII,,, | Performed by: INTERNAL MEDICINE

## 2023-05-17 PROCEDURE — 3079F DIAST BP 80-89 MM HG: CPT | Mod: CPTII,,, | Performed by: INTERNAL MEDICINE

## 2023-05-17 PROCEDURE — 3008F BODY MASS INDEX DOCD: CPT | Mod: CPTII,,, | Performed by: INTERNAL MEDICINE

## 2023-05-17 PROCEDURE — 99214 OFFICE O/P EST MOD 30 MIN: CPT | Mod: ,,, | Performed by: INTERNAL MEDICINE

## 2023-05-17 PROCEDURE — 99214 PR OFFICE/OUTPT VISIT, EST, LEVL IV, 30-39 MIN: ICD-10-PCS | Mod: ,,, | Performed by: INTERNAL MEDICINE

## 2023-05-17 PROCEDURE — 99999 PR PBB SHADOW E&M-EST. PATIENT-LVL IV: ICD-10-PCS | Mod: PBBFAC,,, | Performed by: INTERNAL MEDICINE

## 2023-05-17 PROCEDURE — 3008F PR BODY MASS INDEX (BMI) DOCUMENTED: ICD-10-PCS | Mod: CPTII,,, | Performed by: INTERNAL MEDICINE

## 2023-05-17 PROCEDURE — 99999 PR PBB SHADOW E&M-EST. PATIENT-LVL IV: CPT | Mod: PBBFAC,,, | Performed by: INTERNAL MEDICINE

## 2023-05-17 PROCEDURE — 3044F PR MOST RECENT HEMOGLOBIN A1C LEVEL <7.0%: ICD-10-PCS | Mod: CPTII,,, | Performed by: INTERNAL MEDICINE

## 2023-05-17 PROCEDURE — 3288F PR FALLS RISK ASSESSMENT DOCUMENTED: ICD-10-PCS | Mod: CPTII,,, | Performed by: INTERNAL MEDICINE

## 2023-05-17 PROCEDURE — 1101F PT FALLS ASSESS-DOCD LE1/YR: CPT | Mod: CPTII,,, | Performed by: INTERNAL MEDICINE

## 2023-05-17 PROCEDURE — 1126F PR PAIN SEVERITY QUANTIFIED, NO PAIN PRESENT: ICD-10-PCS | Mod: CPTII,,, | Performed by: INTERNAL MEDICINE

## 2023-05-17 PROCEDURE — 3072F LOW RISK FOR RETINOPATHY: CPT | Mod: CPTII,,, | Performed by: INTERNAL MEDICINE

## 2023-05-17 PROCEDURE — 3074F PR MOST RECENT SYSTOLIC BLOOD PRESSURE < 130 MM HG: ICD-10-PCS | Mod: CPTII,,, | Performed by: INTERNAL MEDICINE

## 2023-05-17 PROCEDURE — 1101F PR PT FALLS ASSESS DOC 0-1 FALLS W/OUT INJ PAST YR: ICD-10-PCS | Mod: CPTII,,, | Performed by: INTERNAL MEDICINE

## 2023-05-17 PROCEDURE — 3044F HG A1C LEVEL LT 7.0%: CPT | Mod: CPTII,,, | Performed by: INTERNAL MEDICINE

## 2023-05-17 PROCEDURE — 4010F ACE/ARB THERAPY RXD/TAKEN: CPT | Mod: CPTII,,, | Performed by: INTERNAL MEDICINE

## 2023-05-17 PROCEDURE — 1126F AMNT PAIN NOTED NONE PRSNT: CPT | Mod: CPTII,,, | Performed by: INTERNAL MEDICINE

## 2023-05-17 PROCEDURE — 1159F PR MEDICATION LIST DOCUMENTED IN MEDICAL RECORD: ICD-10-PCS | Mod: CPTII,,, | Performed by: INTERNAL MEDICINE

## 2023-05-17 RX ORDER — HYDROCHLOROTHIAZIDE 12.5 MG/1
12.5 TABLET ORAL DAILY
Qty: 90 TABLET | Refills: 2 | Status: SHIPPED | OUTPATIENT
Start: 2023-05-17 | End: 2023-11-17 | Stop reason: SDUPTHER

## 2023-05-17 NOTE — PROGRESS NOTES
"Subjective:      Patient ID: Deniz Paulino is a 72 y.o. male.    Chief Complaint: Follow-up    HPI    71 yo with   Patient Active Problem List   Diagnosis    CAD (coronary artery disease)    Hypertension associated with diabetes    Hyperlipidemia associated with type 2 diabetes mellitus    S/P CABG (coronary artery bypass graft)    Old MI (myocardial infarction)    Diabetes mellitus    Hypomagnesemia    Effort angina    Routine general medical examination at a health care facility    Colon cancer screening     Past Medical History:   Diagnosis Date    Acute coronary syndrome     Coronary artery disease     Diabetes mellitus 4/24/2014    Effort angina 4/30/2023    Hyperlipidemia     Hypertension     Old MI (myocardial infarction) 4/24/2014    S/P CABG (coronary artery bypass graft) 4/24/2014     Here today for management of mult med problems as outlined below.  Compliant with meds without significant side effects. Energy and appetite good.       Review of Systems   Constitutional:  Negative for chills and fever.   HENT:  Negative for ear pain and sore throat.    Respiratory:  Negative for cough.    Cardiovascular:  Negative for chest pain.   Gastrointestinal:  Negative for abdominal pain and blood in stool.   Genitourinary:  Negative for dysuria and hematuria.   Neurological:  Negative for seizures and syncope.   Objective:   /80 (BP Location: Right arm, Patient Position: Sitting, BP Method: Large (Manual))   Pulse 66   Temp 96.7 °F (35.9 °C) (Tympanic)   Ht 5' 10" (1.778 m)   Wt 102.3 kg (225 lb 8.5 oz)   SpO2 98%   BMI 32.36 kg/m²     Physical Exam  Constitutional:       General: He is awake. He is not in acute distress.     Appearance: Normal appearance. He is well-developed.   HENT:      Head: Normocephalic and atraumatic.      Right Ear: External ear normal.      Left Ear: External ear normal.   Eyes:      Conjunctiva/sclera: Conjunctivae normal.      Pupils: Pupils are equal, round, and reactive " to light.   Neck:      Thyroid: No thyromegaly.   Cardiovascular:      Rate and Rhythm: Normal rate and regular rhythm.      Pulses:           Dorsalis pedis pulses are 2+ on the right side and 2+ on the left side.   Pulmonary:      Effort: Pulmonary effort is normal.      Breath sounds: Normal breath sounds. No wheezing or rales.   Abdominal:      General: Bowel sounds are normal.      Palpations: Abdomen is soft.      Tenderness: There is no abdominal tenderness.   Musculoskeletal:      Cervical back: Normal range of motion and neck supple.   Feet:      Right foot:      Protective Sensation: 8 sites tested.  4 sites sensed.      Skin integrity: No ulcer or blister.      Left foot:      Protective Sensation: 8 sites tested.  4 sites sensed.      Skin integrity: No ulcer or blister.   Lymphadenopathy:      Cervical: No cervical adenopathy.   Skin:     General: Skin is warm and dry.   Neurological:      Mental Status: He is alert and oriented to person, place, and time. Mental status is at baseline.   Psychiatric:         Mood and Affect: Mood normal.         Behavior: Behavior normal. Behavior is cooperative.         Thought Content: Thought content normal.         Judgment: Judgment normal.       Lab Results   Component Value Date    WBC 6.14 04/20/2023    HGB 14.2 04/20/2023    HGB 13.8 (L) 11/17/2022    HGB 14.0 05/10/2021    HCT 42.4 04/20/2023    MCV 93 04/20/2023    MCV 98 11/17/2022    MCV 94 05/10/2021     04/20/2023    CHOL 131 11/14/2022    TRIG 105 11/14/2022    HDL 52 11/14/2022    LDLCALC 58.0 (L) 11/14/2022    LDLCALC 56.8 (L) 05/04/2022    LDLCALC 57.2 (L) 11/04/2021    ALT 23 11/14/2022    AST 22 11/14/2022     04/20/2023    K 3.9 04/20/2023    CALCIUM 9.4 04/20/2023     04/20/2023    CO2 28 04/20/2023    BUN 12 04/20/2023    CREATININE 1.0 04/20/2023    CREATININE 1.1 11/14/2022    CREATININE 1.0 05/04/2022    CREATININE 1.0 05/04/2022    EGFRNORACEVR >60.0 04/20/2023     EGFRNORACEVR >60.0 11/14/2022    TSH 1.991 11/17/2022    TSH 2.314 05/04/2022    TSH 2.089 05/10/2021    PSA 1.2 05/10/2023    PSA 1.5 05/04/2022    PSA 1.4 05/10/2021     (H) 04/20/2023    HGBA1C 6.2 (H) 05/10/2023    HGBA1C 5.8 (H) 11/14/2022    HGBA1C 5.7 (H) 05/04/2022    HGBA1C 5.7 (H) 05/04/2022    [id number] 43 05/22/2017          The ASCVD Risk score (Guillermo DK, et al., 2019) failed to calculate for the following reasons:    The patient has a prior MI or stroke diagnosis     Assessment:     1. Hypertension associated with diabetes    2. Hyperlipidemia associated with type 2 diabetes mellitus    3. Diabetes mellitus due to underlying condition with hyperosmolarity without coma, without long-term current use of insulin    4. Coronary artery disease involving native coronary artery of native heart without angina pectoris    5. S/P CABG (coronary artery bypass graft)    6. Colon cancer screening    7. Effort angina    8. Encounter for screening for malignant neoplasm of prostate      Plan:   1. Hypertension associated with diabetes  Overview:  Controlled.  Continue current medications    Orders:  -     hydroCHLOROthiazide (HYDRODIURIL) 12.5 MG Tab; Take 1 tablet (12.5 mg total) by mouth once daily.  Dispense: 90 tablet; Refill: 2  -     Comprehensive Metabolic Panel; Future; Expected date: 11/13/2023  -     CBC Auto Differential; Future; Expected date: 11/13/2023    2. Hyperlipidemia associated with type 2 diabetes mellitus  Overview:  Controlled.  Continue current medications    Orders:  -     Lipid Panel; Future; Expected date: 11/13/2023  -     TSH; Future; Expected date: 11/13/2023    3. Diabetes mellitus due to underlying condition with hyperosmolarity without coma, without long-term current use of insulin  Overview:  Stable.  Continue current    Orders:  -     Hemoglobin A1C; Future; Expected date: 11/13/2023  -     POCT Microalbumin/Creatinine Ratio    4. Coronary artery disease involving native  coronary artery of native heart without angina pectoris  Overview:  Stable.  Continue as per Cardiology.  Sees Sabrina      5. S/P CABG (coronary artery bypass graft)    6. Colon cancer screening  Overview:  Colonoscopy due July 26 of 2024.      7. Effort angina  Overview:  Stable.  Continue as per Cardiology.  On nitro patch per cards.      8. Encounter for screening for malignant neoplasm of prostate  -     PSA, Screening; Future; Expected date: 11/13/2023    Advised foot checks qday.     There are no Patient Instructions on file for this visit.    Future Appointments   Date Time Provider Department Center   5/18/2023  1:00 PM Kimberly Bennett MD [facility]       Lab Frequency Next Occurrence   Ambulatory referral/consult to ENT Once 11/24/2022       Follow up in about 6 months (around 11/17/2023), or if symptoms worsen or fail to improve.

## 2023-05-18 ENCOUNTER — OFFICE VISIT (OUTPATIENT)
Dept: CARDIOLOGY | Facility: CLINIC | Age: 73
End: 2023-05-18
Payer: MEDICARE

## 2023-05-18 VITALS
HEIGHT: 70 IN | OXYGEN SATURATION: 96 % | SYSTOLIC BLOOD PRESSURE: 112 MMHG | WEIGHT: 226.19 LBS | HEART RATE: 63 BPM | DIASTOLIC BLOOD PRESSURE: 66 MMHG | BODY MASS INDEX: 32.38 KG/M2

## 2023-05-18 DIAGNOSIS — E78.5 HYPERLIPIDEMIA ASSOCIATED WITH TYPE 2 DIABETES MELLITUS: ICD-10-CM

## 2023-05-18 DIAGNOSIS — I25.2 OLD MI (MYOCARDIAL INFARCTION): ICD-10-CM

## 2023-05-18 DIAGNOSIS — E08.00 DIABETES MELLITUS DUE TO UNDERLYING CONDITION WITH HYPEROSMOLARITY WITHOUT COMA, WITHOUT LONG-TERM CURRENT USE OF INSULIN: ICD-10-CM

## 2023-05-18 DIAGNOSIS — E11.69 HYPERLIPIDEMIA ASSOCIATED WITH TYPE 2 DIABETES MELLITUS: ICD-10-CM

## 2023-05-18 DIAGNOSIS — I15.2 HYPERTENSION ASSOCIATED WITH DIABETES: ICD-10-CM

## 2023-05-18 DIAGNOSIS — I20.89 EFFORT ANGINA: ICD-10-CM

## 2023-05-18 DIAGNOSIS — I25.10 CORONARY ARTERY DISEASE INVOLVING NATIVE CORONARY ARTERY OF NATIVE HEART WITHOUT ANGINA PECTORIS: Primary | ICD-10-CM

## 2023-05-18 DIAGNOSIS — E11.59 HYPERTENSION ASSOCIATED WITH DIABETES: ICD-10-CM

## 2023-05-18 DIAGNOSIS — Z95.1 S/P CABG (CORONARY ARTERY BYPASS GRAFT): ICD-10-CM

## 2023-05-18 PROCEDURE — 1101F PT FALLS ASSESS-DOCD LE1/YR: CPT | Mod: CPTII,,, | Performed by: INTERNAL MEDICINE

## 2023-05-18 PROCEDURE — 3288F FALL RISK ASSESSMENT DOCD: CPT | Mod: CPTII,,, | Performed by: INTERNAL MEDICINE

## 2023-05-18 PROCEDURE — 3008F PR BODY MASS INDEX (BMI) DOCUMENTED: ICD-10-PCS | Mod: CPTII,,, | Performed by: INTERNAL MEDICINE

## 2023-05-18 PROCEDURE — 3074F SYST BP LT 130 MM HG: CPT | Mod: CPTII,,, | Performed by: INTERNAL MEDICINE

## 2023-05-18 PROCEDURE — 4010F ACE/ARB THERAPY RXD/TAKEN: CPT | Mod: CPTII,,, | Performed by: INTERNAL MEDICINE

## 2023-05-18 PROCEDURE — 1160F RVW MEDS BY RX/DR IN RCRD: CPT | Mod: CPTII,,, | Performed by: INTERNAL MEDICINE

## 2023-05-18 PROCEDURE — 1126F AMNT PAIN NOTED NONE PRSNT: CPT | Mod: CPTII,,, | Performed by: INTERNAL MEDICINE

## 2023-05-18 PROCEDURE — 3074F PR MOST RECENT SYSTOLIC BLOOD PRESSURE < 130 MM HG: ICD-10-PCS | Mod: CPTII,,, | Performed by: INTERNAL MEDICINE

## 2023-05-18 PROCEDURE — 1160F PR REVIEW ALL MEDS BY PRESCRIBER/CLIN PHARMACIST DOCUMENTED: ICD-10-PCS | Mod: CPTII,,, | Performed by: INTERNAL MEDICINE

## 2023-05-18 PROCEDURE — 99999 PR PBB SHADOW E&M-EST. PATIENT-LVL IV: CPT | Mod: PBBFAC,,, | Performed by: INTERNAL MEDICINE

## 2023-05-18 PROCEDURE — 1159F MED LIST DOCD IN RCRD: CPT | Mod: CPTII,,, | Performed by: INTERNAL MEDICINE

## 2023-05-18 PROCEDURE — 99214 OFFICE O/P EST MOD 30 MIN: CPT | Mod: ,,, | Performed by: INTERNAL MEDICINE

## 2023-05-18 PROCEDURE — 3288F PR FALLS RISK ASSESSMENT DOCUMENTED: ICD-10-PCS | Mod: CPTII,,, | Performed by: INTERNAL MEDICINE

## 2023-05-18 PROCEDURE — 1159F PR MEDICATION LIST DOCUMENTED IN MEDICAL RECORD: ICD-10-PCS | Mod: CPTII,,, | Performed by: INTERNAL MEDICINE

## 2023-05-18 PROCEDURE — 1101F PR PT FALLS ASSESS DOC 0-1 FALLS W/OUT INJ PAST YR: ICD-10-PCS | Mod: CPTII,,, | Performed by: INTERNAL MEDICINE

## 2023-05-18 PROCEDURE — 3044F PR MOST RECENT HEMOGLOBIN A1C LEVEL <7.0%: ICD-10-PCS | Mod: CPTII,,, | Performed by: INTERNAL MEDICINE

## 2023-05-18 PROCEDURE — 3072F LOW RISK FOR RETINOPATHY: CPT | Mod: CPTII,,, | Performed by: INTERNAL MEDICINE

## 2023-05-18 PROCEDURE — 3008F BODY MASS INDEX DOCD: CPT | Mod: CPTII,,, | Performed by: INTERNAL MEDICINE

## 2023-05-18 PROCEDURE — 99214 OFFICE O/P EST MOD 30 MIN: CPT | Performed by: INTERNAL MEDICINE

## 2023-05-18 PROCEDURE — 99999 PR PBB SHADOW E&M-EST. PATIENT-LVL IV: ICD-10-PCS | Mod: PBBFAC,,, | Performed by: INTERNAL MEDICINE

## 2023-05-18 PROCEDURE — 4010F PR ACE/ARB THEARPY RXD/TAKEN: ICD-10-PCS | Mod: CPTII,,, | Performed by: INTERNAL MEDICINE

## 2023-05-18 PROCEDURE — 3078F PR MOST RECENT DIASTOLIC BLOOD PRESSURE < 80 MM HG: ICD-10-PCS | Mod: CPTII,,, | Performed by: INTERNAL MEDICINE

## 2023-05-18 PROCEDURE — 99214 PR OFFICE/OUTPT VISIT, EST, LEVL IV, 30-39 MIN: ICD-10-PCS | Mod: ,,, | Performed by: INTERNAL MEDICINE

## 2023-05-18 PROCEDURE — 1126F PR PAIN SEVERITY QUANTIFIED, NO PAIN PRESENT: ICD-10-PCS | Mod: CPTII,,, | Performed by: INTERNAL MEDICINE

## 2023-05-18 PROCEDURE — 3078F DIAST BP <80 MM HG: CPT | Mod: CPTII,,, | Performed by: INTERNAL MEDICINE

## 2023-05-18 PROCEDURE — 3072F PR LOW RISK FOR RETINOPATHY: ICD-10-PCS | Mod: CPTII,,, | Performed by: INTERNAL MEDICINE

## 2023-05-18 PROCEDURE — 3044F HG A1C LEVEL LT 7.0%: CPT | Mod: CPTII,,, | Performed by: INTERNAL MEDICINE

## 2023-05-18 NOTE — PROGRESS NOTES
Subjective:   Patient ID:  Deniz Paulino is a 72 y.o. male who presents for follow up of No chief complaint on file.      HPI  11/16/2020  A 70 YO MALE WITH CAD S/P CABG OLD MI HTN DIABETES HLP IS HERE FOR F/U . HE HAS NOT BEEN EXERCISING CLAIMS COMPLIANCE. HAD COVID MILD CASE NO ILL EFFECTS IN AUGUST HAS NO NEW CHEST PAIN SHORTNESS OF BREATH ORTHOPNEA PND. HE IS COMPLIANT WITH SALT. HE HA SNO OTHER CARDIOVASCULAR COMPLAINTS. NO CHANGE IN WEIGHT. A1C 6% LDL ON TARGET. HIS A1C IS INCREASED.      7/14/2021  HERE FROF /U NO NEW SYMPTOMS HE HAS NOT BEEN EXERCISES HE TRIES TO BE COMPLIANT WITH DIET WEIGHT UNCHANGED HAS NO CARDIOVASCULAR SYMPTOMS.      12/2/2021    has  been eating lately for the holidays has been on the sedentary side takes meds regularily asymptomatic cardiovascular wise.      7/12/2022  Here for f /u has rt upper extremity numbness at nite at times has h/o carpal tunnel surgery no associated weakness .has no angina no shortness of breath had nose bleed that stopped he is taking his asa . tries to be compliant with diet.has numbness in feet.         12/12/22  Sees Dr. Bennett in clinic   Comes in with wife  Comes in with chest pain, 1.5 months ago, comes in rest sitting or laying flat  Not on exertion, lasts 10-15 sec  Denies SOB, diaphoresis, nauseous, palpitations   Does not exercise regularly - has gained 6 lbs since 7/22  Stopped smoking 20 years ago  BP elevated today, a little elevated on digtial medicine recently      4vCABG was 20 years ago     1/19/2023  Here for f/u . He has meds switched. His pain resolved after nifedipine. He has normal lvf by echo cardiolite negative for ischemia his bp control impoprved. Has no shortness of breath no leg swelling      4/20/2023   Here for 3 months f/u his bp control improved he ahs less chest pain he feels a minor cramp in the middle opf chest similar to his previous presentation but less intense priro to cabg. He is compliant with salt intake and meds.  He does not carry nitro s/l.      His cabg  lima to lad svg sequential to d1 d2   Svg to pda    His anatomy showed non obs cx occluded lad and rca (2009)     I am concerned that he ahs progressed his lcx disease and that hsi stress test did not show the ischemia     2023  Here forf /u heart cath showed evidence of multiple sequential stenosis in the svg to d1 and d2 was treated medically with ranexa and nitrates, HAS NO FURTHER ANGINA. HAS NO COMPLAINTS AT THE ACCESS SITE. HE HAS NO DIZZINESS NO LIGHTHEADEDNESS   Past Medical History:   Diagnosis Date    Acute coronary syndrome     Coronary artery disease     Diabetes mellitus 2014    Effort angina 2023    Hyperlipidemia     Hypertension     Old MI (myocardial infarction) 2014    S/P CABG (coronary artery bypass graft) 2014       Past Surgical History:   Procedure Laterality Date    CARDIAC CATHETERIZATION      COLONOSCOPY N/A 2019    Procedure: COLONOSCOPY;  Surgeon: Opal Oshea MD;  Location: [facility] ENDO;  Service: Endoscopy;  Laterality: N/A;    CORONARY ARTERY BYPASS GRAFT      CORONARY BYPASS GRAFT ANGIOGRAPHY  2023    Procedure: Bypass graft study;  Surgeon: Kimberly Bennett MD;  Location: [facility] CATH LAB;  Service: Cardiology;;    ESOPHAGOGASTRODUODENOSCOPY N/A 2019    Procedure: ESOPHAGOGASTRODUODENOSCOPY (EGD);  Surgeon: Opal Oshea MD;  Location: [facility] ENDO;  Service: Endoscopy;  Laterality: N/A;    LEFT HEART CATHETERIZATION Left 2023    Procedure: Left heart cath;  Surgeon: Kimberly Bennett MD;  Location: [facility] CATH LAB;  Service: Cardiology;  Laterality: Left;    SKIN CANCER EXCISION  2018    VASECTOMY         Social History     Tobacco Use    Smoking status: Former     Packs/day: 1.00     Years: 30.00     Pack years: 30.00     Types: Cigarettes     Quit date: 2003     Years since quittin.3    Smokeless tobacco: Never   Substance Use Topics    Alcohol use: Yes     Comment: socially    Drug  use: Never       Family History   Problem Relation Age of Onset    Diabetes Mother     Heart murmur Mother     Cancer Mother         Breast    Stroke Mother     Alcohol abuse Father     Heart attack Father 63    Cancer Father         Esophageal    Diabetes Brother        Current Outpatient Medications   Medication Sig    aspirin (ECOTRIN) 325 MG EC tablet Take 325 mg by mouth once daily.    atorvastatin (LIPITOR) 40 MG tablet Take 1 tablet (40 mg total) by mouth once daily.    coenzyme Q10 200 mg capsule Take 200 mg by mouth 2 (two) times daily.    cyanocobalamin (VITAMIN B-12) 1000 MCG tablet Take 1,000 mcg by mouth once daily.     hydroCHLOROthiazide (HYDRODIURIL) 12.5 MG Tab Take 1 tablet (12.5 mg total) by mouth once daily.    lisinopriL (PRINIVIL,ZESTRIL) 40 MG tablet Take 1 tablet (40 mg total) by mouth once daily.    LORATADINE (ALAVERT ORAL) Take 1 tablet by mouth as needed.    magnesium oxide (MAG-OX) 400 mg (241.3 mg magnesium) tablet Take 400 mg by mouth once daily.    metFORMIN (GLUCOPHAGE) 1000 MG tablet Take 1 tablet (1,000 mg total) by mouth 2 (two) times daily with meals.    methocarbamoL (ROBAXIN) 500 MG Tab Take 1 tablet (500 mg total) by mouth 4 (four) times daily as needed (muscle spasm).    metoprolol succinate (TOPROL-XL) 50 MG 24 hr tablet Take 1 tablet (50 mg total) by mouth once daily.    nitroGLYCERIN (NITROSTAT) 0.4 MG SL tablet Place 1 tablet (0.4 mg total) under the tongue every 5 (five) minutes as needed for Chest pain.    nitroGLYCERIN 0.2 mg/hr TD PT24 (NITRODUR) 0.2 mg/hr Place 1 patch onto the skin once daily.    pantoprazole (PROTONIX) 20 MG tablet Take 1 tablet (20 mg total) by mouth once daily.    ranolazine (RANEXA) 500 MG Tb12 Take 1 tablet (500 mg total) by mouth 2 (two) times daily.    vitamin D 1000 units Tab Take 1,000 Units by mouth once daily.      No current facility-administered medications for this visit.     Current Outpatient Medications on File Prior to Visit    Medication Sig    aspirin (ECOTRIN) 325 MG EC tablet Take 325 mg by mouth once daily.    atorvastatin (LIPITOR) 40 MG tablet Take 1 tablet (40 mg total) by mouth once daily.    coenzyme Q10 200 mg capsule Take 200 mg by mouth 2 (two) times daily.    cyanocobalamin (VITAMIN B-12) 1000 MCG tablet Take 1,000 mcg by mouth once daily.     hydroCHLOROthiazide (HYDRODIURIL) 12.5 MG Tab Take 1 tablet (12.5 mg total) by mouth once daily.    lisinopriL (PRINIVIL,ZESTRIL) 40 MG tablet Take 1 tablet (40 mg total) by mouth once daily.    LORATADINE (ALAVERT ORAL) Take 1 tablet by mouth as needed.    magnesium oxide (MAG-OX) 400 mg (241.3 mg magnesium) tablet Take 400 mg by mouth once daily.    metFORMIN (GLUCOPHAGE) 1000 MG tablet Take 1 tablet (1,000 mg total) by mouth 2 (two) times daily with meals.    methocarbamoL (ROBAXIN) 500 MG Tab Take 1 tablet (500 mg total) by mouth 4 (four) times daily as needed (muscle spasm).    metoprolol succinate (TOPROL-XL) 50 MG 24 hr tablet Take 1 tablet (50 mg total) by mouth once daily.    nitroGLYCERIN (NITROSTAT) 0.4 MG SL tablet Place 1 tablet (0.4 mg total) under the tongue every 5 (five) minutes as needed for Chest pain.    nitroGLYCERIN 0.2 mg/hr TD PT24 (NITRODUR) 0.2 mg/hr Place 1 patch onto the skin once daily.    pantoprazole (PROTONIX) 20 MG tablet Take 1 tablet (20 mg total) by mouth once daily.    ranolazine (RANEXA) 500 MG Tb12 Take 1 tablet (500 mg total) by mouth 2 (two) times daily.    vitamin D 1000 units Tab Take 1,000 Units by mouth once daily.      No current facility-administered medications on file prior to visit.     Review of patient's allergies indicates:  No Known Allergies   Review of Systems   Constitutional: Negative for malaise/fatigue.   Eyes:  Negative for blurred vision.   Cardiovascular:  Positive for chest pain. Negative for claudication, cyanosis, dyspnea on exertion, irregular heartbeat, leg swelling, near-syncope, orthopnea, palpitations and  paroxysmal nocturnal dyspnea.   Respiratory:  Negative for cough, hemoptysis and shortness of breath.    Hematologic/Lymphatic: Negative for bleeding problem. Does not bruise/bleed easily.   Skin:  Negative for dry skin and itching.   Musculoskeletal:  Negative for falls, muscle weakness and myalgias.   Gastrointestinal:  Negative for abdominal pain, diarrhea, heartburn, hematemesis, hematochezia and melena.   Genitourinary:  Negative for flank pain and hematuria.   Neurological:  Negative for dizziness, focal weakness, headaches, light-headedness, numbness, paresthesias, seizures and weakness.   Psychiatric/Behavioral:  Negative for altered mental status and memory loss. The patient is not nervous/anxious.    Allergic/Immunologic: Negative for hives.     Objective:   Physical Exam  Vitals and nursing note reviewed.   Constitutional:       General: He is not in acute distress.     Appearance: He is well-developed. He is not diaphoretic.   HENT:      Head: Normocephalic and atraumatic.   Eyes:      General:         Right eye: No discharge.         Left eye: No discharge.      Pupils: Pupils are equal, round, and reactive to light.   Neck:      Thyroid: No thyromegaly.      Vascular: No JVD.   Cardiovascular:      Rate and Rhythm: Normal rate and regular rhythm.      Pulses: Intact distal pulses.      Heart sounds: Normal heart sounds. No murmur heard.    No friction rub. No gallop.   Pulmonary:      Effort: Pulmonary effort is normal. No respiratory distress.      Breath sounds: Normal breath sounds. No wheezing or rales.      Comments: SCAR CABG WELL HEALED.   Chest:      Chest wall: No tenderness.   Abdominal:      General: Bowel sounds are normal. There is no distension.      Palpations: Abdomen is soft.      Tenderness: There is no abdominal tenderness.   Musculoskeletal:         General: Normal range of motion.      Cervical back: Neck supple.   Skin:     General: Skin is warm and dry.      Findings: No erythema  "or rash.   Neurological:      Mental Status: He is alert and oriented to person, place, and time.      Cranial Nerves: No cranial nerve deficit.   Psychiatric:         Behavior: Behavior normal.     Vitals:    05/18/23 1313 05/18/23 1314   BP: 108/64 112/66   Pulse: 63 63   SpO2: 96%    Weight: 102.6 kg (226 lb 3.1 oz)    Height: 5' 10" (1.778 m)      Lab Results   Component Value Date    CHOL 131 11/14/2022    CHOL 129 05/04/2022    CHOL 125 11/04/2021      Body mass index is 32.46 kg/m².   Lab Results   Component Value Date    HGBA1C 6.2 (H) 05/10/2023      BMP  Lab Results   Component Value Date     04/20/2023    K 3.9 04/20/2023     04/20/2023    CO2 28 04/20/2023    BUN 12 04/20/2023    CREATININE 1.0 04/20/2023    CALCIUM 9.4 04/20/2023    ANIONGAP 14 04/20/2023    EGFRNORACEVR >60.0 04/20/2023      Lab Results   Component Value Date    HDL 52 11/14/2022    HDL 50 05/04/2022    HDL 45 11/04/2021     Lab Results   Component Value Date    LDLCALC 58.0 (L) 11/14/2022    LDLCALC 56.8 (L) 05/04/2022    LDLCALC 57.2 (L) 11/04/2021     Lab Results   Component Value Date    TRIG 105 11/14/2022    TRIG 111 05/04/2022    TRIG 114 11/04/2021     Lab Results   Component Value Date    CHOLHDL 39.7 11/14/2022    CHOLHDL 38.8 05/04/2022    CHOLHDL 36.0 11/04/2021       Chemistry        Component Value Date/Time     04/20/2023 1055    K 3.9 04/20/2023 1055     04/20/2023 1055    CO2 28 04/20/2023 1055    BUN 12 04/20/2023 1055    CREATININE 1.0 04/20/2023 1055     (H) 04/20/2023 1055        Component Value Date/Time    CALCIUM 9.4 04/20/2023 1055    ALKPHOS 80 11/14/2022 0905    AST 22 11/14/2022 0905    ALT 23 11/14/2022 0905    BILITOT 0.9 11/14/2022 0905    ESTGFRAFRICA >60.0 05/04/2022 0806    ESTGFRAFRICA >60.0 05/04/2022 0806    EGFRNONAA >60.0 05/04/2022 0806    EGFRNONAA >60.0 05/04/2022 0806          Lab Results   Component Value Date    TSH 1.991 11/17/2022     Lab Results   Component " Value Date    INR 1.1 04/20/2023    INR 1.0 09/19/2009     Lab Results   Component Value Date    WBC 6.14 04/20/2023    HGB 14.2 04/20/2023    HCT 42.4 04/20/2023    MCV 93 04/20/2023     04/20/2023     BMP  Sodium   Date Value Ref Range Status   04/20/2023 142 136 - 145 mmol/L Final     Potassium   Date Value Ref Range Status   04/20/2023 3.9 3.5 - 5.1 mmol/L Final     Chloride   Date Value Ref Range Status   04/20/2023 100 95 - 110 mmol/L Final     CO2   Date Value Ref Range Status   04/20/2023 28 23 - 29 mmol/L Final     BUN   Date Value Ref Range Status   04/20/2023 12 8 - 23 mg/dL Final     Creatinine   Date Value Ref Range Status   04/20/2023 1.0 0.5 - 1.4 mg/dL Final     Calcium   Date Value Ref Range Status   04/20/2023 9.4 8.7 - 10.5 mg/dL Final     Anion Gap   Date Value Ref Range Status   04/20/2023 14 8 - 16 mmol/L Final     eGFR if    Date Value Ref Range Status   05/04/2022 >60.0 >60 mL/min/1.73 m^2 Final   05/04/2022 >60.0 >60 mL/min/1.73 m^2 Final     eGFR if non    Date Value Ref Range Status   05/04/2022 >60.0 >60 mL/min/1.73 m^2 Final     Comment:     Calculation used to obtain the estimated glomerular filtration  rate (eGFR) is the CKD-EPI equation.      05/04/2022 >60.0 >60 mL/min/1.73 m^2 Final     Comment:     Calculation used to obtain the estimated glomerular filtration  rate (eGFR) is the CKD-EPI equation.        CrCl cannot be calculated (Patient's most recent lab result is older than the maximum 7 days allowed.).    Assessment:     1. Coronary artery disease involving native coronary artery of native heart without angina pectoris    2. Effort angina    3. Hyperlipidemia associated with type 2 diabetes mellitus    4. Hypertension associated with diabetes    5. Old MI (myocardial infarction)    6. S/P CABG (coronary artery bypass graft)    7. Diabetes mellitus due to underlying condition with hyperosmolarity without coma, without long-term current use  of insulin      HAS BETTER CONTROL OF HIS HTN HAS NO FURTHER ANGINA ON MEDICAL THERAPY HIS LVF IS PRESERVED HIS LIPIDS AND A1C ARE ON TARGET CONTINUE MEDICAL THERAPY . HE WAS COUNSELED ABOUT CONTINUED WALKING EXERCISE AND COMPLIANCE WEIGHT LOSS .    DISCUSS CAD MEDICAL THERAPY GRAFT FAILURE.   Plan:   Continue current therapy  Cardiac low salt diet.  Risk factor modification and excercise program.  F/u in 3 MONTHS

## 2023-08-12 DIAGNOSIS — Z95.1 S/P CABG (CORONARY ARTERY BYPASS GRAFT): ICD-10-CM

## 2023-08-12 DIAGNOSIS — I25.10 CORONARY ARTERY DISEASE INVOLVING NATIVE CORONARY ARTERY OF NATIVE HEART WITHOUT ANGINA PECTORIS: ICD-10-CM

## 2023-08-14 RX ORDER — METOPROLOL SUCCINATE 50 MG/1
50 TABLET, EXTENDED RELEASE ORAL
Qty: 90 TABLET | Refills: 0 | Status: SHIPPED | OUTPATIENT
Start: 2023-08-14 | End: 2023-08-30

## 2023-08-21 PROBLEM — Z00.00 ROUTINE GENERAL MEDICAL EXAMINATION AT A HEALTH CARE FACILITY: Status: RESOLVED | Noted: 2023-05-17 | Resolved: 2023-08-21

## 2023-08-26 DIAGNOSIS — E11.69 HYPERLIPIDEMIA ASSOCIATED WITH TYPE 2 DIABETES MELLITUS: ICD-10-CM

## 2023-08-26 DIAGNOSIS — E78.5 HYPERLIPIDEMIA ASSOCIATED WITH TYPE 2 DIABETES MELLITUS: ICD-10-CM

## 2023-08-28 RX ORDER — ATORVASTATIN CALCIUM 40 MG/1
40 TABLET, FILM COATED ORAL
Qty: 90 TABLET | Refills: 3 | Status: SHIPPED | OUTPATIENT
Start: 2023-08-28

## 2023-08-30 DIAGNOSIS — I25.10 CORONARY ARTERY DISEASE INVOLVING NATIVE CORONARY ARTERY OF NATIVE HEART WITHOUT ANGINA PECTORIS: ICD-10-CM

## 2023-08-30 DIAGNOSIS — Z95.1 S/P CABG (CORONARY ARTERY BYPASS GRAFT): ICD-10-CM

## 2023-08-30 RX ORDER — PANTOPRAZOLE SODIUM 20 MG/1
20 TABLET, DELAYED RELEASE ORAL
Qty: 90 TABLET | Refills: 2 | Status: SHIPPED | OUTPATIENT
Start: 2023-08-30 | End: 2023-11-17 | Stop reason: SDUPTHER

## 2023-08-30 RX ORDER — METOPROLOL SUCCINATE 50 MG/1
50 TABLET, EXTENDED RELEASE ORAL
Qty: 90 TABLET | Refills: 3 | Status: SHIPPED | OUTPATIENT
Start: 2023-08-30

## 2023-08-30 NOTE — TELEPHONE ENCOUNTER
Refill Decision Note   Deniz Paulino  is requesting a refill authorization.  Brief Assessment and Rationale for Refill:  Approve     Medication Therapy Plan:       Medication Reconciliation Completed: No   Comments:     No Care Gaps recommended.     Note composed:4:14 PM 08/30/2023

## 2023-08-30 NOTE — TELEPHONE ENCOUNTER
No care due was identified.  [facility] Embedded Care Due Messages. Reference number: [id number].   8/30/2023 3:01:27 PM CDT

## 2023-09-27 ENCOUNTER — PATIENT MESSAGE (OUTPATIENT)
Dept: ADMINISTRATIVE | Facility: CLINIC | Age: 73
End: 2023-09-27
Payer: OTHER GOVERNMENT

## 2023-09-28 ENCOUNTER — TELEPHONE (OUTPATIENT)
Dept: CARDIOLOGY | Facility: CLINIC | Age: 73
End: 2023-09-28
Payer: OTHER GOVERNMENT

## 2023-09-28 NOTE — TELEPHONE ENCOUNTER
TESSM for patient to call back to r/s his appointment this afternoon due to Dr. Bennett having to leaving for a procedure

## 2023-09-28 NOTE — TELEPHONE ENCOUNTER
Spoke to patient and he is feeling fine. Appt was r/s to January already----- Message from Glenny Villalobos sent at 9/28/2023 10:56 AM CDT -----  Pt spouse request call back to pt  with questions and to discuss his last cath. Please contact pt at [phone]. Thanks

## 2023-11-10 ENCOUNTER — LAB VISIT (OUTPATIENT)
Dept: LAB | Facility: HOSPITAL | Age: 73
End: 2023-11-10
Attending: INTERNAL MEDICINE
Payer: MEDICARE

## 2023-11-10 DIAGNOSIS — Z12.5 ENCOUNTER FOR SCREENING FOR MALIGNANT NEOPLASM OF PROSTATE: ICD-10-CM

## 2023-11-10 DIAGNOSIS — E11.69 HYPERLIPIDEMIA ASSOCIATED WITH TYPE 2 DIABETES MELLITUS: ICD-10-CM

## 2023-11-10 DIAGNOSIS — E11.59 HYPERTENSION ASSOCIATED WITH DIABETES: ICD-10-CM

## 2023-11-10 DIAGNOSIS — E78.5 HYPERLIPIDEMIA ASSOCIATED WITH TYPE 2 DIABETES MELLITUS: ICD-10-CM

## 2023-11-10 DIAGNOSIS — E08.00 DIABETES MELLITUS DUE TO UNDERLYING CONDITION WITH HYPEROSMOLARITY WITHOUT COMA, WITHOUT LONG-TERM CURRENT USE OF INSULIN: ICD-10-CM

## 2023-11-10 DIAGNOSIS — I15.2 HYPERTENSION ASSOCIATED WITH DIABETES: ICD-10-CM

## 2023-11-10 LAB
ALBUMIN SERPL BCP-MCNC: 4 G/DL (ref 3.5–5.2)
ALP SERPL-CCNC: 68 U/L (ref 55–135)
ALT SERPL W/O P-5'-P-CCNC: 14 U/L (ref 10–44)
ANION GAP SERPL CALC-SCNC: 14 MMOL/L (ref 8–16)
AST SERPL-CCNC: 17 U/L (ref 10–40)
BASOPHILS # BLD AUTO: 0.02 K/UL (ref 0–0.2)
BASOPHILS NFR BLD: 0.3 % (ref 0–1.9)
BILIRUB SERPL-MCNC: 0.7 MG/DL (ref 0.1–1)
BUN SERPL-MCNC: 17 MG/DL (ref 8–23)
CALCIUM SERPL-MCNC: 9.5 MG/DL (ref 8.7–10.5)
CHLORIDE SERPL-SCNC: 101 MMOL/L (ref 95–110)
CHOLEST SERPL-MCNC: 133 MG/DL (ref 120–199)
CHOLEST/HDLC SERPL: 2.5 {RATIO} (ref 2–5)
CO2 SERPL-SCNC: 24 MMOL/L (ref 23–29)
COMPLEXED PSA SERPL-MCNC: 1.3 NG/ML (ref 0–4)
CREAT SERPL-MCNC: 1 MG/DL (ref 0.5–1.4)
DIFFERENTIAL METHOD: ABNORMAL
EOSINOPHIL # BLD AUTO: 0.1 K/UL (ref 0–0.5)
EOSINOPHIL NFR BLD: 1.1 % (ref 0–8)
ERYTHROCYTE [DISTWIDTH] IN BLOOD BY AUTOMATED COUNT: 12.4 % (ref 11.5–14.5)
EST. GFR  (NO RACE VARIABLE): >60 ML/MIN/1.73 M^2
ESTIMATED AVG GLUCOSE: 105 MG/DL (ref 68–131)
GLUCOSE SERPL-MCNC: 95 MG/DL (ref 70–110)
HBA1C MFR BLD: 5.3 % (ref 4–5.6)
HCT VFR BLD AUTO: 36.7 % (ref 40–54)
HDLC SERPL-MCNC: 53 MG/DL (ref 40–75)
HDLC SERPL: 39.8 % (ref 20–50)
HGB BLD-MCNC: 12.6 G/DL (ref 14–18)
IMM GRANULOCYTES # BLD AUTO: 0.03 K/UL (ref 0–0.04)
IMM GRANULOCYTES NFR BLD AUTO: 0.4 % (ref 0–0.5)
LDLC SERPL CALC-MCNC: 53.8 MG/DL (ref 63–159)
LYMPHOCYTES # BLD AUTO: 1.4 K/UL (ref 1–4.8)
LYMPHOCYTES NFR BLD: 19.5 % (ref 18–48)
MCH RBC QN AUTO: 32.6 PG (ref 27–31)
MCHC RBC AUTO-ENTMCNC: 34.3 G/DL (ref 32–36)
MCV RBC AUTO: 95 FL (ref 82–98)
MONOCYTES # BLD AUTO: 0.6 K/UL (ref 0.3–1)
MONOCYTES NFR BLD: 8.5 % (ref 4–15)
NEUTROPHILS # BLD AUTO: 4.9 K/UL (ref 1.8–7.7)
NEUTROPHILS NFR BLD: 70.2 % (ref 38–73)
NONHDLC SERPL-MCNC: 80 MG/DL
NRBC BLD-RTO: 0 /100 WBC
PLATELET # BLD AUTO: 185 K/UL (ref 150–450)
PMV BLD AUTO: 11.1 FL (ref 9.2–12.9)
POTASSIUM SERPL-SCNC: 3.7 MMOL/L (ref 3.5–5.1)
PROT SERPL-MCNC: 6.9 G/DL (ref 6–8.4)
RBC # BLD AUTO: 3.86 M/UL (ref 4.6–6.2)
SODIUM SERPL-SCNC: 139 MMOL/L (ref 136–145)
TRIGL SERPL-MCNC: 131 MG/DL (ref 30–150)
TSH SERPL DL<=0.005 MIU/L-ACNC: 1.52 UIU/ML (ref 0.4–4)
WBC # BLD AUTO: 7.04 K/UL (ref 3.9–12.7)

## 2023-11-10 PROCEDURE — 84443 ASSAY THYROID STIM HORMONE: CPT | Performed by: INTERNAL MEDICINE

## 2023-11-10 PROCEDURE — 85025 COMPLETE CBC W/AUTO DIFF WBC: CPT | Performed by: INTERNAL MEDICINE

## 2023-11-10 PROCEDURE — 36415 COLL VENOUS BLD VENIPUNCTURE: CPT | Performed by: INTERNAL MEDICINE

## 2023-11-10 PROCEDURE — 80061 LIPID PANEL: CPT | Performed by: INTERNAL MEDICINE

## 2023-11-10 PROCEDURE — 80053 COMPREHEN METABOLIC PANEL: CPT | Performed by: INTERNAL MEDICINE

## 2023-11-10 PROCEDURE — 83036 HEMOGLOBIN GLYCOSYLATED A1C: CPT | Performed by: INTERNAL MEDICINE

## 2023-11-10 PROCEDURE — 84153 ASSAY OF PSA TOTAL: CPT | Performed by: INTERNAL MEDICINE

## 2023-11-13 ENCOUNTER — PATIENT MESSAGE (OUTPATIENT)
Dept: ADMINISTRATIVE | Facility: HOSPITAL | Age: 73
End: 2023-11-13
Payer: OTHER GOVERNMENT

## 2023-11-14 ENCOUNTER — PATIENT OUTREACH (OUTPATIENT)
Dept: ADMINISTRATIVE | Facility: HOSPITAL | Age: 73
End: 2023-11-14
Payer: OTHER GOVERNMENT

## 2023-11-14 NOTE — LETTER
AUTHORIZATION FOR RELEASE OF   CONFIDENTIAL INFORMATION    Dear [facility],    We are seeing Deniz Paulino, date of birth 1950, in the clinic at [facility] INTERNAL MEDICINE. Hiro Kraft MD is the patient's PCP. Deniz Paulino has an outstanding DM EYE EXAM at the time we reviewed his chart. In order to help keep his health information updated, he has authorized us to request the following medical record(s):        (  )  MAMMOGRAM                                      (  )  COLONOSCOPY      (  )  PAP SMEAR                                          (  )  OUTSIDE LAB RESULTS     (  )  DEXA SCAN                                          ( X ) DM EYE EXAM            (  )  FOOT EXAM                                          (  )  ENTIRE RECORD     (  )  OUTSIDE IMMUNIZATIONS                 (  )  _______________         Please FAX records to [facility], Breaux, Jeryl P., MD, [phone]     If you have any questions, please contact  Devorah BENAVIDES [phone].           Patient Name: Deniz Paulino  : 1950  Patient Phone #: [phone]     Thanks   Devorah BENAVIDES [facility]  Care Coordination Department  [facility]

## 2023-11-14 NOTE — PROGRESS NOTES
EPIC CAMPAIGN: per portal response pt reported having DM eye exam with [facility], will request record and set RM x's 1wk, pt declined colon cancer screen.

## 2023-11-15 NOTE — PROGRESS NOTES
"Subjective:      Patient ID: Deniz Paulino is a 72 y.o. male.    Chief Complaint: Follow-up    HPI    73 yo with   Patient Active Problem List   Diagnosis    CAD (coronary artery disease)    Hypertension associated with diabetes    Hyperlipidemia associated with type 2 diabetes mellitus    S/P CABG (coronary artery bypass graft)    Old MI (myocardial infarction)    Diabetes mellitus    Hypomagnesemia    Effort angina    Colon cancer screening     Past Medical History:   Diagnosis Date    Acute coronary syndrome     Coronary artery disease     Diabetes mellitus 4/24/2014    Effort angina 4/30/2023    Hyperlipidemia     Hypertension     Old MI (myocardial infarction) 4/24/2014    S/P CABG (coronary artery bypass graft) 4/24/2014     Here today for management of mult med problems as outlined below.  Compliant with meds without significant side effects. appetite good.         Review of Systems   Constitutional:  Negative for chills and fever.   HENT:  Negative for ear pain and sore throat.    Respiratory:  Negative for cough.    Cardiovascular:  Negative for chest pain.   Gastrointestinal:  Negative for abdominal pain and blood in stool.   Genitourinary:  Negative for dysuria and hematuria.   Neurological:  Negative for seizures and syncope.     Objective:   /82 (BP Location: Left arm, Patient Position: Sitting, BP Method: Large (Manual))   Pulse 63   Temp 97.6 °F (36.4 °C) (Tympanic)   Ht 5' 10" (1.778 m)   Wt 99.2 kg (218 lb 11.1 oz)   SpO2 97%   BMI 31.38 kg/m²     Physical Exam  Constitutional:       General: He is not in acute distress.     Appearance: He is well-developed.   HENT:      Head: Normocephalic and atraumatic.   Eyes:      Extraocular Movements: Extraocular movements intact.   Neck:      Thyroid: No thyromegaly.   Cardiovascular:      Rate and Rhythm: Normal rate and regular rhythm.   Pulmonary:      Breath sounds: Normal breath sounds. No wheezing or rales.   Abdominal:      General: " Bowel sounds are normal.      Palpations: Abdomen is soft.      Tenderness: There is no abdominal tenderness.   Musculoskeletal:         General: No swelling.      Cervical back: Neck supple. No rigidity.   Lymphadenopathy:      Cervical: No cervical adenopathy.   Skin:     General: Skin is warm and dry.   Neurological:      Mental Status: He is alert and oriented to person, place, and time.   Psychiatric:         Behavior: Behavior normal.         Lab Results   Component Value Date    WBC 7.04 11/10/2023    HGB 12.6 (L) 11/10/2023    HGB 14.2 04/20/2023    HGB 13.8 (L) 11/17/2022    HCT 36.7 (L) 11/10/2023    MCV 95 11/10/2023    MCV 93 04/20/2023    MCV 98 11/17/2022     11/10/2023    CHOL 133 11/10/2023    TRIG 131 11/10/2023    HDL 53 11/10/2023    LDLCALC 53.8 (L) 11/10/2023    LDLCALC 58.0 (L) 11/14/2022    LDLCALC 56.8 (L) 05/04/2022    ALT 14 11/10/2023    AST 17 11/10/2023     11/10/2023    K 3.7 11/10/2023    CALCIUM 9.5 11/10/2023     11/10/2023    CO2 24 11/10/2023    BUN 17 11/10/2023    CREATININE 1.0 11/10/2023    CREATININE 1.0 04/20/2023    CREATININE 1.1 11/14/2022    EGFRNORACEVR >60.0 11/10/2023    EGFRNORACEVR >60.0 04/20/2023    EGFRNORACEVR >60.0 11/14/2022    TSH 1.516 11/10/2023    TSH 1.991 11/17/2022    TSH 2.314 05/04/2022    PSA 1.3 11/10/2023    PSA 1.2 05/10/2023    PSA 1.5 05/04/2022    GLU 95 11/10/2023    HGBA1C 5.3 11/10/2023    HGBA1C 6.2 (H) 05/10/2023    HGBA1C 5.8 (H) 11/14/2022    [id number] 43 05/22/2017          The ASCVD Risk score (Guillermo DK, et al., 2019) failed to calculate for the following reasons:    The patient has a prior MI or stroke diagnosis     Assessment:     1. Diabetes mellitus due to underlying condition with hyperosmolarity without coma, without long-term current use of insulin    2. Hyperlipidemia associated with type 2 diabetes mellitus    3. Hypertension associated with diabetes    4. Coronary artery disease involving native coronary  artery of native heart without angina pectoris    5. Hypomagnesemia    6. S/P CABG (coronary artery bypass graft)    7. Essential hypertension    8. Type 2 diabetes mellitus without complication, without long-term current use of insulin    9. Colon cancer screening      Plan:   1. Diabetes mellitus due to underlying condition with hyperosmolarity without coma, without long-term current use of insulin  Overview:  Stable.  Continue current    Orders:  -     Hemoglobin A1C; Future; Expected date: 02/15/2024    2. Hyperlipidemia associated with type 2 diabetes mellitus  Overview:  Controlled.  Continue current medications      3. Hypertension associated with diabetes  Overview:  Controlled.  Continue current medications    Orders:  -     hydroCHLOROthiazide (HYDRODIURIL) 12.5 MG Tab; Take 1 tablet (12.5 mg total) by mouth once daily.  Dispense: 90 tablet; Refill: 2  -     NIFEdipine (PROCARDIA-XL) 60 MG (OSM) 24 hr tablet; Take 1 tablet (60 mg total) by mouth once daily.  Dispense: 90 tablet; Refill: 1  -     CBC Auto Differential; Future; Expected date: 05/15/2024    4. Coronary artery disease involving native coronary artery of native heart without angina pectoris  Overview:  Stable.  Continue as per Cardiology.  Vickie Bennett      5. Hypomagnesemia    6. S/P CABG (coronary artery bypass graft)    7. Essential hypertension    8. Type 2 diabetes mellitus without complication, without long-term current use of insulin  Overview:  Stable.  Continue current    Orders:  -     metFORMIN (GLUCOPHAGE) 1000 MG tablet; Take 1 tablet (1,000 mg total) by mouth 2 (two) times daily with meals.  Dispense: 180 tablet; Refill: 1    9. Colon cancer screening  Overview:  Colonoscopy due July 26 of 2024.    Orders:  -     Ambulatory referral/consult to Endo Procedure ; Future; Expected date: 11/18/2023    Other orders  -     pantoprazole (PROTONIX) 20 MG tablet; Take 1 tablet (20 mg total) by mouth once daily.  Dispense: 90 tablet;  Refill: 2        There are no Patient Instructions on file for this visit.    Future Appointments   Date Time Provider Department Center   12/21/2023  5:00 PM Kimberly Bennett MD [facility]   5/20/2024 11:40 AM Hiro Kraft MD [facility]       Lab Frequency Next Occurrence   Ambulatory referral/consult to ENT Once 11/24/2022       Follow up in about 6 months (around 5/17/2024), or if symptoms worsen or fail to improve.

## 2023-11-17 ENCOUNTER — OFFICE VISIT (OUTPATIENT)
Dept: INTERNAL MEDICINE | Facility: CLINIC | Age: 73
End: 2023-11-17
Payer: MEDICARE

## 2023-11-17 VITALS
HEART RATE: 63 BPM | SYSTOLIC BLOOD PRESSURE: 130 MMHG | OXYGEN SATURATION: 97 % | TEMPERATURE: 98 F | DIASTOLIC BLOOD PRESSURE: 82 MMHG | BODY MASS INDEX: 31.31 KG/M2 | HEIGHT: 70 IN | WEIGHT: 218.69 LBS

## 2023-11-17 DIAGNOSIS — E11.9 TYPE 2 DIABETES MELLITUS WITHOUT COMPLICATION, WITHOUT LONG-TERM CURRENT USE OF INSULIN: ICD-10-CM

## 2023-11-17 DIAGNOSIS — I15.2 HYPERTENSION ASSOCIATED WITH DIABETES: ICD-10-CM

## 2023-11-17 DIAGNOSIS — E08.00 DIABETES MELLITUS DUE TO UNDERLYING CONDITION WITH HYPEROSMOLARITY WITHOUT COMA, WITHOUT LONG-TERM CURRENT USE OF INSULIN: Primary | ICD-10-CM

## 2023-11-17 DIAGNOSIS — Z95.1 S/P CABG (CORONARY ARTERY BYPASS GRAFT): ICD-10-CM

## 2023-11-17 DIAGNOSIS — E11.59 HYPERTENSION ASSOCIATED WITH DIABETES: ICD-10-CM

## 2023-11-17 DIAGNOSIS — I25.10 CORONARY ARTERY DISEASE INVOLVING NATIVE CORONARY ARTERY OF NATIVE HEART WITHOUT ANGINA PECTORIS: ICD-10-CM

## 2023-11-17 DIAGNOSIS — E11.69 HYPERLIPIDEMIA ASSOCIATED WITH TYPE 2 DIABETES MELLITUS: ICD-10-CM

## 2023-11-17 DIAGNOSIS — E78.5 HYPERLIPIDEMIA ASSOCIATED WITH TYPE 2 DIABETES MELLITUS: ICD-10-CM

## 2023-11-17 DIAGNOSIS — E83.42 HYPOMAGNESEMIA: ICD-10-CM

## 2023-11-17 DIAGNOSIS — I10 ESSENTIAL HYPERTENSION: ICD-10-CM

## 2023-11-17 DIAGNOSIS — Z12.11 COLON CANCER SCREENING: ICD-10-CM

## 2023-11-17 PROCEDURE — 1101F PR PT FALLS ASSESS DOC 0-1 FALLS W/OUT INJ PAST YR: ICD-10-PCS | Mod: CPTII,S$GLB,, | Performed by: INTERNAL MEDICINE

## 2023-11-17 PROCEDURE — 1159F PR MEDICATION LIST DOCUMENTED IN MEDICAL RECORD: ICD-10-PCS | Mod: CPTII,S$GLB,, | Performed by: INTERNAL MEDICINE

## 2023-11-17 PROCEDURE — 3075F PR MOST RECENT SYSTOLIC BLOOD PRESS GE 130-139MM HG: ICD-10-PCS | Mod: CPTII,S$GLB,, | Performed by: INTERNAL MEDICINE

## 2023-11-17 PROCEDURE — 3288F FALL RISK ASSESSMENT DOCD: CPT | Mod: CPTII,S$GLB,, | Performed by: INTERNAL MEDICINE

## 2023-11-17 PROCEDURE — 1101F PT FALLS ASSESS-DOCD LE1/YR: CPT | Mod: CPTII,S$GLB,, | Performed by: INTERNAL MEDICINE

## 2023-11-17 PROCEDURE — 3075F SYST BP GE 130 - 139MM HG: CPT | Mod: CPTII,S$GLB,, | Performed by: INTERNAL MEDICINE

## 2023-11-17 PROCEDURE — 4010F PR ACE/ARB THEARPY RXD/TAKEN: ICD-10-PCS | Mod: CPTII,S$GLB,, | Performed by: INTERNAL MEDICINE

## 2023-11-17 PROCEDURE — 99999 PR PBB SHADOW E&M-EST. PATIENT-LVL IV: CPT | Mod: PBBFAC,,, | Performed by: INTERNAL MEDICINE

## 2023-11-17 PROCEDURE — 3079F PR MOST RECENT DIASTOLIC BLOOD PRESSURE 80-89 MM HG: ICD-10-PCS | Mod: CPTII,S$GLB,, | Performed by: INTERNAL MEDICINE

## 2023-11-17 PROCEDURE — 3066F PR DOCUMENTATION OF TREATMENT FOR NEPHROPATHY: ICD-10-PCS | Mod: CPTII,S$GLB,, | Performed by: INTERNAL MEDICINE

## 2023-11-17 PROCEDURE — 99214 OFFICE O/P EST MOD 30 MIN: CPT | Mod: S$GLB,,, | Performed by: INTERNAL MEDICINE

## 2023-11-17 PROCEDURE — 3060F POS MICROALBUMINURIA REV: CPT | Mod: CPTII,S$GLB,, | Performed by: INTERNAL MEDICINE

## 2023-11-17 PROCEDURE — 3079F DIAST BP 80-89 MM HG: CPT | Mod: CPTII,S$GLB,, | Performed by: INTERNAL MEDICINE

## 2023-11-17 PROCEDURE — 4010F ACE/ARB THERAPY RXD/TAKEN: CPT | Mod: CPTII,S$GLB,, | Performed by: INTERNAL MEDICINE

## 2023-11-17 PROCEDURE — 3072F LOW RISK FOR RETINOPATHY: CPT | Mod: CPTII,S$GLB,, | Performed by: INTERNAL MEDICINE

## 2023-11-17 PROCEDURE — 1159F MED LIST DOCD IN RCRD: CPT | Mod: CPTII,S$GLB,, | Performed by: INTERNAL MEDICINE

## 2023-11-17 PROCEDURE — 1126F PR PAIN SEVERITY QUANTIFIED, NO PAIN PRESENT: ICD-10-PCS | Mod: CPTII,S$GLB,, | Performed by: INTERNAL MEDICINE

## 2023-11-17 PROCEDURE — 1126F AMNT PAIN NOTED NONE PRSNT: CPT | Mod: CPTII,S$GLB,, | Performed by: INTERNAL MEDICINE

## 2023-11-17 PROCEDURE — 3044F HG A1C LEVEL LT 7.0%: CPT | Mod: CPTII,S$GLB,, | Performed by: INTERNAL MEDICINE

## 2023-11-17 PROCEDURE — 3072F PR LOW RISK FOR RETINOPATHY: ICD-10-PCS | Mod: CPTII,S$GLB,, | Performed by: INTERNAL MEDICINE

## 2023-11-17 PROCEDURE — 3288F PR FALLS RISK ASSESSMENT DOCUMENTED: ICD-10-PCS | Mod: CPTII,S$GLB,, | Performed by: INTERNAL MEDICINE

## 2023-11-17 PROCEDURE — 3008F PR BODY MASS INDEX (BMI) DOCUMENTED: ICD-10-PCS | Mod: CPTII,S$GLB,, | Performed by: INTERNAL MEDICINE

## 2023-11-17 PROCEDURE — 99214 PR OFFICE/OUTPT VISIT, EST, LEVL IV, 30-39 MIN: ICD-10-PCS | Mod: S$GLB,,, | Performed by: INTERNAL MEDICINE

## 2023-11-17 PROCEDURE — 3008F BODY MASS INDEX DOCD: CPT | Mod: CPTII,S$GLB,, | Performed by: INTERNAL MEDICINE

## 2023-11-17 PROCEDURE — 99999 PR PBB SHADOW E&M-EST. PATIENT-LVL IV: ICD-10-PCS | Mod: PBBFAC,,, | Performed by: INTERNAL MEDICINE

## 2023-11-17 PROCEDURE — 3044F PR MOST RECENT HEMOGLOBIN A1C LEVEL <7.0%: ICD-10-PCS | Mod: CPTII,S$GLB,, | Performed by: INTERNAL MEDICINE

## 2023-11-17 PROCEDURE — 3066F NEPHROPATHY DOC TX: CPT | Mod: CPTII,S$GLB,, | Performed by: INTERNAL MEDICINE

## 2023-11-17 PROCEDURE — 3060F PR POS MICROALBUMINURIA RESULT DOCUMENTED/REVIEW: ICD-10-PCS | Mod: CPTII,S$GLB,, | Performed by: INTERNAL MEDICINE

## 2023-11-17 RX ORDER — NIFEDIPINE 60 MG/1
1 TABLET, EXTENDED RELEASE ORAL DAILY
COMMUNITY
Start: 2023-06-01 | End: 2023-11-17 | Stop reason: SDUPTHER

## 2023-11-17 RX ORDER — HYDROCHLOROTHIAZIDE 12.5 MG/1
12.5 TABLET ORAL DAILY
Qty: 90 TABLET | Refills: 2 | Status: SHIPPED | OUTPATIENT
Start: 2023-11-17

## 2023-11-17 RX ORDER — METFORMIN HYDROCHLORIDE 1000 MG/1
1000 TABLET ORAL 2 TIMES DAILY WITH MEALS
Qty: 180 TABLET | Refills: 1 | Status: SHIPPED | OUTPATIENT
Start: 2023-11-17

## 2023-11-17 RX ORDER — NIFEDIPINE 60 MG/1
60 TABLET, EXTENDED RELEASE ORAL DAILY
Qty: 90 TABLET | Refills: 1 | Status: ON HOLD | OUTPATIENT
Start: 2023-11-17 | End: 2024-03-16 | Stop reason: HOSPADM

## 2023-11-17 RX ORDER — PANTOPRAZOLE SODIUM 20 MG/1
20 TABLET, DELAYED RELEASE ORAL DAILY
Qty: 90 TABLET | Refills: 2 | Status: SHIPPED | OUTPATIENT
Start: 2023-11-17

## 2023-11-18 ENCOUNTER — PATIENT OUTREACH (OUTPATIENT)
Dept: ADMINISTRATIVE | Facility: HOSPITAL | Age: 73
End: 2023-11-18
Payer: OTHER GOVERNMENT

## 2023-11-18 LAB
LEFT EYE DM RETINOPATHY: NEGATIVE
RIGHT EYE DM RETINOPATHY: NEGATIVE

## 2023-12-14 DIAGNOSIS — I25.10 CORONARY ARTERY DISEASE INVOLVING NATIVE CORONARY ARTERY OF NATIVE HEART WITHOUT ANGINA PECTORIS: Primary | ICD-10-CM

## 2023-12-15 DIAGNOSIS — Z95.1 S/P CABG (CORONARY ARTERY BYPASS GRAFT): ICD-10-CM

## 2023-12-15 DIAGNOSIS — I25.10 CORONARY ARTERY DISEASE INVOLVING NATIVE CORONARY ARTERY OF NATIVE HEART WITHOUT ANGINA PECTORIS: ICD-10-CM

## 2023-12-15 DIAGNOSIS — I10 ESSENTIAL HYPERTENSION: ICD-10-CM

## 2023-12-15 RX ORDER — LISINOPRIL 40 MG/1
40 TABLET ORAL
Qty: 90 TABLET | Refills: 0 | Status: SHIPPED | OUTPATIENT
Start: 2023-12-15 | End: 2024-03-19

## 2023-12-21 ENCOUNTER — OFFICE VISIT (OUTPATIENT)
Dept: CARDIOLOGY | Facility: CLINIC | Age: 73
End: 2023-12-21
Payer: MEDICARE

## 2023-12-21 ENCOUNTER — HOSPITAL ENCOUNTER (OUTPATIENT)
Dept: CARDIOLOGY | Facility: HOSPITAL | Age: 73
Discharge: HOME | End: 2023-12-21
Attending: INTERNAL MEDICINE
Payer: MEDICARE

## 2023-12-21 ENCOUNTER — TELEPHONE (OUTPATIENT)
Dept: CARDIOLOGY | Facility: CLINIC | Age: 73
End: 2023-12-21
Payer: OTHER GOVERNMENT

## 2023-12-21 VITALS
OXYGEN SATURATION: 98 % | DIASTOLIC BLOOD PRESSURE: 72 MMHG | SYSTOLIC BLOOD PRESSURE: 124 MMHG | HEIGHT: 70 IN | WEIGHT: 220 LBS | HEART RATE: 61 BPM | BODY MASS INDEX: 31.5 KG/M2

## 2023-12-21 DIAGNOSIS — Z95.1 S/P CABG (CORONARY ARTERY BYPASS GRAFT): ICD-10-CM

## 2023-12-21 DIAGNOSIS — I25.10 CORONARY ARTERY DISEASE INVOLVING NATIVE CORONARY ARTERY OF NATIVE HEART WITHOUT ANGINA PECTORIS: Primary | ICD-10-CM

## 2023-12-21 DIAGNOSIS — I25.10 CORONARY ARTERY DISEASE INVOLVING NATIVE CORONARY ARTERY OF NATIVE HEART WITHOUT ANGINA PECTORIS: ICD-10-CM

## 2023-12-21 DIAGNOSIS — E11.69 HYPERLIPIDEMIA ASSOCIATED WITH TYPE 2 DIABETES MELLITUS: ICD-10-CM

## 2023-12-21 DIAGNOSIS — E08.00 DIABETES MELLITUS DUE TO UNDERLYING CONDITION WITH HYPEROSMOLARITY WITHOUT COMA, WITHOUT LONG-TERM CURRENT USE OF INSULIN: ICD-10-CM

## 2023-12-21 DIAGNOSIS — I25.2 OLD MI (MYOCARDIAL INFARCTION): ICD-10-CM

## 2023-12-21 DIAGNOSIS — E78.5 HYPERLIPIDEMIA ASSOCIATED WITH TYPE 2 DIABETES MELLITUS: ICD-10-CM

## 2023-12-21 DIAGNOSIS — E11.59 HYPERTENSION ASSOCIATED WITH DIABETES: ICD-10-CM

## 2023-12-21 DIAGNOSIS — I15.2 HYPERTENSION ASSOCIATED WITH DIABETES: ICD-10-CM

## 2023-12-21 DIAGNOSIS — I20.89 EFFORT ANGINA: ICD-10-CM

## 2023-12-21 PROCEDURE — 3060F PR POS MICROALBUMINURIA RESULT DOCUMENTED/REVIEW: ICD-10-PCS | Mod: CPTII,S$GLB,, | Performed by: INTERNAL MEDICINE

## 2023-12-21 PROCEDURE — 3074F PR MOST RECENT SYSTOLIC BLOOD PRESSURE < 130 MM HG: ICD-10-PCS | Mod: CPTII,S$GLB,, | Performed by: INTERNAL MEDICINE

## 2023-12-21 PROCEDURE — 4010F PR ACE/ARB THEARPY RXD/TAKEN: ICD-10-PCS | Mod: CPTII,S$GLB,, | Performed by: INTERNAL MEDICINE

## 2023-12-21 PROCEDURE — 1159F MED LIST DOCD IN RCRD: CPT | Mod: CPTII,S$GLB,, | Performed by: INTERNAL MEDICINE

## 2023-12-21 PROCEDURE — 1159F PR MEDICATION LIST DOCUMENTED IN MEDICAL RECORD: ICD-10-PCS | Mod: CPTII,S$GLB,, | Performed by: INTERNAL MEDICINE

## 2023-12-21 PROCEDURE — 1160F RVW MEDS BY RX/DR IN RCRD: CPT | Mod: CPTII,S$GLB,, | Performed by: INTERNAL MEDICINE

## 2023-12-21 PROCEDURE — 3074F SYST BP LT 130 MM HG: CPT | Mod: CPTII,S$GLB,, | Performed by: INTERNAL MEDICINE

## 2023-12-21 PROCEDURE — 1160F PR REVIEW ALL MEDS BY PRESCRIBER/CLIN PHARMACIST DOCUMENTED: ICD-10-PCS | Mod: CPTII,S$GLB,, | Performed by: INTERNAL MEDICINE

## 2023-12-21 PROCEDURE — 93005 ELECTROCARDIOGRAM TRACING: CPT

## 2023-12-21 PROCEDURE — 1126F PR PAIN SEVERITY QUANTIFIED, NO PAIN PRESENT: ICD-10-PCS | Mod: CPTII,S$GLB,, | Performed by: INTERNAL MEDICINE

## 2023-12-21 PROCEDURE — 3044F HG A1C LEVEL LT 7.0%: CPT | Mod: CPTII,S$GLB,, | Performed by: INTERNAL MEDICINE

## 2023-12-21 PROCEDURE — 3078F PR MOST RECENT DIASTOLIC BLOOD PRESSURE < 80 MM HG: ICD-10-PCS | Mod: CPTII,S$GLB,, | Performed by: INTERNAL MEDICINE

## 2023-12-21 PROCEDURE — 1101F PR PT FALLS ASSESS DOC 0-1 FALLS W/OUT INJ PAST YR: ICD-10-PCS | Mod: CPTII,S$GLB,, | Performed by: INTERNAL MEDICINE

## 2023-12-21 PROCEDURE — 3066F NEPHROPATHY DOC TX: CPT | Mod: CPTII,S$GLB,, | Performed by: INTERNAL MEDICINE

## 2023-12-21 PROCEDURE — 1101F PT FALLS ASSESS-DOCD LE1/YR: CPT | Mod: CPTII,S$GLB,, | Performed by: INTERNAL MEDICINE

## 2023-12-21 PROCEDURE — 99214 PR OFFICE/OUTPT VISIT, EST, LEVL IV, 30-39 MIN: ICD-10-PCS | Mod: S$GLB,,, | Performed by: INTERNAL MEDICINE

## 2023-12-21 PROCEDURE — 93010 EKG 12-LEAD: ICD-10-PCS | Mod: ,,, | Performed by: INTERNAL MEDICINE

## 2023-12-21 PROCEDURE — 3008F PR BODY MASS INDEX (BMI) DOCUMENTED: ICD-10-PCS | Mod: CPTII,S$GLB,, | Performed by: INTERNAL MEDICINE

## 2023-12-21 PROCEDURE — 3288F PR FALLS RISK ASSESSMENT DOCUMENTED: ICD-10-PCS | Mod: CPTII,S$GLB,, | Performed by: INTERNAL MEDICINE

## 2023-12-21 PROCEDURE — 3044F PR MOST RECENT HEMOGLOBIN A1C LEVEL <7.0%: ICD-10-PCS | Mod: CPTII,S$GLB,, | Performed by: INTERNAL MEDICINE

## 2023-12-21 PROCEDURE — 99214 OFFICE O/P EST MOD 30 MIN: CPT | Mod: S$GLB,,, | Performed by: INTERNAL MEDICINE

## 2023-12-21 PROCEDURE — 3288F FALL RISK ASSESSMENT DOCD: CPT | Mod: CPTII,S$GLB,, | Performed by: INTERNAL MEDICINE

## 2023-12-21 PROCEDURE — 99999 PR PBB SHADOW E&M-EST. PATIENT-LVL IV: CPT | Mod: PBBFAC,,, | Performed by: INTERNAL MEDICINE

## 2023-12-21 PROCEDURE — 93010 ELECTROCARDIOGRAM REPORT: CPT | Mod: ,,, | Performed by: INTERNAL MEDICINE

## 2023-12-21 PROCEDURE — 4010F ACE/ARB THERAPY RXD/TAKEN: CPT | Mod: CPTII,S$GLB,, | Performed by: INTERNAL MEDICINE

## 2023-12-21 PROCEDURE — 3066F PR DOCUMENTATION OF TREATMENT FOR NEPHROPATHY: ICD-10-PCS | Mod: CPTII,S$GLB,, | Performed by: INTERNAL MEDICINE

## 2023-12-21 PROCEDURE — 3008F BODY MASS INDEX DOCD: CPT | Mod: CPTII,S$GLB,, | Performed by: INTERNAL MEDICINE

## 2023-12-21 PROCEDURE — 3078F DIAST BP <80 MM HG: CPT | Mod: CPTII,S$GLB,, | Performed by: INTERNAL MEDICINE

## 2023-12-21 PROCEDURE — 1126F AMNT PAIN NOTED NONE PRSNT: CPT | Mod: CPTII,S$GLB,, | Performed by: INTERNAL MEDICINE

## 2023-12-21 PROCEDURE — 99999 PR PBB SHADOW E&M-EST. PATIENT-LVL IV: ICD-10-PCS | Mod: PBBFAC,,, | Performed by: INTERNAL MEDICINE

## 2023-12-21 PROCEDURE — 3060F POS MICROALBUMINURIA REV: CPT | Mod: CPTII,S$GLB,, | Performed by: INTERNAL MEDICINE

## 2023-12-21 NOTE — TELEPHONE ENCOUNTER
Patient is on his way for his appointment----- Message from [place] sent at 12/21/2023  4:13 PM CST -----  Contact: [phone]  Type:  Patient Returning Call    Who Called:Deniz   Who Left Message for Patient:nurse   Does the patient know what this is regarding?:yes   Would the patient rather a call back or a response via [facility]? Call back   Best Call Back Number:[phone]   Additional Information: n/a      Thanks KB

## 2023-12-21 NOTE — PROGRESS NOTES
Subjective:   Patient ID:  Deniz Paulino is a 73 y.o. male who presents for follow up of No chief complaint on file.      HPI  11/16/2020  A 68 YO MALE WITH CAD S/P CABG OLD MI HTN DIABETES HLP IS HERE FOR F/U . HE HAS NOT BEEN EXERCISING CLAIMS COMPLIANCE. HAD COVID MILD CASE NO ILL EFFECTS IN AUGUST HAS NO NEW CHEST PAIN SHORTNESS OF BREATH ORTHOPNEA PND. HE IS COMPLIANT WITH SALT. HE HA SNO OTHER CARDIOVASCULAR COMPLAINTS. NO CHANGE IN WEIGHT. A1C 6% LDL ON TARGET. HIS A1C IS INCREASED.      7/14/2021  HERE FROF /U NO NEW SYMPTOMS HE HAS NOT BEEN EXERCISES HE TRIES TO BE COMPLIANT WITH DIET WEIGHT UNCHANGED HAS NO CARDIOVASCULAR SYMPTOMS.      12/2/2021    has  been eating lately for the holidays has been on the sedentary side takes meds regularily asymptomatic cardiovascular wise.      7/12/2022  Here for f /u has rt upper extremity numbness at nite at times has h/o carpal tunnel surgery no associated weakness .has no angina no shortness of breath had nose bleed that stopped he is taking his asa . tries to be compliant with diet.has numbness in feet.         12/12/22  Sees Dr. Bennett in clinic   Comes in with wife  Comes in with chest pain, 1.5 months ago, comes in rest sitting or laying flat  Not on exertion, lasts 10-15 sec  Denies SOB, diaphoresis, nauseous, palpitations   Does not exercise regularly - has gained 6 lbs since 7/22  Stopped smoking 20 years ago  BP elevated today, a little elevated on digtial medicine recently      4vCABG was 20 years ago     1/19/2023  Here for f/u . He has meds switched. His pain resolved after nifedipine. He has normal lvf by echo cardiolite negative for ischemia his bp control impoprved. Has no shortness of breath no leg swelling      4/20/2023   Here for 3 months f/u his bp control improved he ahs less chest pain he feels a minor cramp in the middle opf chest similar to his previous presentation but less intense priro to cabg. He is compliant with salt intake and meds.  He does not carry nitro s/l.      His cabg  lima to lad svg sequential to d1 d2   Svg to pda    His anatomy showed non obs cx occluded lad and rca (12/2009)     I am concerned that he ahs progressed his lcx disease and that hsi stress test did not show the ischemia      5/18/2023  Here forf /u heart cath showed evidence of multiple sequential stenosis in the svg to d1 and d2 was treated medically with ranexa and nitrates, HAS NO FURTHER ANGINA. HAS NO COMPLAINTS AT THE ACCESS SITE. HE HAS NO DIZZINESS NO LIGHTHEADEDNESS     12/21/2023  He has not been doing much of activity he gets short of breath. He has used nitro s/l three times since last visit in may. Has no nocturnal symptoms EKG has no new ischemic changes. He is getting fatigue takes naps during the day wears a nose strip has no other issues clinically. Not interested in sleep study.he attributes it to him being bored    Past Medical History:   Diagnosis Date    Acute coronary syndrome     Coronary artery disease     Diabetes mellitus 4/24/2014    Effort angina 4/30/2023    Hyperlipidemia     Hypertension     Old MI (myocardial infarction) 4/24/2014    S/P CABG (coronary artery bypass graft) 4/24/2014       Past Surgical History:   Procedure Laterality Date    CARDIAC CATHETERIZATION      COLONOSCOPY N/A 07/26/2019    Procedure: COLONOSCOPY;  Surgeon: Opal Oshea MD;  Location: [facility] ENDO;  Service: Endoscopy;  Laterality: N/A;    CORONARY ARTERY BYPASS GRAFT      CORONARY BYPASS GRAFT ANGIOGRAPHY  5/1/2023    Procedure: Bypass graft study;  Surgeon: Kimberly Bennett MD;  Location: [facility] CATH LAB;  Service: Cardiology;;    ESOPHAGOGASTRODUODENOSCOPY N/A 07/26/2019    Procedure: ESOPHAGOGASTRODUODENOSCOPY (EGD);  Surgeon: Opal Oshea MD;  Location: [facility] ENDO;  Service: Endoscopy;  Laterality: N/A;    LEFT HEART CATHETERIZATION Left 5/1/2023    Procedure: Left heart cath;  Surgeon: Kimberly Bennett MD;  Location: [facility] CATH LAB;  Service: Cardiology;   Laterality: Left;    SKIN CANCER EXCISION  2018    VASECTOMY         Social History     Tobacco Use    Smoking status: Former     Current packs/day: 0.00     Average packs/day: 1 pack/day for 30.0 years (30.0 ttl pk-yrs)     Types: Cigarettes     Start date: 1973     Quit date: 2003     Years since quittin.8    Smokeless tobacco: Never   Substance Use Topics    Alcohol use: Yes     Alcohol/week: 16.0 standard drinks of alcohol     Types: 10 Glasses of wine, 6 Cans of beer per week     Comment: socially    Drug use: Never       Family History   Problem Relation Age of Onset    Diabetes Mother     Heart murmur Mother     Cancer Mother         Breast    Stroke Mother     Alcohol abuse Father     Heart attack Father 63    Cancer Father         Esophageal    Diabetes Brother        Current Outpatient Medications   Medication Sig    aspirin (ECOTRIN) 325 MG EC tablet Take 325 mg by mouth once daily.    atorvastatin (LIPITOR) 40 MG tablet Take 1 tablet by mouth once daily    coenzyme Q10 200 mg capsule Take 200 mg by mouth 2 (two) times daily.    cyanocobalamin (VITAMIN B-12) 1000 MCG tablet Take 1,000 mcg by mouth once daily.     hydroCHLOROthiazide (HYDRODIURIL) 12.5 MG Tab Take 1 tablet (12.5 mg total) by mouth once daily.    lisinopriL (PRINIVIL,ZESTRIL) 40 MG tablet Take 1 tablet by mouth once daily    LORATADINE (ALAVERT ORAL) Take 1 tablet by mouth as needed.    magnesium oxide (MAG-OX) 400 mg (241.3 mg magnesium) tablet Take 400 mg by mouth once daily.    metFORMIN (GLUCOPHAGE) 1000 MG tablet Take 1 tablet (1,000 mg total) by mouth 2 (two) times daily with meals.    methocarbamoL (ROBAXIN) 500 MG Tab Take 1 tablet (500 mg total) by mouth 4 (four) times daily as needed (muscle spasm).    metoprolol succinate (TOPROL-XL) 50 MG 24 hr tablet Take 1 tablet by mouth once daily    NIFEdipine (PROCARDIA-XL) 60 MG (OSM) 24 hr tablet Take 1 tablet (60 mg total) by mouth once daily.    nitroGLYCERIN 0.2 mg/hr  TD PT24 (NITRODUR) 0.2 mg/hr Place 1 patch onto the skin once daily.    pantoprazole (PROTONIX) 20 MG tablet Take 1 tablet (20 mg total) by mouth once daily.    ranolazine (RANEXA) 500 MG Tb12 Take 1 tablet (500 mg total) by mouth 2 (two) times daily.    vitamin D 1000 units Tab Take 1,000 Units by mouth once daily.     nitroGLYCERIN (NITROSTAT) 0.4 MG SL tablet Place 1 tablet (0.4 mg total) under the tongue every 5 (five) minutes as needed for Chest pain.     No current facility-administered medications for this visit.     Current Outpatient Medications on File Prior to Visit   Medication Sig    aspirin (ECOTRIN) 325 MG EC tablet Take 325 mg by mouth once daily.    atorvastatin (LIPITOR) 40 MG tablet Take 1 tablet by mouth once daily    coenzyme Q10 200 mg capsule Take 200 mg by mouth 2 (two) times daily.    cyanocobalamin (VITAMIN B-12) 1000 MCG tablet Take 1,000 mcg by mouth once daily.     hydroCHLOROthiazide (HYDRODIURIL) 12.5 MG Tab Take 1 tablet (12.5 mg total) by mouth once daily.    lisinopriL (PRINIVIL,ZESTRIL) 40 MG tablet Take 1 tablet by mouth once daily    LORATADINE (ALAVERT ORAL) Take 1 tablet by mouth as needed.    magnesium oxide (MAG-OX) 400 mg (241.3 mg magnesium) tablet Take 400 mg by mouth once daily.    metFORMIN (GLUCOPHAGE) 1000 MG tablet Take 1 tablet (1,000 mg total) by mouth 2 (two) times daily with meals.    methocarbamoL (ROBAXIN) 500 MG Tab Take 1 tablet (500 mg total) by mouth 4 (four) times daily as needed (muscle spasm).    metoprolol succinate (TOPROL-XL) 50 MG 24 hr tablet Take 1 tablet by mouth once daily    NIFEdipine (PROCARDIA-XL) 60 MG (OSM) 24 hr tablet Take 1 tablet (60 mg total) by mouth once daily.    nitroGLYCERIN 0.2 mg/hr TD PT24 (NITRODUR) 0.2 mg/hr Place 1 patch onto the skin once daily.    pantoprazole (PROTONIX) 20 MG tablet Take 1 tablet (20 mg total) by mouth once daily.    ranolazine (RANEXA) 500 MG Tb12 Take 1 tablet (500 mg total) by mouth 2 (two) times daily.     vitamin D 1000 units Tab Take 1,000 Units by mouth once daily.     nitroGLYCERIN (NITROSTAT) 0.4 MG SL tablet Place 1 tablet (0.4 mg total) under the tongue every 5 (five) minutes as needed for Chest pain.     No current facility-administered medications on file prior to visit.     Review of patient's allergies indicates:  No Known Allergies   Review of Systems   Constitutional: Negative for malaise/fatigue.   Eyes:  Negative for blurred vision.   Cardiovascular:  Positive for dyspnea on exertion. Negative for chest pain, claudication, cyanosis, irregular heartbeat, leg swelling, near-syncope, orthopnea, palpitations and paroxysmal nocturnal dyspnea.   Respiratory:  Positive for shortness of breath and snoring. Negative for cough and hemoptysis.    Hematologic/Lymphatic: Negative for bleeding problem. Does not bruise/bleed easily.   Skin:  Negative for dry skin and itching.   Musculoskeletal:  Negative for falls, muscle weakness and myalgias.   Gastrointestinal:  Negative for abdominal pain, diarrhea, heartburn, hematemesis, hematochezia and melena.   Genitourinary:  Negative for flank pain and hematuria.   Neurological:  Negative for dizziness, focal weakness, headaches, light-headedness, numbness, paresthesias, seizures and weakness.   Psychiatric/Behavioral:  Negative for altered mental status and memory loss. The patient is not nervous/anxious.    Allergic/Immunologic: Negative for hives.       Objective:   Physical Exam  Vitals and nursing note reviewed.   Constitutional:       General: He is not in acute distress.     Appearance: He is well-developed. He is not diaphoretic.   HENT:      Head: Normocephalic and atraumatic.   Eyes:      General:         Right eye: No discharge.         Left eye: No discharge.      Pupils: Pupils are equal, round, and reactive to light.   Neck:      Thyroid: No thyromegaly.      Vascular: No JVD.   Cardiovascular:      Rate and Rhythm: Normal rate and regular rhythm.       "Pulses: Normal pulses and intact distal pulses.      Heart sounds: Normal heart sounds. No murmur heard.     No friction rub. No gallop.   Pulmonary:      Effort: Pulmonary effort is normal. No respiratory distress.      Breath sounds: Normal breath sounds. No wheezing or rales.      Comments: Scar well healed.   Chest:      Chest wall: No tenderness.   Abdominal:      General: Bowel sounds are normal. There is no distension.      Palpations: Abdomen is soft.      Tenderness: There is no abdominal tenderness.   Musculoskeletal:         General: Normal range of motion.      Cervical back: Neck supple.      Right lower leg: No edema.      Left lower leg: No edema.   Skin:     General: Skin is warm and dry.      Findings: No erythema or rash.   Neurological:      Mental Status: He is alert and oriented to person, place, and time.      Cranial Nerves: No cranial nerve deficit.   Psychiatric:         Mood and Affect: Mood normal.         Behavior: Behavior normal.       Vitals:    12/21/23 1659 12/21/23 1702   BP: 130/70 124/72   BP Location: Right arm Left arm   Patient Position: Sitting Sitting   BP Method: Large (Manual) Large (Manual)   Pulse: 61    SpO2: 98%    Weight: 99.8 kg (220 lb 0.3 oz)    Height: 5' 10" (1.778 m)      Lab Results   Component Value Date    CHOL 133 11/10/2023    CHOL 131 11/14/2022    CHOL 129 05/04/2022      Body mass index is 31.57 kg/m².   Lab Results   Component Value Date    HGBA1C 5.3 11/10/2023      BMP  Lab Results   Component Value Date     11/10/2023    K 3.7 11/10/2023     11/10/2023    CO2 24 11/10/2023    BUN 17 11/10/2023    CREATININE 1.0 11/10/2023    CALCIUM 9.5 11/10/2023    ANIONGAP 14 11/10/2023    EGFRNORACEVR >60.0 11/10/2023      Lab Results   Component Value Date    HDL 53 11/10/2023    HDL 52 11/14/2022    HDL 50 05/04/2022     Lab Results   Component Value Date    LDLCALC 53.8 (L) 11/10/2023    LDLCALC 58.0 (L) 11/14/2022    LDLCALC 56.8 (L) 05/04/2022 "     Lab Results   Component Value Date    TRIG 131 11/10/2023    TRIG 105 11/14/2022    TRIG 111 05/04/2022     Lab Results   Component Value Date    CHOLHDL 39.8 11/10/2023    CHOLHDL 39.7 11/14/2022    CHOLHDL 38.8 05/04/2022       Chemistry        Component Value Date/Time     11/10/2023 0811    K 3.7 11/10/2023 0811     11/10/2023 0811    CO2 24 11/10/2023 0811    BUN 17 11/10/2023 0811    CREATININE 1.0 11/10/2023 0811    GLU 95 11/10/2023 0811        Component Value Date/Time    CALCIUM 9.5 11/10/2023 0811    ALKPHOS 68 11/10/2023 0811    AST 17 11/10/2023 0811    ALT 14 11/10/2023 0811    BILITOT 0.7 11/10/2023 0811    ESTGFRAFRICA >60.0 05/04/2022 0806    ESTGFRAFRICA >60.0 05/04/2022 0806    EGFRNONAA >60.0 05/04/2022 0806    EGFRNONAA >60.0 05/04/2022 0806          Lab Results   Component Value Date    TSH 1.516 11/10/2023     Lab Results   Component Value Date    INR 1.1 04/20/2023    INR 1.0 09/19/2009     Lab Results   Component Value Date    WBC 7.04 11/10/2023    HGB 12.6 (L) 11/10/2023    HCT 36.7 (L) 11/10/2023    MCV 95 11/10/2023     11/10/2023     BMP  Sodium   Date Value Ref Range Status   11/10/2023 139 136 - 145 mmol/L Final     Potassium   Date Value Ref Range Status   11/10/2023 3.7 3.5 - 5.1 mmol/L Final     Chloride   Date Value Ref Range Status   11/10/2023 101 95 - 110 mmol/L Final     CO2   Date Value Ref Range Status   11/10/2023 24 23 - 29 mmol/L Final     BUN   Date Value Ref Range Status   11/10/2023 17 8 - 23 mg/dL Final     Creatinine   Date Value Ref Range Status   11/10/2023 1.0 0.5 - 1.4 mg/dL Final     Calcium   Date Value Ref Range Status   11/10/2023 9.5 8.7 - 10.5 mg/dL Final     Anion Gap   Date Value Ref Range Status   11/10/2023 14 8 - 16 mmol/L Final     eGFR if    Date Value Ref Range Status   05/04/2022 >60.0 >60 mL/min/1.73 m^2 Final   05/04/2022 >60.0 >60 mL/min/1.73 m^2 Final     eGFR if non    Date Value Ref Range  Status   05/04/2022 >60.0 >60 mL/min/1.73 m^2 Final     Comment:     Calculation used to obtain the estimated glomerular filtration  rate (eGFR) is the CKD-EPI equation.      05/04/2022 >60.0 >60 mL/min/1.73 m^2 Final     Comment:     Calculation used to obtain the estimated glomerular filtration  rate (eGFR) is the CKD-EPI equation.        CrCl cannot be calculated (Patient's most recent lab result is older than the maximum 7 days allowed.).    Assessment:     1. Coronary artery disease involving native coronary artery of native heart without angina pectoris    2. Hypertension associated with diabetes    3. Hyperlipidemia associated with type 2 diabetes mellitus    4. S/P CABG (coronary artery bypass graft)    5. Old MI (myocardial infarction)    6. Diabetes mellitus due to underlying condition with hyperosmolarity without coma, without long-term current use of insulin    7. Effort angina      Long discussion reviewed angios natural history of cad natural history of bypass graft treatment for angina medical therapy aggressive rf modification diet exercise compliance weight loss. He is on adequate medical therapy for ischemia his issue is really getting physical activity and avoid getting deconditioned.   Has some stigmata for sleep apnea not interested in sleep study ;d about weight loss.   Will evaluate his coronary reserve and ischemic treshold in 3 months with ett.  Lipids on target continue same therapy.  Htn controlled low salt diet emphasized.  Plan:   Ett on f/u in 3 months  Continue current therapy  Cardiac low salt diet.  Risk factor modification and excercise program./weight loss

## 2024-02-15 ENCOUNTER — LAB VISIT (OUTPATIENT)
Dept: LAB | Facility: HOSPITAL | Age: 74
End: 2024-02-15
Attending: INTERNAL MEDICINE
Payer: MEDICARE

## 2024-02-15 DIAGNOSIS — E08.00 DIABETES MELLITUS DUE TO UNDERLYING CONDITION WITH HYPEROSMOLARITY WITHOUT COMA, WITHOUT LONG-TERM CURRENT USE OF INSULIN: ICD-10-CM

## 2024-02-15 PROCEDURE — 36415 COLL VENOUS BLD VENIPUNCTURE: CPT | Performed by: INTERNAL MEDICINE

## 2024-02-15 PROCEDURE — 83036 HEMOGLOBIN GLYCOSYLATED A1C: CPT | Performed by: INTERNAL MEDICINE

## 2024-02-16 DIAGNOSIS — M62.838 MUSCLE SPASM: ICD-10-CM

## 2024-02-16 LAB
ESTIMATED AVG GLUCOSE: 105 MG/DL (ref 68–131)
HBA1C MFR BLD: 5.3 % (ref 4–5.6)

## 2024-02-16 RX ORDER — METHOCARBAMOL 500 MG/1
500 TABLET, FILM COATED ORAL 4 TIMES DAILY PRN
Qty: 30 TABLET | Refills: 0 | Status: SHIPPED | OUTPATIENT
Start: 2024-02-16

## 2024-02-16 NOTE — TELEPHONE ENCOUNTER
No care due was identified.  [facility] Embedded Care Due Messages. Reference number: [id number].   2/16/2024 2:04:55 PM CST

## 2024-03-16 ENCOUNTER — HOSPITAL ENCOUNTER (INPATIENT)
Facility: HOSPITAL | Age: 74
LOS: 1 days | Discharge: HOME | DRG: 305 | End: 2024-03-16
Attending: EMERGENCY MEDICINE | Admitting: INTERNAL MEDICINE
Payer: MEDICARE

## 2024-03-16 VITALS
BODY MASS INDEX: 32.03 KG/M2 | DIASTOLIC BLOOD PRESSURE: 80 MMHG | TEMPERATURE: 97 F | HEART RATE: 56 BPM | SYSTOLIC BLOOD PRESSURE: 148 MMHG | RESPIRATION RATE: 20 BRPM | HEIGHT: 70 IN | WEIGHT: 223.75 LBS | OXYGEN SATURATION: 96 %

## 2024-03-16 DIAGNOSIS — R79.89 TROPONIN LEVEL ELEVATED: ICD-10-CM

## 2024-03-16 DIAGNOSIS — I10 PRIMARY HYPERTENSION: ICD-10-CM

## 2024-03-16 DIAGNOSIS — I21.4 NSTEMI (NON-ST ELEVATED MYOCARDIAL INFARCTION): Primary | ICD-10-CM

## 2024-03-16 DIAGNOSIS — I21.4 NSTEMI (NON-ST ELEVATION MYOCARDIAL INFARCTION): ICD-10-CM

## 2024-03-16 DIAGNOSIS — R07.9 CHEST PAIN: ICD-10-CM

## 2024-03-16 DIAGNOSIS — I21.4 NON-ST ELEVATION MYOCARDIAL INFARCTION (NSTEMI): ICD-10-CM

## 2024-03-16 PROBLEM — I16.0 HYPERTENSIVE URGENCY: Status: ACTIVE | Noted: 2024-03-16

## 2024-03-16 PROBLEM — I16.0 HYPERTENSIVE URGENCY: Status: RESOLVED | Noted: 2024-03-16 | Resolved: 2024-03-16

## 2024-03-16 LAB
ALBUMIN SERPL BCP-MCNC: 3.7 G/DL (ref 3.5–5.2)
ALP SERPL-CCNC: 102 U/L (ref 55–135)
ALT SERPL W/O P-5'-P-CCNC: 17 U/L (ref 10–44)
ANION GAP SERPL CALC-SCNC: 12 MMOL/L (ref 8–16)
AORTIC ROOT ANNULUS: 3.42 CM
APTT PPP: 24.2 SEC (ref 21–32)
APTT PPP: 29.7 SEC (ref 21–32)
ASCENDING AORTA: 3.51 CM
AST SERPL-CCNC: 15 U/L (ref 10–40)
AV INDEX (PROSTH): 0.77
AV MEAN GRADIENT: 3 MMHG
AV PEAK GRADIENT: 5 MMHG
AV VALVE AREA BY VELOCITY RATIO: 3.04 CM²
AV VALVE AREA: 2.66 CM²
AV VELOCITY RATIO: 0.88
BASOPHILS # BLD AUTO: 0.01 K/UL (ref 0–0.2)
BASOPHILS NFR BLD: 0.2 % (ref 0–1.9)
BILIRUB SERPL-MCNC: 0.3 MG/DL (ref 0.1–1)
BNP SERPL-MCNC: 160 PG/ML (ref 0–99)
BSA FOR ECHO PROCEDURE: 2.24 M2
BUN SERPL-MCNC: 21 MG/DL (ref 8–23)
CALCIUM SERPL-MCNC: 9.9 MG/DL (ref 8.7–10.5)
CHLORIDE SERPL-SCNC: 106 MMOL/L (ref 95–110)
CHOLEST SERPL-MCNC: 118 MG/DL (ref 120–199)
CHOLEST/HDLC SERPL: 2.5 {RATIO} (ref 2–5)
CO2 SERPL-SCNC: 24 MMOL/L (ref 23–29)
CREAT SERPL-MCNC: 1.3 MG/DL (ref 0.5–1.4)
CV ECHO LV RWT: 0.36 CM
CV STRESS BASE HR: 60 BPM
DIASTOLIC BLOOD PRESSURE: 85 MMHG
DIFFERENTIAL METHOD BLD: ABNORMAL
DOP CALC AO PEAK VEL: 1.07 M/S
DOP CALC AO VTI: 26 CM
DOP CALC LVOT AREA: 3.5 CM2
DOP CALC LVOT DIAMETER: 2.1 CM
DOP CALC LVOT PEAK VEL: 0.94 M/S
DOP CALC LVOT STROKE VOLUME: 69.24 CM3
DOP CALC RVOT PEAK VEL: 0.57 M/S
DOP CALC RVOT VTI: 14.9 CM
DOP CALCLVOT PEAK VEL VTI: 20 CM
E WAVE DECELERATION TIME: 182.7 MSEC
E/A RATIO: 1.2
E/E' RATIO: 9.47 M/S
ECHO LV POSTERIOR WALL: 0.95 CM (ref 0.6–1.1)
EJECTION FRACTION- HIGH: 73 %
END DIASTOLIC INDEX-HIGH: 165 ML/M2
END DIASTOLIC INDEX-LOW: 101 ML/M2
END SYSTOLIC INDEX-HIGH: 64 ML/M2
END SYSTOLIC INDEX-LOW: 28 ML/M2
EOSINOPHIL # BLD AUTO: 0.1 K/UL (ref 0–0.5)
EOSINOPHIL NFR BLD: 0.8 % (ref 0–8)
ERYTHROCYTE [DISTWIDTH] IN BLOOD BY AUTOMATED COUNT: 12.3 % (ref 11.5–14.5)
EST. GFR  (NO RACE VARIABLE): 58 ML/MIN/1.73 M^2
ESTIMATED AVG GLUCOSE: 108 MG/DL (ref 68–131)
FRACTIONAL SHORTENING: 22 % (ref 28–44)
GLUCOSE SERPL-MCNC: 115 MG/DL (ref 70–110)
HBA1C MFR BLD: 5.4 % (ref 4–5.6)
HCT VFR BLD AUTO: 33.6 % (ref 40–54)
HDLC SERPL-MCNC: 48 MG/DL (ref 40–75)
HDLC SERPL: 40.7 % (ref 20–50)
HGB BLD-MCNC: 11.8 G/DL (ref 14–18)
IMM GRANULOCYTES # BLD AUTO: 0.02 K/UL (ref 0–0.04)
IMM GRANULOCYTES NFR BLD AUTO: 0.3 % (ref 0–0.5)
INR PPP: 0.9 (ref 0.8–1.2)
INTERVENTRICULAR SEPTUM: 0.82 CM (ref 0.6–1.1)
IVC DIAMETER: 2.32 CM
IVRT: 74.22 MSEC
LA MAJOR: 6.05 CM
LA MINOR: 6.19 CM
LA WIDTH: 4.3 CM
LDLC SERPL CALC-MCNC: 48.4 MG/DL (ref 63–159)
LEFT ATRIUM SIZE: 4.69 CM
LEFT ATRIUM VOLUME INDEX: 47.9 ML/M2
LEFT ATRIUM VOLUME: 104.9 CM3
LEFT INTERNAL DIMENSION IN SYSTOLE: 4.17 CM (ref 2.1–4)
LEFT VENTRICLE DIASTOLIC VOLUME INDEX: 62.54 ML/M2
LEFT VENTRICLE DIASTOLIC VOLUME: 136.97 ML
LEFT VENTRICLE MASS INDEX: 79 G/M2
LEFT VENTRICLE SYSTOLIC VOLUME INDEX: 35.2 ML/M2
LEFT VENTRICLE SYSTOLIC VOLUME: 77.17 ML
LEFT VENTRICULAR INTERNAL DIMENSION IN DIASTOLE: 5.33 CM (ref 3.5–6)
LEFT VENTRICULAR MASS: 172.4 G
LV LATERAL E/E' RATIO: 8.18 M/S
LV SEPTAL E/E' RATIO: 11.25 M/S
LVOT MG: 1.95 MMHG
LVOT MV: 0.66 CM/S
LYMPHOCYTES # BLD AUTO: 1.3 K/UL (ref 1–4.8)
LYMPHOCYTES NFR BLD: 21.3 % (ref 18–48)
MAGNESIUM SERPL-MCNC: 1.5 MG/DL (ref 1.6–2.6)
MCH RBC QN AUTO: 32.9 PG (ref 27–31)
MCHC RBC AUTO-ENTMCNC: 35.1 G/DL (ref 32–36)
MCV RBC AUTO: 94 FL (ref 82–98)
MONOCYTES # BLD AUTO: 0.8 K/UL (ref 0.3–1)
MONOCYTES NFR BLD: 13.5 % (ref 4–15)
MV PEAK A VEL: 0.75 M/S
MV PEAK E VEL: 0.9 M/S
NEUTROPHILS # BLD AUTO: 3.8 K/UL (ref 1.8–7.7)
NEUTROPHILS NFR BLD: 63.9 % (ref 38–73)
NONHDLC SERPL-MCNC: 70 MG/DL
NRBC BLD-RTO: 0 /100 WBC
NUC REST EJECTION FRACTION: 65
NUC STRESS EJECTION FRACTION: 50 %
OHS CV CPX 85 PERCENT MAX PREDICTED HEART RATE MALE: 125
OHS CV CPX MAX PREDICTED HEART RATE: 147
OHS CV CPX PATIENT IS FEMALE: 0
OHS CV CPX PATIENT IS MALE: 1
OHS CV CPX PEAK DIASTOLIC BLOOD PRESSURE: 85 MMHG
OHS CV CPX PEAK HEAR RATE: 75 BPM
OHS CV CPX PEAK RATE PRESSURE PRODUCT: NORMAL
OHS CV CPX PEAK SYSTOLIC BLOOD PRESSURE: 138 MMHG
OHS CV CPX PERCENT MAX PREDICTED HEART RATE ACHIEVED: 51
OHS CV CPX RATE PRESSURE PRODUCT PRESENTING: 8280
PISA MRMAX VEL: 5.62 M/S
PISA TR MAX VEL: 1.4 M/S
PLATELET # BLD AUTO: 154 K/UL (ref 150–450)
PMV BLD AUTO: 9.9 FL (ref 9.2–12.9)
POCT GLUCOSE: 104 MG/DL (ref 70–110)
POCT GLUCOSE: 114 MG/DL (ref 70–110)
POTASSIUM SERPL-SCNC: 4.3 MMOL/L (ref 3.5–5.1)
PROT SERPL-MCNC: 6.6 G/DL (ref 6–8.4)
PROTHROMBIN TIME: 10.9 SEC (ref 9–12.5)
PULM VEIN S/D RATIO: 1.43
PV MEAN GRADIENT: 1 MMHG
PV MV: 0.78 M/S
PV PEAK D VEL: 0.47 M/S
PV PEAK GRADIENT: 4 MMHG
PV PEAK S VEL: 0.67 M/S
PV PEAK VELOCITY: 0.99 M/S
RA MAJOR: 5.92 CM
RA PRESSURE ESTIMATED: 3 MMHG
RA WIDTH: 3.8 CM
RBC # BLD AUTO: 3.59 M/UL (ref 4.6–6.2)
RETIRED EF AND QEF - SEE NOTES: 59 %
RIGHT VENTRICULAR END-DIASTOLIC DIMENSION: 4.3 CM
RV TB RVSP: 4 MMHG
RV TISSUE DOPPLER FREE WALL SYSTOLIC VELOCITY 1 (APICAL 4 CHAMBER VIEW): 11.01 CM/S
SODIUM SERPL-SCNC: 142 MMOL/L (ref 136–145)
STJ: 2.95 CM
SYSTOLIC BLOOD PRESSURE: 138 MMHG
TDI LATERAL: 0.11 M/S
TDI SEPTAL: 0.08 M/S
TDI: 0.1 M/S
TR MAX PG: 8 MMHG
TRICUSPID ANNULAR PLANE SYSTOLIC EXCURSION: 1.42 CM
TRIGL SERPL-MCNC: 108 MG/DL (ref 30–150)
TROPONIN I SERPL DL<=0.01 NG/ML-MCNC: 0.1 NG/ML (ref 0–0.03)
TROPONIN I SERPL DL<=0.01 NG/ML-MCNC: 0.13 NG/ML (ref 0–0.03)
TROPONIN I SERPL DL<=0.01 NG/ML-MCNC: 0.14 NG/ML (ref 0–0.03)
TROPONIN I SERPL DL<=0.01 NG/ML-MCNC: 0.2 NG/ML (ref 0–0.03)
TV REST PULMONARY ARTERY PRESSURE: 11 MMHG
WBC # BLD AUTO: 6 K/UL (ref 3.9–12.7)
Z-SCORE OF LEFT VENTRICULAR DIMENSION IN END DIASTOLE: -3.28
Z-SCORE OF LEFT VENTRICULAR DIMENSION IN END SYSTOLE: -0.6

## 2024-03-16 PROCEDURE — 80053 COMPREHEN METABOLIC PANEL: CPT | Performed by: EMERGENCY MEDICINE

## 2024-03-16 PROCEDURE — 84484 ASSAY OF TROPONIN QUANT: CPT | Mod: 91 | Performed by: NURSE PRACTITIONER

## 2024-03-16 PROCEDURE — 11000001 HC ACUTE MED/SURG PRIVATE ROOM

## 2024-03-16 PROCEDURE — 80061 LIPID PANEL: CPT | Performed by: NURSE PRACTITIONER

## 2024-03-16 PROCEDURE — 25000003 PHARM REV CODE 250: Performed by: EMERGENCY MEDICINE

## 2024-03-16 PROCEDURE — 84484 ASSAY OF TROPONIN QUANT: CPT | Mod: 91 | Performed by: EMERGENCY MEDICINE

## 2024-03-16 PROCEDURE — 93005 ELECTROCARDIOGRAM TRACING: CPT

## 2024-03-16 PROCEDURE — 85025 COMPLETE CBC W/AUTO DIFF WBC: CPT | Performed by: EMERGENCY MEDICINE

## 2024-03-16 PROCEDURE — 63600175 PHARM REV CODE 636 W HCPCS: Performed by: EMERGENCY MEDICINE

## 2024-03-16 PROCEDURE — 83735 ASSAY OF MAGNESIUM: CPT | Performed by: NURSE PRACTITIONER

## 2024-03-16 PROCEDURE — 63600175 PHARM REV CODE 636 W HCPCS: Performed by: NURSE PRACTITIONER

## 2024-03-16 PROCEDURE — 63600175 PHARM REV CODE 636 W HCPCS: Performed by: INTERNAL MEDICINE

## 2024-03-16 PROCEDURE — 82962 GLUCOSE BLOOD TEST: CPT

## 2024-03-16 PROCEDURE — 83880 ASSAY OF NATRIURETIC PEPTIDE: CPT | Performed by: EMERGENCY MEDICINE

## 2024-03-16 PROCEDURE — 93010 ELECTROCARDIOGRAM REPORT: CPT | Mod: ,,, | Performed by: INTERNAL MEDICINE

## 2024-03-16 PROCEDURE — 36415 COLL VENOUS BLD VENIPUNCTURE: CPT | Performed by: NURSE PRACTITIONER

## 2024-03-16 PROCEDURE — 85730 THROMBOPLASTIN TIME PARTIAL: CPT | Performed by: NURSE PRACTITIONER

## 2024-03-16 PROCEDURE — A9502 TC99M TETROFOSMIN: HCPCS | Performed by: INTERNAL MEDICINE

## 2024-03-16 PROCEDURE — 99291 CRITICAL CARE FIRST HOUR: CPT

## 2024-03-16 PROCEDURE — 85610 PROTHROMBIN TIME: CPT | Performed by: NURSE PRACTITIONER

## 2024-03-16 PROCEDURE — 96374 THER/PROPH/DIAG INJ IV PUSH: CPT

## 2024-03-16 PROCEDURE — 99222 1ST HOSP IP/OBS MODERATE 55: CPT | Mod: 25,,, | Performed by: INTERNAL MEDICINE

## 2024-03-16 PROCEDURE — 83036 HEMOGLOBIN GLYCOSYLATED A1C: CPT | Performed by: NURSE PRACTITIONER

## 2024-03-16 PROCEDURE — 25000003 PHARM REV CODE 250: Performed by: NURSE PRACTITIONER

## 2024-03-16 PROCEDURE — 85730 THROMBOPLASTIN TIME PARTIAL: CPT | Mod: 91 | Performed by: INTERNAL MEDICINE

## 2024-03-16 RX ORDER — NIFEDIPINE 90 MG/1
90 TABLET, EXTENDED RELEASE ORAL DAILY
Qty: 30 TABLET | Refills: 11 | Status: SHIPPED | OUTPATIENT
Start: 2024-03-17 | End: 2025-03-17

## 2024-03-16 RX ORDER — LABETALOL HYDROCHLORIDE 5 MG/ML
20 INJECTION, SOLUTION INTRAVENOUS
Status: COMPLETED | OUTPATIENT
Start: 2024-03-16 | End: 2024-03-16

## 2024-03-16 RX ORDER — RANOLAZINE 500 MG/1
500 TABLET, EXTENDED RELEASE ORAL 2 TIMES DAILY
Status: DISCONTINUED | OUTPATIENT
Start: 2024-03-16 | End: 2024-03-16

## 2024-03-16 RX ORDER — RANOLAZINE 500 MG/1
1000 TABLET, EXTENDED RELEASE ORAL 2 TIMES DAILY
Status: DISCONTINUED | OUTPATIENT
Start: 2024-03-16 | End: 2024-03-16 | Stop reason: HOSPADM

## 2024-03-16 RX ORDER — INSULIN ASPART 100 [IU]/ML
0-5 INJECTION, SOLUTION INTRAVENOUS; SUBCUTANEOUS EVERY 6 HOURS PRN
Status: DISCONTINUED | OUTPATIENT
Start: 2024-03-16 | End: 2024-03-16 | Stop reason: HOSPADM

## 2024-03-16 RX ORDER — CHOLECALCIFEROL (VITAMIN D3) 25 MCG
1000 TABLET ORAL DAILY
Status: DISCONTINUED | OUTPATIENT
Start: 2024-03-16 | End: 2024-03-16 | Stop reason: HOSPADM

## 2024-03-16 RX ORDER — ATORVASTATIN CALCIUM 40 MG/1
40 TABLET, FILM COATED ORAL DAILY
Status: DISCONTINUED | OUTPATIENT
Start: 2024-03-16 | End: 2024-03-16 | Stop reason: HOSPADM

## 2024-03-16 RX ORDER — NIFEDIPINE 60 MG/1
60 TABLET, EXTENDED RELEASE ORAL DAILY
Status: DISCONTINUED | OUTPATIENT
Start: 2024-03-16 | End: 2024-03-16

## 2024-03-16 RX ORDER — MORPHINE SULFATE 4 MG/ML
2 INJECTION, SOLUTION INTRAMUSCULAR; INTRAVENOUS EVERY 4 HOURS PRN
Status: DISCONTINUED | OUTPATIENT
Start: 2024-03-16 | End: 2024-03-16 | Stop reason: HOSPADM

## 2024-03-16 RX ORDER — METOPROLOL SUCCINATE 50 MG/1
50 TABLET, EXTENDED RELEASE ORAL DAILY
Status: DISCONTINUED | OUTPATIENT
Start: 2024-03-16 | End: 2024-03-16 | Stop reason: HOSPADM

## 2024-03-16 RX ORDER — HEPARIN SODIUM,PORCINE/D5W 25000/250
0-40 INTRAVENOUS SOLUTION INTRAVENOUS CONTINUOUS
Status: DISCONTINUED | OUTPATIENT
Start: 2024-03-16 | End: 2024-03-16 | Stop reason: HOSPADM

## 2024-03-16 RX ORDER — ASPIRIN 325 MG
325 TABLET ORAL
Status: COMPLETED | OUTPATIENT
Start: 2024-03-16 | End: 2024-03-16

## 2024-03-16 RX ORDER — NAPROXEN SODIUM 220 MG/1
81 TABLET, FILM COATED ORAL DAILY
Status: DISCONTINUED | OUTPATIENT
Start: 2024-03-17 | End: 2024-03-16 | Stop reason: HOSPADM

## 2024-03-16 RX ORDER — RANOLAZINE 1000 MG/1
1000 TABLET, EXTENDED RELEASE ORAL 2 TIMES DAILY
Qty: 60 TABLET | Refills: 11 | Status: SHIPPED | OUTPATIENT
Start: 2024-03-16 | End: 2025-03-16

## 2024-03-16 RX ORDER — NITROGLYCERIN 0.4 MG/1
0.4 TABLET SUBLINGUAL EVERY 5 MIN PRN
Status: DISCONTINUED | OUTPATIENT
Start: 2024-03-16 | End: 2024-03-16 | Stop reason: HOSPADM

## 2024-03-16 RX ORDER — LISINOPRIL 20 MG/1
40 TABLET ORAL DAILY
Status: DISCONTINUED | OUTPATIENT
Start: 2024-03-16 | End: 2024-03-16 | Stop reason: HOSPADM

## 2024-03-16 RX ORDER — REGADENOSON 0.08 MG/ML
0.4 INJECTION, SOLUTION INTRAVENOUS
Status: COMPLETED | OUTPATIENT
Start: 2024-03-16 | End: 2024-03-16

## 2024-03-16 RX ORDER — GLUCAGON 1 MG
1 KIT INJECTION
Status: DISCONTINUED | OUTPATIENT
Start: 2024-03-16 | End: 2024-03-16 | Stop reason: HOSPADM

## 2024-03-16 RX ORDER — PANTOPRAZOLE SODIUM 40 MG/1
40 TABLET, DELAYED RELEASE ORAL DAILY
Status: DISCONTINUED | OUTPATIENT
Start: 2024-03-16 | End: 2024-03-16 | Stop reason: HOSPADM

## 2024-03-16 RX ADMIN — ASPIRIN 325 MG ORAL TABLET 325 MG: 325 PILL ORAL at 01:03

## 2024-03-16 RX ADMIN — NITROGLYCERIN 1 INCH: 20 OINTMENT TOPICAL at 11:03

## 2024-03-16 RX ADMIN — LISINOPRIL 40 MG: 20 TABLET ORAL at 08:03

## 2024-03-16 RX ADMIN — TETROFOSMIN 10.5 MILLICURIE: 1.38 INJECTION, POWDER, LYOPHILIZED, FOR SOLUTION INTRAVENOUS at 09:03

## 2024-03-16 RX ADMIN — LABETALOL HYDROCHLORIDE 20 MG: 5 INJECTION INTRAVENOUS at 01:03

## 2024-03-16 RX ADMIN — RANOLAZINE 500 MG: 500 TABLET, EXTENDED RELEASE ORAL at 08:03

## 2024-03-16 RX ADMIN — PANTOPRAZOLE SODIUM 40 MG: 40 TABLET, DELAYED RELEASE ORAL at 08:03

## 2024-03-16 RX ADMIN — TETROFOSMIN 33.1 MILLICURIE: 1.38 INJECTION, POWDER, LYOPHILIZED, FOR SOLUTION INTRAVENOUS at 10:03

## 2024-03-16 RX ADMIN — Medication 1000 UNITS: at 08:03

## 2024-03-16 RX ADMIN — HEPARIN SODIUM 12 UNITS/KG/HR: 10000 INJECTION, SOLUTION INTRAVENOUS at 04:03

## 2024-03-16 RX ADMIN — METOPROLOL SUCCINATE 50 MG: 50 TABLET, EXTENDED RELEASE ORAL at 08:03

## 2024-03-16 RX ADMIN — REGADENOSON 0.4 MG: 0.08 INJECTION, SOLUTION INTRAVENOUS at 10:03

## 2024-03-16 RX ADMIN — NITROGLYCERIN 1 INCH: 20 OINTMENT TOPICAL at 01:03

## 2024-03-16 RX ADMIN — ATORVASTATIN CALCIUM 40 MG: 40 TABLET, FILM COATED ORAL at 08:03

## 2024-03-16 RX ADMIN — NIFEDIPINE 60 MG: 60 TABLET, FILM COATED, EXTENDED RELEASE ORAL at 08:03

## 2024-03-16 NOTE — ASSESSMENT & PLAN NOTE
Patient with known CAD s/p stent placement and CABG, which is uncontrolled Will continue ASA and Statin Heparin drip and monitor for S/Sx of angina/ACS. Continue to monitor on telemetry.     Most recent [facility] done May 1, 2023    The Prox LAD to Mid LAD lesion was 100% stenosed.    The Prox RCA lesion was 100% stenosed.    The 3rd RPL lesion was 80% stenosed.    The Ost LAD lesion was 50% stenosed.    The Origin to Prox Graft lesion  before 1st Diag was 80% stenosed.    The Dist Graft lesion  before 1st Diag was 95% stenosed.    The Mid Graft to Dist Graft lesion  between 1st Diag and 2nd Diag was 95% stenosed.    The ejection fraction was calculated to be 55%.    The pre-procedure left ventricular end diastolic pressure was 17.    The post-procedure left ventricular end diastolic pressure was 20.    The estimated blood loss was none.    There was three vessel coronary artery disease.    There was no mitral valve stenosis.    There was no aortic valve stenosis.    There was no mitral valve prolapse evident. The annulus was normal. There was normal mitral valve motion.    Continue ASA, beta blocker, statin, Ranexa

## 2024-03-16 NOTE — ASSESSMENT & PLAN NOTE
Patient's FSGs are controlled on current medication regimen.  Last A1c reviewed-   Lab Results   Component Value Date    HGBA1C 5.3 02/15/2024     Most recent fingerstick glucose reviewed-   Recent Labs   Lab 03/16/24  0454   POCTGLUCOSE 104     Current correctional scale  Low  Maintain anti-hyperglycemic dose as follows-   Antihyperglycemics (From admission, onward)      Start     Stop Route Frequency Ordered    03/16/24 0535  insulin aspart U-100 pen 0-5 Units         -- SubQ Every 6 hours PRN 03/16/24 0436          Hold Oral hypoglycemics (metformin) while patient is in the hospital.

## 2024-03-16 NOTE — HOSPITAL COURSE
"Chest pain resolved later during hospitalization. Stress test was negative. Cardiology cleared for discharge home on increased procardia XL (90mg) and increased ranexa. Patient instructed to follow up with Dr. Bennett's office in 2 weeks. Communicated plan to patient and family at bedside. Patient acknowledged understanding and agreed to the plan.     BP (!) 148/80 (BP Location: Right arm)   Pulse (!) 56   Temp 97.4 °F (36.3 °C) (Oral)   Resp 20   Ht 5' 10" (1.778 m)   Wt 101.5 kg (223 lb 12.3 oz)   SpO2 96%   BMI 32.11 kg/m²   PHYSICAL EXAM  Vitals Reviewed  GEN: No acute distress, pleasant, body habitus normal  HEENT: atraumatic and normocephalic  CARDS: regular rate and rhythm, no m/g, pulses palpable in LE  PULM: breathing comfortably on room air, chest symmetric, nonlabored, no abnormal breath sounds on auscultation  ABD: nontender, nondistended, soft, no organomegaly, BS+  Neuro: Alert and oriented x3, CN's I-IX grossly intact, sensation and motor intact; follows directions and answers questions appropriately      "

## 2024-03-16 NOTE — CONSULTS
[facility] - Emergency Dept.  Cardiology  Consult Note    Patient Name: Deniz Paulino  MRN: [id number]  Admission Date: 3/16/2024  Hospital Length of Stay: 0 days  Code Status: Full Code   Attending Provider: Ollie Quintana MD   Consulting Provider: Trino Chance MD  Primary Care Physician: Hiro Kraft MD  Principal Problem:NSTEMI (non-ST elevated myocardial infarction)    Patient information was obtained from patient and ER records.     Inpatient consult to Cardiology  Consult performed by: Trino Chance MD  Consult ordered by: Nazia Jean NP        Subjective:     Chief Complaint:  CP     HPI:   74 y/o male with PMhx for CAD/CABG, hypertension, hyperlipidemia, and diabetes admitted for accelerated HTN and CP.  There is a mild increase in troponin ( < 1, flat). EKG is normal. SBP >200/90 on presentation. Cath 4-23 medical therapy,  NOT amenable to percutaneous intervention. Stress test was planned.    Recommend increase ranexa, increase procardia, nuclear stress for prognostic reasons  Increased troponin is from demand ischemia    Past Medical History:   Diagnosis Date    Acute coronary syndrome     Coronary artery disease     Diabetes mellitus 4/24/2014    Effort angina 4/30/2023    Hyperlipidemia     Hypertension     Old MI (myocardial infarction) 4/24/2014    S/P CABG (coronary artery bypass graft) 4/24/2014       Past Surgical History:   Procedure Laterality Date    CARDIAC CATHETERIZATION      COLONOSCOPY N/A 07/26/2019    Procedure: COLONOSCOPY;  Surgeon: Opal Oshea MD;  Location: [facility];  Service: Endoscopy;  Laterality: N/A;    CORONARY ARTERY BYPASS GRAFT      CORONARY BYPASS GRAFT ANGIOGRAPHY  5/1/2023    Procedure: Bypass graft study;  Surgeon: Kimberly Bennett MD;  Location: [facility] CATH LAB;  Service: Cardiology;;    ESOPHAGOGASTRODUODENOSCOPY N/A 07/26/2019    Procedure: ESOPHAGOGASTRODUODENOSCOPY (EGD);  Surgeon: Opal Oshea MD;  Location: [facility] ENDO;  Service:  Endoscopy;  Laterality: N/A;    LEFT HEART CATHETERIZATION Left 5/1/2023    Procedure: Left heart cath;  Surgeon: Kimberly Bennett MD;  Location: [facility] CATH LAB;  Service: Cardiology;  Laterality: Left;    SKIN CANCER EXCISION  09/2018    VASECTOMY         Review of patient's allergies indicates:  No Known Allergies    No current facility-administered medications on file prior to encounter.     Current Outpatient Medications on File Prior to Encounter   Medication Sig    aspirin (ECOTRIN) 325 MG EC tablet Take 325 mg by mouth once daily.    atorvastatin (LIPITOR) 40 MG tablet Take 1 tablet by mouth once daily    coenzyme Q10 200 mg capsule Take 200 mg by mouth 2 (two) times daily.    cyanocobalamin (VITAMIN B-12) 1000 MCG tablet Take 1,000 mcg by mouth once daily.     hydroCHLOROthiazide (HYDRODIURIL) 12.5 MG Tab Take 1 tablet (12.5 mg total) by mouth once daily.    lisinopriL (PRINIVIL,ZESTRIL) 40 MG tablet Take 1 tablet by mouth once daily    LORATADINE (ALAVERT ORAL) Take 1 tablet by mouth as needed.    magnesium oxide (MAG-OX) 400 mg (241.3 mg magnesium) tablet Take 400 mg by mouth once daily.    metFORMIN (GLUCOPHAGE) 1000 MG tablet Take 1 tablet (1,000 mg total) by mouth 2 (two) times daily with meals.    methocarbamoL (ROBAXIN) 500 MG Tab Take 1 tablet (500 mg total) by mouth 4 (four) times daily as needed (muscle spasm).    metoprolol succinate (TOPROL-XL) 50 MG 24 hr tablet Take 1 tablet by mouth once daily    NIFEdipine (PROCARDIA-XL) 60 MG (OSM) 24 hr tablet Take 1 tablet (60 mg total) by mouth once daily.    nitroGLYCERIN (NITROSTAT) 0.4 MG SL tablet Place 1 tablet (0.4 mg total) under the tongue every 5 (five) minutes as needed for Chest pain.    nitroGLYCERIN 0.2 mg/hr TD PT24 (NITRODUR) 0.2 mg/hr Place 1 patch onto the skin once daily.    pantoprazole (PROTONIX) 20 MG tablet Take 1 tablet (20 mg total) by mouth once daily.    ranolazine (RANEXA) 500 MG Tb12 Take 1 tablet (500 mg total) by mouth 2 (two)  times daily.    vitamin D 1000 units Tab Take 1,000 Units by mouth once daily.      Family History       Problem Relation (Age of Onset)    Alcohol abuse Father    Cancer Mother, Father    Diabetes Mother, Brother    Heart attack Father (63)    Heart murmur Mother    Stroke Mother          Tobacco Use    Smoking status: Former     Current packs/day: 0.00     Average packs/day: 1 pack/day for 30.0 years (30.0 total pack years)     Types: Cigarettes     Start date: 1973     Quit date: 2003     Years since quittin.1    Smokeless tobacco: Never   Substance and Sexual Activity    Alcohol use: Yes     Alcohol/week: 16.0 standard drinks of alcohol     Types: 10 Glasses of wine, 6 Cans of beer per week     Comment: socially    Drug use: Never    Sexual activity: Not Currently     Partners: Female     Review of Systems   Constitutional: Negative. Negative for weight gain.   HENT: Negative.     Eyes: Negative.    Cardiovascular: Negative.  Negative for chest pain, leg swelling and palpitations.   Respiratory: Negative.  Negative for shortness of breath.    Endocrine: Negative.    Hematologic/Lymphatic: Negative.    Skin: Negative.    Musculoskeletal:  Negative for muscle weakness.   Gastrointestinal: Negative.    Genitourinary: Negative.    Neurological: Negative.  Negative for dizziness.   Psychiatric/Behavioral: Negative.     Allergic/Immunologic: Negative.    All other systems reviewed and are negative.    Objective:     Vital Signs (Most Recent):  Temp: (Pended) 97.9 °F (36.6 °C) (24)  Pulse: (Abnormal) (Pended) 58 (24)  Resp: (Pended) 18 (24)  BP: (Pended) 135/79 (24)  SpO2: 96 % (24 0645) Vital Signs (24h Range):  Temp:  [97.7 °F (36.5 °C)-97.9 °F (36.6 °C)] (P) 97.9 °F (36.6 °C)  Pulse:  [58-71] (P) 58  Resp:  [11-25] (P) 18  SpO2:  [96 %-100 %] 96 %  BP: (129-200)/(76-96) (P) 135/79     Weight: 101.5 kg (223 lb 12.3 oz)  Body mass index is 32.11  kg/m².    SpO2: 96 %       No intake or output data in the 24 hours ending 03/16/24 0724    Lines/Drains/Airways       Peripheral Intravenous Line       Name Duration         Peripheral IV - Single Lumen 03/16/24 0140 Anterior;Proximal;Right Forearm <1 day                     Physical Exam  Vitals and nursing note reviewed.   Constitutional:       Appearance: He is well-developed.   HENT:      Head: Normocephalic and atraumatic.   Eyes:      Conjunctiva/sclera: Conjunctivae normal.      Pupils: Pupils are equal, round, and reactive to light.   Cardiovascular:      Rate and Rhythm: Normal rate and regular rhythm.      Pulses: Intact distal pulses.      Heart sounds: Normal heart sounds.   Pulmonary:      Effort: Pulmonary effort is normal.      Breath sounds: Normal breath sounds.   Abdominal:      General: Bowel sounds are normal.      Palpations: Abdomen is soft.   Musculoskeletal:      Cervical back: Normal range of motion and neck supple.   Skin:     General: Skin is warm and dry.   Neurological:      Mental Status: He is alert and oriented to person, place, and time.          Significant Labs: All pertinent lab results from the last 24 hours have been reviewed.    Significant Imaging: X-Ray: CXR: X-Ray Chest 1 View (CXR): No results found for this visit on 03/16/24.  Assessment and Plan:     No notes have been filed under this hospital service.  Service: Cardiology      VTE Risk Mitigation (From admission, onward)           Ordered     heparin 25,000 units in dextrose 5% (100 units/ml) IV bolus from bag LOW INTENSITY nomogram - [facility]  As needed (PRN)        Question:  Heparin Infusion Adjustment (DO NOT MODIFY ANSWER)  Answer:  [email or web address]\epic\Images\Pharmacy\HeparinInfusions\heparin LOW INTENSITY nomogram for [facility] [id number].[email or web address]    03/16/24 0410     heparin 25,000 units in dextrose 5% (100 units/ml) IV bolus from bag LOW INTENSITY nomogram - [facility]  As needed (PRN)        Question:  Heparin Infusion Adjustment (DO NOT  MODIFY ANSWER)  Answer:  \\[email or web address]\epic\Images\Pharmacy\HeparinInfusions\heparin LOW INTENSITY nomogram for [facility] [id number].pdf    03/16/24 0410     heparin 25,000 units in dextrose 5% 250 mL (100 units/mL) infusion LOW INTENSITY nomogram - [facility]  Continuous        Question:  Begin at (units/kg/hr)  Answer:  12    03/16/24 0410     IP VTE HIGH RISK PATIENT  Once         03/16/24 0407     Place sequential compression device  Until discontinued         03/16/24 0407                    Thank you for your consult. I will follow-up with patient. Please contact us if you have any additional questions.    Trino Chance MD  Cardiology   [facility] - Emergency Dept.

## 2024-03-16 NOTE — ED NOTES
Bed: 13  Expected date:   Expected time:   Means of arrival: Personal Transportation  Comments:   Renee Alexander was seen and treated in our emergency department on 3/10/2022  No restrictions            Diagnosis:     Brayan    She may return on this date: 03/11/2022         If you have any questions or concerns, please don't hesitate to call        Richard Mercado PA-C    ______________________________           _______________          _______________  Hospital Representative                              Date                                Time

## 2024-03-16 NOTE — NURSING
D/C instructions and AVS reviewed with patient;  Verbalized Understanding.  IV access removed, no redness or swelling noted.  Awaiting pt escort.

## 2024-03-16 NOTE — PLAN OF CARE
[facility] - Med Surg  Discharge Final Note    Primary Care Provider: Hiro Kraft MD    Expected Discharge Date: 3/16/2024    Final Discharge Note (most recent)       Final Note - 03/16/24 1457          Final Note    Assessment Type Final Discharge Note     Anticipated Discharge Disposition Home or Self Care        Post-Acute Status    Discharge Delays None known at this time                     Important Message from Medicare             Contact Info       Kimberly Bennett MD   Specialty: Interventional Cardiology, Cardiology    [address] [address] [facility] [place] [address]   Phone: [phone]       Next Steps: Follow up in 2 week(s)    Hiro Kraft MD   Specialty: Internal Medicine   Relationship: PCP - General  Hypertension Digital Medicine Responsible Provider    [address] [address] [facility] [place] [address]   Phone: [phone]       Next Steps: Schedule an appointment as soon as possible for a visit          Discharge home, no home health or dme orders noted.

## 2024-03-16 NOTE — ASSESSMENT & PLAN NOTE
BP elevated on arrival, trended down after IV labetalol   Resume nifedipine, lisinopril, and beta blocker  Will order IV hydralazine PRN

## 2024-03-16 NOTE — HPI
72 y/o male with PMhx for CAD/CABG, hypertension, hyperlipidemia, and diabetes admitted for accelerated HTN and CP.  There is a mild increase in troponin ( < 1, flat). EKG is normal. SBP >200/90 on presentation. Cath 4-23 medical therapy,  NOT amenable to percutaneous intervention. Stress test was planned.    Recommend increase ranexa, increase procardia, nuclear stress for prognostic reasons  Increased troponin is from demand ischemia

## 2024-03-16 NOTE — ASSESSMENT & PLAN NOTE
Chest pain persisted despite 4 SL NTG at home prior to arrival, has been intermittent over past two days  Initial troponin 0.102, will trend   mg given, 81 mg daily ordered  NTG ointment   Heparin bolus/drip ordered  Continue beta blocker and statin  Followed by Dr. Bennett, will consult cardiology  Cardiac monitoring  Keep NPO x medications for now until evaluated by cardiology  PRN morphine

## 2024-03-16 NOTE — CONSULTS
Food & Nutrition Education       Diet Education: Cardiac and Consistent carbohydrate diet     Learners: Patient and pt wife      Nutrition Education provided with handouts:   Heart-Healthy Consistent Carbohydrate Nutrition Therapy ([email or web address])       Comments:   PMH: CAD, HTN, HLD, T2DM    73 y.o. Male admitted for Troponin level elevated. Pt currently NPO. Visited pt at bedside, pt wife present. Pt confirmed that he has had no PO intake since admit d/t NPO status, that he has a good appetite now and PTA, eats 3-5 meals/day and has adequate and access to healthy foods at home. Pt reported his UBW is generally 215-218 lbs, note pt currently 223 lbs.     Provided pt with handout on low sodium, general healthful diet r/t recent hospital diagnosis that recommends a well balanced diet with a variety of fresh foods, fruits and vegetables (5 cups/day), whole grains (3 oz/day), and fat-free or low fat dairy, reading food packages, food labels, nutrition facts labels to identify nutrient content of foods, the importance of fiber (especially soluble fiber), dietary sources, and a goal intake of >20-30g/day, dietary sources of cholesterol and the importance of incorporating healthy fats into the diet, and avoiding saturated and trans fats for heart health. For a generally healthy diet, aim for total fat less than 25-35% of calories.    Discussed the importance of limiting sodium to less than 2,000 mg per day. Recommended salt free seasonings and other herbs and spices in meals to enhance flavor without additional sodium.   Handout also provided dietary sources of carbohydrates, carb counting and daily consistent carbohydrate intake. Discussed the importance of carbohydrates in the diet, but with diabetes, focusing on consistent carb intake throughout the day with emphasis on protein and fiber.   Recommends for a 2,000 calorie diet, aim for 225-275g (45-55%) daily CHO (goal: 3-4 servings (45-60 g) of carbs per meal  "with snacks in between).       Pt reported that his wife does the grocery shopping and cooking at home and that he does not eat fast food, pt wife confirmed and stated that they do "eat more sodium than they should" and reported that they eat frozen meals for convenience.  Pt stated that he already has had prior nutrition education, pt denied discussing the nutrition education in detail at this time. Pt and pt wife expressed understanding and appreciation of nutrition education that was discussed. Encouraged pt and pt wife to use RD contact information that is provided on handout and to speak to pt's MD for a referral for and outpatient RD if warranted.     Nutrition Related Social Determinants of Health: SDOH: Adequate food in home environment and None Identified         NFPE not performed, pt appears well nourished.   All questions and concerns answered.   Provided handout with dietitian's contact information.   *Please re-consult as needed.   Thank you,     Alisha Vicente, Registration Eligible, Provisional LDN      "

## 2024-03-16 NOTE — PLAN OF CARE
Problem: Tissue Perfusion (Acute Coronary Syndrome)  Goal: Adequate Tissue Perfusion  3/16/2024 1337 by Ashlyn Martinez RN  Outcome: Ongoing, Progressing  3/16/2024 1336 by Ashlyn Martinez, RN  Outcome: Ongoing, Progressing

## 2024-03-16 NOTE — H&P
Formerly [facility] - Emergency Dept.  [facility] Medicine  History & Physical    Patient Name: Deniz Paulino  MRN: [id number]  Patient Class: IP- Inpatient  Admission Date: 3/16/2024  Attending Physician: Miriam Krause MD   Primary Care Provider: Hiro Kraft MD         Patient information was obtained from patient, spouse/SO, past medical records, and ER records.     Subjective:     Principal Problem:NSTEMI (non-ST elevated myocardial infarction)    Chief Complaint:   Chief Complaint   Patient presents with    Chest Pain     X 2 days, pain worse tonight, took 4 ntg in the last hour         HPI: Patient is a 73-year-old male with past medical history significant for CAD, MI, CABG, hypertension, hyperlipidemia, and diabetes who presented to the ED for evaluation of chest pain.  He reports intermittent pain across his upper chest on both sides, which started two days ago and gradually worsened. No radiation into jaw, neck, or arms. He described it as moderate, sharp pain/pressure/tightness, unrelieved with SL NTG (he took 4 SL NTG piror to arrival in ED). He is followed by Dr. Bennett and has a treadmill stress test planned for 3/26. He denies dizziness, weakness, headache, palpitations, shortness of breath, cough, wheezing, nausea, vomiting, abdominal pain, constipation, diarrhea, and dysuria. He was given  mg in ED.  Initial BP was 200/96 and he was given IV labetalol and NTG ointment placed to chest. VS stable otherwise, on room air. EKG SR with occasional PVC's. CXR done, lungs clear. Troponin I level 0.102. . Labs otherwise unremarkable. Hospital Medicine was consulted for admission due to NSTEMI and accelerated hypertension.    Past Medical History:   Diagnosis Date    Acute coronary syndrome     Coronary artery disease     Diabetes mellitus 4/24/2014    Effort angina 4/30/2023    Hyperlipidemia     Hypertension     Old MI (myocardial infarction) 4/24/2014    S/P CABG (coronary artery bypass graft)  4/24/2014       Past Surgical History:   Procedure Laterality Date    CARDIAC CATHETERIZATION      COLONOSCOPY N/A 07/26/2019    Procedure: COLONOSCOPY;  Surgeon: Opal Oshea MD;  Location: [facility] ENDO;  Service: Endoscopy;  Laterality: N/A;    CORONARY ARTERY BYPASS GRAFT      CORONARY BYPASS GRAFT ANGIOGRAPHY  5/1/2023    Procedure: Bypass graft study;  Surgeon: Kimberly Bennett MD;  Location: [facility] CATH LAB;  Service: Cardiology;;    ESOPHAGOGASTRODUODENOSCOPY N/A 07/26/2019    Procedure: ESOPHAGOGASTRODUODENOSCOPY (EGD);  Surgeon: Opal Oshea MD;  Location: [facility] ENDO;  Service: Endoscopy;  Laterality: N/A;    LEFT HEART CATHETERIZATION Left 5/1/2023    Procedure: Left heart cath;  Surgeon: Kimberly Bennett MD;  Location: [facility] CATH LAB;  Service: Cardiology;  Laterality: Left;    SKIN CANCER EXCISION  09/2018    VASECTOMY         Review of patient's allergies indicates:  No Known Allergies    No current facility-administered medications on file prior to encounter.     Current Outpatient Medications on File Prior to Encounter   Medication Sig    aspirin (ECOTRIN) 325 MG EC tablet Take 325 mg by mouth once daily.    atorvastatin (LIPITOR) 40 MG tablet Take 1 tablet by mouth once daily    coenzyme Q10 200 mg capsule Take 200 mg by mouth 2 (two) times daily.    cyanocobalamin (VITAMIN B-12) 1000 MCG tablet Take 1,000 mcg by mouth once daily.     hydroCHLOROthiazide (HYDRODIURIL) 12.5 MG Tab Take 1 tablet (12.5 mg total) by mouth once daily.    lisinopriL (PRINIVIL,ZESTRIL) 40 MG tablet Take 1 tablet by mouth once daily    LORATADINE (ALAVERT ORAL) Take 1 tablet by mouth as needed.    magnesium oxide (MAG-OX) 400 mg (241.3 mg magnesium) tablet Take 400 mg by mouth once daily.    metFORMIN (GLUCOPHAGE) 1000 MG tablet Take 1 tablet (1,000 mg total) by mouth 2 (two) times daily with meals.    methocarbamoL (ROBAXIN) 500 MG Tab Take 1 tablet (500 mg total) by mouth 4 (four) times daily as needed (muscle spasm).     metoprolol succinate (TOPROL-XL) 50 MG 24 hr tablet Take 1 tablet by mouth once daily    NIFEdipine (PROCARDIA-XL) 60 MG (OSM) 24 hr tablet Take 1 tablet (60 mg total) by mouth once daily.    nitroGLYCERIN (NITROSTAT) 0.4 MG SL tablet Place 1 tablet (0.4 mg total) under the tongue every 5 (five) minutes as needed for Chest pain.    nitroGLYCERIN 0.2 mg/hr TD PT24 (NITRODUR) 0.2 mg/hr Place 1 patch onto the skin once daily.    pantoprazole (PROTONIX) 20 MG tablet Take 1 tablet (20 mg total) by mouth once daily.    ranolazine (RANEXA) 500 MG Tb12 Take 1 tablet (500 mg total) by mouth 2 (two) times daily.    vitamin D 1000 units Tab Take 1,000 Units by mouth once daily.      Family History       Problem Relation (Age of Onset)    Alcohol abuse Father    Cancer Mother, Father    Diabetes Mother, Brother    Heart attack Father (63)    Heart murmur Mother    Stroke Mother          Tobacco Use    Smoking status: Former     Current packs/day: 0.00     Average packs/day: 1 pack/day for 30.0 years (30.0 ttl pk-yrs)     Types: Cigarettes     Start date: 1973     Quit date: 2003     Years since quittin.1    Smokeless tobacco: Never   Substance and Sexual Activity    Alcohol use: Yes     Alcohol/week: 16.0 standard drinks of alcohol     Types: 10 Glasses of wine, 6 Cans of beer per week     Comment: socially    Drug use: Never    Sexual activity: Not Currently     Partners: Female     Review of Systems   Constitutional: Negative.  Negative for diaphoresis and fatigue.   HENT: Negative.     Eyes: Negative.    Respiratory:  Positive for chest tightness. Negative for cough, shortness of breath and wheezing.    Cardiovascular:  Positive for chest pain. Negative for palpitations and leg swelling.   Gastrointestinal: Negative.  Negative for abdominal distention, abdominal pain, blood in stool, constipation, diarrhea, nausea and vomiting.   Endocrine: Negative.    Genitourinary: Negative.    Musculoskeletal: Negative.     Skin: Negative.    Allergic/Immunologic: Negative.    Neurological: Negative.  Negative for dizziness, syncope, weakness, light-headedness and headaches.   Hematological: Negative.    Psychiatric/Behavioral: Negative.       Objective:     Vital Signs (Most Recent):  Temp: 97.7 °F (36.5 °C) (03/16/24 0027)  Pulse: 64 (03/16/24 0330)  Resp: 19 (03/16/24 0330)  BP: (!) 140/83 (03/16/24 0330)  SpO2: 96 % (03/16/24 0330) Vital Signs (24h Range):  Temp:  [97.7 °F (36.5 °C)] 97.7 °F (36.5 °C)  Pulse:  [62-71] 64  Resp:  [11-19] 19  SpO2:  [96 %-100 %] 96 %  BP: (138-200)/(76-96) 140/83     Weight: 101.5 kg (223 lb 12.3 oz)  Body mass index is 32.11 kg/m².     Physical Exam  Constitutional:       General: He is not in acute distress.     Appearance: Normal appearance. He is not ill-appearing, toxic-appearing or diaphoretic.   HENT:      Head: Normocephalic.      Nose: Nose normal.      Mouth/Throat:      Mouth: Mucous membranes are moist.      Pharynx: Oropharynx is clear.   Eyes:      Extraocular Movements: Extraocular movements intact.      Pupils: Pupils are equal, round, and reactive to light.   Cardiovascular:      Rate and Rhythm: Normal rate and regular rhythm.      Pulses: Normal pulses.      Heart sounds: Normal heart sounds.   Pulmonary:      Effort: Pulmonary effort is normal. No respiratory distress.      Breath sounds: Normal breath sounds. No wheezing or rales.   Chest:      Chest wall: No tenderness.   Abdominal:      General: Bowel sounds are normal. There is no distension.      Palpations: Abdomen is soft.      Tenderness: There is no abdominal tenderness. There is no guarding.   Genitourinary:     Comments: deferred  Musculoskeletal:         General: Normal range of motion.      Cervical back: Normal range of motion and neck supple.      Right lower leg: No edema.      Left lower leg: No edema.   Skin:     General: Skin is warm and dry.      Capillary Refill: Capillary refill takes less than 2 seconds.    Neurological:      General: No focal deficit present.      Mental Status: He is alert and oriented to person, place, and time.   Psychiatric:         Mood and Affect: Mood normal.         Behavior: Behavior normal.         Thought Content: Thought content normal.              CRANIAL NERVES     CN III, IV, VI   Pupils are equal, round, and reactive to light.       Significant Labs: All pertinent labs within the past 24 hours have been reviewed.  Recent Lab Results         03/16/24  0112        Albumin 3.7              ALT 17       Anion Gap 12       AST 15       Baso # 0.01       Basophil % 0.2       BILIRUBIN TOTAL 0.3  Comment: For infants and newborns, interpretation of results should be based  on gestational age, weight and in agreement with clinical  observations.    Premature Infant recommended reference ranges:  Up to 24 hours.............<8.0 mg/dL  Up to 48 hours............<12.0 mg/dL  3-5 days..................<15.0 mg/dL  6-29 days.................<15.0 mg/dL           Comment: Values of less than 100 pg/ml are consistent with non-CHF populations.       BUN 21       Calcium 9.9       Chloride 106       CO2 24       Creatinine 1.3       Differential Method Automated       eGFR 58       Eos # 0.1       Eos % 0.8       Glucose 115       Gran # (ANC) 3.8       Gran % 63.9       Hematocrit 33.6       Hemoglobin 11.8       Immature Grans (Abs) 0.02  Comment: Mild elevation in immature granulocytes is non specific and   can be seen in a variety of conditions including stress response,   acute inflammation, trauma and pregnancy. Correlation with other   laboratory and clinical findings is essential.         Immature Granulocytes 0.3       Lymph # 1.3       Lymph % 21.3       MCH 32.9       MCHC 35.1       MCV 94       Mono # 0.8       Mono % 13.5       MPV 9.9       nRBC 0       Platelet Count 154       Potassium 4.3       PROTEIN TOTAL 6.6       RBC 3.59       RDW 12.3       Sodium 142        Troponin I 0.102  Comment: The reference interval for Troponin I represents the 99th percentile   cutoff   for our facility and is consistent with 3rd generation assay   performance.         WBC 6.00               Significant Imaging: I have reviewed all pertinent imaging results/findings within the past 24 hours.    CXR reviewed, lungs clear  EKG reviewed, SR with occ PVC, no ST elevation  Assessment/Plan:     * NSTEMI (non-ST elevated myocardial infarction)  Chest pain persisted despite 4 SL NTG at home prior to arrival, has been intermittent over past two days  Initial troponin 0.102, will trend   mg given, 81 mg daily ordered  NTG ointment   Heparin bolus/drip ordered  Continue beta blocker and statin  Followed by Dr. Bennett, will consult cardiology  Cardiac monitoring  Keep NPO x medications for now until evaluated by cardiology  PRN morphine          Type 2 diabetes mellitus without complication, without long-term current use of insulin  Patient's FSGs are controlled on current medication regimen.  Last A1c reviewed-   Lab Results   Component Value Date    HGBA1C 5.3 02/15/2024     Most recent fingerstick glucose reviewed-   Recent Labs   Lab 03/16/24  0454   POCTGLUCOSE 104     Current correctional scale  Low  Maintain anti-hyperglycemic dose as follows-   Antihyperglycemics (From admission, onward)      Start     Stop Route Frequency Ordered    03/16/24 0535  insulin aspart U-100 pen 0-5 Units         -- SubQ Every 6 hours PRN 03/16/24 0436          Hold Oral hypoglycemics (metformin) while patient is in the hospital.    Hyperlipidemia associated with type 2 diabetes mellitus  High intensity statin      Hypertension associated with diabetes  BP elevated on arrival, trended down after IV labetalol   Resume nifedipine, lisinopril, and beta blocker  Will order IV hydralazine PRN      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  Patient with known CAD s/p stent placement  and CABG, which is uncontrolled Will continue ASA and Statin Heparin drip and monitor for S/Sx of angina/ACS. Continue to monitor on telemetry.     Most recent [facility] done May 1, 2023    The Prox LAD to Mid LAD lesion was 100% stenosed.    The Prox RCA lesion was 100% stenosed.    The 3rd RPL lesion was 80% stenosed.    The Ost LAD lesion was 50% stenosed.    The Origin to Prox Graft lesion  before 1st Diag was 80% stenosed.    The Dist Graft lesion  before 1st Diag was 95% stenosed.    The Mid Graft to Dist Graft lesion  between 1st Diag and 2nd Diag was 95% stenosed.    The ejection fraction was calculated to be 55%.    The pre-procedure left ventricular end diastolic pressure was 17.    The post-procedure left ventricular end diastolic pressure was 20.    The estimated blood loss was none.    There was three vessel coronary artery disease.    There was no mitral valve stenosis.    There was no aortic valve stenosis.    There was no mitral valve prolapse evident. The annulus was normal. There was normal mitral valve motion.    Continue ASA, beta blocker, statin, Ranexa         VTE Risk Mitigation (From admission, onward)           Ordered     heparin 25,000 units in dextrose 5% (100 units/ml) IV bolus from bag LOW INTENSITY nomogram - [facility]  As needed (PRN)        Question:  Heparin Infusion Adjustment (DO NOT MODIFY ANSWER)  Answer:  \\[facility]\Images\Pharmacy\HeparinInfusions\heparin LOW INTENSITY nomogram for [facility] [id number].[email or web address]    03/16/24 0410     heparin 25,000 units in dextrose 5% (100 units/ml) IV bolus from bag LOW INTENSITY nomogram - [facility]  As needed (PRN)        Question:  Heparin Infusion Adjustment (DO NOT MODIFY ANSWER)  Answer:  \[facility]\Images\Pharmacy\HeparinInfusions\heparin LOW INTENSITY nomogram for [facility] [id number].pdf    03/16/24 0410     heparin 25,000 units in dextrose 5% 250 mL (100 units/mL) infusion LOW INTENSITY nomogram - [facility]  Continuous        Question:  Begin at (units/kg/hr)   Answer:  12    03/16/24 0410     IP VTE HIGH RISK PATIENT  Once         03/16/24 0407     Place sequential compression device  Until discontinued         03/16/24 0407                   Full Resuscitation   Surrogate decision maker is wife   GI prophylaxis: PPI    Case discussed with Dr. Miriam Jean, NP  Department of Hospital Medicine  '[facility] - Emergency Dept.

## 2024-03-16 NOTE — DISCHARGE SUMMARY
[facility] Medicine  Discharge Summary      Patient Name: Deniz Paulino  MRN: [id number]  [facility]: [id number]  Patient Class: IP- Inpatient  Admission Date: 3/16/2024  Hospital Length of Stay: 0 days  Discharge Date and Time:  03/16/2024 2:38 PM  Attending Physician: Ollie Quintana MD   Discharging Provider: Ollie Quintana MD  Primary Care Provider: Hiro Kraft MD    Primary Care Team: Networked reference to record PCT     HPI:   Patient is a 73-year-old male with past medical history significant for CAD, MI, CABG, hypertension, hyperlipidemia, and diabetes who presented to the ED for evaluation of chest pain.  He reports intermittent pain across his upper chest on both sides, which started two days ago and gradually worsened. No radiation into jaw, neck, or arms. He described it as moderate, sharp pain/pressure/tightness, unrelieved with SL NTG (he took 4 SL NTG piror to arrival in ED). He is followed by Dr. Bennett and has a treadmill stress test planned for 3/26. He denies dizziness, weakness, headache, palpitations, shortness of breath, cough, wheezing, nausea, vomiting, abdominal pain, constipation, diarrhea, and dysuria. He was given  mg in ED.  Initial BP was 200/96 and he was given IV labetalol and NTG ointment placed to chest. VS stable otherwise, on room air. EKG SR with occasional PVC's. CXR done, lungs clear. Troponin I level 0.102. . Labs otherwise unremarkable. Hospital Medicine was consulted for admission due to NSTEMI and accelerated hypertension.    * No surgery found *      Hospital Course:   Chest pain resolved later during hospitalization. Stress test was negative. Cardiology cleared for discharge home on increased procardia XL (90mg) and increased ranexa. Patient instructed to follow up with Dr. Bennett's office in 2 weeks. Communicated plan to patient and family at bedside. Patient acknowledged understanding and agreed to the plan.     BP (!) 148/80 (BP  "Location: Right arm)   Pulse (!) 56   Temp 97.4 °F (36.3 °C) (Oral)   Resp 20   Ht 5' 10" (1.778 m)   Wt 101.5 kg (223 lb 12.3 oz)   SpO2 96%   BMI 32.11 kg/m²   PHYSICAL EXAM  Vitals Reviewed  GEN: No acute distress, pleasant, body habitus normal  HEENT: atraumatic and normocephalic  CARDS: regular rate and rhythm, no m/g, pulses palpable in LE  PULM: breathing comfortably on room air, chest symmetric, nonlabored, no abnormal breath sounds on auscultation  ABD: nontender, nondistended, soft, no organomegaly, BS+  Neuro: Alert and oriented x3, CN's I-IX grossly intact, sensation and motor intact; follows directions and answers questions appropriately         Goals of Care Treatment Preferences:  Code Status: Full Code      Consults:   Consults (From admission, onward)          Status Ordering Provider     Inpatient consult to Registered Dietitian/Nutritionist  Once        Provider:  (Not yet assigned)    Completed CARLOS IRAHETA     Inpatient consult to Social Work/Case Management  Once        Provider:  (Not yet assigned)    Completed CARLOS IRAHETA     Inpatient consult to Cardiology  Once        Provider:  (Not yet assigned)    Completed CARLOS IRAHETA     Inpatient consult to Hospitalist  Once        Provider:  Miriam Krause MD    Acknowledged DU GALE            No new Assessment & Plan notes have been filed under this hospital service since the last note was generated.  Service: Hospital Medicine    Final Active Diagnoses:    Diagnosis Date Noted POA    PRINCIPAL PROBLEM:  Troponin level elevated [R79.89] 03/16/2024 Yes    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 [I25.110] 04/24/2014 Yes    Hyperlipidemia associated with type 2 diabetes mellitus [E11.69, E78.5] 04/24/2014 Yes    Hypertension associated with diabetes [E11.59, I15.2] 04/24/2014 Yes    Type 2 diabetes mellitus without complication, without long-term current use of insulin [E11.9] 04/24/2014 " Yes      Problems Resolved During this Admission:    Diagnosis Date Noted Date Resolved POA    Hypertensive urgency [I16.0] 03/16/2024 03/16/2024 Yes       Discharged Condition: good    Disposition: Home or Self Care    Follow Up:   Follow-up Information       Kimberly Bennett MD Follow up in 2 week(s).    Specialties: Interventional Cardiology, Cardiology  Contact information:  [id number]  [place] [address]  [phone]               Hiro Kraft MD. Schedule an appointment as soon as possible for a visit.    Specialty: Internal Medicine  Contact information:  [address]  [place] [address]  [phone]                           Patient Instructions:      REASON FOR NOT PRESCRIBING ANTIPLATELET MEDICATION AT DISCHARGE     Order Specific Question Answer Comments   Reason for not Prescribing: Other (Comment) already has med       Significant Diagnostic Studies: Labs: All labs within the past 24 hours have been reviewed    Pending Diagnostic Studies:       Procedure Component Value Units Date/Time    Echo [[id number]]     Order Status: Sent Lab Status: No result     Lipid panel [[id number]] Collected: 03/16/24 0555    Order Status: Sent Lab Status: In process Updated: 03/16/24 1429    Specimen: Blood            Medications:  Reconciled Home Medications:      Medication List        START taking these medications      NIFEdipine 90 MG (OSM) 24 hr tablet  Commonly known as: PROCARDIA-XL  Take 1 tablet (90 mg total) by mouth once daily.  Start taking on: March 17, 2024     ranolazine 1,000 mg Tb12  Commonly known as: RANEXA  Take 1 tablet (1,000 mg total) by mouth 2 (two) times daily.            CONTINUE taking these medications      ALAVERT ORAL  Take 1 tablet by mouth as needed.     aspirin 325 MG EC tablet  Commonly known as: ECOTRIN  Take 325 mg by mouth once daily.     atorvastatin 40 MG tablet  Commonly known as: LIPITOR  Take 1 tablet by mouth once daily     coenzyme Q10 200 mg  capsule  Take 200 mg by mouth 2 (two) times daily.     cyanocobalamin 1000 MCG tablet  Commonly known as: VITAMIN B-12  Take 1,000 mcg by mouth once daily.     hydroCHLOROthiazide 12.5 MG Tab  Commonly known as: HYDRODIURIL  Take 1 tablet (12.5 mg total) by mouth once daily.     lisinopriL 40 MG tablet  Commonly known as: PRINIVIL,ZESTRIL  Take 1 tablet by mouth once daily     magnesium oxide 400 mg (241.3 mg magnesium) tablet  Commonly known as: MAG-OX  Take 400 mg by mouth once daily.     metFORMIN 1000 MG tablet  Commonly known as: GLUCOPHAGE  Take 1 tablet (1,000 mg total) by mouth 2 (two) times daily with meals.     methocarbamoL 500 MG Tab  Commonly known as: ROBAXIN  Take 1 tablet (500 mg total) by mouth 4 (four) times daily as needed (muscle spasm).     metoprolol succinate 50 MG 24 hr tablet  Commonly known as: TOPROL-XL  Take 1 tablet by mouth once daily     * nitroGLYCERIN 0.4 MG SL tablet  Commonly known as: NITROSTAT  Place 1 tablet (0.4 mg total) under the tongue every 5 (five) minutes as needed for Chest pain.     * nitroGLYCERIN 0.2 mg/hr TD PT24 0.2 mg/hr  Commonly known as: NITRODUR  Place 1 patch onto the skin once daily.     pantoprazole 20 MG tablet  Commonly known as: PROTONIX  Take 1 tablet (20 mg total) by mouth once daily.     vitamin D 1000 units Tab  Commonly known as: VITAMIN D3  Take 1,000 Units by mouth once daily.           * This list has 2 medication(s) that are the same as other medications prescribed for you. Read the directions carefully, and ask your doctor or other care provider to review them with you.                  Indwelling Lines/Drains at time of discharge:   Lines/Drains/Airways       None                   Time spent on the discharge of patient: 25 minutes  of time spent on discharge including examining patient, providing discharge instructions, arranging follow-up and documentation.           Ollie Quintana MD  Department of Hospital Medicine  [facility] - Med Surg

## 2024-03-16 NOTE — HPI
Patient is a 73-year-old male with past medical history significant for CAD, MI, CABG, hypertension, hyperlipidemia, and diabetes who presented to the ED for evaluation of chest pain.  He reports intermittent pain across his upper chest on both sides, which started two days ago and gradually worsened. No radiation into jaw, neck, or arms. He described it as moderate, sharp pain/pressure/tightness, unrelieved with SL NTG (he took 4 SL NTG piror to arrival in ED). He is followed by Dr. Bennett and has a treadmill stress test planned for 3/26. He denies dizziness, weakness, headache, palpitations, shortness of breath, cough, wheezing, nausea, vomiting, abdominal pain, constipation, diarrhea, and dysuria. He was given  mg in ED.  Initial BP was 200/96 and he was given IV labetalol and NTG ointment placed to chest. VS stable otherwise, on room air. EKG SR with occasional PVC's. CXR done, lungs clear. Troponin I level 0.102. . Labs otherwise unremarkable. Hospital Medicine was consulted for admission due to NSTEMI and accelerated hypertension.

## 2024-03-16 NOTE — SUBJECTIVE & OBJECTIVE
Past Medical History:   Diagnosis Date    Acute coronary syndrome     Coronary artery disease     Diabetes mellitus 4/24/2014    Effort angina 4/30/2023    Hyperlipidemia     Hypertension     Old MI (myocardial infarction) 4/24/2014    S/P CABG (coronary artery bypass graft) 4/24/2014       Past Surgical History:   Procedure Laterality Date    CARDIAC CATHETERIZATION      COLONOSCOPY N/A 07/26/2019    Procedure: COLONOSCOPY;  Surgeon: Opal Oshea MD;  Location: [facility] ENDO;  Service: Endoscopy;  Laterality: N/A;    CORONARY ARTERY BYPASS GRAFT      CORONARY BYPASS GRAFT ANGIOGRAPHY  5/1/2023    Procedure: Bypass graft study;  Surgeon: Kimberly Bennett MD;  Location: [facility] CATH LAB;  Service: Cardiology;;    ESOPHAGOGASTRODUODENOSCOPY N/A 07/26/2019    Procedure: ESOPHAGOGASTRODUODENOSCOPY (EGD);  Surgeon: Opal Oshea MD;  Location: [facility] ENDO;  Service: Endoscopy;  Laterality: N/A;    LEFT HEART CATHETERIZATION Left 5/1/2023    Procedure: Left heart cath;  Surgeon: Kimberly Bennett MD;  Location: [facility] CATH LAB;  Service: Cardiology;  Laterality: Left;    SKIN CANCER EXCISION  09/2018    VASECTOMY         Review of patient's allergies indicates:  No Known Allergies    No current facility-administered medications on file prior to encounter.     Current Outpatient Medications on File Prior to Encounter   Medication Sig    aspirin (ECOTRIN) 325 MG EC tablet Take 325 mg by mouth once daily.    atorvastatin (LIPITOR) 40 MG tablet Take 1 tablet by mouth once daily    coenzyme Q10 200 mg capsule Take 200 mg by mouth 2 (two) times daily.    cyanocobalamin (VITAMIN B-12) 1000 MCG tablet Take 1,000 mcg by mouth once daily.     hydroCHLOROthiazide (HYDRODIURIL) 12.5 MG Tab Take 1 tablet (12.5 mg total) by mouth once daily.    lisinopriL (PRINIVIL,ZESTRIL) 40 MG tablet Take 1 tablet by mouth once daily    LORATADINE (ALAVERT ORAL) Take 1 tablet by mouth as needed.    magnesium oxide (MAG-OX) 400 mg (241.3 mg magnesium)  tablet Take 400 mg by mouth once daily.    metFORMIN (GLUCOPHAGE) 1000 MG tablet Take 1 tablet (1,000 mg total) by mouth 2 (two) times daily with meals.    methocarbamoL (ROBAXIN) 500 MG Tab Take 1 tablet (500 mg total) by mouth 4 (four) times daily as needed (muscle spasm).    metoprolol succinate (TOPROL-XL) 50 MG 24 hr tablet Take 1 tablet by mouth once daily    NIFEdipine (PROCARDIA-XL) 60 MG (OSM) 24 hr tablet Take 1 tablet (60 mg total) by mouth once daily.    nitroGLYCERIN (NITROSTAT) 0.4 MG SL tablet Place 1 tablet (0.4 mg total) under the tongue every 5 (five) minutes as needed for Chest pain.    nitroGLYCERIN 0.2 mg/hr TD PT24 (NITRODUR) 0.2 mg/hr Place 1 patch onto the skin once daily.    pantoprazole (PROTONIX) 20 MG tablet Take 1 tablet (20 mg total) by mouth once daily.    ranolazine (RANEXA) 500 MG Tb12 Take 1 tablet (500 mg total) by mouth 2 (two) times daily.    vitamin D 1000 units Tab Take 1,000 Units by mouth once daily.      Family History       Problem Relation (Age of Onset)    Alcohol abuse Father    Cancer Mother, Father    Diabetes Mother, Brother    Heart attack Father (63)    Heart murmur Mother    Stroke Mother          Tobacco Use    Smoking status: Former     Current packs/day: 0.00     Average packs/day: 1 pack/day for 30.0 years (30.0 ttl pk-yrs)     Types: Cigarettes     Start date: 1973     Quit date: 2003     Years since quittin.1    Smokeless tobacco: Never   Substance and Sexual Activity    Alcohol use: Yes     Alcohol/week: 16.0 standard drinks of alcohol     Types: 10 Glasses of wine, 6 Cans of beer per week     Comment: socially    Drug use: Never    Sexual activity: Not Currently     Partners: Female     Review of Systems   Constitutional: Negative.  Negative for diaphoresis and fatigue.   HENT: Negative.     Eyes: Negative.    Respiratory:  Positive for chest tightness. Negative for cough, shortness of breath and wheezing.    Cardiovascular:  Positive for chest  pain. Negative for palpitations and leg swelling.   Gastrointestinal: Negative.  Negative for abdominal distention, abdominal pain, blood in stool, constipation, diarrhea, nausea and vomiting.   Endocrine: Negative.    Genitourinary: Negative.    Musculoskeletal: Negative.    Skin: Negative.    Allergic/Immunologic: Negative.    Neurological: Negative.  Negative for dizziness, syncope, weakness, light-headedness and headaches.   Hematological: Negative.    Psychiatric/Behavioral: Negative.       Objective:     Vital Signs (Most Recent):  Temp: 97.7 °F (36.5 °C) (03/16/24 0027)  Pulse: 64 (03/16/24 0330)  Resp: 19 (03/16/24 0330)  BP: (!) 140/83 (03/16/24 0330)  SpO2: 96 % (03/16/24 0330) Vital Signs (24h Range):  Temp:  [97.7 °F (36.5 °C)] 97.7 °F (36.5 °C)  Pulse:  [62-71] 64  Resp:  [11-19] 19  SpO2:  [96 %-100 %] 96 %  BP: (138-200)/(76-96) 140/83     Weight: 101.5 kg (223 lb 12.3 oz)  Body mass index is 32.11 kg/m².     Physical Exam  Constitutional:       General: He is not in acute distress.     Appearance: Normal appearance. He is not ill-appearing, toxic-appearing or diaphoretic.   HENT:      Head: Normocephalic.      Nose: Nose normal.      Mouth/Throat:      Mouth: Mucous membranes are moist.      Pharynx: Oropharynx is clear.   Eyes:      Extraocular Movements: Extraocular movements intact.      Pupils: Pupils are equal, round, and reactive to light.   Cardiovascular:      Rate and Rhythm: Normal rate and regular rhythm.      Pulses: Normal pulses.      Heart sounds: Normal heart sounds.   Pulmonary:      Effort: Pulmonary effort is normal. No respiratory distress.      Breath sounds: Normal breath sounds. No wheezing or rales.   Chest:      Chest wall: No tenderness.   Abdominal:      General: Bowel sounds are normal. There is no distension.      Palpations: Abdomen is soft.      Tenderness: There is no abdominal tenderness. There is no guarding.   Genitourinary:     Comments: deferred  Musculoskeletal:          General: Normal range of motion.      Cervical back: Normal range of motion and neck supple.      Right lower leg: No edema.      Left lower leg: No edema.   Skin:     General: Skin is warm and dry.      Capillary Refill: Capillary refill takes less than 2 seconds.   Neurological:      General: No focal deficit present.      Mental Status: He is alert and oriented to person, place, and time.   Psychiatric:         Mood and Affect: Mood normal.         Behavior: Behavior normal.         Thought Content: Thought content normal.              CRANIAL NERVES     CN III, IV, VI   Pupils are equal, round, and reactive to light.       Significant Labs: All pertinent labs within the past 24 hours have been reviewed.  Recent Lab Results         03/16/24  0112        Albumin 3.7              ALT 17       Anion Gap 12       AST 15       Baso # 0.01       Basophil % 0.2       BILIRUBIN TOTAL 0.3  Comment: For infants and newborns, interpretation of results should be based  on gestational age, weight and in agreement with clinical  observations.    Premature Infant recommended reference ranges:  Up to 24 hours.............<8.0 mg/dL  Up to 48 hours............<12.0 mg/dL  3-5 days..................<15.0 mg/dL  6-29 days.................<15.0 mg/dL           Comment: Values of less than 100 pg/ml are consistent with non-CHF populations.       BUN 21       Calcium 9.9       Chloride 106       CO2 24       Creatinine 1.3       Differential Method Automated       eGFR 58       Eos # 0.1       Eos % 0.8       Glucose 115       Gran # (ANC) 3.8       Gran % 63.9       Hematocrit 33.6       Hemoglobin 11.8       Immature Grans (Abs) 0.02  Comment: Mild elevation in immature granulocytes is non specific and   can be seen in a variety of conditions including stress response,   acute inflammation, trauma and pregnancy. Correlation with other   laboratory and clinical findings is essential.         Immature Granulocytes  0.3       Lymph # 1.3       Lymph % 21.3       MCH 32.9       MCHC 35.1       MCV 94       Mono # 0.8       Mono % 13.5       MPV 9.9       nRBC 0       Platelet Count 154       Potassium 4.3       PROTEIN TOTAL 6.6       RBC 3.59       RDW 12.3       Sodium 142       Troponin I 0.102  Comment: The reference interval for Troponin I represents the 99th percentile   cutoff   for our facility and is consistent with 3rd generation assay   performance.         WBC 6.00               Significant Imaging: I have reviewed all pertinent imaging results/findings within the past 24 hours.    CXR reviewed, lungs clear  EKG reviewed, SR with occ PVC, no ST elevation

## 2024-03-16 NOTE — ED PROVIDER NOTES
SCRIBE #1 NOTE: I, Carol Carrillo, am scribing for, and in the presence of, Ansley Ricketts MD. I have scribed the entire note.       History     Chief Complaint   Patient presents with    Chest Pain     X 2 days, pain worse tonight, took 4 ntg in the last hour      Review of patient's allergies indicates:  No Known Allergies      History of Present Illness     HPI    3/16/2024, 12:43 AM  History obtained from the patient      History of Present Illness: Deniz Paulino is a 73 y.o. male patient with a PMHx of acute coronary syndrome, CAD, HLD, HTN, MI, S/P CABG, DM, and effort angina who presents to the Emergency Department for evaluation of chest pain which onset gradually 2 days ago. Pt has been in pain for the past 2 days, but it got worse tonight. The pain does not radiate anywhere else. Pt has a treadmill stress-test with Dr. Bennett on the 26th. He states he has been trying to exercise more lately, and that his pain is worse when he is at rest. Pt states he is feeling ok now. Symptoms are constant and moderate in severity. Associated sxs include chest tightness. Patient denies any N/V, SOB, dizziness, diaphoresis, palpitations, and all other sxs at this time. Prior Tx includes 4 nitro pills PTA, 1 every 1.5 hours. No further complaints or concerns at this time.       Arrival mode: Personal vehicle     PCP: Hiro Kraft MD        Past Medical History:  Past Medical History:   Diagnosis Date    Acute coronary syndrome     Coronary artery disease     Diabetes mellitus 4/24/2014    Effort angina 4/30/2023    Hyperlipidemia     Hypertension     Old MI (myocardial infarction) 4/24/2014    S/P CABG (coronary artery bypass graft) 4/24/2014       Past Surgical History:  Past Surgical History:   Procedure Laterality Date    CARDIAC CATHETERIZATION      COLONOSCOPY N/A 07/26/2019    Procedure: COLONOSCOPY;  Surgeon: Opal Oshea MD;  Location: [facility];  Service: Endoscopy;  Laterality: N/A;    CORONARY  ARTERY BYPASS GRAFT      CORONARY BYPASS GRAFT ANGIOGRAPHY  2023    Procedure: Bypass graft study;  Surgeon: Kimberly Bennett MD;  Location: [facility] CATH LAB;  Service: Cardiology;;    ESOPHAGOGASTRODUODENOSCOPY N/A 2019    Procedure: ESOPHAGOGASTRODUODENOSCOPY (EGD);  Surgeon: Opal Oshea MD;  Location: [facility] ENDO;  Service: Endoscopy;  Laterality: N/A;    LEFT HEART CATHETERIZATION Left 2023    Procedure: Left heart cath;  Surgeon: Kimberly Bennett MD;  Location: [facility] CATH LAB;  Service: Cardiology;  Laterality: Left;    SKIN CANCER EXCISION  2018    VASECTOMY           Family History:  Family History   Problem Relation Age of Onset    Diabetes Mother     Heart murmur Mother     Cancer Mother         Breast    Stroke Mother     Alcohol abuse Father     Heart attack Father 63    Cancer Father         Esophageal    Diabetes Brother        Social History:  Social History     Tobacco Use    Smoking status: Former     Current packs/day: 0.00     Average packs/day: 1 pack/day for 30.0 years (30.0 ttl pk-yrs)     Types: Cigarettes     Start date: 1973     Quit date: 2003     Years since quittin.1    Smokeless tobacco: Never   Substance and Sexual Activity    Alcohol use: Yes     Alcohol/week: 16.0 standard drinks of alcohol     Types: 10 Glasses of wine, 6 Cans of beer per week     Comment: socially    Drug use: Never    Sexual activity: Not Currently     Partners: Female        Review of Systems     Review of Systems   Constitutional:  Negative for diaphoresis and fever.   HENT:  Negative for sore throat.    Respiratory:  Positive for chest tightness. Negative for shortness of breath.    Cardiovascular:  Positive for chest pain. Negative for palpitations.   Gastrointestinal:  Negative for nausea and vomiting.   Genitourinary:  Negative for dysuria.   Musculoskeletal:  Negative for back pain.   Skin:  Negative for rash.   Neurological:  Negative for dizziness and weakness.   Hematological:   Does not bruise/bleed easily.        Physical Exam     Initial Vitals [03/16/24 0027]   BP Pulse Resp Temp SpO2   (!) 200/96 70 18 97.7 °F (36.5 °C) 99 %      MAP       --          Physical Exam  Nursing Notes and Vital Signs Reviewed.  Constitutional: Patient is in no acute distress. Well-developed and well-nourished.  Head: Atraumatic. Normocephalic.  Eyes: PERRL. EOM intact. Conjunctivae are not pale. No scleral icterus.  ENT: Mucous membranes are moist. Oropharynx is clear and symmetric.    Neck: Supple. Full ROM. No lymphadenopathy.  Cardiovascular: Regular rate. Regular rhythm. No murmurs, rubs, or gallops. Distal pulses are 2+ and symmetric.  Pulmonary/Chest: No respiratory distress. Clear to auscultation bilaterally. No wheezing or rales.  Abdominal: Soft and non-distended.  There is no tenderness.  No rebound, guarding, or rigidity. Good bowel sounds.  Genitourinary: No CVA tenderness  Musculoskeletal: Moves all extremities. No obvious deformities. No edema. No calf tenderness.  Skin: Warm and dry.  Neurological:  Alert, awake, and appropriate.  Normal speech.  No acute focal neurological deficits are appreciated.  Psychiatric: Normal affect. Good eye contact. Appropriate in content.     ED Course   Critical Care    Date/Time: 3/16/2024 5:19 AM    Performed by: Ansley Ricketts MD  Authorized by: Ansley Ricketts MD  Direct patient critical care time: 13 minutes  Additional history critical care time: 15 minutes  Ordering / reviewing critical care time: 7 minutes  Documentation critical care time: 7 minutes  Consulting other physicians critical care time: 10 minutes  Total critical care time (exclusive of procedural time) : 52 minutes  Critical care time was exclusive of separately billable procedures and treating other patients and teaching time.  Critical care was necessary to treat or prevent imminent or life-threatening deterioration of the following conditions: NSTEMI.  Critical care was time spent  "personally by me on the following activities: blood draw for specimens, development of treatment plan with patient or surrogate, discussions with consultants, interpretation of cardiac output measurements, evaluation of patient's response to treatment, examination of patient, obtaining history from patient or surrogate, ordering and performing treatments and interventions, ordering and review of laboratory studies, ordering and review of radiographic studies, pulse oximetry, re-evaluation of patient's condition and review of old charts.        ED Vital Signs:  Vitals:    03/16/24 0027 03/16/24 0100 03/16/24 0300 03/16/24 0317   BP: (!) 200/96 (!) 170/94 138/76 139/79   Pulse: 70 71 63 62   Resp: 18 13 13 11   Temp: 97.7 °F (36.5 °C)      TempSrc: Oral      SpO2: 99% 100% 97% 98%   Weight: 101.5 kg (223 lb 12.3 oz)      Height: 5' 10" (1.778 m)       03/16/24 0330 03/16/24 0530 03/16/24 0545 03/16/24 0617   BP: (!) 140/83 129/78 132/83 132/86   Pulse: 64 (!) 58 (!) 58 (!) 58   Resp: 19 (!) 21 18 18   Temp:       TempSrc:       SpO2: 96% 96% 96% 97%   Weight:       Height:        03/16/24 0645 03/16/24 0658 03/16/24 0731 03/16/24 0732   BP: 139/86 135/79  136/83   Pulse: 60 (!) 58  (!) 59   Resp: (!) 25 18 18    Temp:  97.9 °F (36.6 °C)     TempSrc:       SpO2: 96%   96%   Weight:       Height:        03/16/24 0809 03/16/24 1008 03/16/24 1123   BP: (!) 149/86 138/85 (!) 148/80   Pulse: 62 62 (!) 56   Resp: 20  20   Temp: 97.4 °F (36.3 °C)  97.4 °F (36.3 °C)   TempSrc: Oral  Oral   SpO2: 96%  96%   Weight:   101.5 kg (223 lb 12.3 oz)   Height:   5' 10" (1.778 m)       Abnormal Lab Results:  Labs Reviewed   CBC W/ AUTO DIFFERENTIAL - Abnormal; Notable for the following components:       Result Value    RBC 3.59 (*)     Hemoglobin 11.8 (*)     Hematocrit 33.6 (*)     MCH 32.9 (*)     All other components within normal limits   COMPREHENSIVE METABOLIC PANEL - Abnormal; Notable for the following components:    Glucose 115 " (*)     eGFR 58 (*)     All other components within normal limits   TROPONIN I - Abnormal; Notable for the following components:    Troponin I 0.102 (*)     All other components within normal limits   B-TYPE NATRIURETIC PEPTIDE - Abnormal; Notable for the following components:     (*)     All other components within normal limits   TROPONIN I - Abnormal; Notable for the following components:    Troponin I 0.126 (*)     All other components within normal limits   TROPONIN I - Abnormal; Notable for the following components:    Troponin I 0.139 (*)     All other components within normal limits   MAGNESIUM - Abnormal; Notable for the following components:    Magnesium 1.5 (*)     All other components within normal limits   APTT    Narrative:     Draw baseline aPTT prior to starting the heparin bolus or  infusion  (if patient is on warfarin prior to heparin therapy)   PROTIME-INR    Narrative:     Draw baseline aPTT prior to starting the heparin bolus or  infusion  (if patient is on warfarin prior to heparin therapy)   POCT GLUCOSE        All Lab Results:  Results for orders placed or performed during the hospital encounter of 03/16/24   CBC auto differential   Result Value Ref Range    WBC 6.00 3.90 - 12.70 K/uL    RBC 3.59 (L) 4.60 - 6.20 M/uL    Hemoglobin 11.8 (L) 14.0 - 18.0 g/dL    Hematocrit 33.6 (L) 40.0 - 54.0 %    MCV 94 82 - 98 fL    MCH 32.9 (H) 27.0 - 31.0 pg    MCHC 35.1 32.0 - 36.0 g/dL    RDW 12.3 11.5 - 14.5 %    Platelets 154 150 - 450 K/uL    MPV 9.9 9.2 - 12.9 fL    Immature Granulocytes 0.3 0.0 - 0.5 %    Gran # (ANC) 3.8 1.8 - 7.7 K/uL    Immature Grans (Abs) 0.02 0.00 - 0.04 K/uL    Lymph # 1.3 1.0 - 4.8 K/uL    Mono # 0.8 0.3 - 1.0 K/uL    Eos # 0.1 0.0 - 0.5 K/uL    Baso # 0.01 0.00 - 0.20 K/uL    nRBC 0 0 /100 WBC    Gran % 63.9 38.0 - 73.0 %    Lymph % 21.3 18.0 - 48.0 %    Mono % 13.5 4.0 - 15.0 %    Eosinophil % 0.8 0.0 - 8.0 %    Basophil % 0.2 0.0 - 1.9 %    Differential Method  Automated    Comprehensive metabolic panel   Result Value Ref Range    Sodium 142 136 - 145 mmol/L    Potassium 4.3 3.5 - 5.1 mmol/L    Chloride 106 95 - 110 mmol/L    CO2 24 23 - 29 mmol/L    Glucose 115 (H) 70 - 110 mg/dL    BUN 21 8 - 23 mg/dL    Creatinine 1.3 0.5 - 1.4 mg/dL    Calcium 9.9 8.7 - 10.5 mg/dL    Total Protein 6.6 6.0 - 8.4 g/dL    Albumin 3.7 3.5 - 5.2 g/dL    Total Bilirubin 0.3 0.1 - 1.0 mg/dL    Alkaline Phosphatase 102 55 - 135 U/L    AST 15 10 - 40 U/L    ALT 17 10 - 44 U/L    eGFR 58 (A) >60 mL/min/1.73 m^2    Anion Gap 12 8 - 16 mmol/L   Troponin I #1   Result Value Ref Range    Troponin I 0.102 (H) 0.000 - 0.026 ng/mL   BNP   Result Value Ref Range     (H) 0 - 99 pg/mL   Troponin I #2   Result Value Ref Range    Troponin I 0.126 (H) 0.000 - 0.026 ng/mL   Lipid panel   Result Value Ref Range    Cholesterol 118 (L) 120 - 199 mg/dL    Triglycerides 108 30 - 150 mg/dL    HDL 48 40 - 75 mg/dL    LDL Cholesterol 48.4 (L) 63.0 - 159.0 mg/dL    HDL/Cholesterol Ratio 40.7 20.0 - 50.0 %    Total Cholesterol/HDL Ratio 2.5 2.0 - 5.0    Non-HDL Cholesterol 70 mg/dL   Hemoglobin A1c   Result Value Ref Range    Hemoglobin A1C 5.4 4.0 - 5.6 %    Estimated Avg Glucose 108 68 - 131 mg/dL   Troponin I   Result Value Ref Range    Troponin I 0.139 (H) 0.000 - 0.026 ng/mL   Magnesium   Result Value Ref Range    Magnesium 1.5 (L) 1.6 - 2.6 mg/dL   APTT   Result Value Ref Range    aPTT 24.2 21.0 - 32.0 sec   Protime-INR   Result Value Ref Range    Prothrombin Time 10.9 9.0 - 12.5 sec    INR 0.9 0.8 - 1.2   Troponin I   Result Value Ref Range    Troponin I 0.201 (H) 0.000 - 0.026 ng/mL   APTT   Result Value Ref Range    aPTT 29.7 21.0 - 32.0 sec   Nuclear Stress - Cardiology Interpreted   Result Value Ref Range    85% Max Predicted      Max Predicted      [facility] CV CPX PATIENT IS MALE 1.0     [facility] CV CPX PATIENT IS FEMALE 0.0     HR at rest 60 bpm    Systolic blood pressure 138 mmHg    Diastolic  blood pressure 85 mmHg    RPP 8,280     Peak HR 75 bpm    Peak Systolic  mmHg    Peak Diatolic BP 85 mmHg    Peak RPP 10,350     % Max HR Achieved 51     EF + QEF 59 %    Ejection Fraction- High Stress 73 %    End diastolic index (mL/m2) 101 mL/m2    End diastolic index (mL/m2) 165 mL/m2    End systolic index (mL/m2) 28 mL/m2    End systolic index (mL/m2) 64 mL/m2    Nuc Rest EF 65     Nuc Stress EF 50 %   EKG 12-lead   Result Value Ref Range    QRS Duration 100 ms    [facility] QTC Calculation 453 ms   Echo   Result Value Ref Range    BSA 2.24 m2    LVOT stroke volume 69.24 cm3    LVIDd 5.33 3.5 - 6.0 cm    LV Systolic Volume 77.17 mL    LV Systolic Volume Index 35.2 mL/m2    LVIDs 4.17 (A) 2.1 - 4.0 cm    LV Diastolic Volume 136.97 mL    LV Diastolic Volume Index 62.54 mL/m2    IVS 0.82 0.6 - 1.1 cm    LVOT diameter 2.10 cm    LVOT area 3.5 cm2    FS 22 (A) 28 - 44 %    Left Ventricle Relative Wall Thickness 0.36 cm    Posterior Wall 0.95 0.6 - 1.1 cm    LV mass 172.40 g    LV Mass Index 79 g/m2    MV Peak E Akshat 0.90 m/s    TDI LATERAL 0.11 m/s    TDI SEPTAL 0.08 m/s    E/E' ratio 9.47 m/s    MV Peak A Akshat 0.75 m/s    TR Max Akshat 1.40 m/s    E/A ratio 1.20     IVRT 74.22 msec    E wave deceleration time 182.70 msec    LV SEPTAL E/E' RATIO 11.25 m/s    LV LATERAL E/E' RATIO 8.18 m/s    PV Peak S Akshat 0.67 m/s    PV Peak D Akshat 0.47 m/s    Pulm vein S/D ratio 1.43     LVOT peak akshat 0.94 m/s    Left Ventricular Outflow Tract Mean Velocity 0.66 cm/s    Left Ventricular Outflow Tract Mean Gradient 1.95 mmHg    RVDD 4.30 cm    RV S' 11.01 cm/s    RVOT peak VTI 14.9 cm    TAPSE 1.42 cm    LA size 4.69 cm    Left Atrium Minor Axis 6.19 cm    Left Atrium Major Axis 6.05 cm    RA Major Axis 5.92 cm    AV mean gradient 3 mmHg    AV peak gradient 5 mmHg    Ao peak akshat 1.07 m/s    Ao VTI 26.00 cm    LVOT peak VTI 20.00 cm    AV valve area 2.66 cm²    AV Velocity Ratio 0.88     AV index (prosthetic) 0.77     FATUMA by Velocity Ratio  3.04 cm²    Mr max mickie 5.62 m/s    Triscuspid Valve Regurgitation Peak Gradient 8 mmHg    PV mean gradient 1 mmHg    PV PEAK VELOCITY 0.99 m/s    PV peak gradient 4 mmHg    Pulmonary Valve Mean Velocity 0.78 m/s    RVOT peak mickie 0.57 m/s    Ao root annulus 3.42 cm    STJ 2.95 cm    Ascending aorta 3.51 cm    IVC diameter 2.32 cm    Mean e' 0.10 m/s    ZLVIDS -0.60     ZLVIDD -3.28     LA Volume Index 47.9 mL/m2    LA volume 104.90 cm3    LA WIDTH 4.3 cm    RA Width 3.8 cm    TV resting pulmonary artery pressure 11 mmHg    RV TB RVSP 4 mmHg    Est. RA pres 3 mmHg   POCT glucose   Result Value Ref Range    POCT Glucose 104 70 - 110 mg/dL   POCT glucose   Result Value Ref Range    POCT Glucose 114 (H) 70 - 110 mg/dL        Imaging Results:  Imaging Results              X-Ray Chest AP Portable (Final result)  Result time 03/16/24 01:57:26      Final result by Divya Felton MD (03/16/24 01:57:26)                   Impression:      No acute findings.      Electronically signed by: Divya Felton  Date:    03/16/2024  Time:    01:57               Narrative:    EXAMINATION:  XR CHEST AP PORTABLE    CLINICAL HISTORY:  Chest Pain;    TECHNIQUE:  Single frontal view of the chest was performed.    COMPARISON:  09/19/2009    FINDINGS:  Lungs are clear. No focal consolidation. No pleural effusion. No pneumothorax. Normal heart size.  Sternotomy wires.                                       The EKG was ordered, reviewed, and independently interpreted by the ED provider.  Interpretation time: 12:28  Rate: 75 BPM  Rhythm:  Sinus rhythm with occasional premature ventricular complexes  Interpretation: Low voltage QRS. Borderline ECG. No STEMI.             The Emergency Provider reviewed the vital signs and test results, which are outlined above.     ED Discussion       3:23 AM: Discussed case with Nazia Jean NP (Hospital Medicine). Nazia Jean NP agrees with current care and management of pt and accepts admission.    Admitting Service: Hospital Medicine  Admitting Physician: Dr. Krause  Admit to: admit tele        Medical Decision Making  Amount and/or Complexity of Data Reviewed  External Data Reviewed: labs, radiology and ECG.  Labs: ordered. Decision-making details documented in ED Course.  Radiology: ordered and independent interpretation performed. Decision-making details documented in ED Course.  ECG/medicine tests: ordered and independent interpretation performed. Decision-making details documented in ED Course.    Risk  OTC drugs.  Prescription drug management.  Decision regarding hospitalization.                ED Medication(s):  Medications   aspirin tablet 325 mg (325 mg Oral Given 3/16/24 0113)   nitroGLYCERIN 2% TD oint ointment 1 inch (1 inch Transdermal Given 3/16/24 0113)   labetaloL injection 20 mg (20 mg Intravenous Given 3/16/24 0159)   heparin 25,000 units in dextrose 5% (100 units/ml) IV bolus from bag LOW INTENSITY nomogram - [facility] (4,000 Units Intravenous Bolus from Bag 3/16/24 0458)   regadenoson injection 0.4 mg (0.4 mg Intravenous Given 3/16/24 1000)   kit prep of Tc-99m-tetrofosmin 1.38 mg SolR 10.5 millicurie (10.5 millicuries Intravenous Given 3/16/24 0900)   kit prep of Tc-99m-tetrofosmin 1.38 mg SolR 33.1 millicurie (33.1 millicuries Intravenous Given 3/16/24 1000)       Discharge Medication List as of 3/16/2024  3:25 PM           Follow-up Information       Kimberly Bennett MD Follow up in 2 week(s).    Specialties: Interventional Cardiology, Cardiology  Contact information:  [address]  [place] [address]  [phone]               Hiro Kraft MD. Schedule an appointment as soon as possible for a visit.    Specialty: Internal Medicine  Contact information:  [address]  [place] [address]  [phone]                                 Scribe Attestation:   Scribe #1: I performed the above scribed service and the documentation accurately describes the services I  performed. I attest to the accuracy of the note.     Attending:   Physician Attestation Statement for Scribe #1: I, Ansley Ricketts MD, personally performed the services described in this documentation, as scribed by Carol Carrillo, in my presence, and it is both accurate and complete.       Scribe Attestation:   Scribe #1: I performed the above scribed service and the documentation accurately describes the services I performed. I attest to the accuracy of the note.         Clinical Impression       ICD-10-CM ICD-9-CM   1. NSTEMI (non-ST elevated myocardial infarction)  I21.4 410.70   2. Chest pain  R07.9 786.50   3. Primary hypertension  I10 401.9   4. Non-ST elevation myocardial infarction (NSTEMI)  I21.4 410.70   5. NSTEMI (non-ST elevation myocardial infarction)  I21.4 410.70   6. Troponin level elevated  R79.89 790.6       Disposition:   Disposition: Admitted  Condition: Fair        Ansley Ricketts MD  03/17/24 0604

## 2024-03-16 NOTE — PLAN OF CARE
[facility] - Med Surg  Initial Discharge Assessment       Primary Care Provider: Hiro Kraft MD    Admission Diagnosis: Primary hypertension [I10]  Non-ST elevation myocardial infarction (NSTEMI) [I21.4]  NSTEMI (non-ST elevation myocardial infarction) [I21.4]  NSTEMI (non-ST elevated myocardial infarction) [I21.4]  Troponin level elevated [R79.89]  Chest pain [R07.9]    Admission Date: 3/16/2024  Expected Discharge Date:     Transition of Care Barriers: (P) None    Payor: [employer] / Plan: [facility] [employer] / Product Type: Government /     Extended Emergency Contact Information  Primary Emergency Contact: Christi Paulino  Address: [address] [place] [address] [place]  Mobile Phone: [phone]  Relation: Spouse    Discharge Plan A: Home with family         [address] - [address] [employer]  [address] [facility] [address]  Phone: [phone] Fax: [phone]      Initial Assessment (most recent)       Adult Discharge Assessment - 03/16/24 0945          Discharge Assessment    Assessment Type Discharge Planning Assessment     Confirmed/corrected address, phone number and insurance Yes     Confirmed Demographics Correct on Facesheet     Source of Information family     When was your last doctors appointment? 02/06/24     Communicated ANAND with patient/caregiver Date not available/Unable to determine (P)      Reason For Admission NSTEMI (non-ST elevated myocardial infarction)     People in Home significant other     Do you expect to return to your current living situation? Yes     Do you have help at home or someone to help you manage your care at home? No     Prior to hospitilization cognitive status: Alert/Oriented     Current cognitive status: Alert/Oriented     Walking or Climbing Stairs Difficulty no     Dressing/Bathing Difficulty no     Home Accessibility wheelchair accessible     Home Layout Able to live on 1st  floor     Equipment Currently Used at Home none     Readmission within 30 days? No     Patient currently being followed by outpatient case management? No     Do you currently have service(s) that help you manage your care at home? No     Do you take prescription medications? Yes     Do you have prescription coverage? Yes     Coverage [facility] as primary and [employer] - [facility] [employer] -     Do you have any problems affording any of your prescribed medications? No     Is the patient taking medications as prescribed? yes     Who is going to help you get home at discharge? Spouse     How do you get to doctors appointments? car, drives self     Are you on dialysis? No     Do you take coumadin? No     Discharge Plan A Home with family     DME Needed Upon Discharge  none     Discharge Plan discussed with: Spouse/sig other     Name(s) and Number(s) Christi Jefferson [phone]     Transition of Care Barriers None (P)         OTHER    Name(s) of People in Home Christisally Whittingtonson [phone] (P)                         Patient was not I'm his room as he was taken to a procedure per his spouse who assisted SW in completing the D/C planning assessment. Patient's spouse did not express any needs at this time.     Noemí Padilla LMSW 3/16/2024 9:58 AM

## 2024-03-16 NOTE — ASSESSMENT & PLAN NOTE
74 y/o male with PMhx for CAD/CABG, hypertension, hyperlipidemia, and diabetes admitted for accelerated HTN and CP.  There is a mild increase in troponin ( < 1, flat). EKG is normal. SBP >200/90 on presentation. Cath 4-23 medical therapy,  NOT amenable to percutaneous intervention. Stress test was planned.    Recommend increase ranexa, increase procardia, nuclear stress for prognostic reasons  Increased troponin is from demand ischemia

## 2024-03-16 NOTE — SUBJECTIVE & OBJECTIVE
Past Medical History:   Diagnosis Date    Acute coronary syndrome     Coronary artery disease     Diabetes mellitus 4/24/2014    Effort angina 4/30/2023    Hyperlipidemia     Hypertension     Old MI (myocardial infarction) 4/24/2014    S/P CABG (coronary artery bypass graft) 4/24/2014       Past Surgical History:   Procedure Laterality Date    CARDIAC CATHETERIZATION      COLONOSCOPY N/A 07/26/2019    Procedure: COLONOSCOPY;  Surgeon: Opal Oshea MD;  Location: [facility] ENDO;  Service: Endoscopy;  Laterality: N/A;    CORONARY ARTERY BYPASS GRAFT      CORONARY BYPASS GRAFT ANGIOGRAPHY  5/1/2023    Procedure: Bypass graft study;  Surgeon: Kimberly Bennett MD;  Location: [facility] CATH LAB;  Service: Cardiology;;    ESOPHAGOGASTRODUODENOSCOPY N/A 07/26/2019    Procedure: ESOPHAGOGASTRODUODENOSCOPY (EGD);  Surgeon: Opal Oshea MD;  Location: [facility] ENDO;  Service: Endoscopy;  Laterality: N/A;    LEFT HEART CATHETERIZATION Left 5/1/2023    Procedure: Left heart cath;  Surgeon: Kimberly Bennett MD;  Location: [facility] CATH LAB;  Service: Cardiology;  Laterality: Left;    SKIN CANCER EXCISION  09/2018    VASECTOMY         Review of patient's allergies indicates:  No Known Allergies    No current facility-administered medications on file prior to encounter.     Current Outpatient Medications on File Prior to Encounter   Medication Sig    aspirin (ECOTRIN) 325 MG EC tablet Take 325 mg by mouth once daily.    atorvastatin (LIPITOR) 40 MG tablet Take 1 tablet by mouth once daily    coenzyme Q10 200 mg capsule Take 200 mg by mouth 2 (two) times daily.    cyanocobalamin (VITAMIN B-12) 1000 MCG tablet Take 1,000 mcg by mouth once daily.     hydroCHLOROthiazide (HYDRODIURIL) 12.5 MG Tab Take 1 tablet (12.5 mg total) by mouth once daily.    lisinopriL (PRINIVIL,ZESTRIL) 40 MG tablet Take 1 tablet by mouth once daily    LORATADINE (ALAVERT ORAL) Take 1 tablet by mouth as needed.    magnesium oxide (MAG-OX) 400 mg (241.3 mg magnesium)  tablet Take 400 mg by mouth once daily.    metFORMIN (GLUCOPHAGE) 1000 MG tablet Take 1 tablet (1,000 mg total) by mouth 2 (two) times daily with meals.    methocarbamoL (ROBAXIN) 500 MG Tab Take 1 tablet (500 mg total) by mouth 4 (four) times daily as needed (muscle spasm).    metoprolol succinate (TOPROL-XL) 50 MG 24 hr tablet Take 1 tablet by mouth once daily    NIFEdipine (PROCARDIA-XL) 60 MG (OSM) 24 hr tablet Take 1 tablet (60 mg total) by mouth once daily.    nitroGLYCERIN (NITROSTAT) 0.4 MG SL tablet Place 1 tablet (0.4 mg total) under the tongue every 5 (five) minutes as needed for Chest pain.    nitroGLYCERIN 0.2 mg/hr TD PT24 (NITRODUR) 0.2 mg/hr Place 1 patch onto the skin once daily.    pantoprazole (PROTONIX) 20 MG tablet Take 1 tablet (20 mg total) by mouth once daily.    ranolazine (RANEXA) 500 MG Tb12 Take 1 tablet (500 mg total) by mouth 2 (two) times daily.    vitamin D 1000 units Tab Take 1,000 Units by mouth once daily.      Family History       Problem Relation (Age of Onset)    Alcohol abuse Father    Cancer Mother, Father    Diabetes Mother, Brother    Heart attack Father (63)    Heart murmur Mother    Stroke Mother          Tobacco Use    Smoking status: Former     Current packs/day: 0.00     Average packs/day: 1 pack/day for 30.0 years (30.0 total pack years)     Types: Cigarettes     Start date: 1973     Quit date: 2003     Years since quittin.1    Smokeless tobacco: Never   Substance and Sexual Activity    Alcohol use: Yes     Alcohol/week: 16.0 standard drinks of alcohol     Types: 10 Glasses of wine, 6 Cans of beer per week     Comment: socially    Drug use: Never    Sexual activity: Not Currently     Partners: Female     Review of Systems   Constitutional: Negative. Negative for weight gain.   HENT: Negative.     Eyes: Negative.    Cardiovascular: Negative.  Negative for chest pain, leg swelling and palpitations.   Respiratory: Negative.  Negative for shortness of breath.     Endocrine: Negative.    Hematologic/Lymphatic: Negative.    Skin: Negative.    Musculoskeletal:  Negative for muscle weakness.   Gastrointestinal: Negative.    Genitourinary: Negative.    Neurological: Negative.  Negative for dizziness.   Psychiatric/Behavioral: Negative.     Allergic/Immunologic: Negative.    All other systems reviewed and are negative.    Objective:     Vital Signs (Most Recent):  Temp: (Pended) 97.9 °F (36.6 °C) (03/16/24 0658)  Pulse: (Abnormal) (Pended) 58 (03/16/24 0658)  Resp: (Pended) 18 (03/16/24 0658)  BP: (Pended) 135/79 (03/16/24 0658)  SpO2: 96 % (03/16/24 0645) Vital Signs (24h Range):  Temp:  [97.7 °F (36.5 °C)-97.9 °F (36.6 °C)] (P) 97.9 °F (36.6 °C)  Pulse:  [58-71] (P) 58  Resp:  [11-25] (P) 18  SpO2:  [96 %-100 %] 96 %  BP: (129-200)/(76-96) (P) 135/79     Weight: 101.5 kg (223 lb 12.3 oz)  Body mass index is 32.11 kg/m².    SpO2: 96 %       No intake or output data in the 24 hours ending 03/16/24 0724    Lines/Drains/Airways       Peripheral Intravenous Line       Name Duration         Peripheral IV - Single Lumen 03/16/24 0140 Anterior;Proximal;Right Forearm <1 day                     Physical Exam  Vitals and nursing note reviewed.   Constitutional:       Appearance: He is well-developed.   HENT:      Head: Normocephalic and atraumatic.   Eyes:      Conjunctiva/sclera: Conjunctivae normal.      Pupils: Pupils are equal, round, and reactive to light.   Cardiovascular:      Rate and Rhythm: Normal rate and regular rhythm.      Pulses: Intact distal pulses.      Heart sounds: Normal heart sounds.   Pulmonary:      Effort: Pulmonary effort is normal.      Breath sounds: Normal breath sounds.   Abdominal:      General: Bowel sounds are normal.      Palpations: Abdomen is soft.   Musculoskeletal:      Cervical back: Normal range of motion and neck supple.   Skin:     General: Skin is warm and dry.   Neurological:      Mental Status: He is alert and oriented to person, place, and  time.          Significant Labs: All pertinent lab results from the last 24 hours have been reviewed.    Significant Imaging: X-Ray: CXR: X-Ray Chest 1 View (CXR): No results found for this visit on 03/16/24.

## 2024-03-17 LAB
OHS QRS DURATION: 100 MS
OHS QRS DURATION: 84 MS
OHS QTC CALCULATION: 437 MS
OHS QTC CALCULATION: 453 MS

## 2024-03-18 DIAGNOSIS — Z95.1 S/P CABG (CORONARY ARTERY BYPASS GRAFT): ICD-10-CM

## 2024-03-18 DIAGNOSIS — I25.10 CORONARY ARTERY DISEASE INVOLVING NATIVE CORONARY ARTERY OF NATIVE HEART WITHOUT ANGINA PECTORIS: ICD-10-CM

## 2024-03-18 DIAGNOSIS — I10 ESSENTIAL HYPERTENSION: ICD-10-CM

## 2024-03-19 RX ORDER — LISINOPRIL 40 MG/1
40 TABLET ORAL
Qty: 90 TABLET | Refills: 3 | Status: SHIPPED | OUTPATIENT
Start: 2024-03-19

## 2024-03-21 ENCOUNTER — PATIENT OUTREACH (OUTPATIENT)
Dept: ADMINISTRATIVE | Facility: CLINIC | Age: 74
End: 2024-03-21
Payer: MEDICARE

## 2024-03-21 NOTE — PROGRESS NOTES
C3 nurse spoke with Deniz Paulino  for a TCC post hospital discharge follow up call. The patient has a scheduled HOSFU appointment with Hiro Kraft MD on 03/22/2024 @ 2829.    Message sent to PCP staff

## 2024-03-22 ENCOUNTER — OFFICE VISIT (OUTPATIENT)
Dept: INTERNAL MEDICINE | Facility: CLINIC | Age: 74
End: 2024-03-22
Payer: MEDICARE

## 2024-03-22 VITALS
RESPIRATION RATE: 16 BRPM | HEART RATE: 67 BPM | HEIGHT: 70 IN | OXYGEN SATURATION: 98 % | SYSTOLIC BLOOD PRESSURE: 120 MMHG | DIASTOLIC BLOOD PRESSURE: 68 MMHG | TEMPERATURE: 97 F | BODY MASS INDEX: 31.37 KG/M2 | WEIGHT: 219.13 LBS

## 2024-03-22 DIAGNOSIS — E11.69 HYPERLIPIDEMIA ASSOCIATED WITH TYPE 2 DIABETES MELLITUS: ICD-10-CM

## 2024-03-22 DIAGNOSIS — I21.4 NSTEMI (NON-ST ELEVATED MYOCARDIAL INFARCTION): Primary | ICD-10-CM

## 2024-03-22 DIAGNOSIS — E11.9 TYPE 2 DIABETES MELLITUS WITHOUT COMPLICATION, WITHOUT LONG-TERM CURRENT USE OF INSULIN: ICD-10-CM

## 2024-03-22 DIAGNOSIS — I15.2 HYPERTENSION ASSOCIATED WITH DIABETES: ICD-10-CM

## 2024-03-22 DIAGNOSIS — E11.59 HYPERTENSION ASSOCIATED WITH DIABETES: ICD-10-CM

## 2024-03-22 DIAGNOSIS — E78.5 HYPERLIPIDEMIA ASSOCIATED WITH TYPE 2 DIABETES MELLITUS: ICD-10-CM

## 2024-03-22 PROCEDURE — 99999 PR PBB SHADOW E&M-EST. PATIENT-LVL V: CPT | Mod: PBBFAC,,, | Performed by: INTERNAL MEDICINE

## 2024-03-22 PROCEDURE — 99214 OFFICE O/P EST MOD 30 MIN: CPT | Mod: S$GLB,,, | Performed by: INTERNAL MEDICINE

## 2024-03-22 NOTE — PROGRESS NOTES
Subjective:      Patient ID: Deniz Paulino is a 73 y.o. male.    Chief Complaint: Follow-up and Chest Pain    Follow-up  Associated symptoms include chest pain. Pertinent negatives include no arthralgias, headaches, joint swelling, neck pain, vomiting or weakness.   Chest Pain   Pertinent negatives include no headaches, palpitations, vomiting or weakness.       Transitional Care Note    Family and/or Caretaker present at visit?  Yes.  Diagnostic tests reviewed/disposition: No diagnosic tests pending after this hospitalization.  Disease/illness education:  Continue medications as prescribed and follow up with Cardiology.  Home health/community services discussion/referrals:  Patient does not have home health established.  Does not need home health  Establishment or re-establishment of referral orders for community resources: No other necessary community resources.   Discussion with other health care providers: No discussion with other health care providers necessary.     72 yo with   Patient Active Problem List   Diagnosis    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    Hypertension associated with diabetes    Hyperlipidemia associated with type 2 diabetes mellitus    S/P CABG (coronary artery bypass graft)    Old MI (myocardial infarction)    Type 2 diabetes mellitus without complication, without long-term current use of insulin    Hypomagnesemia    Effort angina    Colon cancer screening    Troponin level elevated     Past Medical History:   Diagnosis Date    Acute coronary syndrome     Coronary artery disease     Diabetes mellitus 4/24/2014    Effort angina 4/30/2023    Hyperlipidemia     Hypertension     Old MI (myocardial infarction) 4/24/2014    S/P CABG (coronary artery bypass graft) 4/24/2014     Here today for hospital discharge appointment after recent admission for NSTEMI.  Patient was admitted on March 16th due to chest pain starting 2 days prior to presentation.   "Troponin was elevated at 0.102.  BNP was 160.  Chest pain resolved during his hospitalization.  His cardiac stress test was negative.  He was discharged on an increased dose of Ranexa.  He has appointment currently scheduled with his cardiologist for April 11th. Chest pain improved. Has not needed ntg x 4 days.  Compliant with meds without significant side effects.          Review of Systems   Constitutional:  Negative for activity change and unexpected weight change.   HENT:  Negative for hearing loss, rhinorrhea and trouble swallowing.    Eyes:  Negative for discharge and visual disturbance.   Respiratory:  Positive for chest tightness. Negative for wheezing.    Cardiovascular:  Positive for chest pain. Negative for palpitations.   Gastrointestinal:  Negative for blood in stool, constipation, diarrhea and vomiting.   Endocrine: Negative for polydipsia and polyuria.   Genitourinary:  Negative for difficulty urinating, hematuria and urgency.   Musculoskeletal:  Negative for arthralgias, joint swelling and neck pain.   Neurological:  Negative for weakness and headaches.   Psychiatric/Behavioral:  Negative for confusion and dysphoric mood.      Objective:   /68 (BP Location: Right arm, Patient Position: Sitting, BP Method: Large (Manual))   Pulse 67   Temp 97.1 °F (36.2 °C) (Tympanic)   Resp 16   Ht 5' 10" (1.778 m)   Wt 99.4 kg (219 lb 2.2 oz)   SpO2 98%   BMI 31.44 kg/m²     Physical Exam  Constitutional:       General: He is awake. He is not in acute distress.     Appearance: Normal appearance. He is well-developed.   HENT:      Head: Normocephalic and atraumatic.   Eyes:      Conjunctiva/sclera: Conjunctivae normal.   Cardiovascular:      Rate and Rhythm: Normal rate and regular rhythm.   Pulmonary:      Effort: Pulmonary effort is normal.      Breath sounds: Normal breath sounds.   Musculoskeletal:      Cervical back: Normal range of motion.   Skin:     General: Skin is warm and dry.   Neurological: "      Mental Status: He is alert. Mental status is at baseline.   Psychiatric:         Mood and Affect: Mood normal.         Behavior: Behavior normal. Behavior is cooperative.         Thought Content: Thought content normal.         Judgment: Judgment normal.         Lab Results   Component Value Date    WBC 6.00 03/16/2024    HGB 11.8 (L) 03/16/2024    HGB 12.6 (L) 11/10/2023    HGB 14.2 04/20/2023    HCT 33.6 (L) 03/16/2024    MCV 94 03/16/2024    MCV 95 11/10/2023    MCV 93 04/20/2023     03/16/2024    CHOL 118 (L) 03/16/2024    TRIG 108 03/16/2024    HDL 48 03/16/2024    LDLCALC 48.4 (L) 03/16/2024    LDLCALC 53.8 (L) 11/10/2023    LDLCALC 58.0 (L) 11/14/2022    ALT 17 03/16/2024    AST 15 03/16/2024     03/16/2024    K 4.3 03/16/2024    CALCIUM 9.9 03/16/2024     03/16/2024    CO2 24 03/16/2024    BUN 21 03/16/2024    CREATININE 1.3 03/16/2024    CREATININE 1.0 11/10/2023    CREATININE 1.0 04/20/2023    EGFRNORACEVR 58 (A) 03/16/2024    EGFRNORACEVR >60.0 11/10/2023    EGFRNORACEVR >60.0 04/20/2023    TSH 1.516 11/10/2023    TSH 1.991 11/17/2022    TSH 2.314 05/04/2022    PSA 1.3 11/10/2023    PSA 1.2 05/10/2023    PSA 1.5 05/04/2022     (H) 03/16/2024    HGBA1C 5.4 03/16/2024    HGBA1C 5.3 02/15/2024    HGBA1C 5.3 11/10/2023    [id number] 43 05/22/2017     (H) 03/16/2024          The ASCVD Risk score (Guillermo DK, et al., 2019) failed to calculate for the following reasons:    The patient has a prior MI or stroke diagnosis     Assessment:     1. NSTEMI (non-ST elevated myocardial infarction)    2. Hypertension associated with diabetes    3. Hyperlipidemia associated with type 2 diabetes mellitus    4. Type 2 diabetes mellitus without complication, without long-term current use of insulin      Plan:   1. NSTEMI (non-ST elevated myocardial infarction)  Improved. Cont current meds. Discuss f/u with cards.     2. Hypertension associated with diabetes  Overview:  Controlled.   Continue current medications      3. Hyperlipidemia associated with type 2 diabetes mellitus  Overview:  Controlled.  Continue current medications      4. Type 2 diabetes mellitus without complication, without long-term current use of insulin  Overview:  Stable.  Continue current          Patient Instructions   Check with your pharmacy regarding RSV vaccine.      Future Appointments   Date Time Provider Department Center   3/26/2024 10:00 AM CARDIOVASCULAR 1, [facility] ONL [facility]  [facility]   4/11/2024 10:20 AM Kimberly Bennett MD ON CARDIO  [facility]   5/15/2024  9:30 AM LABORATORY, [facility]   5/20/2024 11:40 AM Hiro Kraft MD [facility]       Lab Frequency Next Occurrence   Ambulatory referral/consult to Endo Procedure  Once 11/18/2023   CBC Auto Differential Once 05/15/2024       Follow up if symptoms worsen or fail to improve.

## 2024-03-23 ENCOUNTER — PATIENT MESSAGE (OUTPATIENT)
Dept: CARDIOLOGY | Facility: CLINIC | Age: 74
End: 2024-03-23
Payer: MEDICARE

## 2024-03-31 ENCOUNTER — HOSPITAL ENCOUNTER (EMERGENCY)
Facility: HOSPITAL | Age: 74
Discharge: HOME | End: 2024-03-31
Attending: EMERGENCY MEDICINE
Payer: MEDICARE

## 2024-03-31 VITALS
RESPIRATION RATE: 16 BRPM | OXYGEN SATURATION: 98 % | WEIGHT: 226.88 LBS | BODY MASS INDEX: 32.48 KG/M2 | HEIGHT: 70 IN | HEART RATE: 58 BPM | TEMPERATURE: 98 F | DIASTOLIC BLOOD PRESSURE: 81 MMHG | SYSTOLIC BLOOD PRESSURE: 139 MMHG

## 2024-03-31 DIAGNOSIS — R07.9 CHEST PAIN: ICD-10-CM

## 2024-03-31 DIAGNOSIS — R07.9 CHEST PAIN, UNSPECIFIED TYPE: Primary | ICD-10-CM

## 2024-03-31 LAB
ALBUMIN SERPL BCP-MCNC: 4.2 G/DL (ref 3.5–5.2)
ALP SERPL-CCNC: 84 U/L (ref 55–135)
ALT SERPL W/O P-5'-P-CCNC: 18 U/L (ref 10–44)
ANION GAP SERPL CALC-SCNC: 18 MMOL/L (ref 8–16)
APTT PPP: 27.7 SEC (ref 21–32)
AST SERPL-CCNC: 18 U/L (ref 10–40)
BASOPHILS # BLD AUTO: 0.02 K/UL (ref 0–0.2)
BASOPHILS NFR BLD: 0.3 % (ref 0–1.9)
BILIRUB SERPL-MCNC: 0.6 MG/DL (ref 0.1–1)
BNP SERPL-MCNC: 88 PG/ML (ref 0–99)
BUN SERPL-MCNC: 23 MG/DL (ref 8–23)
CALCIUM SERPL-MCNC: 9.8 MG/DL (ref 8.7–10.5)
CHLORIDE SERPL-SCNC: 103 MMOL/L (ref 95–110)
CO2 SERPL-SCNC: 19 MMOL/L (ref 23–29)
CREAT SERPL-MCNC: 1.2 MG/DL (ref 0.5–1.4)
DIFFERENTIAL METHOD BLD: ABNORMAL
EOSINOPHIL # BLD AUTO: 0.1 K/UL (ref 0–0.5)
EOSINOPHIL NFR BLD: 1 % (ref 0–8)
ERYTHROCYTE [DISTWIDTH] IN BLOOD BY AUTOMATED COUNT: 12 % (ref 11.5–14.5)
EST. GFR  (NO RACE VARIABLE): >60 ML/MIN/1.73 M^2
GLUCOSE SERPL-MCNC: 88 MG/DL (ref 70–110)
HCT VFR BLD AUTO: 36.4 % (ref 40–54)
HCV AB SERPL QL IA: NEGATIVE
HEP C VIRUS HOLD SPECIMEN: NORMAL
HGB BLD-MCNC: 12.9 G/DL (ref 14–18)
HIV 1+2 AB+HIV1 P24 AG SERPL QL IA: NEGATIVE
IMM GRANULOCYTES # BLD AUTO: 0.03 K/UL (ref 0–0.04)
IMM GRANULOCYTES NFR BLD AUTO: 0.4 % (ref 0–0.5)
INR PPP: 1 (ref 0.8–1.2)
LYMPHOCYTES # BLD AUTO: 1.4 K/UL (ref 1–4.8)
LYMPHOCYTES NFR BLD: 20.2 % (ref 18–48)
MCH RBC QN AUTO: 33.2 PG (ref 27–31)
MCHC RBC AUTO-ENTMCNC: 35.4 G/DL (ref 32–36)
MCV RBC AUTO: 94 FL (ref 82–98)
MONOCYTES # BLD AUTO: 0.6 K/UL (ref 0.3–1)
MONOCYTES NFR BLD: 8.2 % (ref 4–15)
NEUTROPHILS # BLD AUTO: 4.9 K/UL (ref 1.8–7.7)
NEUTROPHILS NFR BLD: 69.9 % (ref 38–73)
NRBC BLD-RTO: 0 /100 WBC
OHS QRS DURATION: 98 MS
OHS QTC CALCULATION: 460 MS
PLATELET # BLD AUTO: 169 K/UL (ref 150–450)
PMV BLD AUTO: 9.6 FL (ref 9.2–12.9)
POTASSIUM SERPL-SCNC: 4.3 MMOL/L (ref 3.5–5.1)
PROT SERPL-MCNC: 7.4 G/DL (ref 6–8.4)
PROTHROMBIN TIME: 10.9 SEC (ref 9–12.5)
RBC # BLD AUTO: 3.89 M/UL (ref 4.6–6.2)
SODIUM SERPL-SCNC: 140 MMOL/L (ref 136–145)
TROPONIN I SERPL DL<=0.01 NG/ML-MCNC: 0.06 NG/ML (ref 0–0.03)
TROPONIN I SERPL DL<=0.01 NG/ML-MCNC: 0.07 NG/ML (ref 0–0.03)
WBC # BLD AUTO: 6.97 K/UL (ref 3.9–12.7)

## 2024-03-31 PROCEDURE — 93005 ELECTROCARDIOGRAM TRACING: CPT

## 2024-03-31 PROCEDURE — 25000003 PHARM REV CODE 250: Performed by: EMERGENCY MEDICINE

## 2024-03-31 PROCEDURE — 83880 ASSAY OF NATRIURETIC PEPTIDE: CPT | Performed by: NURSE PRACTITIONER

## 2024-03-31 PROCEDURE — 85025 COMPLETE CBC W/AUTO DIFF WBC: CPT | Performed by: NURSE PRACTITIONER

## 2024-03-31 PROCEDURE — 93010 ELECTROCARDIOGRAM REPORT: CPT | Mod: ,,, | Performed by: STUDENT IN AN ORGANIZED HEALTH CARE EDUCATION/TRAINING PROGRAM

## 2024-03-31 PROCEDURE — 84484 ASSAY OF TROPONIN QUANT: CPT | Mod: 91 | Performed by: NURSE PRACTITIONER

## 2024-03-31 PROCEDURE — 85730 THROMBOPLASTIN TIME PARTIAL: CPT | Performed by: NURSE PRACTITIONER

## 2024-03-31 PROCEDURE — 86803 HEPATITIS C AB TEST: CPT | Performed by: EMERGENCY MEDICINE

## 2024-03-31 PROCEDURE — 85610 PROTHROMBIN TIME: CPT | Performed by: NURSE PRACTITIONER

## 2024-03-31 PROCEDURE — 99285 EMERGENCY DEPT VISIT HI MDM: CPT | Mod: 25

## 2024-03-31 PROCEDURE — 84484 ASSAY OF TROPONIN QUANT: CPT | Performed by: EMERGENCY MEDICINE

## 2024-03-31 PROCEDURE — 80053 COMPREHEN METABOLIC PANEL: CPT | Performed by: NURSE PRACTITIONER

## 2024-03-31 PROCEDURE — 87389 HIV-1 AG W/HIV-1&-2 AB AG IA: CPT | Performed by: EMERGENCY MEDICINE

## 2024-03-31 RX ADMIN — NITROGLYCERIN 1 INCH: 20 OINTMENT TOPICAL at 03:03

## 2024-03-31 NOTE — ED PROVIDER NOTES
SCRIBE #1 NOTE: I, Carol Carrillo, am scribing for, and in the presence of, Ansley Ricketts MD. I have scribed the HPI, ROS, and PEx.     SCRIBE #2 NOTE: I, Tracey Andersen, am scribing for, and in the presence of,  Garry Casillas MD. I have scribed the remaining portions of the note not scribed by Scribe #1.      History     Chief Complaint   Patient presents with    Chest Pain     Started 930pm last night, 5 - 6 NTG with relief     Review of patient's allergies indicates:  No Known Allergies      History of Present Illness     HPI    3/31/2024, 3:24 AM  History obtained from the patient      History of Present Illness: Deniz Paulino is a 73 y.o. male patient with a PMHx of acute coronary syndrome, CAD, HLD, HTN, MI, DM, and effort angina who presents to the Emergency Department for evaluation of chest pain which onset around 9 PM. Pt states he was laying in bed trying to go to sleep when he began experiencing CP. Pt states that whenever he takes his daily nitro patch off, he experiences CP. Pt has an appointment with Dr. Townsend on April 4th after having an NSTEMI on 3/16. Pt states that nitro pills give him 30 minutes to an hour of relief. Pt has taken 5-6 nitro pills since 9 PM. Symptoms are constant and moderate in severity. No mitigating or exacerbating factors reported. No associated sxs reported. Patient denies any SOB, diaphoresis, and all other sxs at this time. Prior Tx includes 5-6 nitro pills. No further complaints or concerns at this time.       Arrival mode: Personal vehicle    PCP: Hiro Kraft MD        Past Medical History:  Past Medical History:   Diagnosis Date    Acute coronary syndrome     Coronary artery disease     Diabetes mellitus 04/24/2014    Effort angina 04/30/2023    Hyperlipidemia     Hypertension     Old MI (myocardial infarction) 04/24/2014    S/P CABG (coronary artery bypass graft) 04/24/2014       Past Surgical History:  Past Surgical History:   Procedure Laterality Date     CARDIAC CATHETERIZATION      COLONOSCOPY N/A 2019    Procedure: COLONOSCOPY;  Surgeon: Opal Oshea MD;  Location: [facility] ENDO;  Service: Endoscopy;  Laterality: N/A;    CORONARY ARTERY BYPASS GRAFT      CORONARY BYPASS GRAFT ANGIOGRAPHY  2023    Procedure: Bypass graft study;  Surgeon: Kimberly Bennett MD;  Location: [facility] CATH LAB;  Service: Cardiology;;    ESOPHAGOGASTRODUODENOSCOPY N/A 2019    Procedure: ESOPHAGOGASTRODUODENOSCOPY (EGD);  Surgeon: Opal Oshea MD;  Location: [facility] ENDO;  Service: Endoscopy;  Laterality: N/A;    LEFT HEART CATHETERIZATION Left 2023    Procedure: Left heart cath;  Surgeon: Kimberly Bennett MD;  Location: [facility] CATH LAB;  Service: Cardiology;  Laterality: Left;    SKIN CANCER EXCISION  2018    VASECTOMY           Family History:  Family History   Problem Relation Age of Onset    Diabetes Mother     Heart murmur Mother     Cancer Mother         Breast    Stroke Mother     Alcohol abuse Father     Heart attack Father 63    Cancer Father         Esophageal    Diabetes Brother        Social History:  Social History     Tobacco Use    Smoking status: Former     Current packs/day: 0.00     Average packs/day: 1 pack/day for 30.0 years (30.0 ttl pk-yrs)     Types: Cigarettes     Start date: 1973     Quit date: 2003     Years since quittin.1    Smokeless tobacco: Former   Substance and Sexual Activity    Alcohol use: Yes     Alcohol/week: 16.0 standard drinks of alcohol     Types: 10 Glasses of wine, 6 Cans of beer per week     Comment: socially    Drug use: Never    Sexual activity: Not Currently     Partners: Female        Review of Systems     Review of Systems   Constitutional:  Negative for diaphoresis and fever.   HENT:  Negative for sore throat.    Respiratory:  Negative for shortness of breath.    Cardiovascular:  Positive for chest pain.   Gastrointestinal:  Negative for nausea.   Genitourinary:  Negative for dysuria.   Musculoskeletal:   "Negative for back pain.   Skin:  Negative for rash.   Neurological:  Negative for weakness.   Hematological:  Does not bruise/bleed easily.      Physical Exam     Initial Vitals [03/31/24 0248]   BP Pulse Resp Temp SpO2   (!) 140/78 68 18 98 °F (36.7 °C) 99 %      MAP       --          Physical Exam  Nursing Notes and Vital Signs Reviewed.  Constitutional: Patient is in no acute distress. Well-developed and well-nourished.  Head: Atraumatic. Normocephalic.  Eyes: PERRL. EOM intact. Conjunctivae are not pale. No scleral icterus.  ENT: Mucous membranes are moist. Oropharynx is clear and symmetric.    Neck: Supple. Full ROM. No lymphadenopathy.  Cardiovascular: Regular rate. Regular rhythm. No murmurs, rubs, or gallops. Distal pulses are 2+ and symmetric.  Pulmonary/Chest: No respiratory distress. Clear to auscultation bilaterally. No wheezing or rales.  Abdominal: Soft and non-distended.  There is no tenderness.  No rebound, guarding, or rigidity. Good bowel sounds.  Genitourinary: No CVA tenderness  Musculoskeletal: Moves all extremities. No obvious deformities. 1+ edema BL. No calf tenderness.  Skin: Warm and dry.  Neurological:  Alert, awake, and appropriate.  Normal speech.  No acute focal neurological deficits are appreciated.  Psychiatric: Normal affect. Good eye contact. Appropriate in content.     ED Course   Procedures  ED Vital Signs:  Vitals:    03/31/24 0248 03/31/24 0309 03/31/24 0400 03/31/24 0500   BP: (!) 140/78 (!) 156/87 131/78 137/78   Pulse: 68 68 61 60   Resp: 18 16 12 17   Temp: 98 °F (36.7 °C)      TempSrc: Oral      SpO2: 99% 99% 99% 98%   Weight: 102.9 kg (226 lb 13.7 oz)      Height: 5' 10" (1.778 m)       03/31/24 0600 03/31/24 0630 03/31/24 0653   BP: (!) 140/86 139/81 139/81   Pulse: (!) 58 (!) 58 (!) 58   Resp: 15 16 16   Temp:      TempSrc:      SpO2: 97% 98% 98%   Weight:      Height:          Abnormal Lab Results:  Labs Reviewed   CBC W/ AUTO DIFFERENTIAL - Abnormal; Notable for the " following components:       Result Value    RBC 3.89 (*)     Hemoglobin 12.9 (*)     Hematocrit 36.4 (*)     MCH 33.2 (*)     All other components within normal limits    Narrative:     Release to patient->Immediate   COMPREHENSIVE METABOLIC PANEL - Abnormal; Notable for the following components:    CO2 19 (*)     Anion Gap 18 (*)     All other components within normal limits    Narrative:     Release to patient->Immediate   TROPONIN I - Abnormal; Notable for the following components:    Troponin I 0.066 (*)     All other components within normal limits    Narrative:     Release to patient->Immediate   TROPONIN I - Abnormal; Notable for the following components:    Troponin I 0.062 (*)     All other components within normal limits   B-TYPE NATRIURETIC PEPTIDE    Narrative:     Release to patient->Immediate   APTT    Narrative:     Release to patient->Immediate   PROTIME-INR    Narrative:     Release to patient->Immediate   HIV 1 / 2 ANTIBODY    Narrative:     Release to patient->Immediate   HEPATITIS C ANTIBODY    Narrative:     Release to patient->Immediate   HEP C VIRUS HOLD SPECIMEN    Narrative:     Release to patient->Immediate        All Lab Results:  Results for orders placed or performed during the hospital encounter of 03/31/24   CBC auto differential   Result Value Ref Range    WBC 6.97 3.90 - 12.70 K/uL    RBC 3.89 (L) 4.60 - 6.20 M/uL    Hemoglobin 12.9 (L) 14.0 - 18.0 g/dL    Hematocrit 36.4 (L) 40.0 - 54.0 %    MCV 94 82 - 98 fL    MCH 33.2 (H) 27.0 - 31.0 pg    MCHC 35.4 32.0 - 36.0 g/dL    RDW 12.0 11.5 - 14.5 %    Platelets 169 150 - 450 K/uL    MPV 9.6 9.2 - 12.9 fL    Immature Granulocytes 0.4 0.0 - 0.5 %    Gran # (ANC) 4.9 1.8 - 7.7 K/uL    Immature Grans (Abs) 0.03 0.00 - 0.04 K/uL    Lymph # 1.4 1.0 - 4.8 K/uL    Mono # 0.6 0.3 - 1.0 K/uL    Eos # 0.1 0.0 - 0.5 K/uL    Baso # 0.02 0.00 - 0.20 K/uL    nRBC 0 0 /100 WBC    Gran % 69.9 38.0 - 73.0 %    Lymph % 20.2 18.0 - 48.0 %    Mono % 8.2 4.0  - 15.0 %    Eosinophil % 1.0 0.0 - 8.0 %    Basophil % 0.3 0.0 - 1.9 %    Differential Method Automated    Comprehensive metabolic panel   Result Value Ref Range    Sodium 140 136 - 145 mmol/L    Potassium 4.3 3.5 - 5.1 mmol/L    Chloride 103 95 - 110 mmol/L    CO2 19 (L) 23 - 29 mmol/L    Glucose 88 70 - 110 mg/dL    BUN 23 8 - 23 mg/dL    Creatinine 1.2 0.5 - 1.4 mg/dL    Calcium 9.8 8.7 - 10.5 mg/dL    Total Protein 7.4 6.0 - 8.4 g/dL    Albumin 4.2 3.5 - 5.2 g/dL    Total Bilirubin 0.6 0.1 - 1.0 mg/dL    Alkaline Phosphatase 84 55 - 135 U/L    AST 18 10 - 40 U/L    ALT 18 10 - 44 U/L    eGFR >60 >60 mL/min/1.73 m^2    Anion Gap 18 (H) 8 - 16 mmol/L   Troponin I #1   Result Value Ref Range    Troponin I 0.066 (H) 0.000 - 0.026 ng/mL   BNP   Result Value Ref Range    BNP 88 0 - 99 pg/mL   APTT   Result Value Ref Range    aPTT 27.7 21.0 - 32.0 sec   Protime-INR   Result Value Ref Range    Prothrombin Time 10.9 9.0 - 12.5 sec    INR 1.0 0.8 - 1.2   HIV 1/2 Ag/Ab (4th Gen)   Result Value Ref Range    HIV 1/2 Ag/Ab Negative Negative   Hepatitis C Antibody   Result Value Ref Range    Hepatitis C Ab Negative Negative   HCV Virus Hold Specimen   Result Value Ref Range    HEP C Virus Hold Specimen Hold for HCV sendout    Troponin I   Result Value Ref Range    Troponin I 0.062 (H) 0.000 - 0.026 ng/mL   EKG 12-lead   Result Value Ref Range    QRS Duration 98 ms    OHS QTC Calculation 460 ms        Imaging Results:  Imaging Results              X-Ray Chest AP Portable (In process)                      The EKG was ordered, reviewed, and independently interpreted by the ED provider.  Interpretation time: 02:54  Rate: 69 BPM  Rhythm: normal sinus rhythm  Interpretation: No acute ST changes. No STEMI.             The Emergency Provider reviewed the vital signs and test results, which are outlined above.     ED Discussion     6:00 AM: Dr. Ricketts transfers care of patient to Dr. Casillas pending repeat troponin results.     6:49  AM: Discussed pt's case with Dr. Pride (Cardiology) who recommends pt wear nitro patch 24 hours if having nocturnal pain.    6:51 AM: Reassessed pt at this time.   Discussed with pt all pertinent ED information and results. Discussed pt dx and plan of tx. Gave pt all f/u and return to the ED instructions. All questions and concerns were addressed at this time. Pt expresses understanding of information and instructions, and is comfortable with plan to discharge. Pt is stable for discharge.    I discussed with patient and/or family/caretaker that evaluation in the ED does not suggest any emergent or life threatening medical conditions requiring immediate intervention beyond what was provided in the ED, and I believe patient is safe for discharge.  Regardless, an unremarkable evaluation in the ED does not preclude the development or presence of a serious of life threatening condition. As such, patient was instructed to return immediately for any worsening or change in current symptoms.         Medical Decision Making  Chest pain at night when he takes his nitro patch off  DDx: Chest pain, unstable angina    Amount and/or Complexity of Data Reviewed  External Data Reviewed: labs, radiology, ECG and notes.  Labs: ordered. Decision-making details documented in ED Course.     Details: Repeat troponin stable  Radiology: ordered and independent interpretation performed. Decision-making details documented in ED Course.  ECG/medicine tests: ordered and independent interpretation performed. Decision-making details documented in ED Course.    Risk  Prescription drug management.                ED Medication(s):  Medications   nitroGLYCERIN 2% TD oint ointment 1 inch (1 inch Topical (Top) Given 3/31/24 0340)       Discharge Medication List as of 3/31/2024  6:47 AM           Follow-up Information       Hiro Kraft MD.    Specialty: Internal Medicine  Contact information:  [address] [facility]  [id number]  [phone]                                 Scribe Attestation:   Scribe #1: I performed the above scribed service and the documentation accurately describes the services I performed. I attest to the accuracy of the note.     Attending:   Physician Attestation Statement for Scribe #1: I, Ansley Ricketts MD, personally performed the services described in this documentation, as scribed by Carol Carrillo, in my presence, and it is both accurate and complete.       Scribe Attestation:   Scribe #2: I performed the above scribed service and the documentation accurately describes the services I performed. I attest to the accuracy of the note.    Attending Attestation:           Physician Attestation for Scribe:    Physician Attestation Statement for Scribe #2: I, Garry Casillas MD, reviewed documentation, as scribed by Tracey Andersen in my presence, and it is both accurate and complete. I also acknowledge and confirm the content of the note done by Scribe #1.           Clinical Impression       ICD-10-CM ICD-9-CM   1. Chest pain, unspecified type  R07.9 786.50   2. Chest pain  R07.9 786.50       Disposition:   Disposition: Discharged  Condition: Stable        Garry Casillas MD  03/31/24 0712

## 2024-03-31 NOTE — FIRST PROVIDER EVALUATION
Medical screening examination initiated.  I have conducted a focused provider triage encounter, findings are as follows:    Brief history of present illness:  reports chest pain, reports recent nstemi. Reports relief with nitro at home    There were no vitals filed for this visit.    Pertinent physical exam:  nad    Brief workup plan:  labs, EKG ,imaging, further eval    Preliminary workup initiated; this workup will be continued and followed by the physician or advanced practice provider that is assigned to the patient when roomed.

## 2024-04-02 ENCOUNTER — PATIENT OUTREACH (OUTPATIENT)
Dept: EMERGENCY MEDICINE | Facility: HOSPITAL | Age: 74
End: 2024-04-02
Payer: MEDICARE

## 2024-04-02 NOTE — PROGRESS NOTES
Pt was seen in the ED on 3/30/24 for chest pain. Pt was scheduled a 7-day Post ED visit follow up appt with cardiology. I spoke with pt to provide a reminder for his upcoming appt scheduled with Dr. Kimberly Bennett on 4/4/24 @ 2:00 p.m. Pt is planning to attend. Closing Encounter.      Ely Trotter

## 2024-04-02 NOTE — PROGRESS NOTES
Ely Trotter  ED Navigator  Emergency Department    [place] [facility] [place]  Role: Community Health Worker    Date: 04/02/2024  Patient Name: Deniz Paulino  MRN: [id number]  PCP: Hiro Kraft MD    Assessment:     Deniz Paulino is a 73 y.o. male who has presented to ED for Chest pain; Patient has visited the ED 1 times in the past 3 months. Patient did not contact PCP.     ED Navigator Initial Assessment    ED Navigator Enrollment Documentation  Consent to Services  Does patient consent to completing the assessment?: Yes  Contact  Method of Initial Contact: Phone  Transportation  Does the patient have issues with Transportation?: No  Does the patient have transportation to and from healthcare appointments?: Yes  Insurance Coverage  Do you have coverage/adequate coverage?: Yes  Type/kind of coverage: PHN  Is patient able to afford co-pays/deductibles?: Yes  Is patient able to afford HME or supplies?: Yes  Does patient have an established [facility] PCP?: Yes  Able to access?: Yes  Does the patient have a lack of adequate coverage?: No  Specialist Appointment  Did the patient come to the ED to see a specialist?: No  Does the patient have a pending specialist referral?: No  Does the patient have a specialist appointment made?: Yes  Is the patient able to access a timely specialist appointment?: Yes  PCP Follow Up Appointment  Has the patient had an appointment with a primary care provider in the past year?: Yes  Approximate date: 3/22/24  Provider: Hiro Kraft MD  Does the patient have a follow up appontment with a PCP?: No  When was the last time you saw your PCP?: 3/22/24  Why does the patient not have a follow up scheduled?: Other (see comments) (Comment: Pt has a fu appt with cardiology)  Medications  Is patient able to afford medication?: Yes  Is patient unable to get medication due to lack of transportation?: No  Psychological  Food  Communication/Education  Does the patient have limited English  proficiency/English not primary language?: No  Does patient have low literacy and/or low health literacy?: Yes  Does patient have concerns with care?: No  Does patient have dissatisfaction with care?: No  Other Financial Concerns  Other Social Barriers/Concerns  Does the patient have any additional barriers or concerns?: Other (see comments) (Comment: Chronic Conditions)  Primary Barrier  Barriers identified: Cognitive barrier (health literacy, language and communication, etc.)  Root Cause of ED Utilization: Chronic Conditions  Plan to address Chronic Conditions: Remind patient of upcoming PCP/specialist appointment within 24 to 48 hours before scheduled appt  Next steps: Provided Education, Scheduled Appointment/Referral  Scheduled Appointment Date: 24  Appointment Reminder Date: 24  Additional Documentation: Pt was seen in the ED on 3/31/24 for Chest pain; Chest pain, unspecified type. I spoke with pt for Post ED visit follow up navigation to assist with scheduling a 7-day Post ED visit follow up appt. Pt states that he had contacted cardiology and was scheduled a 7-day Post ED visit follow up appt on 24 with Dr. Kimberly Bennett @ 2:00 p.m. Pt does not have transportation issues and has no additional needs at this time.  Closing Encounter.    Ely Trotter           Social History     Socioeconomic History    Marital status:    Tobacco Use    Smoking status: Former     Current packs/day: 0.00     Average packs/day: 1 pack/day for 30.0 years (30.0 ttl pk-yrs)     Types: Cigarettes     Start date: 1973     Quit date: 2003     Years since quittin.1    Smokeless tobacco: Former   Substance and Sexual Activity    Alcohol use: Yes     Alcohol/week: 16.0 standard drinks of alcohol     Types: 10 Glasses of wine, 6 Cans of beer per week     Comment: socially    Drug use: Never    Sexual activity: Not Currently     Partners: Female     Social Determinants of Health     Financial Resource  Strain: Low Risk  (11/9/2023)    Overall Financial Resource Strain (CARDIA)     Difficulty of Paying Living Expenses: Not hard at all   Food Insecurity: No Food Insecurity (11/9/2023)    Hunger Vital Sign     Worried About Running Out of Food in the Last Year: Never true     Ran Out of Food in the Last Year: Never true   Transportation Needs: No Transportation Needs (11/9/2023)    PRAPARE - Transportation     Lack of Transportation (Medical): No     Lack of Transportation (Non-Medical): No   Physical Activity: Unknown (11/9/2023)    Exercise Vital Sign     Days of Exercise per Week: 0 days   Stress: No Stress Concern Present (11/9/2023)    [employer] - Occupational Stress Questionnaire     Feeling of Stress : Only a little   Social Connections: Unknown (11/9/2023)    Social Connection and Isolation Panel [NHANES]     Frequency of Communication with Friends and Family: Never     Frequency of Social Gatherings with Friends and Family: Twice a week     Active Member of Clubs or Organizations: No     Attends Club or Organization Meetings: Never     Marital Status:    Housing Stability: Low Risk  (11/9/2023)    Housing Stability Vital Sign     Unable to Pay for Housing in the Last Year: No     Number of Places Lived in the Last Year: 1     Unstable Housing in the Last Year: No       Plan:   Pt was seen in the ED on 3/31/24 for Chest pain; Chest pain, unspecified type. I spoke with pt for Post ED visit follow up navigation to assist with scheduling a 7-day Post ED visit follow up appt. Pt states that he had contacted cardiology and was scheduled a 7-day Post ED visit follow up appt on 4/4/24 with Dr. Kimberly Bennett @ 2:00 p.m. Pt does not have transportation issues and has no additional needs at this time.  Closing Encounter.    Ely Trotter      Appointment made with: Hiro Kraft MD

## 2024-04-04 ENCOUNTER — OFFICE VISIT (OUTPATIENT)
Dept: CARDIOLOGY | Facility: CLINIC | Age: 74
End: 2024-04-04
Payer: MEDICARE

## 2024-04-04 ENCOUNTER — HOSPITAL ENCOUNTER (INPATIENT)
Facility: HOSPITAL | Age: 74
LOS: 1 days | Discharge: HOME | DRG: 322 | End: 2024-04-05
Attending: EMERGENCY MEDICINE | Admitting: INTERNAL MEDICINE
Payer: MEDICARE

## 2024-04-04 VITALS
DIASTOLIC BLOOD PRESSURE: 78 MMHG | HEART RATE: 75 BPM | WEIGHT: 220 LBS | BODY MASS INDEX: 31.57 KG/M2 | SYSTOLIC BLOOD PRESSURE: 120 MMHG | OXYGEN SATURATION: 97 %

## 2024-04-04 DIAGNOSIS — I10 CHRONIC HYPERTENSION: ICD-10-CM

## 2024-04-04 DIAGNOSIS — R94.31 ABNORMAL ECG: ICD-10-CM

## 2024-04-04 DIAGNOSIS — I20.89 EFFORT ANGINA: ICD-10-CM

## 2024-04-04 DIAGNOSIS — I25.10 CAD (CORONARY ARTERY DISEASE): ICD-10-CM

## 2024-04-04 DIAGNOSIS — R07.9 CHEST PAIN: ICD-10-CM

## 2024-04-04 DIAGNOSIS — E11.9 TYPE 2 DIABETES MELLITUS WITHOUT COMPLICATION, WITHOUT LONG-TERM CURRENT USE OF INSULIN: ICD-10-CM

## 2024-04-04 DIAGNOSIS — I25.110 CORONARY ARTERY DISEASE INVOLVING NATIVE CORONARY ARTERY OF NATIVE HEART WITH UNSTABLE ANGINA PECTORIS: Primary | ICD-10-CM

## 2024-04-04 DIAGNOSIS — I25.2 OLD MI (MYOCARDIAL INFARCTION): ICD-10-CM

## 2024-04-04 DIAGNOSIS — E11.69 HYPERLIPIDEMIA ASSOCIATED WITH TYPE 2 DIABETES MELLITUS: ICD-10-CM

## 2024-04-04 DIAGNOSIS — R79.89 TROPONIN LEVEL ELEVATED: ICD-10-CM

## 2024-04-04 DIAGNOSIS — I20.0 UNSTABLE ANGINA: Primary | ICD-10-CM

## 2024-04-04 DIAGNOSIS — E78.5 HYPERLIPIDEMIA ASSOCIATED WITH TYPE 2 DIABETES MELLITUS: ICD-10-CM

## 2024-04-04 DIAGNOSIS — I15.2 HYPERTENSION ASSOCIATED WITH DIABETES: ICD-10-CM

## 2024-04-04 DIAGNOSIS — I21.4 NSTEMI (NON-ST ELEVATED MYOCARDIAL INFARCTION): ICD-10-CM

## 2024-04-04 DIAGNOSIS — E11.59 HYPERTENSION ASSOCIATED WITH DIABETES: ICD-10-CM

## 2024-04-04 DIAGNOSIS — Z95.1 S/P CABG (CORONARY ARTERY BYPASS GRAFT): ICD-10-CM

## 2024-04-04 LAB
ALBUMIN SERPL BCP-MCNC: 4.2 G/DL (ref 3.5–5.2)
ALP SERPL-CCNC: 68 U/L (ref 55–135)
ALT SERPL W/O P-5'-P-CCNC: 13 U/L (ref 10–44)
ANION GAP SERPL CALC-SCNC: 16 MMOL/L (ref 8–16)
APTT PPP: 28.6 SEC (ref 21–32)
AST SERPL-CCNC: 25 U/L (ref 10–40)
BASOPHILS # BLD AUTO: 0.03 K/UL (ref 0–0.2)
BASOPHILS NFR BLD: 0.4 % (ref 0–1.9)
BILIRUB SERPL-MCNC: 0.6 MG/DL (ref 0.1–1)
BNP SERPL-MCNC: 218 PG/ML (ref 0–99)
BUN SERPL-MCNC: 24 MG/DL (ref 8–23)
CALCIUM SERPL-MCNC: 10 MG/DL (ref 8.7–10.5)
CATH EF QUANTITATIVE: 55 %
CHLORIDE SERPL-SCNC: 102 MMOL/L (ref 95–110)
CO2 SERPL-SCNC: 22 MMOL/L (ref 23–29)
CREAT SERPL-MCNC: 1.3 MG/DL (ref 0.5–1.4)
DIFFERENTIAL METHOD BLD: ABNORMAL
EOSINOPHIL # BLD AUTO: 0.1 K/UL (ref 0–0.5)
EOSINOPHIL NFR BLD: 1.2 % (ref 0–8)
ERYTHROCYTE [DISTWIDTH] IN BLOOD BY AUTOMATED COUNT: 12.2 % (ref 11.5–14.5)
EST. GFR  (NO RACE VARIABLE): 58 ML/MIN/1.73 M^2
GLUCOSE SERPL-MCNC: 112 MG/DL (ref 70–110)
HCT VFR BLD AUTO: 36.3 % (ref 40–54)
HGB BLD-MCNC: 12.9 G/DL (ref 14–18)
IMM GRANULOCYTES # BLD AUTO: 0.02 K/UL (ref 0–0.04)
IMM GRANULOCYTES NFR BLD AUTO: 0.2 % (ref 0–0.5)
INR PPP: 1 (ref 0.8–1.2)
LYMPHOCYTES # BLD AUTO: 1.8 K/UL (ref 1–4.8)
LYMPHOCYTES NFR BLD: 22 % (ref 18–48)
MCH RBC QN AUTO: 33 PG (ref 27–31)
MCHC RBC AUTO-ENTMCNC: 35.5 G/DL (ref 32–36)
MCV RBC AUTO: 93 FL (ref 82–98)
MONOCYTES # BLD AUTO: 0.7 K/UL (ref 0.3–1)
MONOCYTES NFR BLD: 8 % (ref 4–15)
NEUTROPHILS # BLD AUTO: 5.6 K/UL (ref 1.8–7.7)
NEUTROPHILS NFR BLD: 68.2 % (ref 38–73)
NRBC BLD-RTO: 0 /100 WBC
PLATELET # BLD AUTO: 190 K/UL (ref 150–450)
PMV BLD AUTO: 9.9 FL (ref 9.2–12.9)
POC ACTIVATED CLOTTING TIME K: 250 SEC (ref 74–137)
POC ACTIVATED CLOTTING TIME K: 293 SEC (ref 74–137)
POTASSIUM SERPL-SCNC: 3.8 MMOL/L (ref 3.5–5.1)
PROT SERPL-MCNC: 7.2 G/DL (ref 6–8.4)
PROTHROMBIN TIME: 11.3 SEC (ref 9–12.5)
RBC # BLD AUTO: 3.91 M/UL (ref 4.6–6.2)
SAMPLE: ABNORMAL
SAMPLE: ABNORMAL
SODIUM SERPL-SCNC: 140 MMOL/L (ref 136–145)
TROPONIN I SERPL DL<=0.01 NG/ML-MCNC: 0.96 NG/ML (ref 0–0.03)
TROPONIN I SERPL DL<=0.01 NG/ML-MCNC: 3.04 NG/ML (ref 0–0.03)
WBC # BLD AUTO: 8.22 K/UL (ref 3.9–12.7)

## 2024-04-04 PROCEDURE — C1894 INTRO/SHEATH, NON-LASER: HCPCS | Performed by: INTERNAL MEDICINE

## 2024-04-04 PROCEDURE — C1725 CATH, TRANSLUMIN NON-LASER: HCPCS | Performed by: INTERNAL MEDICINE

## 2024-04-04 PROCEDURE — 1101F PT FALLS ASSESS-DOCD LE1/YR: CPT | Mod: CPTII,S$GLB,, | Performed by: INTERNAL MEDICINE

## 2024-04-04 PROCEDURE — 93010 ELECTROCARDIOGRAM REPORT: CPT | Mod: ,,, | Performed by: INTERNAL MEDICINE

## 2024-04-04 PROCEDURE — 27201423 OPTIME MED/SURG SUP & DEVICES STERILE SUPPLY: Performed by: INTERNAL MEDICINE

## 2024-04-04 PROCEDURE — 93005 ELECTROCARDIOGRAM TRACING: CPT

## 2024-04-04 PROCEDURE — C1884 EMBOLIZATION PROTECT SYST: HCPCS | Performed by: INTERNAL MEDICINE

## 2024-04-04 PROCEDURE — B2111ZZ FLUOROSCOPY OF MULTIPLE CORONARY ARTERIES USING LOW OSMOLAR CONTRAST: ICD-10-PCS | Performed by: INTERNAL MEDICINE

## 2024-04-04 PROCEDURE — 93459 L HRT ART/GRFT ANGIO: CPT | Mod: 26,XU,51, | Performed by: INTERNAL MEDICINE

## 2024-04-04 PROCEDURE — 1159F MED LIST DOCD IN RCRD: CPT | Mod: CPTII,S$GLB,, | Performed by: INTERNAL MEDICINE

## 2024-04-04 PROCEDURE — 3008F BODY MASS INDEX DOCD: CPT | Mod: CPTII,S$GLB,, | Performed by: INTERNAL MEDICINE

## 2024-04-04 PROCEDURE — 4A023N7 MEASUREMENT OF CARDIAC SAMPLING AND PRESSURE, LEFT HEART, PERCUTANEOUS APPROACH: ICD-10-PCS | Performed by: INTERNAL MEDICINE

## 2024-04-04 PROCEDURE — 85610 PROTHROMBIN TIME: CPT | Performed by: EMERGENCY MEDICINE

## 2024-04-04 PROCEDURE — C1760 CLOSURE DEV, VASC: HCPCS | Performed by: INTERNAL MEDICINE

## 2024-04-04 PROCEDURE — 027034Z DILATION OF CORONARY ARTERY, ONE ARTERY WITH DRUG-ELUTING INTRALUMINAL DEVICE, PERCUTANEOUS APPROACH: ICD-10-PCS | Performed by: INTERNAL MEDICINE

## 2024-04-04 PROCEDURE — C1876 STENT, NON-COA/NON-COV W/DEL: HCPCS | Performed by: INTERNAL MEDICINE

## 2024-04-04 PROCEDURE — 99291 CRITICAL CARE FIRST HOUR: CPT

## 2024-04-04 PROCEDURE — 25000003 PHARM REV CODE 250: Performed by: INTERNAL MEDICINE

## 2024-04-04 PROCEDURE — 92937 PRQ TRLUML REVSC CAB GRF 1: CPT | Mod: RC,,, | Performed by: INTERNAL MEDICINE

## 2024-04-04 PROCEDURE — 99153 MOD SED SAME PHYS/QHP EA: CPT | Performed by: INTERNAL MEDICINE

## 2024-04-04 PROCEDURE — 63600175 PHARM REV CODE 636 W HCPCS: Performed by: EMERGENCY MEDICINE

## 2024-04-04 PROCEDURE — 93571 IV DOP VEL&/PRESS C FLO 1ST: CPT | Mod: 26,52,LC, | Performed by: INTERNAL MEDICINE

## 2024-04-04 PROCEDURE — B2181ZZ FLUOROSCOPY OF LEFT INTERNAL MAMMARY BYPASS GRAFT USING LOW OSMOLAR CONTRAST: ICD-10-PCS | Performed by: INTERNAL MEDICINE

## 2024-04-04 PROCEDURE — 83880 ASSAY OF NATRIURETIC PEPTIDE: CPT | Performed by: EMERGENCY MEDICINE

## 2024-04-04 PROCEDURE — 85025 COMPLETE CBC W/AUTO DIFF WBC: CPT | Performed by: EMERGENCY MEDICINE

## 2024-04-04 PROCEDURE — 96365 THER/PROPH/DIAG IV INF INIT: CPT

## 2024-04-04 PROCEDURE — 85347 COAGULATION TIME ACTIVATED: CPT | Performed by: INTERNAL MEDICINE

## 2024-04-04 PROCEDURE — 3288F FALL RISK ASSESSMENT DOCD: CPT | Mod: CPTII,S$GLB,, | Performed by: INTERNAL MEDICINE

## 2024-04-04 PROCEDURE — C1769 GUIDE WIRE: HCPCS | Performed by: INTERNAL MEDICINE

## 2024-04-04 PROCEDURE — C9604 PERC D-E COR REVASC T CABG S: HCPCS | Mod: RC | Performed by: INTERNAL MEDICINE

## 2024-04-04 PROCEDURE — 25000003 PHARM REV CODE 250: Performed by: EMERGENCY MEDICINE

## 2024-04-04 PROCEDURE — 25500020 PHARM REV CODE 255: Performed by: INTERNAL MEDICINE

## 2024-04-04 PROCEDURE — C1887 CATHETER, GUIDING: HCPCS | Performed by: INTERNAL MEDICINE

## 2024-04-04 PROCEDURE — 84484 ASSAY OF TROPONIN QUANT: CPT | Mod: 91 | Performed by: INTERNAL MEDICINE

## 2024-04-04 PROCEDURE — 93799 UNLISTED CV SVC/PROCEDURE: CPT | Performed by: INTERNAL MEDICINE

## 2024-04-04 PROCEDURE — 93459 L HRT ART/GRFT ANGIO: CPT | Mod: XU | Performed by: INTERNAL MEDICINE

## 2024-04-04 PROCEDURE — 94761 N-INVAS EAR/PLS OXIMETRY MLT: CPT

## 2024-04-04 PROCEDURE — 3074F SYST BP LT 130 MM HG: CPT | Mod: CPTII,S$GLB,, | Performed by: INTERNAL MEDICINE

## 2024-04-04 PROCEDURE — B2131ZZ FLUOROSCOPY OF MULTIPLE CORONARY ARTERY BYPASS GRAFTS USING LOW OSMOLAR CONTRAST: ICD-10-PCS | Performed by: INTERNAL MEDICINE

## 2024-04-04 PROCEDURE — 99152 MOD SED SAME PHYS/QHP 5/>YRS: CPT | Mod: ,,, | Performed by: INTERNAL MEDICINE

## 2024-04-04 PROCEDURE — 1160F RVW MEDS BY RX/DR IN RCRD: CPT | Mod: CPTII,S$GLB,, | Performed by: INTERNAL MEDICINE

## 2024-04-04 PROCEDURE — 4010F ACE/ARB THERAPY RXD/TAKEN: CPT | Mod: CPTII,S$GLB,, | Performed by: INTERNAL MEDICINE

## 2024-04-04 PROCEDURE — 93010 ELECTROCARDIOGRAM REPORT: CPT | Mod: 76,,, | Performed by: INTERNAL MEDICINE

## 2024-04-04 PROCEDURE — 99215 OFFICE O/P EST HI 40 MIN: CPT | Mod: 25,S$GLB,, | Performed by: INTERNAL MEDICINE

## 2024-04-04 PROCEDURE — 84484 ASSAY OF TROPONIN QUANT: CPT | Performed by: EMERGENCY MEDICINE

## 2024-04-04 PROCEDURE — 99999 PR PBB SHADOW E&M-EST. PATIENT-LVL IV: CPT | Mod: PBBFAC,,, | Performed by: INTERNAL MEDICINE

## 2024-04-04 PROCEDURE — 80053 COMPREHEN METABOLIC PANEL: CPT | Performed by: EMERGENCY MEDICINE

## 2024-04-04 PROCEDURE — 4A033BC MEASUREMENT OF ARTERIAL PRESSURE, CORONARY, PERCUTANEOUS APPROACH: ICD-10-PCS | Performed by: INTERNAL MEDICINE

## 2024-04-04 PROCEDURE — 99152 MOD SED SAME PHYS/QHP 5/>YRS: CPT | Performed by: INTERNAL MEDICINE

## 2024-04-04 PROCEDURE — 3044F HG A1C LEVEL LT 7.0%: CPT | Mod: CPTII,S$GLB,, | Performed by: INTERNAL MEDICINE

## 2024-04-04 PROCEDURE — 1111F DSCHRG MED/CURRENT MED MERGE: CPT | Mod: CPTII,S$GLB,, | Performed by: INTERNAL MEDICINE

## 2024-04-04 PROCEDURE — 3078F DIAST BP <80 MM HG: CPT | Mod: CPTII,S$GLB,, | Performed by: INTERNAL MEDICINE

## 2024-04-04 PROCEDURE — 63600175 PHARM REV CODE 636 W HCPCS: Performed by: INTERNAL MEDICINE

## 2024-04-04 PROCEDURE — C9600 PERC DRUG-EL COR STENT SING: HCPCS | Performed by: INTERNAL MEDICINE

## 2024-04-04 PROCEDURE — 85730 THROMBOPLASTIN TIME PARTIAL: CPT | Performed by: EMERGENCY MEDICINE

## 2024-04-04 PROCEDURE — 20000000 HC ICU ROOM

## 2024-04-04 DEVICE — ANGIO-SEAL VIP VASCULAR CLOSURE DEVICE
Type: IMPLANTABLE DEVICE | Site: FEMORAL | Status: FUNCTIONAL
Brand: ANGIO-SEAL

## 2024-04-04 DEVICE — EVEROLIMUS-ELUTING PLATINUM CHROMIUM CORONARY STENT SYSTEM
Type: IMPLANTABLE DEVICE | Site: HEART | Status: FUNCTIONAL
Brand: SYNERGY MEGATRON™

## 2024-04-04 RX ORDER — NIFEDIPINE 30 MG/1
90 TABLET, EXTENDED RELEASE ORAL DAILY
Status: DISCONTINUED | OUTPATIENT
Start: 2024-04-05 | End: 2024-04-05 | Stop reason: HOSPADM

## 2024-04-04 RX ORDER — GLUCAGON 1 MG
1 KIT INJECTION
Status: DISCONTINUED | OUTPATIENT
Start: 2024-04-04 | End: 2024-04-05 | Stop reason: HOSPADM

## 2024-04-04 RX ORDER — MIDAZOLAM HYDROCHLORIDE 1 MG/ML
INJECTION, SOLUTION INTRAMUSCULAR; INTRAVENOUS
Status: DISCONTINUED | OUTPATIENT
Start: 2024-04-04 | End: 2024-04-04 | Stop reason: HOSPADM

## 2024-04-04 RX ORDER — FENTANYL CITRATE 50 UG/ML
INJECTION, SOLUTION INTRAMUSCULAR; INTRAVENOUS
Status: DISCONTINUED | OUTPATIENT
Start: 2024-04-04 | End: 2024-04-04 | Stop reason: HOSPADM

## 2024-04-04 RX ORDER — PANTOPRAZOLE SODIUM 40 MG/1
40 TABLET, DELAYED RELEASE ORAL DAILY
Status: DISCONTINUED | OUTPATIENT
Start: 2024-04-05 | End: 2024-04-05 | Stop reason: HOSPADM

## 2024-04-04 RX ORDER — SODIUM CHLORIDE 9 MG/ML
1000 INJECTION, SOLUTION INTRAVENOUS CONTINUOUS
Status: ACTIVE | OUTPATIENT
Start: 2024-04-04 | End: 2024-04-05

## 2024-04-04 RX ORDER — TIROFIBAN HYDROCHLORIDE 50 UG/ML
0.07 INJECTION INTRAVENOUS CONTINUOUS
Status: ACTIVE | OUTPATIENT
Start: 2024-04-04 | End: 2024-04-05

## 2024-04-04 RX ORDER — ATORVASTATIN CALCIUM 40 MG/1
40 TABLET, FILM COATED ORAL DAILY
Status: DISCONTINUED | OUTPATIENT
Start: 2024-04-05 | End: 2024-04-05 | Stop reason: HOSPADM

## 2024-04-04 RX ORDER — IBUPROFEN 200 MG
24 TABLET ORAL
Status: DISCONTINUED | OUTPATIENT
Start: 2024-04-04 | End: 2024-04-05 | Stop reason: HOSPADM

## 2024-04-04 RX ORDER — SODIUM CHLORIDE 9 MG/ML
1000 INJECTION, SOLUTION INTRAVENOUS CONTINUOUS
Status: DISCONTINUED | OUTPATIENT
Start: 2024-04-04 | End: 2024-04-04

## 2024-04-04 RX ORDER — METOPROLOL SUCCINATE 50 MG/1
50 TABLET, EXTENDED RELEASE ORAL DAILY
Status: DISCONTINUED | OUTPATIENT
Start: 2024-04-05 | End: 2024-04-05 | Stop reason: HOSPADM

## 2024-04-04 RX ORDER — PHENYLEPHRINE HYDROCHLORIDE 10 MG/ML
INJECTION INTRAVENOUS
Status: DISCONTINUED | OUTPATIENT
Start: 2024-04-04 | End: 2024-04-04 | Stop reason: HOSPADM

## 2024-04-04 RX ORDER — ONDANSETRON 8 MG/1
8 TABLET, ORALLY DISINTEGRATING ORAL EVERY 8 HOURS PRN
Status: DISCONTINUED | OUTPATIENT
Start: 2024-04-04 | End: 2024-04-05 | Stop reason: HOSPADM

## 2024-04-04 RX ORDER — ASPIRIN 325 MG
325 TABLET, DELAYED RELEASE (ENTERIC COATED) ORAL DAILY
Status: DISCONTINUED | OUTPATIENT
Start: 2024-04-05 | End: 2024-04-05 | Stop reason: HOSPADM

## 2024-04-04 RX ORDER — METHOCARBAMOL 500 MG/1
500 TABLET, FILM COATED ORAL 4 TIMES DAILY PRN
Status: DISCONTINUED | OUTPATIENT
Start: 2024-04-04 | End: 2024-04-05 | Stop reason: HOSPADM

## 2024-04-04 RX ORDER — SODIUM NITROPRUSSIDE 25 MG/ML
INJECTION INTRAVENOUS
Status: DISCONTINUED | OUTPATIENT
Start: 2024-04-04 | End: 2024-04-04 | Stop reason: HOSPADM

## 2024-04-04 RX ORDER — NITROGLYCERIN 40 MG/1
1 PATCH TRANSDERMAL DAILY
Status: DISCONTINUED | OUTPATIENT
Start: 2024-04-05 | End: 2024-04-05 | Stop reason: HOSPADM

## 2024-04-04 RX ORDER — CEFAZOLIN SODIUM 1 G/3ML
INJECTION, POWDER, FOR SOLUTION INTRAMUSCULAR; INTRAVENOUS
Status: DISCONTINUED | OUTPATIENT
Start: 2024-04-04 | End: 2024-04-04 | Stop reason: HOSPADM

## 2024-04-04 RX ORDER — NITROGLYCERIN 0.4 MG/1
0.4 TABLET SUBLINGUAL EVERY 5 MIN PRN
Status: DISCONTINUED | OUTPATIENT
Start: 2024-04-04 | End: 2024-04-05 | Stop reason: HOSPADM

## 2024-04-04 RX ORDER — ACETAMINOPHEN 325 MG/1
650 TABLET ORAL EVERY 4 HOURS PRN
Status: DISCONTINUED | OUTPATIENT
Start: 2024-04-04 | End: 2024-04-05 | Stop reason: HOSPADM

## 2024-04-04 RX ORDER — IBUPROFEN 200 MG
16 TABLET ORAL
Status: DISCONTINUED | OUTPATIENT
Start: 2024-04-04 | End: 2024-04-05 | Stop reason: HOSPADM

## 2024-04-04 RX ORDER — SODIUM CHLORIDE 9 MG/ML
1000 INJECTION, SOLUTION INTRAVENOUS
Status: COMPLETED | OUTPATIENT
Start: 2024-04-04 | End: 2024-04-04

## 2024-04-04 RX ORDER — DIPHENHYDRAMINE HYDROCHLORIDE 50 MG/ML
INJECTION INTRAMUSCULAR; INTRAVENOUS
Status: DISCONTINUED | OUTPATIENT
Start: 2024-04-04 | End: 2024-04-04 | Stop reason: HOSPADM

## 2024-04-04 RX ORDER — LISINOPRIL 20 MG/1
40 TABLET ORAL DAILY
Status: DISCONTINUED | OUTPATIENT
Start: 2024-04-05 | End: 2024-04-05 | Stop reason: HOSPADM

## 2024-04-04 RX ORDER — MUPIROCIN 20 MG/G
OINTMENT TOPICAL 2 TIMES DAILY
Status: DISCONTINUED | OUTPATIENT
Start: 2024-04-04 | End: 2024-04-05 | Stop reason: HOSPADM

## 2024-04-04 RX ORDER — HEPARIN SODIUM,PORCINE/D5W 25000/250
0-40 INTRAVENOUS SOLUTION INTRAVENOUS CONTINUOUS
Status: DISCONTINUED | OUTPATIENT
Start: 2024-04-04 | End: 2024-04-04

## 2024-04-04 RX ORDER — TIROFIBAN HYDROCHLORIDE 50 UG/ML
INJECTION INTRAVENOUS
Status: DISCONTINUED | OUTPATIENT
Start: 2024-04-04 | End: 2024-04-04

## 2024-04-04 RX ORDER — NITROGLYCERIN 5 MG/ML
INJECTION, SOLUTION INTRAVENOUS
Status: DISCONTINUED | OUTPATIENT
Start: 2024-04-04 | End: 2024-04-04 | Stop reason: HOSPADM

## 2024-04-04 RX ORDER — INSULIN ASPART 100 [IU]/ML
0-10 INJECTION, SOLUTION INTRAVENOUS; SUBCUTANEOUS
Status: DISCONTINUED | OUTPATIENT
Start: 2024-04-04 | End: 2024-04-05 | Stop reason: HOSPADM

## 2024-04-04 RX ORDER — HEPARIN SODIUM 1000 [USP'U]/ML
INJECTION INTRAVENOUS; SUBCUTANEOUS
Status: DISCONTINUED | OUTPATIENT
Start: 2024-04-04 | End: 2024-04-04 | Stop reason: HOSPADM

## 2024-04-04 RX ORDER — RANOLAZINE 500 MG/1
1000 TABLET, EXTENDED RELEASE ORAL 2 TIMES DAILY
Status: DISCONTINUED | OUTPATIENT
Start: 2024-04-04 | End: 2024-04-05 | Stop reason: HOSPADM

## 2024-04-04 RX ADMIN — RANOLAZINE 1000 MG: 500 TABLET, EXTENDED RELEASE ORAL at 08:04

## 2024-04-04 RX ADMIN — SODIUM CHLORIDE 1000 ML: 9 INJECTION, SOLUTION INTRAVENOUS at 03:04

## 2024-04-04 RX ADMIN — MUPIROCIN: 20 OINTMENT TOPICAL at 08:04

## 2024-04-04 RX ADMIN — SODIUM CHLORIDE 1000 ML: 9 INJECTION, SOLUTION INTRAVENOUS at 09:04

## 2024-04-04 RX ADMIN — HEPARIN SODIUM 12 UNITS/KG/HR: 10000 INJECTION, SOLUTION INTRAVENOUS at 03:04

## 2024-04-04 NOTE — HPI
Mr. Paulino is a 70 through a mail history of hypertension, hyperlipidemia, history of coronary artery disease with a CABG in the past who presented to the ER from cardiology clinic due to unstable angina.  Patient went to the cath lab.  He was found to have a SVG to PDA subtotal ulcerated lesion that was treated with a stent.  He was now in ICU.  He denies any chest pain or any other complaints.  Patient states that he has been having chest pain for some time and bee in ER twice.  He is currently on Tirofiban . He denies fever cough shortness of breath.

## 2024-04-04 NOTE — ED PROVIDER NOTES
SCRIBE #1 NOTE: I, Jez Rucker, am scribing for, and in the presence of, Domitila aDniel MD. I have scribed the entire note.      History      Chief Complaint   Patient presents with    Chest Pain     Pt. Sent from Dr. Townsend office for chest pain.        Review of patient's allergies indicates:  No Known Allergies     HPI   HPI    4/4/2024, 3:35 PM   History obtained from the patient      History of Present Illness: Deniz Paulino is a 73 y.o. male patient who presents to the Emergency Department for chest pain, onset 2 days PTA. Pt was referred to the ED by Dr. Bennett (Cardiology) after EKG in clinic showed possible acute STEMI. Symptoms are intermittent and moderate in severity; pt denies any pain at present. No mitigating or exacerbating factors reported. No associated sxs reported. Patient denies any fever, chills, n/v/d, SOB, weakness, numbness, dizziness, headache, and all other sxs at this time. Pt had placed an NTG patch placed on his chest PTA. No further complaints or concerns at this time.     Arrival mode: Personal vehicle    PCP: Hiro Kraft MD       Past Medical History:  Past Medical History:   Diagnosis Date    Acute coronary syndrome     Coronary artery disease     Diabetes mellitus 04/24/2014    Effort angina 04/30/2023    Hyperlipidemia     Hypertension     Old MI (myocardial infarction) 04/24/2014    S/P CABG (coronary artery bypass graft) 04/24/2014       Past Surgical History:  Past Surgical History:   Procedure Laterality Date    CARDIAC CATHETERIZATION      COLONOSCOPY N/A 07/26/2019    Procedure: COLONOSCOPY;  Surgeon: Opal Oshea MD;  Location: [facility] ENDO;  Service: Endoscopy;  Laterality: N/A;    CORONARY ARTERY BYPASS GRAFT      CORONARY BYPASS GRAFT ANGIOGRAPHY  5/1/2023    Procedure: Bypass graft study;  Surgeon: Kimberly Bennett MD;  Location: [facility] CATH LAB;  Service: Cardiology;;    ESOPHAGOGASTRODUODENOSCOPY N/A 07/26/2019    Procedure: ESOPHAGOGASTRODUODENOSCOPY (EGD);   Surgeon: Opal Oshea MD;  Location: [facility] ENDO;  Service: Endoscopy;  Laterality: N/A;    INSTANTANEOUS WAVE-FREE RATIO (IFR) N/A 2024    Procedure: Instantaneous Wave-Free Ratio (IFR);  Surgeon: Kimberly Bennett MD;  Location: [facility] CATH LAB;  Service: Cardiology;  Laterality: N/A;    LEFT HEART CATHETERIZATION Left 2023    Procedure: Left heart cath;  Surgeon: Kimberly Bennett MD;  Location: [facility] CATH LAB;  Service: Cardiology;  Laterality: Left;    LEFT HEART CATHETERIZATION Left 2024    Procedure: Left heart cath;  Surgeon: Kimberly Bennett MD;  Location: [facility] CATH LAB;  Service: Cardiology;  Laterality: Left;    PROTECTION, CORONARY, FILTER  2024    Procedure: Protection, Coronary, Filter;  Surgeon: Kimberly Bennett MD;  Location: [facility] CATH LAB;  Service: Cardiology;;    PTCA, SINGLE VESSEL  2024    Procedure: PTCA, Single Vessel;  Surgeon: Kimberly Bennett MD;  Location: [facility] CATH LAB;  Service: Cardiology;;    SKIN CANCER EXCISION  2018    STENT, DRUG ELUTING, SINGLE VESSEL, CORONARY  2024    Procedure: Stent, Drug Eluting, Single Vessel, Coronary;  Surgeon: Kimberly Bennett MD;  Location: [facility] CATH LAB;  Service: Cardiology;;    VASECTOMY           Family History:  Family History   Problem Relation Age of Onset    Diabetes Mother     Heart murmur Mother     Cancer Mother         Breast    Stroke Mother     Alcohol abuse Father     Heart attack Father 63    Cancer Father         Esophageal    Diabetes Brother        Social History:  Social History     Tobacco Use    Smoking status: Former     Current packs/day: 0.00     Average packs/day: 1 pack/day for 30.0 years (30.0 ttl pk-yrs)     Types: Cigarettes     Start date: 1973     Quit date: 2003     Years since quittin.1    Smokeless tobacco: Former   Substance and Sexual Activity    Alcohol use: Yes     Alcohol/week: 16.0 standard drinks of alcohol     Types: 10 Glasses of wine, 6 Cans of beer per week     Comment:  socially    Drug use: Never    Sexual activity: Not Currently     Partners: Female       ROS   Review of Systems   Constitutional:  Negative for chills and fever.   HENT:  Negative for sore throat.    Respiratory:  Negative for shortness of breath.    Cardiovascular:  Positive for chest pain (intermittent, none at present).   Gastrointestinal:  Negative for diarrhea, nausea and vomiting.   Genitourinary:  Negative for dysuria.   Musculoskeletal:  Negative for back pain.   Skin:  Negative for rash.   Neurological:  Negative for dizziness, weakness, numbness and headaches.   Hematological:  Does not bruise/bleed easily.   All other systems reviewed and are negative.    Physical Exam      Initial Vitals [04/04/24 1526]   BP Pulse Resp Temp SpO2   118/74 84 18 98.4 °F (36.9 °C) 100 %      MAP       --          Physical Exam  Nursing Notes and Vital Signs Reviewed.  Constitutional: Patient is in no acute distress. Well-developed and well-nourished.  Head: Atraumatic. Normocephalic.  Eyes: PERRL. EOM intact. Conjunctivae are not pale. No scleral icterus.  ENT: Mucous membranes are moist. Oropharynx is clear and symmetric.    Neck: Supple. Full ROM.   Cardiovascular: Regular rate. Regular rhythm. No murmurs, rubs, or gallops. Distal pulses are 2+ and symmetric.  Pulmonary/Chest: No respiratory distress. Clear to auscultation bilaterally. No wheezing or rales.  Abdominal: Soft and non-distended.  There is no tenderness.  No rebound, guarding, or rigidity.   Musculoskeletal: Moves all extremities. No obvious deformities. No edema.  Skin: Warm and dry.  Neurological:  Alert, awake, and appropriate.  Normal speech.  No acute focal neurological deficits are appreciated.  Psychiatric: Normal affect. Good eye contact. Appropriate in content.    ED Course    Critical Care    Date/Time: 4/4/2024 3:00 PM    Performed by: Domitila Daniel MD  Authorized by: Leonardo Tony MD  Direct patient critical care time: 35  minutes  Additional history critical care time: 8 minutes  Ordering / reviewing critical care time: 6 minutes  Documentation critical care time: 6 minutes  Consulting other physicians critical care time: 8 minutes  Total critical care time (exclusive of procedural time) : 63 minutes  Critical care was necessary to treat or prevent imminent or life-threatening deterioration of the following conditions: cardiac failure (unstable angina, ACS).  Critical care was time spent personally by me on the following activities: blood draw for specimens, development of treatment plan with patient or surrogate, discussions with consultants, interpretation of cardiac output measurements, evaluation of patient's response to treatment, examination of patient, obtaining history from patient or surrogate, ordering and performing treatments and interventions, ordering and review of laboratory studies, ordering and review of radiographic studies, pulse oximetry, re-evaluation of patient's condition and review of old charts.        ED Vital Signs:  Vitals:    04/04/24 2045 04/04/24 2100 04/04/24 2200 04/04/24 2300   BP: 126/81 132/80 130/80 131/80   Pulse: 69 70 67 60   Resp: 16 17 12 11   Temp:    97.7 °F (36.5 °C)   TempSrc:    Oral   SpO2: 96% 97% 96% 96%   Weight:        04/05/24 0000 04/05/24 0100 04/05/24 0200 04/05/24 0300   BP: 135/83 129/78 134/81 134/80   Pulse: 65 60 (!) 54 (!) 54   Resp: (!) 23 12 15 16   Temp:    97.6 °F (36.4 °C)   TempSrc:    Oral   SpO2: 98% 97% 98% 96%   Weight:        04/05/24 0400 04/05/24 0500 04/05/24 0600 04/05/24 0700   BP: (!) 144/79 (!) 154/90 (!) 150/92 136/83   Pulse: (!) 56 81 76 61   Resp: 15 19 (!) 21 11   Temp:       TempSrc:       SpO2: 97% 96% 98% 98%   Weight:   97.9 kg (215 lb 13.3 oz)     04/05/24 0715 04/05/24 0800 04/05/24 0900   BP:  127/76 (!) 142/82   Pulse:  69 70   Resp:  (!) 25 17   Temp: 98.2 °F (36.8 °C)     TempSrc: Oral     SpO2:  98% 97%   Weight:          Abnormal Lab  Results:  Labs Reviewed   CBC W/ AUTO DIFFERENTIAL - Abnormal; Notable for the following components:       Result Value    RBC 3.91 (*)     Hemoglobin 12.9 (*)     Hematocrit 36.3 (*)     MCH 33.0 (*)     All other components within normal limits   PROTIME-INR   APTT        All Lab Results:  Results for orders placed or performed during the hospital encounter of 04/04/24   CBC auto differential   Result Value Ref Range    WBC 8.22 3.90 - 12.70 K/uL    RBC 3.91 (L) 4.60 - 6.20 M/uL    Hemoglobin 12.9 (L) 14.0 - 18.0 g/dL    Hematocrit 36.3 (L) 40.0 - 54.0 %    MCV 93 82 - 98 fL    MCH 33.0 (H) 27.0 - 31.0 pg    MCHC 35.5 32.0 - 36.0 g/dL    RDW 12.2 11.5 - 14.5 %    Platelets 190 150 - 450 K/uL    MPV 9.9 9.2 - 12.9 fL    Immature Granulocytes 0.2 0.0 - 0.5 %    Gran # (ANC) 5.6 1.8 - 7.7 K/uL    Immature Grans (Abs) 0.02 0.00 - 0.04 K/uL    Lymph # 1.8 1.0 - 4.8 K/uL    Mono # 0.7 0.3 - 1.0 K/uL    Eos # 0.1 0.0 - 0.5 K/uL    Baso # 0.03 0.00 - 0.20 K/uL    nRBC 0 0 /100 WBC    Gran % 68.2 38.0 - 73.0 %    Lymph % 22.0 18.0 - 48.0 %    Mono % 8.0 4.0 - 15.0 %    Eosinophil % 1.2 0.0 - 8.0 %    Basophil % 0.4 0.0 - 1.9 %    Differential Method Automated    Comprehensive metabolic panel   Result Value Ref Range    Sodium 140 136 - 145 mmol/L    Potassium 3.8 3.5 - 5.1 mmol/L    Chloride 102 95 - 110 mmol/L    CO2 22 (L) 23 - 29 mmol/L    Glucose 112 (H) 70 - 110 mg/dL    BUN 24 (H) 8 - 23 mg/dL    Creatinine 1.3 0.5 - 1.4 mg/dL    Calcium 10.0 8.7 - 10.5 mg/dL    Total Protein 7.2 6.0 - 8.4 g/dL    Albumin 4.2 3.5 - 5.2 g/dL    Total Bilirubin 0.6 0.1 - 1.0 mg/dL    Alkaline Phosphatase 68 55 - 135 U/L    AST 25 10 - 40 U/L    ALT 13 10 - 44 U/L    eGFR 58 (A) >60 mL/min/1.73 m^2    Anion Gap 16 8 - 16 mmol/L   Troponin I #1   Result Value Ref Range    Troponin I 0.960 (H) 0.000 - 0.026 ng/mL   BNP   Result Value Ref Range     (H) 0 - 99 pg/mL   Protime-INR   Result Value Ref Range    Prothrombin Time 11.3 9.0  - 12.5 sec    INR 1.0 0.8 - 1.2   APTT   Result Value Ref Range    aPTT 28.6 21.0 - 32.0 sec   Troponin I in 6 hours   Result Value Ref Range    Troponin I 3.040 (H) 0.000 - 0.026 ng/mL   CBC auto differential   Result Value Ref Range    WBC 6.47 3.90 - 12.70 K/uL    RBC 3.50 (L) 4.60 - 6.20 M/uL    Hemoglobin 11.4 (L) 14.0 - 18.0 g/dL    Hematocrit 31.7 (L) 40.0 - 54.0 %    MCV 91 82 - 98 fL    MCH 32.6 (H) 27.0 - 31.0 pg    MCHC 36.0 32.0 - 36.0 g/dL    RDW 12.1 11.5 - 14.5 %    Platelets 170 150 - 450 K/uL    MPV 9.9 9.2 - 12.9 fL    Immature Granulocytes 0.3 0.0 - 0.5 %    Gran # (ANC) 4.9 1.8 - 7.7 K/uL    Immature Grans (Abs) 0.02 0.00 - 0.04 K/uL    Lymph # 0.9 (L) 1.0 - 4.8 K/uL    Mono # 0.6 0.3 - 1.0 K/uL    Eos # 0.0 0.0 - 0.5 K/uL    Baso # 0.02 0.00 - 0.20 K/uL    nRBC 0 0 /100 WBC    Gran % 76.0 (H) 38.0 - 73.0 %    Lymph % 13.8 (L) 18.0 - 48.0 %    Mono % 9.0 4.0 - 15.0 %    Eosinophil % 0.6 0.0 - 8.0 %    Basophil % 0.3 0.0 - 1.9 %    Differential Method Automated    Basic metabolic panel   Result Value Ref Range    Sodium 139 136 - 145 mmol/L    Potassium 3.6 3.5 - 5.1 mmol/L    Chloride 105 95 - 110 mmol/L    CO2 23 23 - 29 mmol/L    Glucose 100 70 - 110 mg/dL    BUN 14 8 - 23 mg/dL    Creatinine 1.0 0.5 - 1.4 mg/dL    Calcium 9.1 8.7 - 10.5 mg/dL    Anion Gap 11 8 - 16 mmol/L    eGFR >60 >60 mL/min/1.73 m^2   CBC auto differential   Result Value Ref Range    WBC 6.47 3.90 - 12.70 K/uL    RBC 3.50 (L) 4.60 - 6.20 M/uL    Hemoglobin 11.4 (L) 14.0 - 18.0 g/dL    Hematocrit 31.7 (L) 40.0 - 54.0 %    MCV 91 82 - 98 fL    MCH 32.6 (H) 27.0 - 31.0 pg    MCHC 36.0 32.0 - 36.0 g/dL    RDW 12.1 11.5 - 14.5 %    Platelets 170 150 - 450 K/uL    MPV 9.9 9.2 - 12.9 fL    Immature Granulocytes 0.3 0.0 - 0.5 %    Gran # (ANC) 4.9 1.8 - 7.7 K/uL    Immature Grans (Abs) 0.02 0.00 - 0.04 K/uL    Lymph # 0.9 (L) 1.0 - 4.8 K/uL    Mono # 0.6 0.3 - 1.0 K/uL    Eos # 0.0 0.0 - 0.5 K/uL    Baso # 0.02 0.00 - 0.20 K/uL     nRBC 0 0 /100 WBC    Gran % 76.0 (H) 38.0 - 73.0 %    Lymph % 13.8 (L) 18.0 - 48.0 %    Mono % 9.0 4.0 - 15.0 %    Eosinophil % 0.6 0.0 - 8.0 %    Basophil % 0.3 0.0 - 1.9 %    Differential Method Automated    Troponin I in a.m.   Result Value Ref Range    Troponin I 3.374 (H) 0.000 - 0.026 ng/mL   CBC Auto Differential   Result Value Ref Range    WBC 6.47 3.90 - 12.70 K/uL    RBC 3.50 (L) 4.60 - 6.20 M/uL    Hemoglobin 11.4 (L) 14.0 - 18.0 g/dL    Hematocrit 31.7 (L) 40.0 - 54.0 %    MCV 91 82 - 98 fL    MCH 32.6 (H) 27.0 - 31.0 pg    MCHC 36.0 32.0 - 36.0 g/dL    RDW 12.1 11.5 - 14.5 %    Platelets 170 150 - 450 K/uL    MPV 9.9 9.2 - 12.9 fL    Immature Granulocytes 0.3 0.0 - 0.5 %    Gran # (ANC) 4.9 1.8 - 7.7 K/uL    Immature Grans (Abs) 0.02 0.00 - 0.04 K/uL    Lymph # 0.9 (L) 1.0 - 4.8 K/uL    Mono # 0.6 0.3 - 1.0 K/uL    Eos # 0.0 0.0 - 0.5 K/uL    Baso # 0.02 0.00 - 0.20 K/uL    nRBC 0 0 /100 WBC    Gran % 76.0 (H) 38.0 - 73.0 %    Lymph % 13.8 (L) 18.0 - 48.0 %    Mono % 9.0 4.0 - 15.0 %    Eosinophil % 0.6 0.0 - 8.0 %    Basophil % 0.3 0.0 - 1.9 %    Differential Method Automated    Comprehensive Metabolic Panel   Result Value Ref Range    Sodium 139 136 - 145 mmol/L    Potassium 3.6 3.5 - 5.1 mmol/L    Chloride 105 95 - 110 mmol/L    CO2 23 23 - 29 mmol/L    Glucose 100 70 - 110 mg/dL    BUN 14 8 - 23 mg/dL    Creatinine 1.0 0.5 - 1.4 mg/dL    Calcium 9.1 8.7 - 10.5 mg/dL    Total Protein 6.0 6.0 - 8.4 g/dL    Albumin 3.5 3.5 - 5.2 g/dL    Total Bilirubin 0.6 0.1 - 1.0 mg/dL    Alkaline Phosphatase 57 55 - 135 U/L    AST 26 10 - 40 U/L    ALT 13 10 - 44 U/L    eGFR >60 >60 mL/min/1.73 m^2    Anion Gap 11 8 - 16 mmol/L   Magnesium   Result Value Ref Range    Magnesium 1.7 1.6 - 2.6 mg/dL   Phosphorus   Result Value Ref Range    Phosphorus 3.5 2.7 - 4.5 mg/dL   EKG 12-lead   Result Value Ref Range    QRS Duration 98 ms    [facility] QTC Calculation 480 ms   EKG 12-LEAD on arrival to floor   Result Value Ref Range     QRS Duration 80 ms    [facility] QTC Calculation 481 ms   EKG 12-LEAD starting tomorrow   Result Value Ref Range    QRS Duration 78 ms    [facility] QTC Calculation 462 ms   Cardiac catheterization   Result Value Ref Range    Cath EF Quantitative 55 %   ISTAT ACT-K   Result Value Ref Range    POC ACTIVATED CLOTTING TIME K 293 (H) 74 - 137 sec    Sample unknown    ISTAT ACT-K   Result Value Ref Range    POC ACTIVATED CLOTTING TIME K 250 (H) 74 - 137 sec    Sample unknown    POCT glucose   Result Value Ref Range    POCT Glucose 93 70 - 110 mg/dL   POCT glucose   Result Value Ref Range    POCT Glucose 112 (H) 70 - 110 mg/dL     Imaging Results:  Imaging Results              X-Ray Chest AP Portable (Final result)  Result time 04/04/24 16:06:18      Final result by Pk Franks MD (04/04/24 16:06:18)                   Impression:      No acute process seen.      Electronically signed by: Pk Franks MD  Date:    04/04/2024  Time:    16:06               Narrative:    EXAMINATION:  XR CHEST AP PORTABLE    CLINICAL HISTORY:  Chest Pain;    FINDINGS:  Single view of the chest.  Comparison 03/31/2024    Cardiac silhouette is normal.  The lungs demonstrate no evidence of active disease.  No evidence of pleural effusion or pneumothorax.  Bones appear intact.  Moderate degenerative changes and moderate atherosclerotic disease.  Sternotomy wires are intact.                                     The EKG was ordered, reviewed, and independently interpreted by the ED provider.  Interpretation time: 15:15  Rate: 75 BPM  Rhythm: Sinus rhythm with occasional PVCs  Interpretation: Low voltage QRS. Inferior infarct, age undetermined. Acute MI/STEMI. Consider right ventricular involvement in acute inferior infarct.           The Emergency Provider reviewed the vital signs and test results, which are outlined above.    ED Discussion     3:40 PM: Discussed pt's case with Dr. Bennett (Cardiology) who recommends starting heparin (bolus and drip).   Sabrina will plan to take the pt to Cath Lab later today. Dr. Bennett agrees with current care and management of pt and accepts admission.   Admitting Service: Interventional Cardiology  Admitting Physician: Dr. Bennett  Admit to: Cath Lab    3:44 PM: Re-evaluated pt. I have discussed test results, shared treatment plan, and the need for admission with patient and family at bedside. Pt and family express understanding at this time and agree with all information. All questions answered. Pt and family have no further questions or concerns at this time. Pt is ready for admit.         ED Medication(s):  Medications   0.9%  NaCl infusion ( Intravenous Stopped 4/5/24 0557)   aspirin EC tablet 325 mg ( Oral Canceled Entry 4/5/24 0900)   atorvastatin tablet 40 mg ( Oral Canceled Entry 4/5/24 0900)   lisinopriL tablet 40 mg ( Oral Canceled Entry 4/5/24 0900)   methocarbamoL tablet 500 mg (has no administration in time range)   metoprolol succinate (TOPROL-XL) 24 hr tablet 50 mg ( Oral Canceled Entry 4/5/24 0900)   NIFEdipine 24 hr tablet 90 mg ( Oral Canceled Entry 4/5/24 0900)   nitroGLYCERIN SL tablet 0.4 mg (has no administration in time range)   nitroGLYCERIN 0.2 mg/hr TD PT24 1 patch ( Transdermal Canceled Entry 4/5/24 0900)   pantoprazole EC tablet 40 mg ( Oral Canceled Entry 4/5/24 0900)   ranolazine 12 hr tablet 1,000 mg ( Oral Canceled Entry 4/5/24 0900)   acetaminophen tablet 650 mg (has no administration in time range)   ondansetron disintegrating tablet 8 mg (has no administration in time range)   ticagrelor tablet 90 mg ( Oral Canceled Entry 4/5/24 0900)   tirofiban 12.5 mg in sodium chloride 0.9% 250 mL infusion (0 mcg/kg/min × 99.9 kg Intravenous Stopped 4/5/24 0557)   glucose chewable tablet 16 g (has no administration in time range)   glucose chewable tablet 24 g (has no administration in time range)   glucagon (human recombinant) injection 1 mg (has no administration in time range)   insulin aspart U-100 pen 0-10  Units (has no administration in time range)   dextrose 10% bolus 125 mL 125 mL (has no administration in time range)   dextrose 10% bolus 250 mL 250 mL (has no administration in time range)   mupirocin 2 % ointment ( Nasal Given 4/4/24 2032)   potassium bicarbonate disintegrating tablet 50 mEq (50 mEq Oral Given 4/5/24 0925)   potassium bicarbonate disintegrating tablet 35 mEq (has no administration in time range)   potassium bicarbonate disintegrating tablet 60 mEq (has no administration in time range)   magnesium oxide tablet 800 mg (800 mg Oral Given 4/5/24 0924)   magnesium oxide tablet 800 mg (has no administration in time range)   heparin 25,000 units in dextrose 5% (100 units/ml) IV bolus from bag LOW INTENSITY nomogram - [facility] (3,990 Units Intravenous Bolus from Bag 4/4/24 1552)   0.9%  NaCl infusion (1,000 mLs Intravenous New Bag 4/4/24 1541)     Discharge Medication List as of 4/5/2024  9:40 AM        START taking these medications    Details   ticagrelor (BRILINTA) 90 mg tablet Take 1 tablet (90 mg total) by mouth 2 (two) times daily., Starting Fri 4/5/2024, Until Sat 4/5/2025, Normal            Follow-up Information       Kimberly Bennett MD Follow up in 1 week(s).    Specialties: Interventional Cardiology, Cardiology  Contact information:  [id number]  [place] [address] [phone]                               Medical Decision Making    Medical Decision Making  DDX: 1. ACS 2. Stent occlusion 3. HTN crisis    Sent from cardiology clinic for admit for emergent heart cath due to concerns for unstable angina, sent from Dr. Bennett's office, Dr. Bennett will be taking patient to the cath lab, patient without active chest pain at this time, ECG reviewed and shows acute ischemic changes in inferior leads, Dr. Bennett aware, not a code stemi, heparin started, lab work reviewed and troponin is 0.9 which is higher than baseline, WBC normal, BUN slightly elevated, creatinine normal, cxr clear, gentle  hydration started, patient taken to cath lab.     Amount and/or Complexity of Data Reviewed  External Data Reviewed: labs, radiology, ECG and notes.     Details: Cardiology clinic notes from today along with recent ER visits.    Labs: ordered. Decision-making details documented in ED Course.  Radiology: ordered. Decision-making details documented in ED Course.  ECG/medicine tests: ordered and independent interpretation performed. Decision-making details documented in ED Course.  Discussion of management or test interpretation with external provider(s): Interventional cardiology    Risk  Prescription drug management.  Decision regarding hospitalization.                Scribe Attestation:   Scribe #1: I performed the above scribed service and the documentation accurately describes the services I performed. I attest to the accuracy of the note.    Attending:   Physician Attestation Statement for Scribe #1: I, Domitila Daniel MD, personally performed the services described in this documentation, as scribed by Jez Rucker, in my presence, and it is both accurate and complete.          Clinical Impression       ICD-10-CM ICD-9-CM   1. Unstable angina  I20.0 411.1   2. Chest pain  R07.9 786.50   3. Abnormal ECG  R94.31 794.31   4. CAD (coronary artery disease)  I25.10 414.00       Disposition:   Disposition: Admitted  Condition: Serious         Domitila Daniel MD  04/05/24 1100

## 2024-04-04 NOTE — ASSESSMENT & PLAN NOTE
"Patient's FSGs are controlled on current medication regimen.  Last A1c reviewed-   Lab Results   Component Value Date    HGBA1C 5.4 03/16/2024     Most recent fingerstick glucose reviewed- No results for input(s): "POCTGLUCOSE" in the last 24 hours.  Current correctional scale  Low    Antihyperglycemics (From admission, onward)      Start     Stop Route Frequency Ordered    04/04/24 1916  insulin aspart U-100 pen 0-10 Units         -- SubQ Before meals & nightly PRN 04/04/24 1816            Sliding scale for now   "

## 2024-04-04 NOTE — SUBJECTIVE & OBJECTIVE
Past Medical History:   Diagnosis Date    Acute coronary syndrome     Coronary artery disease     Diabetes mellitus 04/24/2014    Effort angina 04/30/2023    Hyperlipidemia     Hypertension     Old MI (myocardial infarction) 04/24/2014    S/P CABG (coronary artery bypass graft) 04/24/2014       Past Surgical History:   Procedure Laterality Date    CARDIAC CATHETERIZATION      COLONOSCOPY N/A 07/26/2019    Procedure: COLONOSCOPY;  Surgeon: Opal Oshea MD;  Location: [facility] ENDO;  Service: Endoscopy;  Laterality: N/A;    CORONARY ARTERY BYPASS GRAFT      CORONARY BYPASS GRAFT ANGIOGRAPHY  5/1/2023    Procedure: Bypass graft study;  Surgeon: Kimberly Bennett MD;  Location: [facility] CATH LAB;  Service: Cardiology;;    ESOPHAGOGASTRODUODENOSCOPY N/A 07/26/2019    Procedure: ESOPHAGOGASTRODUODENOSCOPY (EGD);  Surgeon: Opal Oshea MD;  Location: [facility] ENDO;  Service: Endoscopy;  Laterality: N/A;    LEFT HEART CATHETERIZATION Left 5/1/2023    Procedure: Left heart cath;  Surgeon: Kimberly Bennett MD;  Location: [facility] CATH LAB;  Service: Cardiology;  Laterality: Left;    SKIN CANCER EXCISION  09/2018    VASECTOMY         Review of patient's allergies indicates:  No Known Allergies    No current facility-administered medications on file prior to encounter.     Current Outpatient Medications on File Prior to Encounter   Medication Sig    aspirin (ECOTRIN) 325 MG EC tablet Take 325 mg by mouth once daily.    atorvastatin (LIPITOR) 40 MG tablet Take 1 tablet by mouth once daily    coenzyme Q10 200 mg capsule Take 200 mg by mouth 2 (two) times daily.    cyanocobalamin (VITAMIN B-12) 1000 MCG tablet Take 1,000 mcg by mouth once daily.     hydroCHLOROthiazide (HYDRODIURIL) 12.5 MG Tab Take 1 tablet (12.5 mg total) by mouth once daily.    lisinopriL (PRINIVIL,ZESTRIL) 40 MG tablet Take 1 tablet by mouth once daily    LORATADINE (ALAVERT ORAL) Take 1 tablet by mouth as needed.    magnesium oxide (MAG-OX) 400 mg (241.3 mg magnesium)  tablet Take 400 mg by mouth once daily.    metFORMIN (GLUCOPHAGE) 1000 MG tablet Take 1 tablet (1,000 mg total) by mouth 2 (two) times daily with meals.    methocarbamoL (ROBAXIN) 500 MG Tab Take 1 tablet (500 mg total) by mouth 4 (four) times daily as needed (muscle spasm).    metoprolol succinate (TOPROL-XL) 50 MG 24 hr tablet Take 1 tablet by mouth once daily    NIFEdipine (PROCARDIA-XL) 90 MG (OSM) 24 hr tablet Take 1 tablet (90 mg total) by mouth once daily.    nitroGLYCERIN (NITROSTAT) 0.4 MG SL tablet Place 1 tablet (0.4 mg total) under the tongue every 5 (five) minutes as needed for Chest pain.    nitroGLYCERIN 0.2 mg/hr TD PT24 (NITRODUR) 0.2 mg/hr Place 1 patch onto the skin once daily.    pantoprazole (PROTONIX) 20 MG tablet Take 1 tablet (20 mg total) by mouth once daily.    ranolazine (RANEXA) 1,000 mg Tb12 Take 1 tablet (1,000 mg total) by mouth 2 (two) times daily.    vitamin D 1000 units Tab Take 1,000 Units by mouth once daily.      Family History       Problem Relation (Age of Onset)    Alcohol abuse Father    Cancer Mother, Father    Diabetes Mother, Brother    Heart attack Father (63)    Heart murmur Mother    Stroke Mother          Tobacco Use    Smoking status: Former     Current packs/day: 0.00     Average packs/day: 1 pack/day for 30.0 years (30.0 ttl pk-yrs)     Types: Cigarettes     Start date: 1973     Quit date: 2003     Years since quittin.1    Smokeless tobacco: Former   Substance and Sexual Activity    Alcohol use: Yes     Alcohol/week: 16.0 standard drinks of alcohol     Types: 10 Glasses of wine, 6 Cans of beer per week     Comment: socially    Drug use: Never    Sexual activity: Not Currently     Partners: Female     Review of Systems   Constitutional: Positive for malaise/fatigue.   HENT:          Jaw pain     Eyes: Negative.    Cardiovascular:  Positive for chest pain.   Respiratory: Negative.     Endocrine: Negative.    Hematologic/Lymphatic: Negative.    Skin:  Negative.    Musculoskeletal:         L arm pain   Gastrointestinal: Negative.    Genitourinary: Negative.    Neurological: Negative.    Psychiatric/Behavioral: Negative.     Allergic/Immunologic: Negative.      Objective:     Vital Signs (Most Recent):  Temp: 98.4 °F (36.9 °C) (04/04/24 1526)  Pulse: 84 (04/04/24 1526)  Resp: 18 (04/04/24 1526)  BP: 118/74 (04/04/24 1526)  SpO2: 100 % (04/04/24 1526) Vital Signs (24h Range):  Temp:  [98.4 °F (36.9 °C)] 98.4 °F (36.9 °C)  Pulse:  [75-84] 84  Resp:  [18] 18  SpO2:  [97 %-100 %] 100 %  BP: (118-120)/(74-78) 118/74     Weight: 99.9 kg (220 lb 3.8 oz)  Body mass index is 31.6 kg/m².    SpO2: 100 %       No intake or output data in the 24 hours ending 04/04/24 1554    Lines/Drains/Airways       Peripheral Intravenous Line  Duration                  Peripheral IV - Single Lumen 04/04/24 1536 20 G Right Antecubital <1 day         Peripheral IV - Single Lumen 04/04/24 1546 20 G Left;Posterior Hand <1 day                     Physical Exam  Vitals and nursing note reviewed.   Constitutional:       General: He is not in acute distress.     Appearance: Normal appearance. He is well-developed. He is not diaphoretic.   HENT:      Head: Normocephalic and atraumatic.   Eyes:      General:         Right eye: No discharge.         Left eye: No discharge.      Pupils: Pupils are equal, round, and reactive to light.   Cardiovascular:      Rate and Rhythm: Normal rate and regular rhythm.      Heart sounds: Normal heart sounds, S1 normal and S2 normal. No murmur heard.     Comments: Sternotomy site well-healed  Pulmonary:      Effort: Pulmonary effort is normal.   Abdominal:      General: There is no distension.   Musculoskeletal:      Right lower leg: Edema present.      Left lower leg: Edema present.   Skin:     General: Skin is warm and dry.      Findings: No erythema.   Neurological:      General: No focal deficit present.      Mental Status: He is alert and oriented to person,  "place, and time.   Psychiatric:         Mood and Affect: Mood normal.         Behavior: Behavior normal.         Thought Content: Thought content normal.          Significant Labs: CMP No results for input(s): "NA", "K", "CL", "CO2", "GLU", "BUN", "CREATININE", "CALCIUM", "PROT", "ALBUMIN", "BILITOT", "ALKPHOS", "AST", "ALT", "ANIONGAP", "ESTGFRAFRICA", "EGFRNONAA" in the last 48 hours., CBC   Recent Labs   Lab 04/04/24  1535   WBC 8.22   HGB 12.9*   HCT 36.3*      , Troponin No results for input(s): "TROPONINI" in the last 48 hours., and All pertinent lab results from the last 24 hours have been reviewed.    Significant Imaging: Echocardiogram: Transthoracic echo (TTE) complete (Cupid Only):   Results for orders placed or performed during the hospital encounter of 03/16/24   Echo   Result Value Ref Range    BSA 2.24 m2    LVOT stroke volume 69.24 cm3    LVIDd 5.33 3.5 - 6.0 cm    LV Systolic Volume 77.17 mL    LV Systolic Volume Index 35.2 mL/m2    LVIDs 4.17 (A) 2.1 - 4.0 cm    LV Diastolic Volume 136.97 mL    LV Diastolic Volume Index 62.54 mL/m2    IVS 0.82 0.6 - 1.1 cm    LVOT diameter 2.10 cm    LVOT area 3.5 cm2    FS 22 (A) 28 - 44 %    Left Ventricle Relative Wall Thickness 0.36 cm    Posterior Wall 0.95 0.6 - 1.1 cm    LV mass 172.40 g    LV Mass Index 79 g/m2    MV Peak E Akshat 0.90 m/s    TDI LATERAL 0.11 m/s    TDI SEPTAL 0.08 m/s    E/E' ratio 9.47 m/s    MV Peak A Akshat 0.75 m/s    TR Max Akshat 1.40 m/s    E/A ratio 1.20     IVRT 74.22 msec    E wave deceleration time 182.70 msec    LV SEPTAL E/E' RATIO 11.25 m/s    LV LATERAL E/E' RATIO 8.18 m/s    PV Peak S Akshat 0.67 m/s    PV Peak D Akshat 0.47 m/s    Pulm vein S/D ratio 1.43     LVOT peak akshat 0.94 m/s    Left Ventricular Outflow Tract Mean Velocity 0.66 cm/s    Left Ventricular Outflow Tract Mean Gradient 1.95 mmHg    RVDD 4.30 cm    RV S' 11.01 cm/s    RVOT peak VTI 14.9 cm    TAPSE 1.42 cm    LA size 4.69 cm    Left Atrium Minor Axis 6.19 cm    " Left Atrium Major Axis 6.05 cm    RA Major Axis 5.92 cm    AV mean gradient 3 mmHg    AV peak gradient 5 mmHg    Ao peak mickie 1.07 m/s    Ao VTI 26.00 cm    LVOT peak VTI 20.00 cm    AV valve area 2.66 cm²    AV Velocity Ratio 0.88     AV index (prosthetic) 0.77     FATUMA by Velocity Ratio 3.04 cm²    Mr max mickie 5.62 m/s    Triscuspid Valve Regurgitation Peak Gradient 8 mmHg    PV mean gradient 1 mmHg    PV PEAK VELOCITY 0.99 m/s    PV peak gradient 4 mmHg    Pulmonary Valve Mean Velocity 0.78 m/s    RVOT peak mickie 0.57 m/s    Ao root annulus 3.42 cm    STJ 2.95 cm    Ascending aorta 3.51 cm    IVC diameter 2.32 cm    Mean e' 0.10 m/s    ZLVIDS -0.60     ZLVIDD -3.28     LA Volume Index 47.9 mL/m2    LA volume 104.90 cm3    LA WIDTH 4.3 cm    RA Width 3.8 cm    TV resting pulmonary artery pressure 11 mmHg    RV TB RVSP 4 mmHg    Est. RA pres 3 mmHg    Narrative      Left Ventricle: Regional wall motion abnormalities present. There is   mildly reduced systolic function with a visually estimated ejection   fraction of 45 - 50%. There is normal diastolic function.    Right Ventricle: Normal right ventricular cavity size. Wall thickness   is normal. Right ventricle wall motion  is normal. Systolic function is   normal.    Left Atrium: Left atrium is moderately dilated.    Mitral Valve: There is mild to moderate regurgitation with a centrally   directed jet.    Pulmonary Artery: The estimated pulmonary artery systolic pressure is   11 mmHg.    IVC/SVC: Normal venous pressure at 3 mmHg.      and EKG: Reviewed

## 2024-04-04 NOTE — Clinical Note
The catheter was inserted into the SVG TO D1 SEQUENTIAL TO D2. An angiography was performed of the SVG TO D1 SEQUENTIAL TO D2. Multiple views were taken.

## 2024-04-04 NOTE — CONSULTS
[facility] - Emergency Dept.  Cardiology  Consult Note    Patient Name: Deniz Paulino  MRN: [id number]  Admission Date: 4/4/2024  Hospital Length of Stay: 0 days  Code Status: Prior   Attending Provider: Domitila Daniel MD   Consulting Provider: Andie Kennedy PA-C  Primary Care Physician: Hiro Kraft MD  Principal Problem:<principal problem not specified>    Patient information was obtained from patient, spouse/SO, past medical records, and ER records.     Inpatient consult to Cardiology  Consult performed by: Andie Kennedy PA-C  Consult ordered by: Domitila Daniel MD        Subjective:     Chief Complaint:  UA    HPI:   Mr. Paulino is a 73 year old male patient whose current medical conditions include CAD s/p CABG, prior MI, HTN, hyperlipidemia who was sent to [facility] ED from cardiology clinic due to unstable angina symptoms. Patient complained of accelerating anginal symptoms/substernal CP over past two months. Reported recently pain was even occurring at rest and radiating to his jaw and left arm. He endorsed active chest pain while being seen by Dr. Bennett today, prompting ED visit. Labs/workup in process at time of exam. EKG with biphasic T waves inferiorly. [facility] planned today.    Past Medical History:   Diagnosis Date    Acute coronary syndrome     Coronary artery disease     Diabetes mellitus 04/24/2014    Effort angina 04/30/2023    Hyperlipidemia     Hypertension     Old MI (myocardial infarction) 04/24/2014    S/P CABG (coronary artery bypass graft) 04/24/2014       Past Surgical History:   Procedure Laterality Date    CARDIAC CATHETERIZATION      COLONOSCOPY N/A 07/26/2019    Procedure: COLONOSCOPY;  Surgeon: Opal Oshea MD;  Location: [facility] ENDO;  Service: Endoscopy;  Laterality: N/A;    CORONARY ARTERY BYPASS GRAFT      CORONARY BYPASS GRAFT ANGIOGRAPHY  5/1/2023    Procedure: Bypass graft study;  Surgeon: Kimberly Bennett MD;  Location: [facility] CATH LAB;  Service: Cardiology;;     ESOPHAGOGASTRODUODENOSCOPY N/A 07/26/2019    Procedure: ESOPHAGOGASTRODUODENOSCOPY (EGD);  Surgeon: Opal Oshea MD;  Location: [facility] ENDO;  Service: Endoscopy;  Laterality: N/A;    LEFT HEART CATHETERIZATION Left 5/1/2023    Procedure: Left heart cath;  Surgeon: Kimberly Bennett MD;  Location: [facility] CATH LAB;  Service: Cardiology;  Laterality: Left;    SKIN CANCER EXCISION  09/2018    VASECTOMY         Review of patient's allergies indicates:  No Known Allergies    No current facility-administered medications on file prior to encounter.     Current Outpatient Medications on File Prior to Encounter   Medication Sig    aspirin (ECOTRIN) 325 MG EC tablet Take 325 mg by mouth once daily.    atorvastatin (LIPITOR) 40 MG tablet Take 1 tablet by mouth once daily    coenzyme Q10 200 mg capsule Take 200 mg by mouth 2 (two) times daily.    cyanocobalamin (VITAMIN B-12) 1000 MCG tablet Take 1,000 mcg by mouth once daily.     hydroCHLOROthiazide (HYDRODIURIL) 12.5 MG Tab Take 1 tablet (12.5 mg total) by mouth once daily.    lisinopriL (PRINIVIL,ZESTRIL) 40 MG tablet Take 1 tablet by mouth once daily    LORATADINE (ALAVERT ORAL) Take 1 tablet by mouth as needed.    magnesium oxide (MAG-OX) 400 mg (241.3 mg magnesium) tablet Take 400 mg by mouth once daily.    metFORMIN (GLUCOPHAGE) 1000 MG tablet Take 1 tablet (1,000 mg total) by mouth 2 (two) times daily with meals.    methocarbamoL (ROBAXIN) 500 MG Tab Take 1 tablet (500 mg total) by mouth 4 (four) times daily as needed (muscle spasm).    metoprolol succinate (TOPROL-XL) 50 MG 24 hr tablet Take 1 tablet by mouth once daily    NIFEdipine (PROCARDIA-XL) 90 MG (OSM) 24 hr tablet Take 1 tablet (90 mg total) by mouth once daily.    nitroGLYCERIN (NITROSTAT) 0.4 MG SL tablet Place 1 tablet (0.4 mg total) under the tongue every 5 (five) minutes as needed for Chest pain.    nitroGLYCERIN 0.2 mg/hr TD PT24 (NITRODUR) 0.2 mg/hr Place 1 patch onto the skin once daily.    pantoprazole  (PROTONIX) 20 MG tablet Take 1 tablet (20 mg total) by mouth once daily.    ranolazine (RANEXA) 1,000 mg Tb12 Take 1 tablet (1,000 mg total) by mouth 2 (two) times daily.    vitamin D 1000 units Tab Take 1,000 Units by mouth once daily.      Family History       Problem Relation (Age of Onset)    Alcohol abuse Father    Cancer Mother, Father    Diabetes Mother, Brother    Heart attack Father (63)    Heart murmur Mother    Stroke Mother          Tobacco Use    Smoking status: Former     Current packs/day: 0.00     Average packs/day: 1 pack/day for 30.0 years (30.0 ttl pk-yrs)     Types: Cigarettes     Start date: 1973     Quit date: 2003     Years since quittin.1    Smokeless tobacco: Former   Substance and Sexual Activity    Alcohol use: Yes     Alcohol/week: 16.0 standard drinks of alcohol     Types: 10 Glasses of wine, 6 Cans of beer per week     Comment: socially    Drug use: Never    Sexual activity: Not Currently     Partners: Female     Review of Systems   Constitutional: Positive for malaise/fatigue.   HENT:          Jaw pain     Eyes: Negative.    Cardiovascular:  Positive for chest pain.   Respiratory: Negative.     Endocrine: Negative.    Hematologic/Lymphatic: Negative.    Skin: Negative.    Musculoskeletal:         L arm pain   Gastrointestinal: Negative.    Genitourinary: Negative.    Neurological: Negative.    Psychiatric/Behavioral: Negative.     Allergic/Immunologic: Negative.      Objective:     Vital Signs (Most Recent):  Temp: 98.4 °F (36.9 °C) (24 1526)  Pulse: 84 (24 1526)  Resp: 18 (24 1526)  BP: 118/74 (24 1526)  SpO2: 100 % (24 1526) Vital Signs (24h Range):  Temp:  [98.4 °F (36.9 °C)] 98.4 °F (36.9 °C)  Pulse:  [75-84] 84  Resp:  [18] 18  SpO2:  [97 %-100 %] 100 %  BP: (118-120)/(74-78) 118/74     Weight: 99.9 kg (220 lb 3.8 oz)  Body mass index is 31.6 kg/m².    SpO2: 100 %       No intake or output data in the 24 hours ending 24  "1554    Lines/Drains/Airways       Peripheral Intravenous Line  Duration                  Peripheral IV - Single Lumen 04/04/24 1536 20 G Right Antecubital <1 day         Peripheral IV - Single Lumen 04/04/24 1546 20 G Left;Posterior Hand <1 day                     Physical Exam  Vitals and nursing note reviewed.   Constitutional:       General: He is not in acute distress.     Appearance: Normal appearance. He is well-developed. He is not diaphoretic.   HENT:      Head: Normocephalic and atraumatic.   Eyes:      General:         Right eye: No discharge.         Left eye: No discharge.      Pupils: Pupils are equal, round, and reactive to light.   Cardiovascular:      Rate and Rhythm: Normal rate and regular rhythm.      Heart sounds: Normal heart sounds, S1 normal and S2 normal. No murmur heard.     Comments: Sternotomy site well-healed  Pulmonary:      Effort: Pulmonary effort is normal.   Abdominal:      General: There is no distension.   Musculoskeletal:      Right lower leg: Edema present.      Left lower leg: Edema present.   Skin:     General: Skin is warm and dry.      Findings: No erythema.   Neurological:      General: No focal deficit present.      Mental Status: He is alert and oriented to person, place, and time.   Psychiatric:         Mood and Affect: Mood normal.         Behavior: Behavior normal.         Thought Content: Thought content normal.          Significant Labs: CMP No results for input(s): "NA", "K", "CL", "CO2", "GLU", "BUN", "CREATININE", "CALCIUM", "PROT", "ALBUMIN", "BILITOT", "ALKPHOS", "AST", "ALT", "ANIONGAP", "ESTGFRAFRICA", "EGFRNONAA" in the last 48 hours., CBC   Recent Labs   Lab 04/04/24  1535   WBC 8.22   HGB 12.9*   HCT 36.3*      , Troponin No results for input(s): "TROPONINI" in the last 48 hours., and All pertinent lab results from the last 24 hours have been reviewed.    Significant Imaging: Echocardiogram: Transthoracic echo (TTE) complete (Cupid Only):   Results " for orders placed or performed during the hospital encounter of 03/16/24   Echo   Result Value Ref Range    BSA 2.24 m2    LVOT stroke volume 69.24 cm3    LVIDd 5.33 3.5 - 6.0 cm    LV Systolic Volume 77.17 mL    LV Systolic Volume Index 35.2 mL/m2    LVIDs 4.17 (A) 2.1 - 4.0 cm    LV Diastolic Volume 136.97 mL    LV Diastolic Volume Index 62.54 mL/m2    IVS 0.82 0.6 - 1.1 cm    LVOT diameter 2.10 cm    LVOT area 3.5 cm2    FS 22 (A) 28 - 44 %    Left Ventricle Relative Wall Thickness 0.36 cm    Posterior Wall 0.95 0.6 - 1.1 cm    LV mass 172.40 g    LV Mass Index 79 g/m2    MV Peak E Akshat 0.90 m/s    TDI LATERAL 0.11 m/s    TDI SEPTAL 0.08 m/s    E/E' ratio 9.47 m/s    MV Peak A Akshat 0.75 m/s    TR Max Akshat 1.40 m/s    E/A ratio 1.20     IVRT 74.22 msec    E wave deceleration time 182.70 msec    LV SEPTAL E/E' RATIO 11.25 m/s    LV LATERAL E/E' RATIO 8.18 m/s    PV Peak S Akshat 0.67 m/s    PV Peak D Akshat 0.47 m/s    Pulm vein S/D ratio 1.43     LVOT peak akshat 0.94 m/s    Left Ventricular Outflow Tract Mean Velocity 0.66 cm/s    Left Ventricular Outflow Tract Mean Gradient 1.95 mmHg    RVDD 4.30 cm    RV S' 11.01 cm/s    RVOT peak VTI 14.9 cm    TAPSE 1.42 cm    LA size 4.69 cm    Left Atrium Minor Axis 6.19 cm    Left Atrium Major Axis 6.05 cm    RA Major Axis 5.92 cm    AV mean gradient 3 mmHg    AV peak gradient 5 mmHg    Ao peak akshat 1.07 m/s    Ao VTI 26.00 cm    LVOT peak VTI 20.00 cm    AV valve area 2.66 cm²    AV Velocity Ratio 0.88     AV index (prosthetic) 0.77     FATUMA by Velocity Ratio 3.04 cm²    Mr max akshat 5.62 m/s    Triscuspid Valve Regurgitation Peak Gradient 8 mmHg    PV mean gradient 1 mmHg    PV PEAK VELOCITY 0.99 m/s    PV peak gradient 4 mmHg    Pulmonary Valve Mean Velocity 0.78 m/s    RVOT peak akshat 0.57 m/s    Ao root annulus 3.42 cm    STJ 2.95 cm    Ascending aorta 3.51 cm    IVC diameter 2.32 cm    Mean e' 0.10 m/s    ZLVIDS -0.60     ZLVIDD -3.28     LA Volume Index 47.9 mL/m2    LA volume 104.90  cm3    LA WIDTH 4.3 cm    RA Width 3.8 cm    TV resting pulmonary artery pressure 11 mmHg    RV TB RVSP 4 mmHg    Est. RA pres 3 mmHg    Narrative      Left Ventricle: Regional wall motion abnormalities present. There is   mildly reduced systolic function with a visually estimated ejection   fraction of 45 - 50%. There is normal diastolic function.    Right Ventricle: Normal right ventricular cavity size. Wall thickness   is normal. Right ventricle wall motion  is normal. Systolic function is   normal.    Left Atrium: Left atrium is moderately dilated.    Mitral Valve: There is mild to moderate regurgitation with a centrally   directed jet.    Pulmonary Artery: The estimated pulmonary artery systolic pressure is   11 mmHg.    IVC/SVC: Normal venous pressure at 3 mmHg.      and EKG: Reviewed  Assessment and Plan:   Patient with UA, +CP while at office visit today. Continue OMT. Urgent LHC by Dr. Bennett today, further rec's to follow.    Type 2 diabetes mellitus without complication, without long-term current use of insulin  -Per primary team    S/P CABG (coronary artery bypass graft)  -See plan under UA    Hypertension associated with diabetes  -Continue home meds    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 Bennett, further rec's to follow        VTE Risk Mitigation (From admission, onward)           Ordered     heparin 25,000 units in dextrose 5% (100 units/ml) IV bolus from bag LOW INTENSITY nomogram - [facility]  As needed (PRN)        Question:  Heparin Infusion Adjustment (DO NOT MODIFY ANSWER)  Answer:  [email or web address]\Images\Pharmacy\HeparinInfusions\heparin LOW INTENSITY nomogram for [facility] [id number]     04/04/24 1533     heparin 25,000 units in dextrose 5% (100 units/ml) IV bolus from bag LOW INTENSITY nomogram - [facility]  As needed (PRN)        Question:  Heparin Infusion Adjustment (DO NOT MODIFY ANSWER)  Answer:  [email or web address]\Images\Pharmacy\HeparinInfusions\heparin LOW INTENSITY nomogram for [facility] [id number]    04/04/24 1533     heparin 25,000 units in dextrose 5% 250 mL (100 units/mL) infusion LOW INTENSITY nomogram - [facility]  Continuous        Question:  Begin at (units/kg/hr)  Answer:  12    04/04/24 1534                    Thank you for your consult. I will follow-up with patient. Please contact us if you have any additional questions.    Andie Kennedy PA-C  Cardiology   [facility] - Emergency Dept.

## 2024-04-04 NOTE — PROGRESS NOTES
Subjective:   Patient ID:  Deniz Paulino is a 73 y.o. male who presents for follow up of No chief complaint on file.      HPI  11/16/2020  A 70 YO MALE WITH CAD S/P CABG OLD MI HTN DIABETES HLP IS HERE FOR F/U . HE HAS NOT BEEN EXERCISING CLAIMS COMPLIANCE. HAD COVID MILD CASE NO ILL EFFECTS IN AUGUST HAS NO NEW CHEST PAIN SHORTNESS OF BREATH ORTHOPNEA PND. HE IS COMPLIANT WITH SALT. HE HA SNO OTHER CARDIOVASCULAR COMPLAINTS. NO CHANGE IN WEIGHT. A1C 6% LDL ON TARGET. HIS A1C IS INCREASED.      7/14/2021  HERE FROF /U NO NEW SYMPTOMS HE HAS NOT BEEN EXERCISES HE TRIES TO BE COMPLIANT WITH DIET WEIGHT UNCHANGED HAS NO CARDIOVASCULAR SYMPTOMS.      12/2/2021    has  been eating lately for the holidays has been on the sedentary side takes meds regularily asymptomatic cardiovascular wise.      7/12/2022  Here for f /u has rt upper extremity numbness at nite at times has h/o carpal tunnel surgery no associated weakness .has no angina no shortness of breath had nose bleed that stopped he is taking his asa . tries to be compliant with diet.has numbness in feet.         12/12/22  Sees Dr. Bennett in clinic   Comes in with wife  Comes in with chest pain, 1.5 months ago, comes in rest sitting or laying flat  Not on exertion, lasts 10-15 sec  Denies SOB, diaphoresis, nauseous, palpitations   Does not exercise regularly - has gained 6 lbs since 7/22  Stopped smoking 20 years ago  BP elevated today, a little elevated on digtial medicine recently      4vCABG was 20 years ago     1/19/2023  Here for f/u . He has meds switched. His pain resolved after nifedipine. He has normal lvf by echo cardiolite negative for ischemia his bp control impoprved. Has no shortness of breath no leg swelling      4/20/2023   Here for 3 months f/u his bp control improved he ahs less chest pain he feels a minor cramp in the middle opf chest similar to his previous presentation but less intense priro to cabg. He is compliant with salt intake and meds.  He does not carry nitro s/l.      His cabg  lima to lad svg sequential to d1 d2   Svg to pda    His anatomy showed non obs cx occluded lad and rca (12/2009)     I am concerned that he ahs progressed his lcx disease and that hsi stress test did not show the ischemia      5/18/2023  Here forf /u heart cath showed evidence of multiple sequential stenosis in the svg to d1 and d2 was treated medically with ranexa and nitrates, HAS NO FURTHER ANGINA. HAS NO COMPLAINTS AT THE ACCESS SITE. HE HAS NO DIZZINESS NO LIGHTHEADEDNESS      12/21/2023  He has not been doing much of activity he gets short of breath. He has used nitro s/l three times since last visit in may. Has no nocturnal symptoms EKG has no new ischemic changes. He is getting fatigue takes naps during the day wears a nose strip has no other issues clinically. Not interested in sleep study.he attributes it to him being bored       4/4/2024   Has been having a lot of chest pain requiring er visit x2  continues using nitro for control he is very limited with activity to few steps he has  basically lost his lifestyle.his troponin is elevated consistently now   He cannot recline or lie on his side the chest pain gets worse it radiates to jaw and arm . He slept on his recliner yesterday he is still having arm pain . He is having chest pain now   Past Medical History:   Diagnosis Date    Acute coronary syndrome     Coronary artery disease     Diabetes mellitus 04/24/2014    Effort angina 04/30/2023    Hyperlipidemia     Hypertension     Old MI (myocardial infarction) 04/24/2014    S/P CABG (coronary artery bypass graft) 04/24/2014       Past Surgical History:   Procedure Laterality Date    CARDIAC CATHETERIZATION      COLONOSCOPY N/A 07/26/2019    Procedure: COLONOSCOPY;  Surgeon: Opal Oshea MD;  Location: [facility];  Service: Endoscopy;  Laterality: N/A;    CORONARY ARTERY BYPASS GRAFT      CORONARY BYPASS GRAFT ANGIOGRAPHY  5/1/2023    Procedure: Bypass graft  study;  Surgeon: Kimberly Bennett MD;  Location: [facility] CATH LAB;  Service: Cardiology;;    ESOPHAGOGASTRODUODENOSCOPY N/A 2019    Procedure: ESOPHAGOGASTRODUODENOSCOPY (EGD);  Surgeon: Opal Oshea MD;  Location: [facility] ENDO;  Service: Endoscopy;  Laterality: N/A;    LEFT HEART CATHETERIZATION Left 2023    Procedure: Left heart cath;  Surgeon: Kimberly Bennett MD;  Location: [facility] CATH LAB;  Service: Cardiology;  Laterality: Left;    SKIN CANCER EXCISION  2018    VASECTOMY         Social History     Tobacco Use    Smoking status: Former     Current packs/day: 0.00     Average packs/day: 1 pack/day for 30.0 years (30.0 ttl pk-yrs)     Types: Cigarettes     Start date: 1973     Quit date: 2003     Years since quittin.1    Smokeless tobacco: Former   Substance Use Topics    Alcohol use: Yes     Alcohol/week: 16.0 standard drinks of alcohol     Types: 10 Glasses of wine, 6 Cans of beer per week     Comment: socially    Drug use: Never       Family History   Problem Relation Age of Onset    Diabetes Mother     Heart murmur Mother     Cancer Mother         Breast    Stroke Mother     Alcohol abuse Father     Heart attack Father 63    Cancer Father         Esophageal    Diabetes Brother        Current Outpatient Medications   Medication Sig    aspirin (ECOTRIN) 325 MG EC tablet Take 325 mg by mouth once daily.    atorvastatin (LIPITOR) 40 MG tablet Take 1 tablet by mouth once daily    coenzyme Q10 200 mg capsule Take 200 mg by mouth 2 (two) times daily.    cyanocobalamin (VITAMIN B-12) 1000 MCG tablet Take 1,000 mcg by mouth once daily.     hydroCHLOROthiazide (HYDRODIURIL) 12.5 MG Tab Take 1 tablet (12.5 mg total) by mouth once daily.    lisinopriL (PRINIVIL,ZESTRIL) 40 MG tablet Take 1 tablet by mouth once daily    LORATADINE (ALAVERT ORAL) Take 1 tablet by mouth as needed.    magnesium oxide (MAG-OX) 400 mg (241.3 mg magnesium) tablet Take 400 mg by mouth once daily.    metFORMIN (GLUCOPHAGE)  1000 MG tablet Take 1 tablet (1,000 mg total) by mouth 2 (two) times daily with meals.    methocarbamoL (ROBAXIN) 500 MG Tab Take 1 tablet (500 mg total) by mouth 4 (four) times daily as needed (muscle spasm).    metoprolol succinate (TOPROL-XL) 50 MG 24 hr tablet Take 1 tablet by mouth once daily    NIFEdipine (PROCARDIA-XL) 90 MG (OSM) 24 hr tablet Take 1 tablet (90 mg total) by mouth once daily.    nitroGLYCERIN (NITROSTAT) 0.4 MG SL tablet Place 1 tablet (0.4 mg total) under the tongue every 5 (five) minutes as needed for Chest pain.    nitroGLYCERIN 0.2 mg/hr TD PT24 (NITRODUR) 0.2 mg/hr Place 1 patch onto the skin once daily.    pantoprazole (PROTONIX) 20 MG tablet Take 1 tablet (20 mg total) by mouth once daily.    ranolazine (RANEXA) 1,000 mg Tb12 Take 1 tablet (1,000 mg total) by mouth 2 (two) times daily.    vitamin D 1000 units Tab Take 1,000 Units by mouth once daily.      No current facility-administered medications for this visit.     Current Outpatient Medications on File Prior to Visit   Medication Sig    aspirin (ECOTRIN) 325 MG EC tablet Take 325 mg by mouth once daily.    atorvastatin (LIPITOR) 40 MG tablet Take 1 tablet by mouth once daily    coenzyme Q10 200 mg capsule Take 200 mg by mouth 2 (two) times daily.    cyanocobalamin (VITAMIN B-12) 1000 MCG tablet Take 1,000 mcg by mouth once daily.     hydroCHLOROthiazide (HYDRODIURIL) 12.5 MG Tab Take 1 tablet (12.5 mg total) by mouth once daily.    lisinopriL (PRINIVIL,ZESTRIL) 40 MG tablet Take 1 tablet by mouth once daily    LORATADINE (ALAVERT ORAL) Take 1 tablet by mouth as needed.    magnesium oxide (MAG-OX) 400 mg (241.3 mg magnesium) tablet Take 400 mg by mouth once daily.    metFORMIN (GLUCOPHAGE) 1000 MG tablet Take 1 tablet (1,000 mg total) by mouth 2 (two) times daily with meals.    methocarbamoL (ROBAXIN) 500 MG Tab Take 1 tablet (500 mg total) by mouth 4 (four) times daily as needed (muscle spasm).    metoprolol succinate (TOPROL-XL)  50 MG 24 hr tablet Take 1 tablet by mouth once daily    NIFEdipine (PROCARDIA-XL) 90 MG (OSM) 24 hr tablet Take 1 tablet (90 mg total) by mouth once daily.    nitroGLYCERIN (NITROSTAT) 0.4 MG SL tablet Place 1 tablet (0.4 mg total) under the tongue every 5 (five) minutes as needed for Chest pain.    nitroGLYCERIN 0.2 mg/hr TD PT24 (NITRODUR) 0.2 mg/hr Place 1 patch onto the skin once daily.    pantoprazole (PROTONIX) 20 MG tablet Take 1 tablet (20 mg total) by mouth once daily.    ranolazine (RANEXA) 1,000 mg Tb12 Take 1 tablet (1,000 mg total) by mouth 2 (two) times daily.    vitamin D 1000 units Tab Take 1,000 Units by mouth once daily.      No current facility-administered medications on file prior to visit.     Review of patient's allergies indicates:  No Known Allergies   Review of Systems   Constitutional: Negative for malaise/fatigue.   Eyes:  Negative for blurred vision.   Cardiovascular:  Positive for chest pain. Negative for claudication, cyanosis, dyspnea on exertion, irregular heartbeat, leg swelling, near-syncope, orthopnea, palpitations and paroxysmal nocturnal dyspnea.   Respiratory:  Positive for shortness of breath. Negative for cough and hemoptysis.    Hematologic/Lymphatic: Negative for bleeding problem. Does not bruise/bleed easily.   Skin:  Negative for dry skin and itching.   Musculoskeletal:  Negative for falls, muscle weakness and myalgias.   Gastrointestinal:  Negative for abdominal pain, diarrhea, heartburn, hematemesis, hematochezia and melena.   Genitourinary:  Negative for flank pain and hematuria.   Neurological:  Negative for dizziness, focal weakness, headaches, light-headedness, numbness, paresthesias, seizures and weakness.   Psychiatric/Behavioral:  Negative for altered mental status and memory loss. The patient is not nervous/anxious.    Allergic/Immunologic: Negative for hives.       Objective:   Physical Exam  Vitals and nursing note reviewed.   Constitutional:       General: He  is not in acute distress.     Appearance: He is well-developed. He is not diaphoretic.   HENT:      Head: Normocephalic and atraumatic.   Eyes:      General:         Right eye: No discharge.         Left eye: No discharge.      Pupils: Pupils are equal, round, and reactive to light.   Neck:      Thyroid: No thyromegaly.      Vascular: No JVD.   Cardiovascular:      Rate and Rhythm: Normal rate and regular rhythm.      Pulses: Intact distal pulses.      Heart sounds: Normal heart sounds. No murmur heard.     No friction rub. No gallop.   Pulmonary:      Effort: Pulmonary effort is normal. No respiratory distress.      Breath sounds: Normal breath sounds. No wheezing or rales.      Comments: Scar well healed.   Chest:      Chest wall: No tenderness.   Abdominal:      General: Bowel sounds are normal. There is no distension.      Palpations: Abdomen is soft.      Tenderness: There is no abdominal tenderness.   Musculoskeletal:         General: Normal range of motion.      Cervical back: Neck supple.      Right lower leg: No edema.      Left lower leg: No edema.   Skin:     General: Skin is warm and dry.      Findings: No erythema or rash.   Neurological:      General: No focal deficit present.      Mental Status: He is alert and oriented to person, place, and time.      Cranial Nerves: No cranial nerve deficit.   Psychiatric:         Mood and Affect: Mood normal.         Behavior: Behavior normal.       Vitals:    04/04/24 1402 04/04/24 1405   BP: 118/78 120/78   BP Location: Left arm Right arm   Patient Position: Sitting Sitting   Pulse: 75    SpO2: 97%    Weight: 99.8 kg (220 lb 0.3 oz)      Lab Results   Component Value Date    CHOL 118 (L) 03/16/2024    CHOL 133 11/10/2023    CHOL 131 11/14/2022      Body mass index is 31.57 kg/m².   Lab Results   Component Value Date    HGBA1C 5.4 03/16/2024      BMP  Lab Results   Component Value Date     03/31/2024    K 4.3 03/31/2024     03/31/2024    CO2 19 (L)  03/31/2024    BUN 23 03/31/2024    CREATININE 1.2 03/31/2024    CALCIUM 9.8 03/31/2024    ANIONGAP 18 (H) 03/31/2024    EGFRNORACEVR >60 03/31/2024      Lab Results   Component Value Date    HDL 48 03/16/2024    HDL 53 11/10/2023    HDL 52 11/14/2022     Lab Results   Component Value Date    LDLCALC 48.4 (L) 03/16/2024    LDLCALC 53.8 (L) 11/10/2023    LDLCALC 58.0 (L) 11/14/2022     Lab Results   Component Value Date    TRIG 108 03/16/2024    TRIG 131 11/10/2023    TRIG 105 11/14/2022     Lab Results   Component Value Date    CHOLHDL 40.7 03/16/2024    CHOLHDL 39.8 11/10/2023    CHOLHDL 39.7 11/14/2022       Chemistry        Component Value Date/Time     03/31/2024 0326    K 4.3 03/31/2024 0326     03/31/2024 0326    CO2 19 (L) 03/31/2024 0326    BUN 23 03/31/2024 0326    CREATININE 1.2 03/31/2024 0326    GLU 88 03/31/2024 0326        Component Value Date/Time    CALCIUM 9.8 03/31/2024 0326    ALKPHOS 84 03/31/2024 0326    AST 18 03/31/2024 0326    ALT 18 03/31/2024 0326    BILITOT 0.6 03/31/2024 0326    ESTGFRAFRICA >60.0 05/04/2022 0806    ESTGFRAFRICA >60.0 05/04/2022 0806    EGFRNONAA >60.0 05/04/2022 0806    EGFRNONAA >60.0 05/04/2022 0806          Lab Results   Component Value Date    TSH 1.516 11/10/2023     Lab Results   Component Value Date    INR 1.0 03/31/2024    INR 0.9 03/16/2024    INR 1.1 04/20/2023     Lab Results   Component Value Date    WBC 6.97 03/31/2024    HGB 12.9 (L) 03/31/2024    HCT 36.4 (L) 03/31/2024    MCV 94 03/31/2024     03/31/2024     BMP  Sodium   Date Value Ref Range Status   03/31/2024 140 136 - 145 mmol/L Final     Potassium   Date Value Ref Range Status   03/31/2024 4.3 3.5 - 5.1 mmol/L Final     Chloride   Date Value Ref Range Status   03/31/2024 103 95 - 110 mmol/L Final     CO2   Date Value Ref Range Status   03/31/2024 19 (L) 23 - 29 mmol/L Final     BUN   Date Value Ref Range Status   03/31/2024 23 8 - 23 mg/dL Final     Creatinine   Date Value Ref Range  Status   03/31/2024 1.2 0.5 - 1.4 mg/dL Final     Calcium   Date Value Ref Range Status   03/31/2024 9.8 8.7 - 10.5 mg/dL Final     Anion Gap   Date Value Ref Range Status   03/31/2024 18 (H) 8 - 16 mmol/L Final     eGFR if    Date Value Ref Range Status   05/04/2022 >60.0 >60 mL/min/1.73 m^2 Final   05/04/2022 >60.0 >60 mL/min/1.73 m^2 Final     eGFR if non    Date Value Ref Range Status   05/04/2022 >60.0 >60 mL/min/1.73 m^2 Final     Comment:     Calculation used to obtain the estimated glomerular filtration  rate (eGFR) is the CKD-EPI equation.      05/04/2022 >60.0 >60 mL/min/1.73 m^2 Final     Comment:     Calculation used to obtain the estimated glomerular filtration  rate (eGFR) is the CKD-EPI equation.        Estimated Creatinine Clearance: 64.9 mL/min (based on SCr of 1.2 mg/dL).    Conclusion         There are two significant perfusion abnormalities.    Perfusion Abnormality #1 - There is a moderate intensity, fixed perfusion abnormality consistent with scar in the apical  wall(s).    Perfusion Abnormality #2 - There is a mild to moderate intensity, fixed perfusion abnormality consistent with scar in the basal to mid inferoseptal wall(s).    The gated perfusion images showed an ejection fraction of 65% at rest. The gated perfusion images showed an ejection fraction of 50% post stress. Normal ejection fraction is greater than 59%.    There is moderate apical hypokinesis at rest and stress.    LV cavity size is normal at rest and normal at stress.    The ECG portion of the study is negative for ischemia.     Assessment:     1. Coronary artery disease involving native coronary artery of native heart with unstable angina pectoris    2. Hypertension associated with diabetes    3. Hyperlipidemia associated with type 2 diabetes mellitus    4. S/P CABG (coronary artery bypass graft)    5. Old MI (myocardial infarction)    6. Type 2 diabetes mellitus without complication, without  long-term current use of insulin    7. Effort angina    8. Troponin level elevated    Patient has unstable angina with continous angina AND  rest pain will get him admitted and proceed with urgent lhc/ptca.   Discussed with patient and wife and er physician DR HERRERA .HAS BIPHASIC T WAVE INFERIORELY.     Plan:   EMERGENT LHC PATIENT WENT TO ER.  I have explained the risks, benefits , and alternatives of the procedure in detail.the patient voices understanding and all questions have been answered.the patient agrees to proceed as planned.

## 2024-04-04 NOTE — NURSING
Pt arrived to ICU bed 14 in NAD s/p heart cath. R groin access site CDI without evidence of hematoma formation. IV fluids and aggrastat infusing to PIV. No complaints of chest pain. Educated pt on bedrest until 2100 per cath lab RN. Supine in reverse trendelenburg.

## 2024-04-04 NOTE — ASSESSMENT & PLAN NOTE
-Patient presents with accelerating anginal symptoms over past two months  -Substernal tightness/pressure now occurring even at rest with radiation to his jaw and left arm  -+symptoms at office visit today  -Continue OMT-ASA, statin, BB, CCB, nitrates, Ranexa  -LHC today by Dr. Bennett, further rec's to follow

## 2024-04-04 NOTE — SUBJECTIVE & OBJECTIVE
Past Medical History:   Diagnosis Date    Acute coronary syndrome     Coronary artery disease     Diabetes mellitus 2014    Effort angina 2023    Hyperlipidemia     Hypertension     Old MI (myocardial infarction) 2014    S/P CABG (coronary artery bypass graft) 2014       Past Surgical History:   Procedure Laterality Date    CARDIAC CATHETERIZATION      COLONOSCOPY N/A 2019    Procedure: COLONOSCOPY;  Surgeon: Opal Oshea MD;  Location: [facility] ENDO;  Service: Endoscopy;  Laterality: N/A;    CORONARY ARTERY BYPASS GRAFT      CORONARY BYPASS GRAFT ANGIOGRAPHY  2023    Procedure: Bypass graft study;  Surgeon: Kimberly Bennett MD;  Location: [facility] CATH LAB;  Service: Cardiology;;    ESOPHAGOGASTRODUODENOSCOPY N/A 2019    Procedure: ESOPHAGOGASTRODUODENOSCOPY (EGD);  Surgeon: Opal Oshea MD;  Location: [facility] ENDO;  Service: Endoscopy;  Laterality: N/A;    LEFT HEART CATHETERIZATION Left 2023    Procedure: Left heart cath;  Surgeon: Kimberly Bennett MD;  Location: [facility] CATH LAB;  Service: Cardiology;  Laterality: Left;    SKIN CANCER EXCISION  2018    VASECTOMY         Review of patient's allergies indicates:  No Known Allergies    Family History       Problem Relation (Age of Onset)    Alcohol abuse Father    Cancer Mother, Father    Diabetes Mother, Brother    Heart attack Father (63)    Heart murmur Mother    Stroke Mother          Tobacco Use    Smoking status: Former     Current packs/day: 0.00     Average packs/day: 1 pack/day for 30.0 years (30.0 ttl pk-yrs)     Types: Cigarettes     Start date: 1973     Quit date: 2003     Years since quittin.1    Smokeless tobacco: Former   Substance and Sexual Activity    Alcohol use: Yes     Alcohol/week: 16.0 standard drinks of alcohol     Types: 10 Glasses of wine, 6 Cans of beer per week     Comment: socially    Drug use: Never    Sexual activity: Not Currently     Partners: Female         Review of Systems    Constitutional: Negative.    HENT: Negative.     Respiratory: Negative.     Cardiovascular:  Positive for leg swelling.   Gastrointestinal: Negative.    Genitourinary: Negative.    Musculoskeletal: Negative.    Neurological:         Headache while lying flat   Many years     Objective:     Vital Signs (Most Recent):  Temp: 97.6 °F (36.4 °C) (04/04/24 1828)  Pulse: 69 (04/04/24 1830)  Resp: 15 (04/04/24 1830)  BP: 125/81 (04/04/24 1830)  SpO2: 96 % (04/04/24 1830) Vital Signs (24h Range):  Temp:  [97.6 °F (36.4 °C)-98.4 °F (36.9 °C)] 97.6 °F (36.4 °C)  Pulse:  [69-84] 69  Resp:  [13-18] 15  SpO2:  [96 %-100 %] 96 %  BP: (118-131)/(74-81) 125/81     Weight: 99.9 kg (220 lb 3.8 oz)  Body mass index is 31.6 kg/m².      Intake/Output Summary (Last 24 hours) at 4/4/2024 1853  Last data filed at 4/4/2024 1827  Gross per 24 hour   Intake --   Output 300 ml   Net -300 ml        Physical Exam  Vitals and nursing note reviewed.   Constitutional:       General: He is not in acute distress.  HENT:      Head: Normocephalic and atraumatic.   Eyes:      General:         Right eye: No discharge.         Left eye: No discharge.   Cardiovascular:      Rate and Rhythm: Normal rate and regular rhythm.      Heart sounds: No murmur heard.  Pulmonary:      Effort: No respiratory distress.      Breath sounds: No wheezing or rales.   Abdominal:      General: There is no distension.      Palpations: Abdomen is soft.   Musculoskeletal:         General: Swelling present. No tenderness.      Cervical back: Neck supple. No rigidity.   Neurological:      General: No focal deficit present.      Mental Status: He is alert and oriented to person, place, and time.          Vents:       Lines/Drains/Airways       Peripheral Intravenous Line  Duration                  Peripheral IV - Single Lumen 04/04/24 1536 20 G Right Antecubital <1 day         Peripheral IV - Single Lumen 04/04/24 1546 20 G Left;Posterior Hand <1 day                    Significant  Labs:    CBC/Anemia Profile:  Recent Labs   Lab 04/04/24  1535   WBC 8.22   HGB 12.9*   HCT 36.3*      MCV 93   RDW 12.2        Chemistries:  Recent Labs   Lab 04/04/24  1535      K 3.8      CO2 22*   BUN 24*   CREATININE 1.3   CALCIUM 10.0   ALBUMIN 4.2   PROT 7.2   BILITOT 0.6   ALKPHOS 68   ALT 13   AST 25       All pertinent labs within the past 24 hours have been reviewed.    Significant Imaging:   I have reviewed all pertinent imaging results/findings within the past 24 hours.

## 2024-04-04 NOTE — OP NOTE
INPATIENT Operative Note         SUMMARY     Surgery Date: 4/4/2024     Surgeon(s) and Role:     * Kimberly Bennett MD - Primary    ASSISTANT:none    Pre-op Diagnosis:  Unstable angina [I20.0]      Post-op Diagnosis:  Unstable angina [I20.0]    Procedure(s) (LRB):  Left heart cath (Left)  Protection, Coronary, Filter  Stent, Drug Eluting, Single Vessel, Coronary  PTCA, Single Vessel  Instantaneous Wave-Free Ratio (IFR) (N/A)    COMPLICATION:none    Anesthesia: RN IV Sedation    Findings/Key Components:  Svg to pda subtotal ulcerated lesion with slow flow treated with spider filter protection and stent.no complication.  Svg to d1 d2 subtotal timi1 flow small diffusely diseaqsed.  Lcx 50% normal ifr.  Ef 55% inferior akinesis.  Estimated Blood Loss: < 50 ML.         SPECIMEN: NONE    Devices/Prostetics: megatron 4.5x32     PLAN:   Routine post stent care

## 2024-04-04 NOTE — HPI
The patient is a 74 yo male with CAD s/p CABG 2014, prior MI, HTN, HLD, DM who was sent to ED from cardiology clinic due to chest pain with concerns for unstable angina symptoms. Patient complained of worsening CP over past two months. Reported recently pain was even occurring at rest and radiating to his jaw and left arm. He endorsed active chest pain while being seen by Dr. Bennett today, prompting ED visit. Labs/workup in process at time of exam. EKG with biphasic T waves inferiorly. LHC planned today.

## 2024-04-04 NOTE — Clinical Note
The catheter was repositioned into the LIMA to LAD. An angiography was performed of the LIMA to LAD. Multiple views were taken.

## 2024-04-04 NOTE — HPI
Mr. Paulino is a 73 year old male patient whose current medical conditions include CAD s/p CABG, prior MI, HTN, hyperlipidemia who was sent to [facility] ED from cardiology clinic due to unstable angina symptoms. Patient complained of accelerating anginal symptoms/substernal CP over past two months. Reported recently pain was even occurring at rest and radiating to his jaw and left arm. He endorsed active chest pain while being seen by Dr. Bennett today, prompting ED visit. Labs/workup in process at time of exam. [facility] planned today.

## 2024-04-04 NOTE — Clinical Note
The catheter was inserted into the ostial SVG graft. An angiography was performed of the right coronary arteries. Multiple views were taken.

## 2024-04-04 NOTE — ASSESSMENT & PLAN NOTE
Patient with history of CABG in the past.  Patient did have a stent placed to an old graft site.  He was currently on tirofiban   He was on aspirin, statin , beta-blocker and ticagrelor as well.  Plan per Cardiology.

## 2024-04-04 NOTE — CONSULTS
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 Paulino is a 70 through a mail history of hypertension, hyperlipidemia, history of coronary artery disease with a CABG in the past who presented to the ER from cardiology clinic due to unstable angina.  Patient went to the cath lab.  He was found to have a SVG to PDA subtotal ulcerated lesion that was treated with a stent.  He was now in ICU.  He denies any chest pain or any other complaints.  Patient states that he has been having chest pain for some time and bee in ER twice.  He is currently on Tirofiban . He denies fever cough shortness of breath.    Hospital/ICU Course:  No notes on file    Past Medical History:   Diagnosis Date    Acute coronary syndrome     Coronary artery disease     Diabetes mellitus 04/24/2014    Effort angina 04/30/2023    Hyperlipidemia     Hypertension     Old MI (myocardial infarction) 04/24/2014    S/P CABG (coronary artery bypass graft) 04/24/2014       Past Surgical History:   Procedure Laterality Date    CARDIAC CATHETERIZATION      COLONOSCOPY N/A 07/26/2019    Procedure: COLONOSCOPY;  Surgeon: Opal Oshea MD;  Location: [facility] ENDO;  Service: Endoscopy;  Laterality: N/A;    CORONARY ARTERY BYPASS GRAFT      CORONARY BYPASS GRAFT ANGIOGRAPHY  5/1/2023    Procedure: Bypass graft study;  Surgeon: Kimberly Bennett MD;  Location: [facility] CATH LAB;  Service: Cardiology;;    ESOPHAGOGASTRODUODENOSCOPY N/A 07/26/2019    Procedure: ESOPHAGOGASTRODUODENOSCOPY (EGD);  Surgeon: Opal Oshea MD;  Location: [facility] ENDO;  Service: Endoscopy;  Laterality: N/A;    LEFT HEART  CATHETERIZATION Left 2023    Procedure: Left heart cath;  Surgeon: Kimberly Bennett MD;  Location: [facility] CATH LAB;  Service: Cardiology;  Laterality: Left;    SKIN CANCER EXCISION  2018    VASECTOMY         Review of patient's allergies indicates:  No Known Allergies    Family History       Problem Relation (Age of Onset)    Alcohol abuse Father    Cancer Mother, Father    Diabetes Mother, Brother    Heart attack Father (63)    Heart murmur Mother    Stroke Mother          Tobacco Use    Smoking status: Former     Current packs/day: 0.00     Average packs/day: 1 pack/day for 30.0 years (30.0 ttl pk-yrs)     Types: Cigarettes     Start date: 1973     Quit date: 2003     Years since quittin.1    Smokeless tobacco: Former   Substance and Sexual Activity    Alcohol use: Yes     Alcohol/week: 16.0 standard drinks of alcohol     Types: 10 Glasses of wine, 6 Cans of beer per week     Comment: socially    Drug use: Never    Sexual activity: Not Currently     Partners: Female         Review of Systems   Constitutional: Negative.    HENT: Negative.     Respiratory: Negative.     Cardiovascular:  Positive for leg swelling.   Gastrointestinal: Negative.    Genitourinary: Negative.    Musculoskeletal: Negative.    Neurological:         Headache while lying flat   Many years     Objective:     Vital Signs (Most Recent):  Temp: 97.6 °F (36.4 °C) (24)  Pulse: 69 (24)  Resp: 15 (24)  BP: 125/81 (24)  SpO2: 96 % (24) Vital Signs (24h Range):  Temp:  [97.6 °F (36.4 °C)-98.4 °F (36.9 °C)] 97.6 °F (36.4 °C)  Pulse:  [69-84] 69  Resp:  [13-18] 15  SpO2:  [96 %-100 %] 96 %  BP: (118-131)/(74-81) 125/81     Weight: 99.9 kg (220 lb 3.8 oz)  Body mass index is 31.6 kg/m².      Intake/Output Summary (Last 24 hours) at 2024 1853  Last data filed at 2024 1827  Gross per 24 hour   Intake --   Output 300 ml   Net -300 ml        Physical Exam  Vitals and nursing note  "reviewed.   Constitutional:       General: He is not in acute distress.  HENT:      Head: Normocephalic and atraumatic.   Eyes:      General:         Right eye: No discharge.         Left eye: No discharge.   Cardiovascular:      Rate and Rhythm: Normal rate and regular rhythm.      Heart sounds: No murmur heard.  Pulmonary:      Effort: No respiratory distress.      Breath sounds: No wheezing or rales.   Abdominal:      General: There is no distension.      Palpations: Abdomen is soft.   Musculoskeletal:         General: Swelling present. No tenderness.      Cervical back: Neck supple. No rigidity.   Neurological:      General: No focal deficit present.      Mental Status: He is alert and oriented to person, place, and time.          Vents:       Lines/Drains/Airways       Peripheral Intravenous Line  Duration                  Peripheral IV - Single Lumen 04/04/24 1536 20 G Right Antecubital <1 day         Peripheral IV - Single Lumen 04/04/24 1546 20 G Left;Posterior Hand <1 day                    Significant Labs:    CBC/Anemia Profile:  Recent Labs   Lab 04/04/24  1535   WBC 8.22   HGB 12.9*   HCT 36.3*      MCV 93   RDW 12.2        Chemistries:  Recent Labs   Lab 04/04/24  1535      K 3.8      CO2 22*   BUN 24*   CREATININE 1.3   CALCIUM 10.0   ALBUMIN 4.2   PROT 7.2   BILITOT 0.6   ALKPHOS 68   ALT 13   AST 25       All pertinent labs within the past 24 hours have been reviewed.    Significant Imaging:   I have reviewed all pertinent imaging results/findings within the past 24 hours.    ABG  No results for input(s): "PH", "PO2", "PCO2", "HCO3", "BE" in the last 168 hours.  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  Patient did have a stent placed to an old graft site.  He was currently on tirofiban   He was on aspirin, statin , beta-blocker and ticagrelor as well.  Plan per " "Cardiology.      Hypertension associated with diabetes  Home meds as tolerated    Endocrine  Type 2 diabetes mellitus without complication, without long-term current use of insulin  Patient's FSGs are controlled on current medication regimen.  Last A1c reviewed-   Lab Results   Component Value Date    HGBA1C 5.4 03/16/2024     Most recent fingerstick glucose reviewed- No results for input(s): "POCTGLUCOSE" in the last 24 hours.  Current correctional scale  Low    Antihyperglycemics (From admission, onward)      Start     Stop Route Frequency Ordered    04/04/24 1916  insulin aspart U-100 pen 0-10 Units         -- SubQ Before meals & nightly PRN 04/04/24 1816            Sliding scale for now          Thank you for your consult. I will follow-up with patient. Please contact us if you have any additional questions.     Angela Justice MD  Critical Care Medicine  [facility] ([facility]  "

## 2024-04-05 ENCOUNTER — TELEPHONE (OUTPATIENT)
Dept: CARDIOLOGY | Facility: HOSPITAL | Age: 74
End: 2024-04-05
Payer: MEDICARE

## 2024-04-05 VITALS
SYSTOLIC BLOOD PRESSURE: 142 MMHG | RESPIRATION RATE: 17 BRPM | BODY MASS INDEX: 31.5 KG/M2 | WEIGHT: 220 LBS | HEIGHT: 70 IN | OXYGEN SATURATION: 97 % | HEART RATE: 70 BPM | DIASTOLIC BLOOD PRESSURE: 82 MMHG | TEMPERATURE: 98 F

## 2024-04-05 PROBLEM — I20.0 UNSTABLE ANGINA: Status: ACTIVE | Noted: 2024-04-05

## 2024-04-05 LAB
ALBUMIN SERPL BCP-MCNC: 3.5 G/DL (ref 3.5–5.2)
ALP SERPL-CCNC: 57 U/L (ref 55–135)
ALT SERPL W/O P-5'-P-CCNC: 13 U/L (ref 10–44)
ANION GAP SERPL CALC-SCNC: 11 MMOL/L (ref 8–16)
ANION GAP SERPL CALC-SCNC: 11 MMOL/L (ref 8–16)
AORTIC ROOT ANNULUS: 3.02 CM
ASCENDING AORTA: 3.26 CM
AST SERPL-CCNC: 26 U/L (ref 10–40)
AV INDEX (PROSTH): 0.71
AV MEAN GRADIENT: 4 MMHG
AV PEAK GRADIENT: 6 MMHG
AV VALVE AREA BY VELOCITY RATIO: 3.45 CM²
AV VALVE AREA: 2.93 CM²
AV VELOCITY RATIO: 0.84
BASOPHILS # BLD AUTO: 0.02 K/UL (ref 0–0.2)
BASOPHILS NFR BLD: 0.3 % (ref 0–1.9)
BILIRUB SERPL-MCNC: 0.6 MG/DL (ref 0.1–1)
BSA FOR ECHO PROCEDURE: 2.22 M2
BUN SERPL-MCNC: 14 MG/DL (ref 8–23)
BUN SERPL-MCNC: 14 MG/DL (ref 8–23)
CALCIUM SERPL-MCNC: 9.1 MG/DL (ref 8.7–10.5)
CALCIUM SERPL-MCNC: 9.1 MG/DL (ref 8.7–10.5)
CHLORIDE SERPL-SCNC: 105 MMOL/L (ref 95–110)
CHLORIDE SERPL-SCNC: 105 MMOL/L (ref 95–110)
CO2 SERPL-SCNC: 23 MMOL/L (ref 23–29)
CO2 SERPL-SCNC: 23 MMOL/L (ref 23–29)
CREAT SERPL-MCNC: 1 MG/DL (ref 0.5–1.4)
CREAT SERPL-MCNC: 1 MG/DL (ref 0.5–1.4)
CV ECHO LV RWT: 0.43 CM
DIFFERENTIAL METHOD BLD: ABNORMAL
DOP CALC AO PEAK VEL: 1.24 M/S
DOP CALC AO VTI: 25.6 CM
DOP CALC LVOT AREA: 4.1 CM2
DOP CALC LVOT DIAMETER: 2.29 CM
DOP CALC LVOT PEAK VEL: 1.04 M/S
DOP CALC LVOT STROKE VOLUME: 74.92 CM3
DOP CALC RVOT PEAK VEL: 0.69 M/S
DOP CALC RVOT VTI: 13.7 CM
DOP CALCLVOT PEAK VEL VTI: 18.2 CM
E WAVE DECELERATION TIME: 180.69 MSEC
E/A RATIO: 0.93
E/E' RATIO: 9.41 M/S
ECHO LV POSTERIOR WALL: 1.02 CM (ref 0.6–1.1)
EOSINOPHIL # BLD AUTO: 0 K/UL (ref 0–0.5)
EOSINOPHIL NFR BLD: 0.6 % (ref 0–8)
ERYTHROCYTE [DISTWIDTH] IN BLOOD BY AUTOMATED COUNT: 12.1 % (ref 11.5–14.5)
EST. GFR  (NO RACE VARIABLE): >60 ML/MIN/1.73 M^2
EST. GFR  (NO RACE VARIABLE): >60 ML/MIN/1.73 M^2
FRACTIONAL SHORTENING: 23 % (ref 28–44)
GLUCOSE SERPL-MCNC: 100 MG/DL (ref 70–110)
GLUCOSE SERPL-MCNC: 100 MG/DL (ref 70–110)
HCT VFR BLD AUTO: 31.7 % (ref 40–54)
HGB BLD-MCNC: 11.4 G/DL (ref 14–18)
IMM GRANULOCYTES # BLD AUTO: 0.02 K/UL (ref 0–0.04)
IMM GRANULOCYTES NFR BLD AUTO: 0.3 % (ref 0–0.5)
INTERVENTRICULAR SEPTUM: 0.98 CM (ref 0.6–1.1)
IVC DIAMETER: 1.86 CM
IVRT: 98.95 MSEC
LA MAJOR: 5 CM
LA MINOR: 5.11 CM
LA WIDTH: 3.7 CM
LEFT ATRIUM SIZE: 4.31 CM
LEFT ATRIUM VOLUME INDEX: 31.6 ML/M2
LEFT ATRIUM VOLUME: 68.51 CM3
LEFT INTERNAL DIMENSION IN SYSTOLE: 3.64 CM (ref 2.1–4)
LEFT VENTRICLE DIASTOLIC VOLUME INDEX: 48.32 ML/M2
LEFT VENTRICLE DIASTOLIC VOLUME: 104.85 ML
LEFT VENTRICLE MASS INDEX: 77 G/M2
LEFT VENTRICLE SYSTOLIC VOLUME INDEX: 25.8 ML/M2
LEFT VENTRICLE SYSTOLIC VOLUME: 56 ML
LEFT VENTRICULAR INTERNAL DIMENSION IN DIASTOLE: 4.75 CM (ref 3.5–6)
LEFT VENTRICULAR MASS: 167.31 G
LV LATERAL E/E' RATIO: 7.27 M/S
LV SEPTAL E/E' RATIO: 13.33 M/S
LVOT MG: 1.76 MMHG
LVOT MV: 0.58 CM/S
LYMPHOCYTES # BLD AUTO: 0.9 K/UL (ref 1–4.8)
LYMPHOCYTES NFR BLD: 13.8 % (ref 18–48)
MAGNESIUM SERPL-MCNC: 1.7 MG/DL (ref 1.6–2.6)
MCH RBC QN AUTO: 32.6 PG (ref 27–31)
MCHC RBC AUTO-ENTMCNC: 36 G/DL (ref 32–36)
MCV RBC AUTO: 91 FL (ref 82–98)
MONOCYTES # BLD AUTO: 0.6 K/UL (ref 0.3–1)
MONOCYTES NFR BLD: 9 % (ref 4–15)
MV PEAK A VEL: 0.86 M/S
MV PEAK E VEL: 0.8 M/S
MV STENOSIS PRESSURE HALF TIME: 52.4 MS
MV VALVE AREA P 1/2 METHOD: 4.2 CM2
NEUTROPHILS # BLD AUTO: 4.9 K/UL (ref 1.8–7.7)
NEUTROPHILS NFR BLD: 76 % (ref 38–73)
NRBC BLD-RTO: 0 /100 WBC
OHS LV EJECTION FRACTION SIMPSONS BIPLANE MOD: 53 %
OHS QRS DURATION: 78 MS
OHS QRS DURATION: 80 MS
OHS QRS DURATION: 96 MS
OHS QTC CALCULATION: 462 MS
OHS QTC CALCULATION: 477 MS
OHS QTC CALCULATION: 481 MS
PHOSPHATE SERPL-MCNC: 3.5 MG/DL (ref 2.7–4.5)
PISA TR MAX VEL: 2.48 M/S
PLATELET # BLD AUTO: 170 K/UL (ref 150–450)
PMV BLD AUTO: 9.9 FL (ref 9.2–12.9)
POCT GLUCOSE: 112 MG/DL (ref 70–110)
POCT GLUCOSE: 93 MG/DL (ref 70–110)
POTASSIUM SERPL-SCNC: 3.6 MMOL/L (ref 3.5–5.1)
POTASSIUM SERPL-SCNC: 3.6 MMOL/L (ref 3.5–5.1)
PROT SERPL-MCNC: 6 G/DL (ref 6–8.4)
PV MEAN GRADIENT: 1 MMHG
PV PEAK GRADIENT: 5 MMHG
PV PEAK VELOCITY: 1.1 M/S
RA MAJOR: 5.29 CM
RA PRESSURE ESTIMATED: 8 MMHG
RA WIDTH: 3.7 CM
RBC # BLD AUTO: 3.5 M/UL (ref 4.6–6.2)
RIGHT VENTRICULAR END-DIASTOLIC DIMENSION: 3.14 CM
RV TB RVSP: 10 MMHG
SODIUM SERPL-SCNC: 139 MMOL/L (ref 136–145)
SODIUM SERPL-SCNC: 139 MMOL/L (ref 136–145)
STJ: 2.77 CM
TDI LATERAL: 0.11 M/S
TDI SEPTAL: 0.06 M/S
TDI: 0.09 M/S
TR MAX PG: 25 MMHG
TRICUSPID ANNULAR PLANE SYSTOLIC EXCURSION: 1.95 CM
TROPONIN I SERPL DL<=0.01 NG/ML-MCNC: 3.37 NG/ML (ref 0–0.03)
TV REST PULMONARY ARTERY PRESSURE: 33 MMHG
WBC # BLD AUTO: 6.47 K/UL (ref 3.9–12.7)
Z-SCORE OF LEFT VENTRICULAR DIMENSION IN END DIASTOLE: -4.13
Z-SCORE OF LEFT VENTRICULAR DIMENSION IN END SYSTOLE: -1.45

## 2024-04-05 PROCEDURE — 85025 COMPLETE CBC W/AUTO DIFF WBC: CPT | Performed by: INTERNAL MEDICINE

## 2024-04-05 PROCEDURE — 80053 COMPREHEN METABOLIC PANEL: CPT | Performed by: INTERNAL MEDICINE

## 2024-04-05 PROCEDURE — 84484 ASSAY OF TROPONIN QUANT: CPT | Performed by: INTERNAL MEDICINE

## 2024-04-05 PROCEDURE — 99239 HOSP IP/OBS DSCHRG MGMT >30: CPT | Mod: 25,,, | Performed by: INTERNAL MEDICINE

## 2024-04-05 PROCEDURE — 83735 ASSAY OF MAGNESIUM: CPT | Performed by: INTERNAL MEDICINE

## 2024-04-05 PROCEDURE — 25000003 PHARM REV CODE 250: Performed by: NURSE PRACTITIONER

## 2024-04-05 PROCEDURE — 84100 ASSAY OF PHOSPHORUS: CPT | Performed by: INTERNAL MEDICINE

## 2024-04-05 PROCEDURE — 36415 COLL VENOUS BLD VENIPUNCTURE: CPT | Performed by: INTERNAL MEDICINE

## 2024-04-05 PROCEDURE — 25000003 PHARM REV CODE 250: Performed by: INTERNAL MEDICINE

## 2024-04-05 RX ORDER — LANOLIN ALCOHOL/MO/W.PET/CERES
800 CREAM (GRAM) TOPICAL
Status: DISCONTINUED | OUTPATIENT
Start: 2024-04-05 | End: 2024-04-05 | Stop reason: HOSPADM

## 2024-04-05 RX ORDER — ASPIRIN 81 MG/1
81 TABLET ORAL DAILY
Qty: 30 TABLET | Refills: 11 | Status: SHIPPED | OUTPATIENT
Start: 2024-04-05 | End: 2025-04-05

## 2024-04-05 RX ADMIN — ATORVASTATIN CALCIUM 40 MG: 40 TABLET, FILM COATED ORAL at 07:04

## 2024-04-05 RX ADMIN — RANOLAZINE 1000 MG: 500 TABLET, EXTENDED RELEASE ORAL at 07:04

## 2024-04-05 RX ADMIN — TICAGRELOR 90 MG: 90 TABLET ORAL at 07:04

## 2024-04-05 RX ADMIN — Medication 800 MG: at 09:04

## 2024-04-05 RX ADMIN — ASPIRIN 325 MG: 325 TABLET, COATED ORAL at 07:04

## 2024-04-05 RX ADMIN — NIFEDIPINE 90 MG: 30 TABLET, FILM COATED, EXTENDED RELEASE ORAL at 07:04

## 2024-04-05 RX ADMIN — NITROGLYCERIN 1 PATCH: 0.2 PATCH TRANSDERMAL at 07:04

## 2024-04-05 RX ADMIN — PANTOPRAZOLE SODIUM 40 MG: 40 TABLET, DELAYED RELEASE ORAL at 07:04

## 2024-04-05 RX ADMIN — LISINOPRIL 40 MG: 20 TABLET ORAL at 07:04

## 2024-04-05 RX ADMIN — POTASSIUM BICARBONATE 50 MEQ: 978 TABLET, EFFERVESCENT ORAL at 09:04

## 2024-04-05 RX ADMIN — METOPROLOL SUCCINATE 50 MG: 50 TABLET, EXTENDED RELEASE ORAL at 07:04

## 2024-04-05 NOTE — PLAN OF CARE
Pt being discharged.  Right groin angioseal intact with dry bloody drainage on bandage.  Awaiting bedside delivery of meds.  Wife at bedside.

## 2024-04-05 NOTE — DISCHARGE SUMMARY
[facility] (H[facility])  Cardiology  Discharge Summary      Patient Name: Deniz Paulino  MRN: [id number]  Admission Date: 4/4/2024  Hospital Length of Stay: 1 days  Discharge Date and Time:  04/05/2024 9:07 AM  Attending Physician: Leonardo Tony MD    Discharging Provider: Andie Kennedy PA-C  Primary Care Physician: Hiro Kraft MD    HPI:   Mr. Paulino is a 73 year old male patient whose current medical conditions include CAD s/p CABG, prior MI, HTN, hyperlipidemia who was sent to [facility] ED from cardiology clinic due to unstable angina symptoms. Patient complained of accelerating anginal symptoms/substernal CP over past two months. Reported recently pain was even occurring at rest and radiating to his jaw and left arm. He endorsed active chest pain while being seen by Dr. Bennett today, prompting ED visit. Labs/workup in process at time of exam. LHC planned today.    Procedure(s) (LRB):  Left heart cath (Left)  Protection, Coronary, Filter  Stent, Drug Eluting, Single Vessel, Coronary  PTCA, Single Vessel  Instantaneous Wave-Free Ratio (IFR) (N/A)     Indwelling Lines/Drains at time of discharge:  Lines/Drains/Airways       None                   Hospital Course:  Patient seen and examined today by myself and MD and deemed suitable for d/c. Underwent LHC yesterday with successful PCI of SVG to PDA. Feels well. No recurrent CP or CV complaints. Labs reviewed/stable. Right groin access site C/D/I; no bleeding erythema or drainage, intact pulse. Patient counseled on post-cath restrictions. He will be d/c'd home on ASA, Brilinta, ACEi, BB, CCB, nitrates, Ranexa, and statin. He will need to f/u in clinic in one week.    He was also advised to hold Metformin 48 hours post cath.     Goals of Care Treatment Preferences:  Code Status: Full Code      Consults:   Consults (From admission, onward)          Status Ordering Provider     Inpatient consult to Cardiology  Once        Provider:  (Not yet  assigned)    Completed EDUARDO HERRERA            Significant Diagnostic Studies: Labs, LHC, TTE    Pending Diagnostic Studies:       Procedure Component Value Units Date/Time    Echo [[id number]] Resulted: 04/05/24 0813    Order Status: Sent Lab Status: In process Updated: 04/05/24 0813            Final Active Diagnoses:    Diagnosis Date Noted POA    PRINCIPAL PROBLEM:  Unstable angina [I20.0] 04/05/2024 Unknown    S/P CABG (coronary artery bypass graft) [Z95.1] 04/24/2014 Not Applicable    Hypertension associated with diabetes [E11.59, I15.2] 04/24/2014 Yes    Type 2 diabetes mellitus without complication, without long-term current use of insulin [E11.9] 04/24/2014 Yes    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 [I25.110] 04/24/2014 Yes      Problems Resolved During this Admission:     Cardiac/Vascular  * Unstable angina  -See plan above    S/P CABG (coronary artery bypass graft)  -See plan under UA    Hypertension associated with diabetes  -Continue home meds    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 Bennett, further rec's to follow    4/5/24  -s/p LHC with PCI of SVG to PDA  -Stable this AM, CP free  -Continue ASA, Brilinta, BB, nitrates, Ranexa, CCB  -Counseled on post-cath restrictions    Endocrine  Type 2 diabetes mellitus without complication, without long-term current use of insulin  -Per primary team        Discharged Condition: good    Disposition: Home or Self Care    Follow Up:   Follow-up Information       Kimberly Bennett MD Follow up in 1 week(s).    Specialties: Interventional Cardiology, Cardiology  Contact information:  [id number]  [place] [id number]  [phone]                           Patient Instructions:      Diet Cardiac     Diet diabetic     Other restrictions (specify):   Order Comments: No heavy bending or lifting   No strenuous activity   May shower tmw, no baths x 5 days     Notify your health care provider if you experience any of the following:  temperature >100.4     Notify your health care provider if you experience any of the following:  persistent nausea and vomiting or diarrhea     Notify your health care provider if you experience any of the following:  severe uncontrolled pain     Notify your health care provider if you experience any of the following:  redness, tenderness, or signs of infection (pain, swelling, redness, odor or green/yellow discharge around incision site)     Notify your health care provider if you experience any of the following:  difficulty breathing or increased cough     Notify your health care provider if you experience any of the following:  persistent dizziness, light-headedness, or visual disturbances     Notify your health care provider if you experience any of the following:  worsening rash     Notify your health care provider if you experience any of the following:  severe persistent headache     Notify your health care provider if you experience any of the following:     Notify your health care provider if you experience any of the following:  increased confusion or weakness     Remove dressing in 24 hours     Activity as tolerated     Medications:  Reconciled Home Medications:      Medication List        START taking these medications      ticagrelor 90 mg tablet  Commonly known as: BRILINTA  Take 1 tablet (90 mg total) by mouth 2 (two) times daily.            CHANGE how you take these medications      aspirin 81 MG EC tablet  Commonly known as: ECOTRIN  Take 1 tablet (81 mg total) by mouth once daily.  What changed:   medication strength  how much to take            CONTINUE taking these  medications      ALAVERT ORAL  Take 1 tablet by mouth as needed.     atorvastatin 40 MG tablet  Commonly known as: LIPITOR  Take 1 tablet by mouth once daily     coenzyme Q10 200 mg capsule  Take 200 mg by mouth 2 (two) times daily.     cyanocobalamin 1000 MCG tablet  Commonly known as: VITAMIN B-12  Take 1,000 mcg by mouth once daily.     hydroCHLOROthiazide 12.5 MG Tab  Commonly known as: HYDRODIURIL  Take 1 tablet (12.5 mg total) by mouth once daily.     lisinopriL 40 MG tablet  Commonly known as: PRINIVIL,ZESTRIL  Take 1 tablet by mouth once daily     magnesium oxide 400 mg (241.3 mg magnesium) tablet  Commonly known as: MAG-OX  Take 400 mg by mouth once daily.     metFORMIN 1000 MG tablet  Commonly known as: GLUCOPHAGE  Take 1 tablet (1,000 mg total) by mouth 2 (two) times daily with meals.     methocarbamoL 500 MG Tab  Commonly known as: ROBAXIN  Take 1 tablet (500 mg total) by mouth 4 (four) times daily as needed (muscle spasm).     metoprolol succinate 50 MG 24 hr tablet  Commonly known as: TOPROL-XL  Take 1 tablet by mouth once daily     NIFEdipine 90 MG (OSM) 24 hr tablet  Commonly known as: PROCARDIA-XL  Take 1 tablet (90 mg total) by mouth once daily.     * nitroGLYCERIN 0.4 MG SL tablet  Commonly known as: NITROSTAT  Place 1 tablet (0.4 mg total) under the tongue every 5 (five) minutes as needed for Chest pain.     * nitroGLYCERIN 0.2 mg/hr TD PT24 0.2 mg/hr  Commonly known as: NITRODUR  Place 1 patch onto the skin once daily.     pantoprazole 20 MG tablet  Commonly known as: PROTONIX  Take 1 tablet (20 mg total) by mouth once daily.     ranolazine 1,000 mg Tb12  Commonly known as: RANEXA  Take 1 tablet (1,000 mg total) by mouth 2 (two) times daily.     vitamin D 1000 units Tab  Commonly known as: VITAMIN D3  Take 1,000 Units by mouth once daily.           * This list has 2 medication(s) that are the same as other medications prescribed for you. Read the directions carefully, and ask your doctor or  other care provider to review them with you.                  Time spent on the discharge of patient: 25 minutes    Andie Kennedy PA-C  Cardiology  [facility] ([facility])

## 2024-04-05 NOTE — PLAN OF CARE
Pt AAOx4. SPO2 98% on RA. HR SR with frequent PVCs. BP normotensive throughout the shift. Voids per urinal with total UOP of 2.6 L. Pt had a BM this AM. POC reviewed with spouse and patient. Bed in lowest position, side rails up x 3, wheels locked, call light within reach, and alarms on and audible.

## 2024-04-05 NOTE — TELEPHONE ENCOUNTER
Contacted pt and got him scheduled for a Cath f/u w/ Nimo Alvarez NP on 04/09/24. Also advised him to hold Metformin 48 hours post cath. Pt vu w/o q/c    ---- JC Lloyd 04/05/24 09:09 AM  Needs hospital f/u next week with Dr. Bennett or Nimo Alvarez NP.    Please also advise patient he is to hold Metformin 48 hours post cath.    Thanks

## 2024-04-05 NOTE — TELEPHONE ENCOUNTER
Needs hospital f/u next week with Dr. Bennett or Nimo Alvarez NP.    Please also advise patient he is to hold Metformin 48 hours post cath.    Thanks

## 2024-04-05 NOTE — ASSESSMENT & PLAN NOTE
-Patient presents with accelerating anginal symptoms over past two months  -Substernal tightness/pressure now occurring even at rest with radiation to his jaw and left arm  -+symptoms at office visit today  -Continue OMT-ASA, statin, BB, CCB, nitrates, Ranexa  -LHC today by Dr. Bennett, further rec's to follow    4/5/24  -s/p LHC with PCI of SVG to PDA  -Stable this AM, CP free  -Continue ASA, Brilinta, BB, nitrates, Ranexa, CCB  -Counseled on post-cath restrictions

## 2024-04-07 LAB
OHS QRS DURATION: 98 MS
OHS QTC CALCULATION: 480 MS

## 2024-04-09 ENCOUNTER — OFFICE VISIT (OUTPATIENT)
Dept: CARDIOLOGY | Facility: CLINIC | Age: 74
End: 2024-04-09
Payer: MEDICARE

## 2024-04-09 VITALS
DIASTOLIC BLOOD PRESSURE: 80 MMHG | WEIGHT: 220.69 LBS | OXYGEN SATURATION: 96 % | SYSTOLIC BLOOD PRESSURE: 130 MMHG | BODY MASS INDEX: 31.66 KG/M2 | HEART RATE: 76 BPM

## 2024-04-09 DIAGNOSIS — Z95.1 S/P CABG (CORONARY ARTERY BYPASS GRAFT): ICD-10-CM

## 2024-04-09 DIAGNOSIS — I25.110 CORONARY ARTERY DISEASE INVOLVING NATIVE CORONARY ARTERY OF NATIVE HEART WITH UNSTABLE ANGINA PECTORIS: Primary | ICD-10-CM

## 2024-04-09 DIAGNOSIS — I15.2 HYPERTENSION ASSOCIATED WITH DIABETES: ICD-10-CM

## 2024-04-09 DIAGNOSIS — E11.69 HYPERLIPIDEMIA ASSOCIATED WITH TYPE 2 DIABETES MELLITUS: ICD-10-CM

## 2024-04-09 DIAGNOSIS — E11.59 HYPERTENSION ASSOCIATED WITH DIABETES: ICD-10-CM

## 2024-04-09 DIAGNOSIS — E78.5 HYPERLIPIDEMIA ASSOCIATED WITH TYPE 2 DIABETES MELLITUS: ICD-10-CM

## 2024-04-09 DIAGNOSIS — E11.9 TYPE 2 DIABETES MELLITUS WITHOUT COMPLICATION, WITHOUT LONG-TERM CURRENT USE OF INSULIN: ICD-10-CM

## 2024-04-09 PROCEDURE — 3075F SYST BP GE 130 - 139MM HG: CPT | Mod: CPTII,S$GLB,, | Performed by: NURSE PRACTITIONER

## 2024-04-09 PROCEDURE — 3288F FALL RISK ASSESSMENT DOCD: CPT | Mod: CPTII,S$GLB,, | Performed by: NURSE PRACTITIONER

## 2024-04-09 PROCEDURE — 1101F PT FALLS ASSESS-DOCD LE1/YR: CPT | Mod: CPTII,S$GLB,, | Performed by: NURSE PRACTITIONER

## 2024-04-09 PROCEDURE — 4010F ACE/ARB THERAPY RXD/TAKEN: CPT | Mod: CPTII,S$GLB,, | Performed by: NURSE PRACTITIONER

## 2024-04-09 PROCEDURE — 99999 PR PBB SHADOW E&M-EST. PATIENT-LVL III: CPT | Mod: PBBFAC,,, | Performed by: NURSE PRACTITIONER

## 2024-04-09 PROCEDURE — 3079F DIAST BP 80-89 MM HG: CPT | Mod: CPTII,S$GLB,, | Performed by: NURSE PRACTITIONER

## 2024-04-09 PROCEDURE — 1111F DSCHRG MED/CURRENT MED MERGE: CPT | Mod: CPTII,S$GLB,, | Performed by: NURSE PRACTITIONER

## 2024-04-09 PROCEDURE — 3044F HG A1C LEVEL LT 7.0%: CPT | Mod: CPTII,S$GLB,, | Performed by: NURSE PRACTITIONER

## 2024-04-09 PROCEDURE — 1159F MED LIST DOCD IN RCRD: CPT | Mod: CPTII,S$GLB,, | Performed by: NURSE PRACTITIONER

## 2024-04-09 PROCEDURE — 99214 OFFICE O/P EST MOD 30 MIN: CPT | Mod: S$GLB,,, | Performed by: NURSE PRACTITIONER

## 2024-04-09 PROCEDURE — 3008F BODY MASS INDEX DOCD: CPT | Mod: CPTII,S$GLB,, | Performed by: NURSE PRACTITIONER

## 2024-04-13 DIAGNOSIS — E08.00 DIABETES MELLITUS DUE TO UNDERLYING CONDITION WITH HYPEROSMOLARITY WITHOUT COMA, WITHOUT LONG-TERM CURRENT USE OF INSULIN: ICD-10-CM

## 2024-04-13 DIAGNOSIS — I25.10 CORONARY ARTERY DISEASE INVOLVING NATIVE CORONARY ARTERY OF NATIVE HEART WITHOUT ANGINA PECTORIS: ICD-10-CM

## 2024-04-15 ENCOUNTER — PATIENT MESSAGE (OUTPATIENT)
Dept: CARDIOLOGY | Facility: CLINIC | Age: 74
End: 2024-04-15
Payer: MEDICARE

## 2024-04-15 RX ORDER — NITROGLYCERIN 40 MG/1
1 PATCH TRANSDERMAL
Qty: 30 PATCH | Refills: 6 | Status: SHIPPED | OUTPATIENT
Start: 2024-04-15

## 2024-04-19 ENCOUNTER — PATIENT MESSAGE (OUTPATIENT)
Dept: OTHER | Facility: OTHER | Age: 74
End: 2024-04-19
Payer: MEDICARE

## 2024-05-15 ENCOUNTER — LAB VISIT (OUTPATIENT)
Dept: LAB | Facility: HOSPITAL | Age: 74
End: 2024-05-15
Attending: INTERNAL MEDICINE
Payer: MEDICARE

## 2024-05-15 DIAGNOSIS — E11.59 HYPERTENSION ASSOCIATED WITH DIABETES: ICD-10-CM

## 2024-05-15 DIAGNOSIS — I15.2 HYPERTENSION ASSOCIATED WITH DIABETES: ICD-10-CM

## 2024-05-15 LAB
BASOPHILS # BLD AUTO: 0.02 K/UL (ref 0–0.2)
BASOPHILS NFR BLD: 0.3 % (ref 0–1.9)
DIFFERENTIAL METHOD BLD: ABNORMAL
EOSINOPHIL # BLD AUTO: 0.1 K/UL (ref 0–0.5)
EOSINOPHIL NFR BLD: 1 % (ref 0–8)
ERYTHROCYTE [DISTWIDTH] IN BLOOD BY AUTOMATED COUNT: 12.5 % (ref 11.5–14.5)
HCT VFR BLD AUTO: 34.3 % (ref 40–54)
HGB BLD-MCNC: 12 G/DL (ref 14–18)
IMM GRANULOCYTES # BLD AUTO: 0.02 K/UL (ref 0–0.04)
IMM GRANULOCYTES NFR BLD AUTO: 0.3 % (ref 0–0.5)
LYMPHOCYTES # BLD AUTO: 1.2 K/UL (ref 1–4.8)
LYMPHOCYTES NFR BLD: 18.8 % (ref 18–48)
MCH RBC QN AUTO: 33.3 PG (ref 27–31)
MCHC RBC AUTO-ENTMCNC: 35 G/DL (ref 32–36)
MCV RBC AUTO: 95 FL (ref 82–98)
MONOCYTES # BLD AUTO: 0.7 K/UL (ref 0.3–1)
MONOCYTES NFR BLD: 11.3 % (ref 4–15)
NEUTROPHILS # BLD AUTO: 4.3 K/UL (ref 1.8–7.7)
NEUTROPHILS NFR BLD: 68.3 % (ref 38–73)
NRBC BLD-RTO: 0 /100 WBC
PLATELET # BLD AUTO: 196 K/UL (ref 150–450)
PMV BLD AUTO: 10.2 FL (ref 9.2–12.9)
RBC # BLD AUTO: 3.6 M/UL (ref 4.6–6.2)
WBC # BLD AUTO: 6.26 K/UL (ref 3.9–12.7)

## 2024-05-15 PROCEDURE — 85025 COMPLETE CBC W/AUTO DIFF WBC: CPT | Performed by: INTERNAL MEDICINE

## 2024-05-15 PROCEDURE — 36415 COLL VENOUS BLD VENIPUNCTURE: CPT | Performed by: INTERNAL MEDICINE

## 2024-05-20 ENCOUNTER — OFFICE VISIT (OUTPATIENT)
Dept: INTERNAL MEDICINE | Facility: CLINIC | Age: 74
End: 2024-05-20
Payer: MEDICARE

## 2024-05-20 VITALS
BODY MASS INDEX: 30.68 KG/M2 | OXYGEN SATURATION: 98 % | SYSTOLIC BLOOD PRESSURE: 122 MMHG | TEMPERATURE: 96 F | DIASTOLIC BLOOD PRESSURE: 72 MMHG | HEART RATE: 69 BPM | HEIGHT: 70 IN | WEIGHT: 214.31 LBS

## 2024-05-20 DIAGNOSIS — I15.2 HYPERTENSION ASSOCIATED WITH DIABETES: ICD-10-CM

## 2024-05-20 DIAGNOSIS — E11.59 HYPERTENSION ASSOCIATED WITH DIABETES: ICD-10-CM

## 2024-05-20 DIAGNOSIS — E11.9 TYPE 2 DIABETES MELLITUS WITHOUT COMPLICATION, WITHOUT LONG-TERM CURRENT USE OF INSULIN: ICD-10-CM

## 2024-05-20 DIAGNOSIS — I25.110 CORONARY ARTERY DISEASE INVOLVING NATIVE CORONARY ARTERY OF NATIVE HEART WITH UNSTABLE ANGINA PECTORIS: ICD-10-CM

## 2024-05-20 DIAGNOSIS — I20.0 UNSTABLE ANGINA: ICD-10-CM

## 2024-05-20 DIAGNOSIS — Z12.5 ENCOUNTER FOR SCREENING FOR MALIGNANT NEOPLASM OF PROSTATE: ICD-10-CM

## 2024-05-20 DIAGNOSIS — Z00.00 ROUTINE GENERAL MEDICAL EXAMINATION AT A HEALTH CARE FACILITY: Primary | ICD-10-CM

## 2024-05-20 DIAGNOSIS — E78.5 HYPERLIPIDEMIA ASSOCIATED WITH TYPE 2 DIABETES MELLITUS: ICD-10-CM

## 2024-05-20 DIAGNOSIS — D64.9 ANEMIA, UNSPECIFIED TYPE: ICD-10-CM

## 2024-05-20 DIAGNOSIS — Z79.899 LONG-TERM USE OF HIGH-RISK MEDICATION: ICD-10-CM

## 2024-05-20 DIAGNOSIS — E11.69 HYPERLIPIDEMIA ASSOCIATED WITH TYPE 2 DIABETES MELLITUS: ICD-10-CM

## 2024-05-20 PROCEDURE — 99999 PR PBB SHADOW E&M-EST. PATIENT-LVL III: CPT | Mod: PBBFAC,,, | Performed by: INTERNAL MEDICINE

## 2024-05-20 PROCEDURE — 3074F SYST BP LT 130 MM HG: CPT | Mod: CPTII,S$GLB,, | Performed by: INTERNAL MEDICINE

## 2024-05-20 PROCEDURE — 4010F ACE/ARB THERAPY RXD/TAKEN: CPT | Mod: CPTII,S$GLB,, | Performed by: INTERNAL MEDICINE

## 2024-05-20 PROCEDURE — 3044F HG A1C LEVEL LT 7.0%: CPT | Mod: CPTII,S$GLB,, | Performed by: INTERNAL MEDICINE

## 2024-05-20 PROCEDURE — 3008F BODY MASS INDEX DOCD: CPT | Mod: CPTII,S$GLB,, | Performed by: INTERNAL MEDICINE

## 2024-05-20 PROCEDURE — 3078F DIAST BP <80 MM HG: CPT | Mod: CPTII,S$GLB,, | Performed by: INTERNAL MEDICINE

## 2024-05-20 PROCEDURE — 1126F AMNT PAIN NOTED NONE PRSNT: CPT | Mod: CPTII,S$GLB,, | Performed by: INTERNAL MEDICINE

## 2024-05-20 PROCEDURE — 3288F FALL RISK ASSESSMENT DOCD: CPT | Mod: CPTII,S$GLB,, | Performed by: INTERNAL MEDICINE

## 2024-05-20 PROCEDURE — 1101F PT FALLS ASSESS-DOCD LE1/YR: CPT | Mod: CPTII,S$GLB,, | Performed by: INTERNAL MEDICINE

## 2024-05-20 PROCEDURE — 99397 PER PM REEVAL EST PAT 65+ YR: CPT | Mod: S$GLB,,, | Performed by: INTERNAL MEDICINE

## 2024-05-20 RX ORDER — HYDROCHLOROTHIAZIDE 12.5 MG/1
12.5 TABLET ORAL DAILY
Qty: 90 TABLET | Refills: 2 | Status: SHIPPED | OUTPATIENT
Start: 2024-05-20

## 2024-05-20 RX ORDER — METFORMIN HYDROCHLORIDE 1000 MG/1
1000 TABLET ORAL 2 TIMES DAILY WITH MEALS
Qty: 180 TABLET | Refills: 1 | Status: SHIPPED | OUTPATIENT
Start: 2024-05-20

## 2024-05-20 NOTE — PROGRESS NOTES
Subjective:      Patient ID: Deniz Paulino is a 73 y.o. male.    Chief Complaint: Annual Exam    HPI      73 y.o. with  Patient Active Problem List   Diagnosis    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    Hypertension associated with diabetes    Hyperlipidemia associated with type 2 diabetes mellitus    S/P CABG (coronary artery bypass graft)    Old MI (myocardial infarction)    Type 2 diabetes mellitus without complication, without long-term current use of insulin    Hypomagnesemia    Effort angina    Routine general medical examination at a health care facility    Colon cancer screening    Troponin level elevated    Unstable angina     Past Medical History:   Diagnosis Date    Acute coronary syndrome     Coronary artery disease     Diabetes mellitus 04/24/2014    Effort angina 04/30/2023    Hyperlipidemia     Hypertension     Old MI (myocardial infarction) 04/24/2014    S/P CABG (coronary artery bypass graft) 04/24/2014       Here today for annual prev exam.  Compliant with meds without significant side effects. Energy and appetite are good.         Past Surgical History:   Procedure Laterality Date    CARDIAC CATHETERIZATION      COLONOSCOPY N/A 07/26/2019    Procedure: COLONOSCOPY;  Surgeon: Opal Oshea MD;  Location: [facility] ENDO;  Service: Endoscopy;  Laterality: N/A;    CORONARY ARTERY BYPASS GRAFT      CORONARY BYPASS GRAFT ANGIOGRAPHY  5/1/2023    Procedure: Bypass graft study;  Surgeon: Kimberly Bennett MD;  Location: [facility] CATH LAB;  Service: Cardiology;;    ESOPHAGOGASTRODUODENOSCOPY N/A 07/26/2019    Procedure: ESOPHAGOGASTRODUODENOSCOPY (EGD);  Surgeon: Opal Oshea MD;  Location: [facility] ENDO;  Service: Endoscopy;  Laterality: N/A;    INSTANTANEOUS WAVE-FREE RATIO (IFR) N/A 4/4/2024    Procedure: Instantaneous Wave-Free Ratio (IFR);  Surgeon: Kimberly Bennett MD;  Location: [facility] CATH LAB;  Service: Cardiology;  Laterality: N/A;    LEFT HEART CATHETERIZATION  Left 2023    Procedure: Left heart cath;  Surgeon: Kimberly Bennett MD;  Location: [facility] CATH LAB;  Service: Cardiology;  Laterality: Left;    LEFT HEART CATHETERIZATION Left 2024    Procedure: Left heart cath;  Surgeon: Kimberly Bennett MD;  Location: [facility] CATH LAB;  Service: Cardiology;  Laterality: Left;    PROTECTION, CORONARY, FILTER  2024    Procedure: Protection, Coronary, Filter;  Surgeon: Kimberly Bennett MD;  Location: [facility] CATH LAB;  Service: Cardiology;;    PTCA, SINGLE VESSEL  2024    Procedure: PTCA, Single Vessel;  Surgeon: Kimberly Bennett MD;  Location: [facility] CATH LAB;  Service: Cardiology;;    SKIN CANCER EXCISION  2018    STENT, DRUG ELUTING, SINGLE VESSEL, CORONARY  2024    Procedure: Stent, Drug Eluting, Single Vessel, Coronary;  Surgeon: Kimberly Bennett MD;  Location: [facility] CATH LAB;  Service: Cardiology;;    VASECTOMY       Social History     Socioeconomic History    Marital status:    Tobacco Use    Smoking status: Former     Current packs/day: 0.00     Average packs/day: 1 pack/day for 30.0 years (30.0 ttl pk-yrs)     Types: Cigarettes     Start date: 1973     Quit date: 2003     Years since quittin.3    Smokeless tobacco: Former   Substance and Sexual Activity    Alcohol use: Yes     Alcohol/week: 16.0 standard drinks of alcohol     Types: 10 Glasses of wine, 6 Cans of beer per week     Comment: socially    Drug use: Never    Sexual activity: Not Currently     Partners: Female     Social Determinants of Health     Financial Resource Strain: Low Risk  (2023)    Overall Financial Resource Strain (CARDIA)     Difficulty of Paying Living Expenses: Not hard at all   Food Insecurity: No Food Insecurity (2023)    Hunger Vital Sign     Worried About Running Out of Food in the Last Year: Never true     Ran Out of Food in the Last Year: Never true   Transportation Needs: No Transportation Needs (2023)    PRAPARE - Transportation     Lack of  "Transportation (Medical): No     Lack of Transportation (Non-Medical): No   Physical Activity: Unknown (11/9/2023)    Exercise Vital Sign     Days of Exercise per Week: 0 days   Stress: No Stress Concern Present (11/9/2023)    [employer] - Occupational Stress Questionnaire     Feeling of Stress : Only a little   Housing Stability: Low Risk  (11/9/2023)    Housing Stability Vital Sign     Unable to Pay for Housing in the Last Year: No     Number of Places Lived in the Last Year: 1     Unstable Housing in the Last Year: No     family history includes Alcohol abuse in his father; Cancer in his father and mother; Diabetes in his brother and mother; Heart attack (age of onset: 63) in his father; Heart murmur in his mother; Stroke in his mother.    Review of Systems   Constitutional:  Negative for chills and fever.   HENT:  Negative for ear pain and sore throat.    Respiratory:  Negative for cough.    Cardiovascular:  Negative for chest pain.   Gastrointestinal:  Negative for abdominal pain and blood in stool.   Genitourinary:  Negative for dysuria and hematuria.   Neurological:  Negative for seizures and syncope.     Objective:   /72 (BP Location: Left arm, Patient Position: Sitting, BP Method: Large (Manual))   Pulse 69   Temp 96.2 °F (35.7 °C) (Tympanic)   Ht 5' 10" (1.778 m)   Wt 97.2 kg (214 lb 4.6 oz)   SpO2 98%   BMI 30.75 kg/m²     Physical Exam  Constitutional:       General: He is not in acute distress.     Appearance: He is well-developed.   HENT:      Head: Normocephalic and atraumatic.   Eyes:      Extraocular Movements: Extraocular movements intact.   Neck:      Thyroid: No thyromegaly.   Cardiovascular:      Rate and Rhythm: Normal rate and regular rhythm.   Pulmonary:      Breath sounds: Normal breath sounds. No wheezing or rales.   Abdominal:      General: Bowel sounds are normal.      Palpations: Abdomen is soft.      Tenderness: There is no abdominal tenderness. "   Musculoskeletal:         General: No swelling.      Cervical back: Neck supple. No rigidity.   Lymphadenopathy:      Cervical: No cervical adenopathy.   Skin:     General: Skin is warm and dry.   Neurological:      Mental Status: He is alert and oriented to person, place, and time.   Psychiatric:         Behavior: Behavior normal.         Lab Results   Component Value Date    WBC 6.26 05/15/2024    HGB 12.0 (L) 05/15/2024    HGB 11.4 (L) 04/05/2024    HGB 11.4 (L) 04/05/2024    HGB 11.4 (L) 04/05/2024    HCT 34.3 (L) 05/15/2024    MCV 95 05/15/2024    MCV 91 04/05/2024    MCV 91 04/05/2024    MCV 91 04/05/2024     05/15/2024    CHOL 118 (L) 03/16/2024    TRIG 108 03/16/2024    HDL 48 03/16/2024    LDLCALC 48.4 (L) 03/16/2024    LDLCALC 53.8 (L) 11/10/2023    LDLCALC 58.0 (L) 11/14/2022    ALT 13 04/05/2024    AST 26 04/05/2024     04/05/2024     04/05/2024    K 3.6 04/05/2024    K 3.6 04/05/2024    CALCIUM 9.1 04/05/2024    CALCIUM 9.1 04/05/2024     04/05/2024     04/05/2024    CO2 23 04/05/2024    CO2 23 04/05/2024    BUN 14 04/05/2024    BUN 14 04/05/2024    CREATININE 1.0 04/05/2024    CREATININE 1.0 04/05/2024    CREATININE 1.3 04/04/2024    EGFRNORACEVR >60 04/05/2024    EGFRNORACEVR >60 04/05/2024    EGFRNORACEVR 58 (A) 04/04/2024    TSH 1.516 11/10/2023    TSH 1.991 11/17/2022    TSH 2.314 05/04/2022    PSA 1.3 11/10/2023    PSA 1.2 05/10/2023    PSA 1.5 05/04/2022     04/05/2024     04/05/2024    HGBA1C 5.4 03/16/2024    HGBA1C 5.3 02/15/2024    HGBA1C 5.3 11/10/2023    [id number] 43 05/22/2017     (H) 04/04/2024          The ASCVD Risk score (Guillermo VALENZUELA, et al., 2019) failed to calculate for the following reasons:    The patient has a prior MI or stroke diagnosis     Assessment:     1. Routine general medical examination at a health care facility    2. Coronary artery disease involving native coronary artery of native heart with unstable angina  pectoris    3. Hypertension associated with diabetes    4. Hyperlipidemia associated with type 2 diabetes mellitus    5. Type 2 diabetes mellitus without complication, without long-term current use of insulin    6. Unstable angina    7. Encounter for screening for malignant neoplasm of prostate    8. Long-term use of high-risk medication    9. Anemia, unspecified type      Plan:   1. Routine general medical examination at a health care facility  Overview:  Heart healthy diet, regular exercise, and regular use of sunscreen.   HM reviewed        2. Coronary artery disease involving native coronary artery of native heart with unstable angina pectoris  Overview:  Stable.  Continue as per Cardiology.  Vickie Bennett      3. Hypertension associated with diabetes  Overview:  Controlled.  Continue current medications    Orders:  -     hydroCHLOROthiazide (HYDRODIURIL) 12.5 MG Tab; Take 1 tablet (12.5 mg total) by mouth once daily.  Dispense: 90 tablet; Refill: 2    4. Hyperlipidemia associated with type 2 diabetes mellitus  Overview:  Controlled.  Continue current medications    Orders:  -     Lipid Panel; Future; Expected date: 11/16/2024  -     Comprehensive Metabolic Panel; Future; Expected date: 11/16/2024  -     CBC Auto Differential; Future; Expected date: 11/16/2024  -     TSH; Future; Expected date: 11/16/2024  -     TSH; Future; Expected date: 05/20/2024    5. Type 2 diabetes mellitus without complication, without long-term current use of insulin  Overview:  Stable.  Continue current    Orders:  -     Hemoglobin A1C; Future; Expected date: 11/16/2024  -     Microalbumin/Creatinine Ratio, Urine; Future; Expected date: 05/20/2024  -     metFORMIN (GLUCOPHAGE) 1000 MG tablet; Take 1 tablet (1,000 mg total) by mouth 2 (two) times daily with meals.  Dispense: 180 tablet; Refill: 1    6. Unstable angina  Overview:  Stable. Improved after 2024 stent      7. Encounter for screening for malignant neoplasm of prostate  -      PSA, Screening; Future; Expected date: 11/16/2024    8. Long-term use of high-risk medication  -     Vitamin B12; Future; Expected date: 05/20/2024  -     Folate; Future; Expected date: 05/20/2024    9. Anemia, unspecified type  -     Iron and TIBC; Future; Expected date: 05/20/2024  -     Ferritin; Future; Expected date: 05/20/2024        Patient Instructions   Check with your pharmacy regarding rsv vaccine.       If blood pressure is consistently below 115 systolic (top number)  then notify Dr. Kraft or digital medicine program to decrease nifedipine to 60 mg.    Future Appointments   Date Time Provider Department Center   5/21/2024  8:00 AM SPECIMEN, [facility]   5/21/2024  1:20 PM LABORATORY, [facility] ON [facility]   7/30/2024  3:00 PM Kimberly Bennett MD [facility]   11/13/2024  8:20 AM LABORATORY, [facility]   11/19/2024 10:00 AM Hiro Kraft MD [facility]       Lab Frequency Next Occurrence   Ambulatory referral/consult to Endo Procedure  Once 11/18/2023       Follow up in about 6 months (around 11/20/2024), or if symptoms worsen or fail to improve.

## 2024-05-20 NOTE — PATIENT INSTRUCTIONS
Check with your pharmacy regarding rsv vaccine.       If blood pressure is consistently below 115 systolic (top number)  then notify Dr. Kraft or [employer] medicine program to decrease nifedipine to 60 mg.

## 2024-05-21 ENCOUNTER — LAB VISIT (OUTPATIENT)
Dept: LAB | Facility: HOSPITAL | Age: 74
End: 2024-05-21
Attending: INTERNAL MEDICINE
Payer: MEDICARE

## 2024-05-21 DIAGNOSIS — E11.69 HYPERLIPIDEMIA ASSOCIATED WITH TYPE 2 DIABETES MELLITUS: ICD-10-CM

## 2024-05-21 DIAGNOSIS — Z79.899 LONG-TERM USE OF HIGH-RISK MEDICATION: ICD-10-CM

## 2024-05-21 DIAGNOSIS — E78.5 HYPERLIPIDEMIA ASSOCIATED WITH TYPE 2 DIABETES MELLITUS: ICD-10-CM

## 2024-05-21 DIAGNOSIS — D64.9 ANEMIA, UNSPECIFIED TYPE: ICD-10-CM

## 2024-05-21 LAB
FERRITIN SERPL-MCNC: 81 NG/ML (ref 20–300)
FOLATE SERPL-MCNC: 5.1 NG/ML (ref 4–24)
IRON SERPL-MCNC: 95 UG/DL (ref 45–160)
SATURATED IRON: 30 % (ref 20–50)
TOTAL IRON BINDING CAPACITY: 315 UG/DL (ref 250–450)
TRANSFERRIN SERPL-MCNC: 213 MG/DL (ref 200–375)
TSH SERPL DL<=0.005 MIU/L-ACNC: 1.93 UIU/ML (ref 0.4–4)
VIT B12 SERPL-MCNC: 543 PG/ML (ref 210–950)

## 2024-05-21 PROCEDURE — 82746 ASSAY OF FOLIC ACID SERUM: CPT | Performed by: INTERNAL MEDICINE

## 2024-05-21 PROCEDURE — 82728 ASSAY OF FERRITIN: CPT | Performed by: INTERNAL MEDICINE

## 2024-05-21 PROCEDURE — 36415 COLL VENOUS BLD VENIPUNCTURE: CPT | Performed by: INTERNAL MEDICINE

## 2024-05-21 PROCEDURE — 82607 VITAMIN B-12: CPT | Performed by: INTERNAL MEDICINE

## 2024-05-21 PROCEDURE — 83540 ASSAY OF IRON: CPT | Performed by: INTERNAL MEDICINE

## 2024-05-21 PROCEDURE — 84443 ASSAY THYROID STIM HORMONE: CPT | Performed by: INTERNAL MEDICINE

## 2024-06-12 RX ORDER — PANTOPRAZOLE SODIUM 20 MG/1
20 TABLET, DELAYED RELEASE ORAL
Qty: 90 TABLET | Refills: 3 | Status: SHIPPED | OUTPATIENT
Start: 2024-06-12 | End: 2024-06-13 | Stop reason: SDUPTHER

## 2024-06-12 NOTE — TELEPHONE ENCOUNTER
Refill Decision Note   Deniz Paulino  is requesting a refill authorization.  Brief Assessment and Rationale for Refill:  Approve     Medication Therapy Plan:         Comments:     Note composed:11:21 AM 06/12/2024             Appointments     Last Visit   5/20/2024 Hiro Kraft MD   Next Visit   11/19/2024 Hiro Kraft MD

## 2024-06-12 NOTE — TELEPHONE ENCOUNTER
No care due was identified.  [facility] Embedded Care Due Messages. Reference number: [id number].   6/12/2024 11:03:36 AM CDT

## 2024-06-13 RX ORDER — PANTOPRAZOLE SODIUM 20 MG/1
20 TABLET, DELAYED RELEASE ORAL DAILY
Qty: 90 TABLET | Refills: 3 | Status: SHIPPED | OUTPATIENT
Start: 2024-06-13

## 2024-06-13 NOTE — TELEPHONE ENCOUNTER
No care due was identified.  [facility] Embedded Care Due Messages. Reference number: [id number].   6/13/2024 2:00:23 PM CDT

## 2024-07-30 ENCOUNTER — OFFICE VISIT (OUTPATIENT)
Dept: CARDIOLOGY | Facility: CLINIC | Age: 74
End: 2024-07-30
Payer: MEDICARE

## 2024-07-30 VITALS
OXYGEN SATURATION: 97 % | HEIGHT: 70 IN | DIASTOLIC BLOOD PRESSURE: 70 MMHG | BODY MASS INDEX: 29.57 KG/M2 | SYSTOLIC BLOOD PRESSURE: 116 MMHG | WEIGHT: 206.56 LBS | HEART RATE: 65 BPM

## 2024-07-30 DIAGNOSIS — E11.9 TYPE 2 DIABETES MELLITUS WITHOUT COMPLICATION, WITHOUT LONG-TERM CURRENT USE OF INSULIN: ICD-10-CM

## 2024-07-30 DIAGNOSIS — Z95.1 S/P CABG (CORONARY ARTERY BYPASS GRAFT): ICD-10-CM

## 2024-07-30 DIAGNOSIS — I25.110 CORONARY ARTERY DISEASE INVOLVING NATIVE CORONARY ARTERY OF NATIVE HEART WITH UNSTABLE ANGINA PECTORIS: Primary | ICD-10-CM

## 2024-07-30 DIAGNOSIS — I25.2 OLD MI (MYOCARDIAL INFARCTION): ICD-10-CM

## 2024-07-30 DIAGNOSIS — E78.5 HYPERLIPIDEMIA ASSOCIATED WITH TYPE 2 DIABETES MELLITUS: ICD-10-CM

## 2024-07-30 DIAGNOSIS — I15.2 HYPERTENSION ASSOCIATED WITH DIABETES: ICD-10-CM

## 2024-07-30 DIAGNOSIS — E11.69 HYPERLIPIDEMIA ASSOCIATED WITH TYPE 2 DIABETES MELLITUS: ICD-10-CM

## 2024-07-30 DIAGNOSIS — E11.59 HYPERTENSION ASSOCIATED WITH DIABETES: ICD-10-CM

## 2024-07-30 PROCEDURE — 3288F FALL RISK ASSESSMENT DOCD: CPT | Mod: CPTII,S$GLB,, | Performed by: INTERNAL MEDICINE

## 2024-07-30 PROCEDURE — 3078F DIAST BP <80 MM HG: CPT | Mod: CPTII,S$GLB,, | Performed by: INTERNAL MEDICINE

## 2024-07-30 PROCEDURE — 1101F PT FALLS ASSESS-DOCD LE1/YR: CPT | Mod: CPTII,S$GLB,, | Performed by: INTERNAL MEDICINE

## 2024-07-30 PROCEDURE — 3060F POS MICROALBUMINURIA REV: CPT | Mod: CPTII,S$GLB,, | Performed by: INTERNAL MEDICINE

## 2024-07-30 PROCEDURE — 1160F RVW MEDS BY RX/DR IN RCRD: CPT | Mod: CPTII,S$GLB,, | Performed by: INTERNAL MEDICINE

## 2024-07-30 PROCEDURE — 3044F HG A1C LEVEL LT 7.0%: CPT | Mod: CPTII,S$GLB,, | Performed by: INTERNAL MEDICINE

## 2024-07-30 PROCEDURE — 4010F ACE/ARB THERAPY RXD/TAKEN: CPT | Mod: CPTII,S$GLB,, | Performed by: INTERNAL MEDICINE

## 2024-07-30 PROCEDURE — 99215 OFFICE O/P EST HI 40 MIN: CPT | Mod: S$GLB,,, | Performed by: INTERNAL MEDICINE

## 2024-07-30 PROCEDURE — 3008F BODY MASS INDEX DOCD: CPT | Mod: CPTII,S$GLB,, | Performed by: INTERNAL MEDICINE

## 2024-07-30 PROCEDURE — 3066F NEPHROPATHY DOC TX: CPT | Mod: CPTII,S$GLB,, | Performed by: INTERNAL MEDICINE

## 2024-07-30 PROCEDURE — 99999 PR PBB SHADOW E&M-EST. PATIENT-LVL III: CPT | Mod: PBBFAC,,, | Performed by: INTERNAL MEDICINE

## 2024-07-30 PROCEDURE — 1159F MED LIST DOCD IN RCRD: CPT | Mod: CPTII,S$GLB,, | Performed by: INTERNAL MEDICINE

## 2024-07-30 PROCEDURE — 3074F SYST BP LT 130 MM HG: CPT | Mod: CPTII,S$GLB,, | Performed by: INTERNAL MEDICINE

## 2024-07-30 RX ORDER — PRASUGREL 10 MG/1
10 TABLET, FILM COATED ORAL DAILY
Qty: 30 TABLET | Refills: 11 | Status: SHIPPED | OUTPATIENT
Start: 2024-07-30

## 2024-08-05 ENCOUNTER — HOSPITAL ENCOUNTER (OUTPATIENT)
Dept: CARDIOLOGY | Facility: HOSPITAL | Age: 74
Discharge: HOME | End: 2024-08-05
Attending: INTERNAL MEDICINE
Payer: MEDICARE

## 2024-08-05 VITALS
DIASTOLIC BLOOD PRESSURE: 70 MMHG | SYSTOLIC BLOOD PRESSURE: 116 MMHG | WEIGHT: 206 LBS | BODY MASS INDEX: 29.49 KG/M2 | HEIGHT: 70 IN

## 2024-08-05 DIAGNOSIS — E11.69 HYPERLIPIDEMIA ASSOCIATED WITH TYPE 2 DIABETES MELLITUS: ICD-10-CM

## 2024-08-05 DIAGNOSIS — I25.110 CORONARY ARTERY DISEASE INVOLVING NATIVE CORONARY ARTERY OF NATIVE HEART WITH UNSTABLE ANGINA PECTORIS: ICD-10-CM

## 2024-08-05 DIAGNOSIS — E78.5 HYPERLIPIDEMIA ASSOCIATED WITH TYPE 2 DIABETES MELLITUS: ICD-10-CM

## 2024-08-05 DIAGNOSIS — E11.9 TYPE 2 DIABETES MELLITUS WITHOUT COMPLICATION, WITHOUT LONG-TERM CURRENT USE OF INSULIN: ICD-10-CM

## 2024-08-05 PROCEDURE — 93306 TTE W/DOPPLER COMPLETE: CPT | Mod: 26,,, | Performed by: INTERNAL MEDICINE

## 2024-08-05 PROCEDURE — 93306 TTE W/DOPPLER COMPLETE: CPT

## 2024-08-06 LAB
AORTIC ROOT ANNULUS: 3.63 CM
ASCENDING AORTA: 3.69 CM
AV INDEX (PROSTH): 0.8
AV MEAN GRADIENT: 3 MMHG
AV PEAK GRADIENT: 5 MMHG
AV VALVE AREA BY VELOCITY RATIO: 2.85 CM²
AV VALVE AREA: 2.79 CM²
AV VELOCITY RATIO: 0.82
BSA FOR ECHO PROCEDURE: 2.15 M2
CV ECHO LV RWT: 0.4 CM
DOP CALC AO PEAK VEL: 1.09 M/S
DOP CALC AO VTI: 22.4 CM
DOP CALC LVOT AREA: 3.5 CM2
DOP CALC LVOT DIAMETER: 2.11 CM
DOP CALC LVOT PEAK VEL: 0.89 M/S
DOP CALC LVOT STROKE VOLUME: 62.56 CM3
DOP CALC RVOT PEAK VEL: 0.82 M/S
DOP CALC RVOT VTI: 19.8 CM
DOP CALCLVOT PEAK VEL VTI: 17.9 CM
E WAVE DECELERATION TIME: 219.82 MSEC
E/A RATIO: 0.62
E/E' RATIO: 6.21 M/S
ECHO LV POSTERIOR WALL: 1.06 CM (ref 0.6–1.1)
FRACTIONAL SHORTENING: 33 % (ref 28–44)
INTERVENTRICULAR SEPTUM: 1.1 CM (ref 0.6–1.1)
IVC DIAMETER: 1.7 CM
IVRT: 134.16 MSEC
LA MAJOR: 5.29 CM
LA MINOR: 4.79 CM
LA WIDTH: 3.7 CM
LEFT ATRIUM AREA SYSTOLIC (APICAL 2 CHAMBER): 14.38 CM2
LEFT ATRIUM AREA SYSTOLIC (APICAL 4 CHAMBER): 16.75 CM2
LEFT ATRIUM SIZE: 4.73 CM
LEFT ATRIUM VOLUME INDEX MOD: 21.3 ML/M2
LEFT ATRIUM VOLUME INDEX: 35.4 ML/M2
LEFT ATRIUM VOLUME MOD: 45.02 CM3
LEFT ATRIUM VOLUME: 74.79 CM3
LEFT INTERNAL DIMENSION IN SYSTOLE: 3.55 CM (ref 2.1–4)
LEFT VENTRICLE DIASTOLIC VOLUME INDEX: 63.14 ML/M2
LEFT VENTRICLE DIASTOLIC VOLUME: 133.23 ML
LEFT VENTRICLE END SYSTOLIC VOLUME APICAL 2 CHAMBER: 38.63 ML
LEFT VENTRICLE END SYSTOLIC VOLUME APICAL 4 CHAMBER: 44.59 ML
LEFT VENTRICLE MASS INDEX: 104 G/M2
LEFT VENTRICLE SYSTOLIC VOLUME INDEX: 25 ML/M2
LEFT VENTRICLE SYSTOLIC VOLUME: 52.67 ML
LEFT VENTRICULAR INTERNAL DIMENSION IN DIASTOLE: 5.26 CM (ref 3.5–6)
LEFT VENTRICULAR MASS: 219.41 G
LV LATERAL E/E' RATIO: 5.36 M/S
LV SEPTAL E/E' RATIO: 7.38 M/S
LVED V (TEICH): 133.23 ML
LVES V (TEICH): 52.67 ML
LVOT MG: 1.69 MMHG
LVOT MV: 0.61 CM/S
MV PEAK A VEL: 0.95 M/S
MV PEAK E VEL: 0.59 M/S
OHS CV RV/LV RATIO: 0.8 CM
PULM VEIN S/D RATIO: 1.06
PV MEAN GRADIENT: 2 MMHG
PV MV: 0.67 M/S
PV PEAK D VEL: 0.49 M/S
PV PEAK GRADIENT: 4 MMHG
PV PEAK S VEL: 0.52 M/S
PV PEAK VELOCITY: 1 M/S
RA MAJOR: 5.37 CM
RA PRESSURE ESTIMATED: 3 MMHG
RA WIDTH: 3.15 CM
RIGHT VENTRICULAR END-DIASTOLIC DIMENSION: 4.2 CM
SINUS: 3.45 CM
STJ: 3.48 CM
TDI LATERAL: 0.11 M/S
TDI SEPTAL: 0.08 M/S
TDI: 0.1 M/S
Z-SCORE OF LEFT VENTRICULAR DIMENSION IN END DIASTOLE: -2.29
Z-SCORE OF LEFT VENTRICULAR DIMENSION IN END SYSTOLE: -1.02

## 2024-08-07 ENCOUNTER — TELEPHONE (OUTPATIENT)
Dept: CARDIOLOGY | Facility: CLINIC | Age: 74
End: 2024-08-07
Payer: MEDICARE

## 2024-08-15 ENCOUNTER — PATIENT MESSAGE (OUTPATIENT)
Dept: CARDIOLOGY | Facility: CLINIC | Age: 74
End: 2024-08-15
Payer: MEDICARE

## 2024-08-16 DIAGNOSIS — E11.69 HYPERLIPIDEMIA ASSOCIATED WITH TYPE 2 DIABETES MELLITUS: ICD-10-CM

## 2024-08-16 DIAGNOSIS — E78.5 HYPERLIPIDEMIA ASSOCIATED WITH TYPE 2 DIABETES MELLITUS: ICD-10-CM

## 2024-08-16 RX ORDER — ATORVASTATIN CALCIUM 40 MG/1
40 TABLET, FILM COATED ORAL
Qty: 90 TABLET | Refills: 0 | Status: SHIPPED | OUTPATIENT
Start: 2024-08-16

## 2024-08-19 PROBLEM — Z00.00 ROUTINE GENERAL MEDICAL EXAMINATION AT A HEALTH CARE FACILITY: Status: RESOLVED | Noted: 2023-05-17 | Resolved: 2024-08-19

## 2024-08-27 LAB
BUN SERPL-MCNC: 25 MG/DL (ref 7–20)
CALCIUM SERPL-MCNC: 9.4 MG/DL (ref 8.9–10.3)
CHLORIDE SERPL-SCNC: 98 MMOL/L (ref 101–111)
CHOLEST SERPL-MSCNC: 137 MG/DL (ref 0–200)
CO2 SERPL-SCNC: 28 MMOL/L (ref 22–32)
CREAT SERPL-MCNC: 1.5 MG/DL (ref 0.6–1.3)
GFR: 49
GLUCOSE SERPL-MCNC: 111 MG/DL (ref 70–110)
HBA1C MFR BLD: 5.6 % (ref 4.2–5.8)
HDLC SERPL-MCNC: 53 MG/DL (ref 40–?)
LDLC SERPL CALC-MCNC: 52 MG/DL (ref 0–160)
POTASSIUM SERPL-SCNC: 3.7 MMOL/L (ref 3.6–5.1)
SODIUM BLD-SCNC: 138 MMOL/L (ref 136–144)
TRIGL SERPL-MCNC: 156 MG/DL (ref ?–200)

## 2024-09-13 LAB
LEFT EYE DM RETINOPATHY: NEGATIVE
RIGHT EYE DM RETINOPATHY: NEGATIVE

## 2024-10-01 ENCOUNTER — PATIENT OUTREACH (OUTPATIENT)
Dept: ADMINISTRATIVE | Facility: HOSPITAL | Age: 74
End: 2024-10-01
Payer: MEDICARE

## 2024-11-03 DIAGNOSIS — Z95.1 S/P CABG (CORONARY ARTERY BYPASS GRAFT): ICD-10-CM

## 2024-11-03 DIAGNOSIS — I25.10 CORONARY ARTERY DISEASE INVOLVING NATIVE CORONARY ARTERY OF NATIVE HEART WITHOUT ANGINA PECTORIS: ICD-10-CM

## 2024-11-04 RX ORDER — METOPROLOL SUCCINATE 50 MG/1
50 TABLET, EXTENDED RELEASE ORAL DAILY
Qty: 90 TABLET | Refills: 3 | Status: SHIPPED | OUTPATIENT
Start: 2024-11-04

## 2024-11-08 DIAGNOSIS — E78.5 HYPERLIPIDEMIA ASSOCIATED WITH TYPE 2 DIABETES MELLITUS: ICD-10-CM

## 2024-11-08 DIAGNOSIS — E11.69 HYPERLIPIDEMIA ASSOCIATED WITH TYPE 2 DIABETES MELLITUS: ICD-10-CM

## 2024-11-08 RX ORDER — ATORVASTATIN CALCIUM 40 MG/1
40 TABLET, FILM COATED ORAL
Qty: 90 TABLET | Refills: 0 | Status: SHIPPED | OUTPATIENT
Start: 2024-11-08

## 2024-11-11 ENCOUNTER — PATIENT OUTREACH (OUTPATIENT)
Dept: ADMINISTRATIVE | Facility: HOSPITAL | Age: 74
End: 2024-11-11
Payer: MEDICARE

## 2024-11-11 NOTE — PROGRESS NOTES
Working DM Lab Report.     Pt has lab appt scheduled 11/13/24.  All needed lab scheduled.    Aug 2024 A1C, CMP and Lipid entered/linked from [facility].

## 2024-11-13 ENCOUNTER — LAB VISIT (OUTPATIENT)
Dept: LAB | Facility: HOSPITAL | Age: 74
End: 2024-11-13
Attending: INTERNAL MEDICINE
Payer: MEDICARE

## 2024-11-13 DIAGNOSIS — Z12.5 ENCOUNTER FOR SCREENING FOR MALIGNANT NEOPLASM OF PROSTATE: ICD-10-CM

## 2024-11-13 DIAGNOSIS — E11.69 HYPERLIPIDEMIA ASSOCIATED WITH TYPE 2 DIABETES MELLITUS: ICD-10-CM

## 2024-11-13 DIAGNOSIS — E78.5 HYPERLIPIDEMIA ASSOCIATED WITH TYPE 2 DIABETES MELLITUS: ICD-10-CM

## 2024-11-13 DIAGNOSIS — E11.9 TYPE 2 DIABETES MELLITUS WITHOUT COMPLICATION, WITHOUT LONG-TERM CURRENT USE OF INSULIN: ICD-10-CM

## 2024-11-13 LAB
ALBUMIN SERPL BCP-MCNC: 4 G/DL (ref 3.5–5.2)
ALP SERPL-CCNC: 78 U/L (ref 40–150)
ALT SERPL W/O P-5'-P-CCNC: 16 U/L (ref 10–44)
ANION GAP SERPL CALC-SCNC: 15 MMOL/L (ref 8–16)
AST SERPL-CCNC: 20 U/L (ref 10–40)
BASOPHILS # BLD AUTO: 0.02 K/UL (ref 0–0.2)
BASOPHILS NFR BLD: 0.3 % (ref 0–1.9)
BILIRUB SERPL-MCNC: 0.6 MG/DL (ref 0.1–1)
BUN SERPL-MCNC: 21 MG/DL (ref 8–23)
CALCIUM SERPL-MCNC: 9.8 MG/DL (ref 8.7–10.5)
CHLORIDE SERPL-SCNC: 103 MMOL/L (ref 95–110)
CHOLEST SERPL-MCNC: 136 MG/DL (ref 120–199)
CHOLEST/HDLC SERPL: 2.7 {RATIO} (ref 2–5)
CO2 SERPL-SCNC: 23 MMOL/L (ref 23–29)
COMPLEXED PSA SERPL-MCNC: 1.7 NG/ML (ref 0–4)
CREAT SERPL-MCNC: 1.3 MG/DL (ref 0.5–1.4)
DIFFERENTIAL METHOD BLD: ABNORMAL
EOSINOPHIL # BLD AUTO: 0.1 K/UL (ref 0–0.5)
EOSINOPHIL NFR BLD: 1 % (ref 0–8)
ERYTHROCYTE [DISTWIDTH] IN BLOOD BY AUTOMATED COUNT: 12.8 % (ref 11.5–14.5)
EST. GFR  (NO RACE VARIABLE): 58 ML/MIN/1.73 M^2
ESTIMATED AVG GLUCOSE: 111 MG/DL (ref 68–131)
GLUCOSE SERPL-MCNC: 115 MG/DL (ref 70–110)
HBA1C MFR BLD: 5.5 % (ref 4–5.6)
HCT VFR BLD AUTO: 39 % (ref 40–54)
HDLC SERPL-MCNC: 51 MG/DL (ref 40–75)
HDLC SERPL: 37.5 % (ref 20–50)
HGB BLD-MCNC: 13.1 G/DL (ref 14–18)
IMM GRANULOCYTES # BLD AUTO: 0.02 K/UL (ref 0–0.04)
IMM GRANULOCYTES NFR BLD AUTO: 0.3 % (ref 0–0.5)
LDLC SERPL CALC-MCNC: 56.4 MG/DL (ref 63–159)
LYMPHOCYTES # BLD AUTO: 1.7 K/UL (ref 1–4.8)
LYMPHOCYTES NFR BLD: 23.8 % (ref 18–48)
MCH RBC QN AUTO: 32 PG (ref 27–31)
MCHC RBC AUTO-ENTMCNC: 33.6 G/DL (ref 32–36)
MCV RBC AUTO: 95 FL (ref 82–98)
MONOCYTES # BLD AUTO: 0.6 K/UL (ref 0.3–1)
MONOCYTES NFR BLD: 8.8 % (ref 4–15)
NEUTROPHILS # BLD AUTO: 4.7 K/UL (ref 1.8–7.7)
NEUTROPHILS NFR BLD: 65.8 % (ref 38–73)
NONHDLC SERPL-MCNC: 85 MG/DL
NRBC BLD-RTO: 0 /100 WBC
PLATELET # BLD AUTO: 174 K/UL (ref 150–450)
PMV BLD AUTO: 10.5 FL (ref 9.2–12.9)
POTASSIUM SERPL-SCNC: 3.4 MMOL/L (ref 3.5–5.1)
PROT SERPL-MCNC: 7 G/DL (ref 6–8.4)
RBC # BLD AUTO: 4.09 M/UL (ref 4.6–6.2)
SODIUM SERPL-SCNC: 141 MMOL/L (ref 136–145)
TRIGL SERPL-MCNC: 143 MG/DL (ref 30–150)
TSH SERPL DL<=0.005 MIU/L-ACNC: 2.18 UIU/ML (ref 0.4–4)
WBC # BLD AUTO: 7.18 K/UL (ref 3.9–12.7)

## 2024-11-13 PROCEDURE — 36415 COLL VENOUS BLD VENIPUNCTURE: CPT | Performed by: INTERNAL MEDICINE

## 2024-11-13 PROCEDURE — 84443 ASSAY THYROID STIM HORMONE: CPT | Performed by: INTERNAL MEDICINE

## 2024-11-13 PROCEDURE — 80053 COMPREHEN METABOLIC PANEL: CPT | Performed by: INTERNAL MEDICINE

## 2024-11-13 PROCEDURE — 83036 HEMOGLOBIN GLYCOSYLATED A1C: CPT | Performed by: INTERNAL MEDICINE

## 2024-11-13 PROCEDURE — 80061 LIPID PANEL: CPT | Performed by: INTERNAL MEDICINE

## 2024-11-13 PROCEDURE — 84153 ASSAY OF PSA TOTAL: CPT | Performed by: INTERNAL MEDICINE

## 2024-11-13 PROCEDURE — 85025 COMPLETE CBC W/AUTO DIFF WBC: CPT | Performed by: INTERNAL MEDICINE

## 2024-11-15 DIAGNOSIS — E08.00 DIABETES MELLITUS DUE TO UNDERLYING CONDITION WITH HYPEROSMOLARITY WITHOUT COMA, WITHOUT LONG-TERM CURRENT USE OF INSULIN: ICD-10-CM

## 2024-11-15 DIAGNOSIS — I25.10 CORONARY ARTERY DISEASE INVOLVING NATIVE CORONARY ARTERY OF NATIVE HEART WITHOUT ANGINA PECTORIS: ICD-10-CM

## 2024-11-15 RX ORDER — NITROGLYCERIN 40 MG/1
1 PATCH TRANSDERMAL DAILY
Qty: 30 PATCH | Refills: 6 | Status: SHIPPED | OUTPATIENT
Start: 2024-11-15

## 2024-11-15 RX ORDER — NITROGLYCERIN 40 MG/1
1 PATCH TRANSDERMAL
Qty: 30 PATCH | Refills: 6 | Status: SHIPPED | OUTPATIENT
Start: 2024-11-15

## 2024-11-19 ENCOUNTER — OFFICE VISIT (OUTPATIENT)
Dept: INTERNAL MEDICINE | Facility: CLINIC | Age: 74
End: 2024-11-19
Payer: MEDICARE

## 2024-11-19 VITALS
SYSTOLIC BLOOD PRESSURE: 104 MMHG | TEMPERATURE: 98 F | HEART RATE: 64 BPM | DIASTOLIC BLOOD PRESSURE: 72 MMHG | HEIGHT: 70 IN | OXYGEN SATURATION: 98 % | WEIGHT: 218.5 LBS | BODY MASS INDEX: 31.28 KG/M2

## 2024-11-19 DIAGNOSIS — I25.110 CORONARY ARTERY DISEASE INVOLVING NATIVE CORONARY ARTERY OF NATIVE HEART WITH UNSTABLE ANGINA PECTORIS: Primary | ICD-10-CM

## 2024-11-19 DIAGNOSIS — E78.5 HYPERLIPIDEMIA ASSOCIATED WITH TYPE 2 DIABETES MELLITUS: ICD-10-CM

## 2024-11-19 DIAGNOSIS — E11.59 HYPERTENSION ASSOCIATED WITH DIABETES: ICD-10-CM

## 2024-11-19 DIAGNOSIS — L72.0 EPIDERMOID CYST OF SKIN: ICD-10-CM

## 2024-11-19 DIAGNOSIS — E11.9 TYPE 2 DIABETES MELLITUS WITHOUT COMPLICATION, WITHOUT LONG-TERM CURRENT USE OF INSULIN: ICD-10-CM

## 2024-11-19 DIAGNOSIS — Z79.899 OTHER LONG TERM (CURRENT) DRUG THERAPY: ICD-10-CM

## 2024-11-19 DIAGNOSIS — I15.2 HYPERTENSION ASSOCIATED WITH DIABETES: ICD-10-CM

## 2024-11-19 DIAGNOSIS — Z12.11 COLON CANCER SCREENING: ICD-10-CM

## 2024-11-19 DIAGNOSIS — Z95.1 S/P CABG (CORONARY ARTERY BYPASS GRAFT): ICD-10-CM

## 2024-11-19 DIAGNOSIS — E11.69 HYPERLIPIDEMIA ASSOCIATED WITH TYPE 2 DIABETES MELLITUS: ICD-10-CM

## 2024-11-19 DIAGNOSIS — I25.2 OLD MI (MYOCARDIAL INFARCTION): ICD-10-CM

## 2024-11-19 DIAGNOSIS — I20.0 UNSTABLE ANGINA: ICD-10-CM

## 2024-11-19 DIAGNOSIS — E83.42 HYPOMAGNESEMIA: ICD-10-CM

## 2024-11-19 DIAGNOSIS — Z79.899 HIGH RISK MEDICATION USE: ICD-10-CM

## 2024-11-19 DIAGNOSIS — N40.0 BENIGN PROSTATIC HYPERPLASIA, UNSPECIFIED WHETHER LOWER URINARY TRACT SYMPTOMS PRESENT: ICD-10-CM

## 2024-11-19 PROCEDURE — 99999 PR PBB SHADOW E&M-EST. PATIENT-LVL V: CPT | Mod: PBBFAC,,, | Performed by: INTERNAL MEDICINE

## 2024-11-19 PROCEDURE — 3044F HG A1C LEVEL LT 7.0%: CPT | Mod: CPTII,S$GLB,, | Performed by: INTERNAL MEDICINE

## 2024-11-19 PROCEDURE — 1126F AMNT PAIN NOTED NONE PRSNT: CPT | Mod: CPTII,S$GLB,, | Performed by: INTERNAL MEDICINE

## 2024-11-19 PROCEDURE — 3008F BODY MASS INDEX DOCD: CPT | Mod: CPTII,S$GLB,, | Performed by: INTERNAL MEDICINE

## 2024-11-19 PROCEDURE — 1101F PT FALLS ASSESS-DOCD LE1/YR: CPT | Mod: CPTII,S$GLB,, | Performed by: INTERNAL MEDICINE

## 2024-11-19 PROCEDURE — 1159F MED LIST DOCD IN RCRD: CPT | Mod: CPTII,S$GLB,, | Performed by: INTERNAL MEDICINE

## 2024-11-19 PROCEDURE — 3066F NEPHROPATHY DOC TX: CPT | Mod: CPTII,S$GLB,, | Performed by: INTERNAL MEDICINE

## 2024-11-19 PROCEDURE — 4010F ACE/ARB THERAPY RXD/TAKEN: CPT | Mod: CPTII,S$GLB,, | Performed by: INTERNAL MEDICINE

## 2024-11-19 PROCEDURE — 99214 OFFICE O/P EST MOD 30 MIN: CPT | Mod: S$GLB,,, | Performed by: INTERNAL MEDICINE

## 2024-11-19 PROCEDURE — 3074F SYST BP LT 130 MM HG: CPT | Mod: CPTII,S$GLB,, | Performed by: INTERNAL MEDICINE

## 2024-11-19 PROCEDURE — 3078F DIAST BP <80 MM HG: CPT | Mod: CPTII,S$GLB,, | Performed by: INTERNAL MEDICINE

## 2024-11-19 PROCEDURE — 2023F DILAT RTA XM W/O RTNOPTHY: CPT | Mod: CPTII,S$GLB,, | Performed by: INTERNAL MEDICINE

## 2024-11-19 PROCEDURE — 3288F FALL RISK ASSESSMENT DOCD: CPT | Mod: CPTII,S$GLB,, | Performed by: INTERNAL MEDICINE

## 2024-11-19 PROCEDURE — 3060F POS MICROALBUMINURIA REV: CPT | Mod: CPTII,S$GLB,, | Performed by: INTERNAL MEDICINE

## 2024-11-19 RX ORDER — HYDROCHLOROTHIAZIDE 12.5 MG/1
12.5 TABLET ORAL DAILY
Qty: 90 TABLET | Refills: 2 | Status: SHIPPED | OUTPATIENT
Start: 2024-11-19

## 2024-11-19 RX ORDER — TAMSULOSIN HYDROCHLORIDE 0.4 MG/1
0.4 CAPSULE ORAL DAILY
Qty: 90 CAPSULE | Refills: 3 | Status: SHIPPED | OUTPATIENT
Start: 2024-11-19 | End: 2025-11-19

## 2024-11-19 RX ORDER — METFORMIN HYDROCHLORIDE 1000 MG/1
1000 TABLET ORAL 2 TIMES DAILY WITH MEALS
Qty: 180 TABLET | Refills: 2 | Status: SHIPPED | OUTPATIENT
Start: 2024-11-19

## 2024-11-19 RX ORDER — ASPIRIN 325 MG
325 TABLET, DELAYED RELEASE (ENTERIC COATED) ORAL DAILY
COMMUNITY

## 2024-11-19 NOTE — PROGRESS NOTES
"Subjective:      Patient ID: Deniz Paulino is a 74 y.o. male.    Chief Complaint: Follow-up    Follow-up  Pertinent negatives include no abdominal pain, chest pain, chills, coughing, fever or sore throat.       72 yo with   Patient Active Problem List   Diagnosis    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    Hypertension associated with diabetes    Hyperlipidemia associated with type 2 diabetes mellitus    S/P CABG (coronary artery bypass graft)    Old MI (myocardial infarction)    Type 2 diabetes mellitus without complication, without long-term current use of insulin    Hypomagnesemia    Effort angina    Colon cancer screening    Troponin level elevated    Unstable angina     Past Medical History:   Diagnosis Date    Acute coronary syndrome     Coronary artery disease     Diabetes mellitus 04/24/2014    Effort angina 04/30/2023    Hyperlipidemia     Hypertension     Old MI (myocardial infarction) 04/24/2014    S/P CABG (coronary artery bypass graft) 04/24/2014     Here today for management of mult med problems as outlined below.  Compliant with meds without significant side effects.appetite good.     Feels feeling better , dizziness, bower improved after cards changed meds.     On vit d and b12 without dx. Would like to stop and recheck these in a few months.     Cyst left neck for many years.     Review of Systems   Constitutional:  Negative for chills and fever.   HENT:  Negative for ear pain and sore throat.    Respiratory:  Negative for cough.    Cardiovascular:  Negative for chest pain.   Gastrointestinal:  Negative for abdominal pain and blood in stool.   Genitourinary:  Negative for dysuria and hematuria.   Neurological:  Negative for seizures and syncope.     Objective:   /72 (BP Location: Right arm, Patient Position: Sitting)   Pulse 64   Temp 97.5 °F (36.4 °C) (Tympanic)   Ht 5' 10" (1.778 m)   Wt 99.1 kg (218 lb 7.6 oz)   SpO2 98%   BMI 31.35 kg/m² "     Physical Exam  Constitutional:       General: He is not in acute distress.     Appearance: He is well-developed.   HENT:      Head: Normocephalic and atraumatic.   Eyes:      Extraocular Movements: Extraocular movements intact.   Neck:      Thyroid: No thyromegaly.   Cardiovascular:      Rate and Rhythm: Normal rate and regular rhythm.   Pulmonary:      Breath sounds: Normal breath sounds. No wheezing or rales.   Abdominal:      General: Bowel sounds are normal.      Palpations: Abdomen is soft.      Tenderness: There is no abdominal tenderness.   Musculoskeletal:         General: No swelling.      Cervical back: Neck supple. No rigidity.   Lymphadenopathy:      Cervical: No cervical adenopathy.   Skin:     General: Skin is warm and dry.   Neurological:      Mental Status: He is alert and oriented to person, place, and time.   Psychiatric:         Behavior: Behavior normal.         Lab Results   Component Value Date    WBC 7.18 11/13/2024    HGB 13.1 (L) 11/13/2024    HGB 12.0 (L) 05/15/2024    HGB 11.4 (L) 04/05/2024    HGB 11.4 (L) 04/05/2024    HGB 11.4 (L) 04/05/2024    HCT 39.0 (L) 11/13/2024    MCV 95 11/13/2024    MCV 95 05/15/2024    MCV 91 04/05/2024    MCV 91 04/05/2024    MCV 91 04/05/2024     11/13/2024    CHOL 136 11/13/2024    TRIG 143 11/13/2024    HDL 51 11/13/2024    LDLCALC 56.4 (L) 11/13/2024    LDLCALC 52 08/27/2024    LDLCALC 48.4 (L) 03/16/2024    ALT 16 11/13/2024    AST 20 11/13/2024     11/13/2024    K 3.4 (L) 11/13/2024    CALCIUM 9.8 11/13/2024     11/13/2024    CO2 23 11/13/2024    BUN 21 11/13/2024    CREATININE 1.3 11/13/2024    CREATININE 1.5 (A) 08/27/2024    CREATININE 1.0 04/05/2024    CREATININE 1.0 04/05/2024    EGFRNORACEVR 58.0 (A) 11/13/2024    EGFRNORACEVR >60 04/05/2024    EGFRNORACEVR >60 04/05/2024    TSH 2.179 11/13/2024    TSH 1.925 05/21/2024    TSH 1.516 11/10/2023    PSA 1.7 11/13/2024    PSA 1.3 11/10/2023    PSA 1.2 05/10/2023     (H)  11/13/2024    HGBA1C 5.5 11/13/2024    HGBA1C 5.6 08/27/2024    HGBA1C 5.4 03/16/2024    [id number] 43 05/22/2017     (H) 04/04/2024          The ASCVD Risk score (Guillermo VALENZUELA, et al., 2019) failed to calculate for the following reasons:    Risk score cannot be calculated because patient has a medical history suggesting prior/existing ASCVD     Assessment:     1. Coronary artery disease involving native coronary artery of native heart with unstable angina pectoris    2. Hypertension associated with diabetes    3. Hyperlipidemia associated with type 2 diabetes mellitus    4. S/P CABG (coronary artery bypass graft)    5. Type 2 diabetes mellitus without complication, without long-term current use of insulin    6. Hypomagnesemia    7. High risk medication use    8. Old MI (myocardial infarction)    9. Unstable angina    10. Other long term (current) drug therapy    11. Colon cancer screening    12. Epidermoid cyst of skin    13. Benign prostatic hyperplasia, unspecified whether lower urinary tract symptoms present      Plan:   1. Coronary artery disease involving native coronary artery of native heart with unstable angina pectoris  Overview:  Stable.  Continue as per Cardiology.  Vickie Bennett      2. Hypertension associated with diabetes  Overview:  Controlled.  Continue current medications    Orders:  -     Basic Metabolic Panel; Future; Expected date: 02/19/2025  -     hydroCHLOROthiazide (HYDRODIURIL) 12.5 MG Tab; Take 1 tablet (12.5 mg total) by mouth once daily.  Dispense: 90 tablet; Refill: 2    3. Hyperlipidemia associated with type 2 diabetes mellitus  Overview:  Controlled.  Continue current medications      4. S/P CABG (coronary artery bypass graft)    5. Type 2 diabetes mellitus without complication, without long-term current use of insulin  Overview:  Stable.  Continue current    Orders:  -     metFORMIN (GLUCOPHAGE) 1000 MG tablet; Take 1 tablet (1,000 mg total) by mouth 2 (two) times daily with  meals.  Dispense: 180 tablet; Refill: 2    6. Hypomagnesemia    7. High risk medication use  -     Vitamin B12; Future; Expected date: 02/19/2025    8. Old MI (myocardial infarction)    9. Unstable angina  Overview:  Stable. Improved after 2024 stent    Orders:  -     Vitamin D; Future; Expected date: 02/19/2025    10. Other long term (current) drug therapy  -     Vitamin D; Future; Expected date: 02/19/2025    11. Colon cancer screening  Overview:  Colonoscopy due July 26 of 2024.    Orders:  -     Ambulatory referral/consult to Endo Procedure ; Future; Expected date: 11/20/2024    12. Epidermoid cyst of skin  -     Ambulatory referral/consult to General Surgery; Future; Expected date: 11/26/2024    13. Benign prostatic hyperplasia, unspecified whether lower urinary tract symptoms present  -     tamsulosin (FLOMAX) 0.4 mg Cap; Take 1 capsule (0.4 mg total) by mouth once daily.  Dispense: 90 capsule; Refill: 3        Patient Instructions   Check with your pharmacy regarding RSV vaccine.      Future Appointments   Date Time Provider Department Center   11/27/2024  1:00 PM PRE-ADMIT, ENDO [facility] [place]   1/9/2025 10:40 AM Kimberly Bennett MD ON [facility]   2/12/2025  9:50 AM LABORATORY, [facility] ON [facility]   2/19/2025 11:00 AM Hiro Kraft MD [facility]       Lab Frequency Next Occurrence   Comprehensive Metabolic Panel Once 10/28/2024   Lipid Panel Once 10/28/2024   Hemoglobin A1C Once 10/30/2024       Follow up in about 3 months (around 2/19/2025), or if symptoms worsen or fail to improve.

## 2024-11-20 ENCOUNTER — PATIENT MESSAGE (OUTPATIENT)
Dept: PREADMISSION TESTING | Facility: HOSPITAL | Age: 74
End: 2024-11-20
Payer: MEDICARE

## 2024-11-27 ENCOUNTER — HOSPITAL ENCOUNTER (OUTPATIENT)
Dept: PREADMISSION TESTING | Facility: HOSPITAL | Age: 74
Discharge: HOME | End: 2024-11-27
Attending: INTERNAL MEDICINE
Payer: MEDICARE

## 2024-11-27 ENCOUNTER — E-CONSULT (OUTPATIENT)
Dept: CARDIOLOGY | Facility: HOSPITAL | Age: 74
End: 2024-11-27
Payer: MEDICARE

## 2024-11-27 DIAGNOSIS — Z01.810 PREOP CARDIOVASCULAR EXAM: ICD-10-CM

## 2024-11-27 DIAGNOSIS — I25.110 CORONARY ARTERY DISEASE INVOLVING NATIVE CORONARY ARTERY OF NATIVE HEART WITH UNSTABLE ANGINA PECTORIS: Primary | ICD-10-CM

## 2024-11-27 DIAGNOSIS — Z12.11 COLON CANCER SCREENING: Primary | ICD-10-CM

## 2024-11-27 RX ORDER — SODIUM, POTASSIUM,MAG SULFATES 17.5-3.13G
1 SOLUTION, RECONSTITUTED, ORAL ORAL DAILY
Qty: 1 KIT | Refills: 0 | Status: SHIPPED | OUTPATIENT
Start: 2024-11-27 | End: 2024-11-29

## 2024-11-27 NOTE — CONSULTS
[facility]  Response for E-Consult     Patient Name: Deniz Paulino  MRN: [id number]  Primary Care Provider: Hiro Kraft MD   Requesting Provider: Samia Stein NP  Consults    Recommendation: moderate risk cardiac wise  No cardiac contraindication for colonoscopy  Ok to stop asa effient 5-7 days before procedure and resume asap post       Contingency that warrants a repeat eConsult or referral: none    Total time of Consultation: 10 minute    I did not speak to the requesting provider verbally about this.     *This eConsult is based on the clinical data available to me and is furnished without benefit of a physical examination. The eConsult will need to be interpreted in light of any clinical issues or changes in patient status not available to me at the time of filing this eConsults. Significant changes in patient condition or level of acuity should result in immediate formal consultation and reevaluation. Please alert me if you have further questions.    Thank you for this eConsult referral.     Bladimir Bennett MD  [facility]

## 2024-12-17 ENCOUNTER — ANESTHESIA EVENT (OUTPATIENT)
Dept: ENDOSCOPY | Facility: HOSPITAL | Age: 74
End: 2024-12-17
Payer: MEDICARE

## 2024-12-17 ENCOUNTER — ANESTHESIA (OUTPATIENT)
Dept: ENDOSCOPY | Facility: HOSPITAL | Age: 74
End: 2024-12-17
Payer: MEDICARE

## 2024-12-17 ENCOUNTER — HOSPITAL ENCOUNTER (OUTPATIENT)
Facility: HOSPITAL | Age: 74
Discharge: HOME | End: 2024-12-17
Attending: STUDENT IN AN ORGANIZED HEALTH CARE EDUCATION/TRAINING PROGRAM | Admitting: STUDENT IN AN ORGANIZED HEALTH CARE EDUCATION/TRAINING PROGRAM
Payer: MEDICARE

## 2024-12-17 DIAGNOSIS — Z12.11 SCREENING FOR COLON CANCER: ICD-10-CM

## 2024-12-17 LAB — POCT GLUCOSE: 117 MG/DL (ref 70–110)

## 2024-12-17 PROCEDURE — 37000009 HC ANESTHESIA EA ADD 15 MINS: Performed by: STUDENT IN AN ORGANIZED HEALTH CARE EDUCATION/TRAINING PROGRAM

## 2024-12-17 PROCEDURE — 88305 TISSUE EXAM BY PATHOLOGIST: CPT | Performed by: PATHOLOGY

## 2024-12-17 PROCEDURE — 63600175 PHARM REV CODE 636 W HCPCS

## 2024-12-17 PROCEDURE — 88305 TISSUE EXAM BY PATHOLOGIST: CPT | Mod: 26,,, | Performed by: PATHOLOGY

## 2024-12-17 PROCEDURE — 45385 COLONOSCOPY W/LESION REMOVAL: CPT | Mod: PT,,, | Performed by: STUDENT IN AN ORGANIZED HEALTH CARE EDUCATION/TRAINING PROGRAM

## 2024-12-17 PROCEDURE — 27201089 HC SNARE, DISP (ANY): Performed by: STUDENT IN AN ORGANIZED HEALTH CARE EDUCATION/TRAINING PROGRAM

## 2024-12-17 PROCEDURE — 25000003 PHARM REV CODE 250: Performed by: STUDENT IN AN ORGANIZED HEALTH CARE EDUCATION/TRAINING PROGRAM

## 2024-12-17 PROCEDURE — 45385 COLONOSCOPY W/LESION REMOVAL: CPT | Mod: PT | Performed by: STUDENT IN AN ORGANIZED HEALTH CARE EDUCATION/TRAINING PROGRAM

## 2024-12-17 PROCEDURE — 37000008 HC ANESTHESIA 1ST 15 MINUTES: Performed by: STUDENT IN AN ORGANIZED HEALTH CARE EDUCATION/TRAINING PROGRAM

## 2024-12-17 RX ORDER — PROPOFOL 10 MG/ML
VIAL (ML) INTRAVENOUS
Status: DISCONTINUED | OUTPATIENT
Start: 2024-12-17 | End: 2024-12-17

## 2024-12-17 RX ORDER — DEXTROMETHORPHAN/PSEUDOEPHED 2.5-7.5/.8
DROPS ORAL
Status: COMPLETED | OUTPATIENT
Start: 2024-12-17 | End: 2024-12-17

## 2024-12-17 RX ORDER — LIDOCAINE HYDROCHLORIDE 10 MG/ML
INJECTION, SOLUTION EPIDURAL; INFILTRATION; INTRACAUDAL; PERINEURAL
Status: DISCONTINUED | OUTPATIENT
Start: 2024-12-17 | End: 2024-12-17

## 2024-12-17 RX ADMIN — PROPOFOL 40 MG: 10 INJECTION, EMULSION INTRAVENOUS at 08:12

## 2024-12-17 RX ADMIN — PROPOFOL 70 MG: 10 INJECTION, EMULSION INTRAVENOUS at 08:12

## 2024-12-17 RX ADMIN — LIDOCAINE HYDROCHLORIDE 50 MG: 10 SOLUTION INTRAVENOUS at 08:12

## 2024-12-17 RX ADMIN — PROPOFOL 40 MG: 10 INJECTION, EMULSION INTRAVENOUS at 09:12

## 2024-12-17 RX ADMIN — PROPOFOL 30 MG: 10 INJECTION, EMULSION INTRAVENOUS at 09:12

## 2024-12-17 NOTE — H&P
[facility] ([facility])  Colon and Rectal Surgery  History & Physical    Patient Name: Deniz Paulino  MRN: [id number]  Admission Date: 12/17/2024  Attending Physician: Carol Pinon MD  Primary Care Provider: Hiro Kraft MD    Patient information was obtained from patient and medical records.    Subjective:     Chief Complaint/Reason for Admission: Here for Colonoscopy    History of Present Illness:  Patient is a 74 y.o. male presents for colonoscopy. History of polyps    Denies changes in bowel habits such as bleeding, melena, painful bowel movements, change in caliber of stool, diarrhea or constipation.    Denies FH of colorectal cancer, polyps or IBD       No current facility-administered medications on file prior to encounter.     Current Outpatient Medications on File Prior to Encounter   Medication Sig    aspirin (ECOTRIN) 325 MG EC tablet Take 325 mg by mouth once daily. (Patient taking differently: Take 81 mg by mouth once daily.)    atorvastatin (LIPITOR) 40 MG tablet Take 1 tablet by mouth once daily    coenzyme Q10 200 mg capsule Take 200 mg by mouth 2 (two) times daily.    cyanocobalamin (VITAMIN B-12) 1000 MCG tablet Take 1,000 mcg by mouth once daily.    hydroCHLOROthiazide (HYDRODIURIL) 12.5 MG Tab Take 1 tablet (12.5 mg total) by mouth once daily.    lisinopriL (PRINIVIL,ZESTRIL) 40 MG tablet Take 1 tablet by mouth once daily    magnesium oxide (MAG-OX) 400 mg (241.3 mg magnesium) tablet Take 400 mg by mouth once daily.    metFORMIN (GLUCOPHAGE) 1000 MG tablet Take 1 tablet (1,000 mg total) by mouth 2 (two) times daily with meals.    metoprolol succinate (TOPROL-XL) 50 MG 24 hr tablet Take 1 tablet (50 mg total) by mouth once daily.    NIFEdipine (PROCARDIA-XL) 90 MG (OSM) 24 hr tablet Take 1 tablet (90 mg total) by mouth once daily.    nitroGLYCERIN 0.2 mg/hr TD PT24 (NITRODUR) 0.2 mg/hr APPLY 1 PATCH TOPICALLY ONCE DAILY    pantoprazole (PROTONIX) 20 MG tablet Take 1 tablet (20 mg  total) by mouth once daily.    prasugreL (EFFIENT) 10 mg Tab Take 1 tablet (10 mg total) by mouth once daily.    tamsulosin (FLOMAX) 0.4 mg Cap Take 1 capsule (0.4 mg total) by mouth once daily.    vitamin D 1000 units Tab Take 1,000 Units by mouth once daily.        Review of patient's allergies indicates:  No Known Allergies    Past Medical History:   Diagnosis Date    Acute coronary syndrome     Coronary artery disease     Diabetes mellitus 04/24/2014    Effort angina 04/30/2023    Hyperlipidemia     Hypertension     Old MI (myocardial infarction) 04/24/2014    Preop cardiovascular exam 11/27/2024    S/P CABG (coronary artery bypass graft) 04/24/2014     Past Surgical History:   Procedure Laterality Date    CARDIAC CATHETERIZATION      COLONOSCOPY N/A 07/26/2019    Procedure: COLONOSCOPY;  Surgeon: Opal Oshea MD;  Location: [facility];  Service: Endoscopy;  Laterality: N/A;    CORONARY ARTERY BYPASS GRAFT      CORONARY BYPASS GRAFT ANGIOGRAPHY  5/1/2023    Procedure: Bypass graft study;  Surgeon: Kimberly Bennett MD;  Location: [facility] CATH LAB;  Service: Cardiology;;    ESOPHAGOGASTRODUODENOSCOPY N/A 07/26/2019    Procedure: ESOPHAGOGASTRODUODENOSCOPY (EGD);  Surgeon: Opal Oshea MD;  Location: [facility];  Service: Endoscopy;  Laterality: N/A;    INSTANTANEOUS WAVE-FREE RATIO (IFR) N/A 4/4/2024    Procedure: Instantaneous Wave-Free Ratio (IFR);  Surgeon: Kimberly Bennett MD;  Location: [facility] CATH LAB;  Service: Cardiology;  Laterality: N/A;    LEFT HEART CATHETERIZATION Left 5/1/2023    Procedure: Left heart cath;  Surgeon: Kimberly Bennett MD;  Location: [facility] CATH LAB;  Service: Cardiology;  Laterality: Left;    LEFT HEART CATHETERIZATION Left 4/4/2024    Procedure: Left heart cath;  Surgeon: Kimberly Bennett MD;  Location: [facility] CATH LAB;  Service: Cardiology;  Laterality: Left;    PROTECTION, CORONARY, FILTER  4/4/2024    Procedure: Protection, Coronary, Filter;  Surgeon: Kimberly Bennett MD;  Location:  [facility];  Service: Cardiology;;    PTCA, SINGLE VESSEL  2024    Procedure: PTCA, Single Vessel;  Surgeon: Kimberly Bennett MD;  Location: [facility] CATH LAB;  Service: Cardiology;;    SKIN CANCER EXCISION  2018    STENT, DRUG ELUTING, SINGLE VESSEL, CORONARY  2024    Procedure: Stent, Drug Eluting, Single Vessel, Coronary;  Surgeon: Kimberly Bennett MD;  Location: [facility] CATH LAB;  Service: Cardiology;;    VASECTOMY       Family History       Problem Relation (Age of Onset)    Alcohol abuse Father    Cancer Mother, Father    Diabetes Mother, Brother    Heart attack Father (63)    Heart murmur Mother    Stroke Mother          Tobacco Use    Smoking status: Former     Current packs/day: 0.00     Average packs/day: 1 pack/day for 30.0 years (30.0 ttl pk-yrs)     Types: Cigarettes     Start date: 1973     Quit date: 2003     Years since quittin.8    Smokeless tobacco: Former   Substance and Sexual Activity    Alcohol use: Yes     Alcohol/week: 16.0 standard drinks of alcohol     Types: 10 Glasses of wine, 6 Cans of beer per week     Comment: socially    Drug use: Never    Sexual activity: Not Currently     Partners: Female       Objective:     Vital Signs (Most Recent):    Vital Signs (24h Range):           There is no height or weight on file to calculate BMI.    Physical Exam  Constitutional:       Appearance: He is well-developed.   HENT:      Head: Normocephalic and atraumatic.   Eyes:      Conjunctiva/sclera: Conjunctivae normal.      Pupils: Pupils are equal, round, and reactive to light.   Neck:      Thyroid: No thyromegaly.   Cardiovascular:      Rate and Rhythm: Normal rate and regular rhythm.   Pulmonary:      Effort: Pulmonary effort is normal. No respiratory distress.   Abdominal:      General: There is no distension.      Palpations: Abdomen is soft. There is no mass.      Tenderness: There is no abdominal tenderness.   Musculoskeletal:         General: No tenderness. Normal range of  motion.      Cervical back: Normal range of motion.   Skin:     General: Skin is warm and dry.      Capillary Refill: Capillary refill takes less than 2 seconds.   Neurological:      General: No focal deficit present.      Mental Status: He is alert and oriented to person, place, and time.           Assessment/Plan:     Patient is a 74 y.o. male who presents for colonoscopy     - Ok to proceed to endoscopy suite for colonoscopy  - Consent obtained. All risks, benefits and alternatives fully explained to patient, including but not limited to bleeding, infection, perforation, and missed polyps. All questions appropriately answered to patient's satisfaction. Consent signed and placed on chart.    There are no hospital problems to display for this patient.    VTE Risk Mitigation (From admission, onward)      None            Carol Pinon MD  Colon and Rectal Surgery  [facility] ([facility])

## 2024-12-17 NOTE — ANESTHESIA POSTPROCEDURE EVALUATION
Anesthesia Post Evaluation    Patient: Deniz Paulino    Procedure(s) Performed: Procedure(s) (LRB):  COLONOSCOPY, SCREENING, LOW RISK PATIENT 11/27-clr rec'd-Ok to stop asa effient 5-7 days (N/A)    Final Anesthesia Type: MAC      Patient location during evaluation: PACU  Patient participation: Yes- Able to Participate  Level of consciousness: awake and alert  Post-procedure vital signs: reviewed and stable  Pain management: adequate  Airway patency: patent    PONV status at discharge: No PONV  Anesthetic complications: no      Cardiovascular status: blood pressure returned to baseline  Respiratory status: unassisted and room air  Hydration status: euvolemic  Follow-up not needed.              Vitals Value Taken Time   /79 12/17/24 0928   Temp 36.5 °C (97.7 °F) 12/17/24 0918   Pulse 51 12/17/24 0928   Resp 18 12/17/24 0928   SpO2 96 % 12/17/24 0928         No case tracking events are documented in the log.      Pain/Flaco Score: Flaco Score: 10 (12/17/2024  9:28 AM)

## 2024-12-17 NOTE — PLAN OF CARE
Dr Pinon at bs to discuss findings. VSS. No  Pain, no GI bleeding. Pt to be discharged from unit.

## 2024-12-17 NOTE — BRIEF OP NOTE
[facility])  Brief Operative Note     SUMMARY     Surgery Date: 12/17/2024     Surgeons and Role:     * Carol Pinon MD - Primary    Assisting Surgeon: None    Pre-op Diagnosis:  Colon cancer screening [Z12.11]    Post-op Diagnosis:  Post-Op Diagnosis Codes:     * Colon cancer screening [Z12.11]    Procedure(s) (LRB):  COLONOSCOPY, SCREENING, LOW RISK PATIENT 11/27-clr rec'd-Ok to stop asa effient 5-7 days (N/A)    Anesthesia: Choice    Description of the findings of the procedure: See Op Note    Findings/Key Components: multiple polyps    Estimated Blood Loss: * No values recorded between 12/17/2024  8:54 AM and 12/17/2024  9:13 AM *         Specimens:   Specimen (24h ago, onward)       Start     Ordered    12/17/24 0905  Specimen to Pathology, Surgery Gastrointestinal tract  Once        Comments: Pre-op Diagnosis: Colon cancer screening [Z12.11]Procedure(s):COLONOSCOPY, SCREENING, LOW RISK PATIENT 11/27-clr rec'd-Ok to stop asa effient 5-7 days Number of specimens: 2Name of specimens: #1 Transverse colon polyps#2 Descending colon polyp     References:    Click here for ordering Quick Tip   Question Answer Comment   Procedure Type: Gastrointestinal tract    Specimen Class: Routine/Screening    Release to patient Immediate        12/17/24 0911                    Discharge Note    SUMMARY     Admit Date: 12/17/2024    Discharge Date and Time: 12/17/2024 9:19 AM    Hospital Course Patient was seen in the preoperative area by both myself and anesthesia. All consents were verified and all questions appropriately answered. All risks, benefits and alternatives explained to patient. Patient proceeded to endoscopy suite for colonoscopy and was discharged home postoperative once cleared by anesthesia.    Final Diagnosis: Post-Op Diagnosis Codes:     * Colon cancer screening [Z12.11]    Disposition: Home or Self Care    Follow Up/Patient Instructions: See Provation report    Medications:  Reconciled  Home Medications:      Medication List        CONTINUE taking these medications      aspirin 325 MG EC tablet  Commonly known as: ECOTRIN  Take 325 mg by mouth once daily.     atorvastatin 40 MG tablet  Commonly known as: LIPITOR  Take 1 tablet by mouth once daily     coenzyme Q10 200 mg capsule  Take 200 mg by mouth 2 (two) times daily.     cyanocobalamin 1000 MCG tablet  Commonly known as: VITAMIN B-12  Take 1,000 mcg by mouth once daily.     hydroCHLOROthiazide 12.5 MG Tab  Commonly known as: HYDRODIURIL  Take 1 tablet (12.5 mg total) by mouth once daily.     lisinopriL 40 MG tablet  Commonly known as: PRINIVIL,ZESTRIL  Take 1 tablet by mouth once daily     magnesium oxide 400 mg (241.3 mg magnesium) tablet  Commonly known as: MAG-OX  Take 400 mg by mouth once daily.     metFORMIN 1000 MG tablet  Commonly known as: GLUCOPHAGE  Take 1 tablet (1,000 mg total) by mouth 2 (two) times daily with meals.     metoprolol succinate 50 MG 24 hr tablet  Commonly known as: TOPROL-XL  Take 1 tablet (50 mg total) by mouth once daily.     NIFEdipine 90 MG (OSM) 24 hr tablet  Commonly known as: PROCARDIA-XL  Take 1 tablet (90 mg total) by mouth once daily.     nitroGLYCERIN 0.2 mg/hr TD PT24 0.2 mg/hr  Commonly known as: NITRODUR  APPLY 1 PATCH TOPICALLY ONCE DAILY     pantoprazole 20 MG tablet  Commonly known as: PROTONIX  Take 1 tablet (20 mg total) by mouth once daily.     prasugreL 10 mg Tab  Commonly known as: EFFIENT  Take 1 tablet (10 mg total) by mouth once daily.     tamsulosin 0.4 mg Cap  Commonly known as: FLOMAX  Take 1 capsule (0.4 mg total) by mouth once daily.     vitamin D 1000 units Tab  Commonly known as: VITAMIN D3  Take 1,000 Units by mouth once daily.            Discharge Procedure Orders   Diet general

## 2024-12-17 NOTE — TRANSFER OF CARE
Anesthesia Transfer of Care Note    Patient: Deniz Paulino    Procedure(s) Performed: Procedure(s) (LRB):  COLONOSCOPY, SCREENING, LOW RISK PATIENT 11/27-clr rec'd-Ok to stop asa effient 5-7 days (N/A)    Patient location: PACU    Anesthesia Type: MAC    Transport from OR: Transported from OR on room air with adequate spontaneous ventilation    Post pain: adequate analgesia    Post assessment: no apparent anesthetic complications    Post vital signs: stable    Level of consciousness: responds to stimulation    Nausea/Vomiting: no nausea/vomiting    Complications: none    Transfer of care protocol was followed      Last vitals: There were no vitals taken for this visit.

## 2024-12-17 NOTE — ANESTHESIA PREPROCEDURE EVALUATION
12/17/2024  Deniz Paulino is a 74 y.o., male.      Pre-op Assessment    I have reviewed the Patient Summary Reports.     I have reviewed the Nursing Notes. I have reviewed the NPO Status.   I have reviewed the Medications.     Review of Systems  Anesthesia Hx:  No problems with previous Anesthesia             Denies Family Hx of Anesthesia complications.    Denies Personal Hx of Anesthesia complications.                    Social:  Former Smoker       Hematology/Oncology:  Hematology Normal   Oncology Normal                                   EENT/Dental:  EENT/Dental Normal           Cardiovascular:     Hypertension  Past MI CAD   CABG/stent   Angina     hyperlipidemia   ECG has been reviewed.                            Pulmonary:  Pulmonary Normal                       Renal/:  Renal/ Normal                 Hepatic/GI:  Hepatic/GI Normal                    Musculoskeletal:  Musculoskeletal Normal                Neurological:  Neurology Normal                                      Endocrine:  Diabetes, type 2         Obesity / BMI > 30  Dermatological:  Skin Normal    Psych:  Psychiatric Normal                    Physical Exam  General: Cooperative, Alert and Oriented    Airway:  Mallampati: II   Mouth Opening: Normal  TM Distance: Normal  Tongue: Normal  Neck ROM: Normal ROM    Chest/Lungs:  Clear to auscultation, Normal Respiratory Rate    Heart:  Rate: Normal  Rhythm: Regular Rhythm        Anesthesia Plan  Type of Anesthesia, risks & benefits discussed:    Anesthesia Type: MAC  Intra-op Monitoring Plan: Standard ASA Monitors  Induction:  IV  Informed Consent: Informed consent signed with the Patient and all parties understand the risks and agree with anesthesia plan.  All questions answered.   ASA Score: 3  Day of Surgery Review of History & Physical: H&P Update referred to the surgeon/provider.I  have interviewed and examined the patient. I have reviewed the patient's H&P dated: There are no significant changes.     Ready For Surgery From Anesthesia Perspective.     .

## 2024-12-17 NOTE — [employer] PATIENT INSTRUCTIONS
Discharge Summary/Instructions after an Endoscopic Procedure  Patient Name: Deniz Paulino  Patient MRN: [id number]  Patient YOB: 1950 Tuesday, December 17, 2024 Carol Pinon MD  Dear patient,  As a result of recent federal legislation (The Federal Cures Act), you may   receive lab or pathology results from your procedure in your [facility]   account before your physician is able to contact you. Your physician or   their representative will relay the results to you with their   recommendations at their soonest availability.  Thank you,  RESTRICTIONS:  During your procedure today, you received medications for sedation.  These   medications may affect your judgment, balance and coordination.  Therefore,   for 24 hours, you have the following restrictions:   - DO NOT drive a car, operate machinery, make legal/financial decisions,   sign important papers or drink alcohol.    ACTIVITY:  Today: no heavy lifting, straining or running due to procedural   sedation/anesthesia.  The following day: return to full activity including work.  DIET:  Eat and drink normally unless instructed otherwise.     TREATMENT FOR COMMON SIDE EFFECTS:  - Mild abdominal pain, nausea, belching, bloating or excessive gas:  rest,   eat lightly and use a heating pad.  - Sore Throat: treat with throat lozenges and/or gargle with warm salt   water.  - Because air was used during the procedure, expelling large amounts of air   from your rectum or belching is normal.  - If a bowel prep was taken, you may not have a bowel movement for 1-3 days.    This is normal.  SYMPTOMS TO WATCH FOR AND REPORT TO YOUR PHYSICIAN:  1. Abdominal pain or bloating, other than gas cramps.  2. Chest pain.  3. Back pain.  4. Signs of infection such as: chills or fever occurring within 24 hours   after the procedure.  5. Rectal bleeding, which would show as bright red, maroon, or black stools.   (A tablespoon of blood from the rectum is not serious, especially  if   hemorrhoids are present.)  6. Vomiting.  7. Weakness or dizziness.  GO DIRECTLY TO THE NEAREST EMERGENCY ROOM IF YOU HAVE ANY OF THE FOLLOWING:      Difficulty breathing              Chills and/or fever over 101 F   Persistent vomiting and/or vomiting blood   Severe abdominal pain   Severe chest pain   Black, tarry stools   Bleeding- more than one tablespoon   Any other symptom or condition that you feel may need urgent attention  Your doctor recommends these additional instructions:  If any biopsies were taken, your doctors clinic will contact you in 1 to 2   weeks with any results.  - Discharge patient to home (ambulatory).   - Patient has a contact number available for emergencies.  The signs and   symptoms of potential delayed complications were discussed with the   patient.  Return to normal activities tomorrow.  Written discharge   instructions were provided to the patient.   - Resume previous diet.   - Continue present medications.   - Return to primary care physician as previously scheduled.   - Repeat colonoscopy in 5 years for surveillance.  For questions, problems or results please call your physician Carol Pinon MD at Work:  [phone]  If you have any questions about the above instructions, call the GI   department at [phone] or call the endoscopy unit at [phone]   from 7am until 3 pm.  [facility] [place], EMERGENCY ROOM PHONE NUMBER:   [phone]  IF A COMPLICATION OR EMERGENCY SITUATION ARISES AND YOU ARE UNABLE TO REACH   YOUR PHYSICIAN - GO DIRECTLY TO THE EMERGENCY ROOM.  I have read or have had read to me these discharge instructions for my   procedure and have received a written copy.  I understand these   instructions and will follow-up with my physician if I have any questions.     __________________________________       _____________________________________  Nurse Signature                                          Patient/Designated    Responsible Party Signature  Carol Pinon MD  12/17/2024 9:14:08 AM  This report has been verified and signed electronically.  Dear patient,  As a result of recent federal legislation (The Federal Cures Act), you may   receive lab or pathology results from your procedure in your [facility]   account before your physician is able to contact you. Your physician or   their representative will relay the results to you with their   recommendations at their soonest availability.  Thank you,  PROVATION

## 2024-12-18 VITALS
RESPIRATION RATE: 18 BRPM | OXYGEN SATURATION: 96 % | SYSTOLIC BLOOD PRESSURE: 124 MMHG | DIASTOLIC BLOOD PRESSURE: 79 MMHG | HEART RATE: 51 BPM | TEMPERATURE: 98 F

## 2024-12-20 LAB
FINAL PATHOLOGIC DIAGNOSIS: NORMAL
GROSS: NORMAL
Lab: NORMAL

## 2025-01-09 ENCOUNTER — OFFICE VISIT (OUTPATIENT)
Dept: CARDIOLOGY | Facility: CLINIC | Age: 75
End: 2025-01-09
Payer: MEDICARE

## 2025-01-09 VITALS
SYSTOLIC BLOOD PRESSURE: 136 MMHG | WEIGHT: 226.63 LBS | OXYGEN SATURATION: 98 % | BODY MASS INDEX: 32.45 KG/M2 | HEART RATE: 60 BPM | DIASTOLIC BLOOD PRESSURE: 80 MMHG | HEIGHT: 70 IN

## 2025-01-09 DIAGNOSIS — E11.59 HYPERTENSION ASSOCIATED WITH DIABETES: ICD-10-CM

## 2025-01-09 DIAGNOSIS — I25.2 OLD MI (MYOCARDIAL INFARCTION): ICD-10-CM

## 2025-01-09 DIAGNOSIS — I20.89 EFFORT ANGINA: ICD-10-CM

## 2025-01-09 DIAGNOSIS — E11.9 TYPE 2 DIABETES MELLITUS WITHOUT COMPLICATION, WITHOUT LONG-TERM CURRENT USE OF INSULIN: ICD-10-CM

## 2025-01-09 DIAGNOSIS — E11.69 HYPERLIPIDEMIA ASSOCIATED WITH TYPE 2 DIABETES MELLITUS: ICD-10-CM

## 2025-01-09 DIAGNOSIS — Z95.1 S/P CABG (CORONARY ARTERY BYPASS GRAFT): ICD-10-CM

## 2025-01-09 DIAGNOSIS — I15.2 HYPERTENSION ASSOCIATED WITH DIABETES: ICD-10-CM

## 2025-01-09 DIAGNOSIS — I25.110 CORONARY ARTERY DISEASE INVOLVING NATIVE CORONARY ARTERY OF NATIVE HEART WITH UNSTABLE ANGINA PECTORIS: Primary | ICD-10-CM

## 2025-01-09 DIAGNOSIS — E78.5 HYPERLIPIDEMIA ASSOCIATED WITH TYPE 2 DIABETES MELLITUS: ICD-10-CM

## 2025-01-09 PROCEDURE — 99999 PR PBB SHADOW E&M-EST. PATIENT-LVL IV: CPT | Mod: PBBFAC,,, | Performed by: INTERNAL MEDICINE

## 2025-01-09 RX ORDER — ASPIRIN 81 MG/1
81 TABLET ORAL DAILY
Qty: 30 TABLET | Refills: 6 | Status: SHIPPED | OUTPATIENT
Start: 2025-01-09

## 2025-01-09 RX ORDER — SEMAGLUTIDE 0.68 MG/ML
0.25 INJECTION, SOLUTION SUBCUTANEOUS
Qty: 3 ML | Refills: 6 | Status: SHIPPED | OUTPATIENT
Start: 2025-01-09

## 2025-01-09 RX ORDER — TRIAMTERENE AND HYDROCHLOROTHIAZIDE 37.5; 25 MG/1; MG/1
1 CAPSULE ORAL EVERY MORNING
Qty: 90 CAPSULE | Refills: 3 | Status: SHIPPED | OUTPATIENT
Start: 2025-01-09 | End: 2026-01-09

## 2025-01-09 NOTE — PROGRESS NOTES
Subjective:   Patient ID:  Deniz Paulino is a 74 y.o. male who presents for follow up of No chief complaint on file.      HPI  11/16/2020  A 68 YO MALE WITH CAD S/P CABG OLD MI HTN DIABETES HLP IS HERE FOR F/U . HE HAS NOT BEEN EXERCISING CLAIMS COMPLIANCE. HAD COVID MILD CASE NO ILL EFFECTS IN AUGUST HAS NO NEW CHEST PAIN SHORTNESS OF BREATH ORTHOPNEA PND. HE IS COMPLIANT WITH SALT. HE HA SNO OTHER CARDIOVASCULAR COMPLAINTS. NO CHANGE IN WEIGHT. A1C 6% LDL ON TARGET. HIS A1C IS INCREASED.      7/14/2021  HERE FROF /U NO NEW SYMPTOMS HE HAS NOT BEEN EXERCISES HE TRIES TO BE COMPLIANT WITH DIET WEIGHT UNCHANGED HAS NO CARDIOVASCULAR SYMPTOMS.      12/2/2021    has  been eating lately for the holidays has been on the sedentary side takes meds regularily asymptomatic cardiovascular wise.      7/12/2022  Here for f /u has rt upper extremity numbness at nite at times has h/o carpal tunnel surgery no associated weakness .has no angina no shortness of breath had nose bleed that stopped he is taking his asa . tries to be compliant with diet.has numbness in feet.         12/12/22  Sees Dr. Bennett in clinic   Comes in with wife  Comes in with chest pain, 1.5 months ago, comes in rest sitting or laying flat  Not on exertion, lasts 10-15 sec  Denies SOB, diaphoresis, nauseous, palpitations   Does not exercise regularly - has gained 6 lbs since 7/22  Stopped smoking 20 years ago  BP elevated today, a little elevated on digtial medicine recently      4vCABG was 20 years ago     1/19/2023  Here for f/u . He has meds switched. His pain resolved after nifedipine. He has normal lvf by echo cardiolite negative for ischemia his bp control impoprved. Has no shortness of breath no leg swelling      4/20/2023   Here for 3 months f/u his bp control improved he ahs less chest pain he feels a minor cramp in the middle opf chest similar to his previous presentation but less intense priro to cabg. He is compliant with salt intake and meds.  He does not carry nitro s/l.      His cabg  lima to lad svg sequential to d1 d2   Svg to pda    His anatomy showed non obs cx occluded lad and rca (12/2009)     I am concerned that he ahs progressed his lcx disease and that hsi stress test did not show the ischemia      5/18/2023  Here forf /u heart cath showed evidence of multiple sequential stenosis in the svg to d1 and d2 was treated medically with ranexa and nitrates, HAS NO FURTHER ANGINA. HAS NO COMPLAINTS AT THE ACCESS SITE. HE HAS NO DIZZINESS NO LIGHTHEADEDNESS      12/21/2023  He has not been doing much of activity he gets short of breath. He has used nitro s/l three times since last visit in may. Has no nocturnal symptoms EKG has no new ischemic changes. He is getting fatigue takes naps during the day wears a nose strip has no other issues clinically. Not interested in sleep study.he attributes it to him being bored        4/4/2024   Has been having a lot of chest pain requiring er visit x2  continues using nitro for control he is very limited with activity to few steps he has  basically lost his lifestyle.his troponin is elevated consistently now   He cannot recline or lie on his side the chest pain gets worse it radiates to jaw and arm . He slept on his recliner yesterday he is still having arm pain . He is having chest pain now      7/30/2024   He has had an intervention in April since he had an acs threatened an acute graft closure  He has issues with standing up he feels dizzy. He has some shortness of breath he has decrease in exercise tolerance. He feels he has no energy he snores he ha snot had sleep study he uses nose strips . Not interested in sleep study.    1/9/2025    has leg swelling has taken the hctz two pills made some difference. Has exertional shortness of breath unchanged. No chest pain not used nitro s/l .takes coq10 and magnesium.  Has no palpitation weight is unchanged . He uses nose strips do not want to check for sleep  apnea.  Past Medical History:   Diagnosis Date    Acute coronary syndrome     Coronary artery disease     Diabetes mellitus 04/24/2014    Effort angina 04/30/2023    Hyperlipidemia     Hypertension     Old MI (myocardial infarction) 04/24/2014    Preop cardiovascular exam 11/27/2024    S/P CABG (coronary artery bypass graft) 04/24/2014       Past Surgical History:   Procedure Laterality Date    CARDIAC CATHETERIZATION      COLONOSCOPY N/A 07/26/2019    Procedure: COLONOSCOPY;  Surgeon: Opal Oshea MD;  Location: [facility];  Service: Endoscopy;  Laterality: N/A;    COLONOSCOPY, SCREENING, LOW RISK PATIENT N/A 12/17/2024    Procedure: COLONOSCOPY, SCREENING, LOW RISK PATIENT 11/27-clr rec'd-Ok to stop asa effient 5-7 days;  Surgeon: Carol Pinon MD;  Location: [facility];  Service: Colon and Rectal;  Laterality: N/A;    CORONARY ARTERY BYPASS GRAFT      CORONARY BYPASS GRAFT ANGIOGRAPHY  5/1/2023    Procedure: Bypass graft study;  Surgeon: Kimberly Bennett MD;  Location: [facility] CATH LAB;  Service: Cardiology;;    ESOPHAGOGASTRODUODENOSCOPY N/A 07/26/2019    Procedure: ESOPHAGOGASTRODUODENOSCOPY (EGD);  Surgeon: Opal Oshea MD;  Location: [facility];  Service: Endoscopy;  Laterality: N/A;    INSTANTANEOUS WAVE-FREE RATIO (IFR) N/A 4/4/2024    Procedure: Instantaneous Wave-Free Ratio (IFR);  Surgeon: Kimberly Bennett MD;  Location: [facility] CATH LAB;  Service: Cardiology;  Laterality: N/A;    LEFT HEART CATHETERIZATION Left 5/1/2023    Procedure: Left heart cath;  Surgeon: Kimberly Bennett MD;  Location: [facility] CATH LAB;  Service: Cardiology;  Laterality: Left;    LEFT HEART CATHETERIZATION Left 4/4/2024    Procedure: Left heart cath;  Surgeon: Kimberly Bennett MD;  Location: [facility] CATH LAB;  Service: Cardiology;  Laterality: Left;    PROTECTION, CORONARY, FILTER  4/4/2024    Procedure: Protection, Coronary, Filter;  Surgeon: Kimberly Bennett MD;  Location: [facility] CATH LAB;  Service: Cardiology;;    PTCA, SINGLE  VESSEL  2024    Procedure: PTCA, Single Vessel;  Surgeon: Kimberly Bennett MD;  Location: [facility] CATH LAB;  Service: Cardiology;;    SKIN CANCER EXCISION  2018    STENT, DRUG ELUTING, SINGLE VESSEL, CORONARY  2024    Procedure: Stent, Drug Eluting, Single Vessel, Coronary;  Surgeon: Kimberly Bennett MD;  Location: [facility] CATH LAB;  Service: Cardiology;;    VASECTOMY         Social History     Tobacco Use    Smoking status: Former     Current packs/day: 0.00     Average packs/day: 1 pack/day for 30.0 years (30.0 ttl pk-yrs)     Types: Cigarettes     Start date: 1973     Quit date: 2003     Years since quittin.9    Smokeless tobacco: Former   Substance Use Topics    Alcohol use: Yes     Alcohol/week: 16.0 standard drinks of alcohol     Types: 10 Glasses of wine, 6 Cans of beer per week     Comment: socially    Drug use: Never       Family History   Problem Relation Name Age of Onset    Diabetes Mother Mother     Heart murmur Mother Mother     Cancer Mother Mother         Breast    Stroke Mother Mother     Alcohol abuse Father Father     Heart attack Father Father 63    Cancer Father Father         Esophageal    Diabetes Brother Frank        Current Outpatient Medications   Medication Sig    aspirin (ECOTRIN) 325 MG EC tablet Take 325 mg by mouth once daily.    atorvastatin (LIPITOR) 40 MG tablet Take 1 tablet by mouth once daily    coenzyme Q10 200 mg capsule Take 200 mg by mouth 2 (two) times daily.    cyanocobalamin (VITAMIN B-12) 1000 MCG tablet Take 1,000 mcg by mouth once daily.    hydroCHLOROthiazide (HYDRODIURIL) 12.5 MG Tab Take 1 tablet (12.5 mg total) by mouth once daily.    lisinopriL (PRINIVIL,ZESTRIL) 40 MG tablet Take 1 tablet by mouth once daily    magnesium oxide (MAG-OX) 400 mg (241.3 mg magnesium) tablet Take 400 mg by mouth once daily.    metFORMIN (GLUCOPHAGE) 1000 MG tablet Take 1 tablet (1,000 mg total) by mouth 2 (two) times daily with meals.    metoprolol succinate  (TOPROL-XL) 50 MG 24 hr tablet Take 1 tablet (50 mg total) by mouth once daily.    NIFEdipine (PROCARDIA-XL) 90 MG (OSM) 24 hr tablet Take 1 tablet (90 mg total) by mouth once daily.    nitroGLYCERIN 0.2 mg/hr TD PT24 (NITRODUR) 0.2 mg/hr APPLY 1 PATCH TOPICALLY ONCE DAILY    pantoprazole (PROTONIX) 20 MG tablet Take 1 tablet (20 mg total) by mouth once daily.    prasugreL (EFFIENT) 10 mg Tab Take 1 tablet (10 mg total) by mouth once daily.    tamsulosin (FLOMAX) 0.4 mg Cap Take 1 capsule (0.4 mg total) by mouth once daily.    vitamin D 1000 units Tab Take 1,000 Units by mouth once daily.      No current facility-administered medications for this visit.     Current Outpatient Medications on File Prior to Visit   Medication Sig    aspirin (ECOTRIN) 325 MG EC tablet Take 325 mg by mouth once daily.    atorvastatin (LIPITOR) 40 MG tablet Take 1 tablet by mouth once daily    coenzyme Q10 200 mg capsule Take 200 mg by mouth 2 (two) times daily.    cyanocobalamin (VITAMIN B-12) 1000 MCG tablet Take 1,000 mcg by mouth once daily.    hydroCHLOROthiazide (HYDRODIURIL) 12.5 MG Tab Take 1 tablet (12.5 mg total) by mouth once daily.    lisinopriL (PRINIVIL,ZESTRIL) 40 MG tablet Take 1 tablet by mouth once daily    magnesium oxide (MAG-OX) 400 mg (241.3 mg magnesium) tablet Take 400 mg by mouth once daily.    metFORMIN (GLUCOPHAGE) 1000 MG tablet Take 1 tablet (1,000 mg total) by mouth 2 (two) times daily with meals.    metoprolol succinate (TOPROL-XL) 50 MG 24 hr tablet Take 1 tablet (50 mg total) by mouth once daily.    NIFEdipine (PROCARDIA-XL) 90 MG (OSM) 24 hr tablet Take 1 tablet (90 mg total) by mouth once daily.    nitroGLYCERIN 0.2 mg/hr TD PT24 (NITRODUR) 0.2 mg/hr APPLY 1 PATCH TOPICALLY ONCE DAILY    pantoprazole (PROTONIX) 20 MG tablet Take 1 tablet (20 mg total) by mouth once daily.    prasugreL (EFFIENT) 10 mg Tab Take 1 tablet (10 mg total) by mouth once daily.    tamsulosin (FLOMAX) 0.4 mg Cap Take 1 capsule  (0.4 mg total) by mouth once daily.    vitamin D 1000 units Tab Take 1,000 Units by mouth once daily.      No current facility-administered medications on file prior to visit.     Review of patient's allergies indicates:  No Known Allergies   Review of Systems   Constitutional: Negative for malaise/fatigue.   Eyes:  Negative for blurred vision.   Cardiovascular:  Negative for chest pain, claudication, cyanosis, dyspnea on exertion, irregular heartbeat, leg swelling, near-syncope, orthopnea, palpitations and paroxysmal nocturnal dyspnea.   Respiratory:  Negative for cough, hemoptysis and shortness of breath.    Hematologic/Lymphatic: Negative for bleeding problem. Does not bruise/bleed easily.   Skin:  Negative for dry skin and itching.   Musculoskeletal:  Negative for falls, muscle weakness and myalgias.   Gastrointestinal:  Negative for abdominal pain, diarrhea, heartburn, hematemesis, hematochezia and melena.   Genitourinary:  Negative for flank pain and hematuria.   Neurological:  Negative for dizziness, focal weakness, headaches, light-headedness, numbness, paresthesias, seizures and weakness.   Psychiatric/Behavioral:  Negative for altered mental status and memory loss. The patient is not nervous/anxious.    Allergic/Immunologic: Negative for hives.       Objective:   Physical Exam  Vitals and nursing note reviewed.   Constitutional:       General: He is not in acute distress.     Appearance: He is well-developed. He is not diaphoretic.   HENT:      Head: Normocephalic and atraumatic.   Eyes:      General:         Right eye: No discharge.         Left eye: No discharge.      Pupils: Pupils are equal, round, and reactive to light.   Neck:      Thyroid: No thyromegaly.      Vascular: No JVD.   Cardiovascular:      Rate and Rhythm: Normal rate and regular rhythm.      Pulses: Intact distal pulses.      Heart sounds: Normal heart sounds. No murmur heard.     No friction rub. No gallop.   Pulmonary:      Effort:  "Pulmonary effort is normal. No respiratory distress.      Breath sounds: Normal breath sounds. No wheezing or rales.      Comments: Scar well healed   Chest:      Chest wall: No tenderness.   Abdominal:      General: Bowel sounds are normal. There is no distension.      Palpations: Abdomen is soft.      Tenderness: There is no abdominal tenderness.   Musculoskeletal:         General: Normal range of motion.      Cervical back: Neck supple.      Right lower leg: No edema.      Left lower leg: No edema.   Skin:     General: Skin is warm and dry.      Findings: No erythema or rash.   Neurological:      Mental Status: He is alert and oriented to person, place, and time.      Cranial Nerves: No cranial nerve deficit.   Psychiatric:         Behavior: Behavior normal.       Vitals:    01/09/25 1036 01/09/25 1039   BP: (!) 140/80 136/80   BP Location: Left arm Right arm   Patient Position: Sitting Sitting   Pulse: 60    SpO2: 98%    Weight: 102.8 kg (226 lb 10.1 oz)    Height: 5' 10" (1.778 m)      Lab Results   Component Value Date    CHOL 136 11/13/2024    CHOL 137 08/27/2024    CHOL 118 (L) 03/16/2024      Body mass index is 32.52 kg/m².   Lab Results   Component Value Date    HGBA1C 5.5 11/13/2024      BMP  Lab Results   Component Value Date     11/13/2024    K 3.4 (L) 11/13/2024     11/13/2024    CO2 23 11/13/2024    BUN 21 11/13/2024    CREATININE 1.3 11/13/2024    CALCIUM 9.8 11/13/2024    ANIONGAP 15 11/13/2024    EGFRNORACEVR 58.0 (A) 11/13/2024      Lab Results   Component Value Date    HDL 51 11/13/2024    HDL 53 08/27/2024    HDL 48 03/16/2024     Lab Results   Component Value Date    LDLCALC 56.4 (L) 11/13/2024    LDLCALC 52 08/27/2024    LDLCALC 48.4 (L) 03/16/2024     Lab Results   Component Value Date    TRIG 143 11/13/2024    TRIG 156 08/27/2024    TRIG 108 03/16/2024     Lab Results   Component Value Date    CHOLHDL 37.5 11/13/2024    CHOLHDL 40.7 03/16/2024    CHOLHDL 39.8 11/10/2023       " Chemistry        Component Value Date/Time     11/13/2024 0816    K 3.4 (L) 11/13/2024 0816     11/13/2024 0816    CO2 23 11/13/2024 0816    BUN 21 11/13/2024 0816    CREATININE 1.3 11/13/2024 0816     (H) 11/13/2024 0816        Component Value Date/Time    CALCIUM 9.8 11/13/2024 0816    ALKPHOS 78 11/13/2024 0816    AST 20 11/13/2024 0816    ALT 16 11/13/2024 0816    BILITOT 0.6 11/13/2024 0816    ESTGFRAFRICA >60.0 05/04/2022 0806    ESTGFRAFRICA >60.0 05/04/2022 0806    EGFRNONAA >60.0 05/04/2022 0806    EGFRNONAA >60.0 05/04/2022 0806          Lab Results   Component Value Date    TSH 2.179 11/13/2024     Lab Results   Component Value Date    INR 1.0 04/04/2024    INR 1.0 03/31/2024    INR 0.9 03/16/2024     Lab Results   Component Value Date    WBC 7.18 11/13/2024    HGB 13.1 (L) 11/13/2024    HCT 39.0 (L) 11/13/2024    MCV 95 11/13/2024     11/13/2024     BMP  Sodium   Date Value Ref Range Status   11/13/2024 141 136 - 145 mmol/L Final     Potassium   Date Value Ref Range Status   11/13/2024 3.4 (L) 3.5 - 5.1 mmol/L Final     Chloride   Date Value Ref Range Status   11/13/2024 103 95 - 110 mmol/L Final     CO2   Date Value Ref Range Status   11/13/2024 23 23 - 29 mmol/L Final     BUN   Date Value Ref Range Status   11/13/2024 21 8 - 23 mg/dL Final     Creatinine   Date Value Ref Range Status   11/13/2024 1.3 0.5 - 1.4 mg/dL Final     Calcium   Date Value Ref Range Status   11/13/2024 9.8 8.7 - 10.5 mg/dL Final     Anion Gap   Date Value Ref Range Status   11/13/2024 15 8 - 16 mmol/L Final     eGFR if    Date Value Ref Range Status   05/04/2022 >60.0 >60 mL/min/1.73 m^2 Final   05/04/2022 >60.0 >60 mL/min/1.73 m^2 Final     eGFR if non    Date Value Ref Range Status   05/04/2022 >60.0 >60 mL/min/1.73 m^2 Final     Comment:     Calculation used to obtain the estimated glomerular filtration  rate (eGFR) is the CKD-EPI equation.      05/04/2022 >60.0 >60  mL/min/1.73 m^2 Final     Comment:     Calculation used to obtain the estimated glomerular filtration  rate (eGFR) is the CKD-EPI equation.        CrCl cannot be calculated (Patient's most recent lab result is older than the maximum 7 days allowed.).    Assessment:     1. Coronary artery disease involving native coronary artery of native heart with unstable angina pectoris    2. Hypertension associated with diabetes    3. Hyperlipidemia associated with type 2 diabetes mellitus    4. Type 2 diabetes mellitus without complication, without long-term current use of insulin    5. Old MI (myocardial infarction)    6. S/P CABG (coronary artery bypass graft)    7. Effort angina    Leg swelling ? Venous insufficiency suggest compression socks   Htn borderline has some leg swelling responded to diuretics increase and has hypokalemia will switch to dyazide.  Obesity snoring not interested in sleep eval using nasal strips. Counseled about weight loss  Diabetes A1c on target I think he will benefit from ozempic  specially he is obese has mi cad.  Dyspnea on exertion ? Effort angina vs diastolic dysfunction vs deconditioning he is on appropriate therapy will encourage exercise weight loss  Hlp on target continue the same stable.   Plan:   Ozempic   Dyazide   Continue current therapy  Cardiac low salt diet.  Risk factor modification and excercise program./weight loss  F/u in 6 months with lipid cmp a1c

## 2025-01-16 ENCOUNTER — PATIENT MESSAGE (OUTPATIENT)
Dept: CARDIOLOGY | Facility: CLINIC | Age: 75
End: 2025-01-16
Payer: MEDICARE

## 2025-02-07 DIAGNOSIS — E11.69 HYPERLIPIDEMIA ASSOCIATED WITH TYPE 2 DIABETES MELLITUS: ICD-10-CM

## 2025-02-07 DIAGNOSIS — E78.5 HYPERLIPIDEMIA ASSOCIATED WITH TYPE 2 DIABETES MELLITUS: ICD-10-CM

## 2025-02-08 RX ORDER — ATORVASTATIN CALCIUM 40 MG/1
40 TABLET, FILM COATED ORAL DAILY
Qty: 90 TABLET | Refills: 3 | Status: SHIPPED | OUTPATIENT
Start: 2025-02-08

## 2025-02-12 ENCOUNTER — LAB VISIT (OUTPATIENT)
Dept: LAB | Facility: HOSPITAL | Age: 75
End: 2025-02-12
Attending: INTERNAL MEDICINE
Payer: MEDICARE

## 2025-02-12 DIAGNOSIS — E11.59 HYPERTENSION ASSOCIATED WITH DIABETES: ICD-10-CM

## 2025-02-12 DIAGNOSIS — I15.2 HYPERTENSION ASSOCIATED WITH DIABETES: ICD-10-CM

## 2025-02-12 DIAGNOSIS — Z79.899 HIGH RISK MEDICATION USE: ICD-10-CM

## 2025-02-12 DIAGNOSIS — I20.0 UNSTABLE ANGINA: ICD-10-CM

## 2025-02-12 DIAGNOSIS — Z79.899 OTHER LONG TERM (CURRENT) DRUG THERAPY: ICD-10-CM

## 2025-02-12 LAB
25(OH)D3+25(OH)D2 SERPL-MCNC: 47 NG/ML (ref 30–96)
ANION GAP SERPL CALC-SCNC: 11 MMOL/L (ref 8–16)
BUN SERPL-MCNC: 23 MG/DL (ref 8–23)
CALCIUM SERPL-MCNC: 9.2 MG/DL (ref 8.7–10.5)
CHLORIDE SERPL-SCNC: 106 MMOL/L (ref 95–110)
CO2 SERPL-SCNC: 24 MMOL/L (ref 23–29)
CREAT SERPL-MCNC: 1.7 MG/DL (ref 0.5–1.4)
EST. GFR  (NO RACE VARIABLE): 41.8 ML/MIN/1.73 M^2
GLUCOSE SERPL-MCNC: 99 MG/DL (ref 70–110)
POTASSIUM SERPL-SCNC: 4.3 MMOL/L (ref 3.5–5.1)
SODIUM SERPL-SCNC: 141 MMOL/L (ref 136–145)
VIT B12 SERPL-MCNC: 270 PG/ML (ref 210–950)

## 2025-02-12 PROCEDURE — 82306 VITAMIN D 25 HYDROXY: CPT | Performed by: INTERNAL MEDICINE

## 2025-02-12 PROCEDURE — 82607 VITAMIN B-12: CPT | Performed by: INTERNAL MEDICINE

## 2025-02-12 PROCEDURE — 36415 COLL VENOUS BLD VENIPUNCTURE: CPT | Performed by: INTERNAL MEDICINE

## 2025-02-12 PROCEDURE — 80048 BASIC METABOLIC PNL TOTAL CA: CPT | Performed by: INTERNAL MEDICINE

## 2025-02-19 ENCOUNTER — TELEPHONE (OUTPATIENT)
Dept: NEPHROLOGY | Facility: CLINIC | Age: 75
End: 2025-02-19
Payer: MEDICARE

## 2025-02-19 ENCOUNTER — OFFICE VISIT (OUTPATIENT)
Dept: INTERNAL MEDICINE | Facility: CLINIC | Age: 75
End: 2025-02-19
Payer: MEDICARE

## 2025-02-19 VITALS
OXYGEN SATURATION: 97 % | DIASTOLIC BLOOD PRESSURE: 74 MMHG | HEIGHT: 70 IN | SYSTOLIC BLOOD PRESSURE: 128 MMHG | WEIGHT: 227.06 LBS | BODY MASS INDEX: 32.51 KG/M2 | HEART RATE: 54 BPM | TEMPERATURE: 98 F

## 2025-02-19 DIAGNOSIS — I15.2 HYPERTENSION ASSOCIATED WITH DIABETES: Primary | ICD-10-CM

## 2025-02-19 DIAGNOSIS — E11.59 HYPERTENSION ASSOCIATED WITH DIABETES: ICD-10-CM

## 2025-02-19 DIAGNOSIS — I15.2 HYPERTENSION ASSOCIATED WITH DIABETES: ICD-10-CM

## 2025-02-19 DIAGNOSIS — E53.8 LOW SERUM VITAMIN B12: ICD-10-CM

## 2025-02-19 DIAGNOSIS — E11.69 HYPERLIPIDEMIA ASSOCIATED WITH TYPE 2 DIABETES MELLITUS: ICD-10-CM

## 2025-02-19 DIAGNOSIS — E83.42 HYPOMAGNESEMIA: ICD-10-CM

## 2025-02-19 DIAGNOSIS — N18.32 STAGE 3B CHRONIC KIDNEY DISEASE: ICD-10-CM

## 2025-02-19 DIAGNOSIS — E78.5 HYPERLIPIDEMIA ASSOCIATED WITH TYPE 2 DIABETES MELLITUS: ICD-10-CM

## 2025-02-19 DIAGNOSIS — Z95.1 S/P CABG (CORONARY ARTERY BYPASS GRAFT): ICD-10-CM

## 2025-02-19 DIAGNOSIS — E11.9 TYPE 2 DIABETES MELLITUS WITHOUT COMPLICATION, WITHOUT LONG-TERM CURRENT USE OF INSULIN: ICD-10-CM

## 2025-02-19 DIAGNOSIS — I25.110 CORONARY ARTERY DISEASE INVOLVING NATIVE CORONARY ARTERY OF NATIVE HEART WITH UNSTABLE ANGINA PECTORIS: Primary | ICD-10-CM

## 2025-02-19 DIAGNOSIS — I25.2 OLD MI (MYOCARDIAL INFARCTION): ICD-10-CM

## 2025-02-19 DIAGNOSIS — E11.59 HYPERTENSION ASSOCIATED WITH DIABETES: Primary | ICD-10-CM

## 2025-02-19 DIAGNOSIS — C44.90 SKIN CANCER: ICD-10-CM

## 2025-02-19 PROCEDURE — 3078F DIAST BP <80 MM HG: CPT | Mod: CPTII,S$GLB,, | Performed by: INTERNAL MEDICINE

## 2025-02-19 PROCEDURE — 1101F PT FALLS ASSESS-DOCD LE1/YR: CPT | Mod: CPTII,S$GLB,, | Performed by: INTERNAL MEDICINE

## 2025-02-19 PROCEDURE — 1159F MED LIST DOCD IN RCRD: CPT | Mod: CPTII,S$GLB,, | Performed by: INTERNAL MEDICINE

## 2025-02-19 PROCEDURE — G2211 COMPLEX E/M VISIT ADD ON: HCPCS | Mod: S$GLB,,, | Performed by: INTERNAL MEDICINE

## 2025-02-19 PROCEDURE — 3074F SYST BP LT 130 MM HG: CPT | Mod: CPTII,S$GLB,, | Performed by: INTERNAL MEDICINE

## 2025-02-19 PROCEDURE — 1126F AMNT PAIN NOTED NONE PRSNT: CPT | Mod: CPTII,S$GLB,, | Performed by: INTERNAL MEDICINE

## 2025-02-19 PROCEDURE — 99214 OFFICE O/P EST MOD 30 MIN: CPT | Mod: S$GLB,,, | Performed by: INTERNAL MEDICINE

## 2025-02-19 PROCEDURE — 3288F FALL RISK ASSESSMENT DOCD: CPT | Mod: CPTII,S$GLB,, | Performed by: INTERNAL MEDICINE

## 2025-02-19 PROCEDURE — 99999 PR PBB SHADOW E&M-EST. PATIENT-LVL V: CPT | Mod: PBBFAC,,, | Performed by: INTERNAL MEDICINE

## 2025-02-19 PROCEDURE — 3008F BODY MASS INDEX DOCD: CPT | Mod: CPTII,S$GLB,, | Performed by: INTERNAL MEDICINE

## 2025-02-19 NOTE — TELEPHONE ENCOUNTER
----- Message from Afia sent at 2/19/2025  2:21 PM CST -----  Contact: pt  Type:  Sooner Apoointment RequestCaller is requesting a sooner appointment.  Caller declined first available appointment listed below.  Caller will not accept being placed on the waitlist and is requesting a message be sent to doctor.Name of Caller: ptWhen is the first available appointment? Symptoms: referral in pt's chartWould the patient rather a call back or a response via [employer]? phoneBest Call Back Number:   [phone] Information:  pt wants to go to  area only

## 2025-02-19 NOTE — PROGRESS NOTES
"Subjective:      Patient ID: Deniz Paulino is a 74 y.o. male.    Chief Complaint: Follow-up (Pt would like referral to dermatology)    HPI  History of Present Illness               73 yo with Problem List[1]  Past Medical History:   Diagnosis Date    Acute coronary syndrome     Coronary artery disease     Diabetes mellitus 04/24/2014    Effort angina 04/30/2023    Hyperlipidemia     Hypertension     Old MI (myocardial infarction) 04/24/2014    Preop cardiovascular exam 11/27/2024    S/P CABG (coronary artery bypass graft) 04/24/2014     Here today for management of mult med problems as outlined below.  Compliant with meds without significant side effects. Energy and appetite good.         Review of Systems   Constitutional:  Negative for chills and fever.   HENT:  Negative for ear pain and sore throat.    Respiratory:  Negative for cough.    Cardiovascular:  Negative for chest pain.   Gastrointestinal:  Negative for abdominal pain and blood in stool.   Genitourinary:  Negative for dysuria and hematuria.   Neurological:  Negative for seizures and syncope.     Objective:   /74 (BP Location: Right arm, Patient Position: Sitting)   Pulse (!) 54   Temp 97.7 °F (36.5 °C)   Ht 5' 10" (1.778 m)   Wt 103 kg (227 lb 1.2 oz)   SpO2 97%   BMI 32.58 kg/m²     Physical Exam  Constitutional:       General: He is not in acute distress.     Appearance: He is well-developed.   HENT:      Head: Normocephalic and atraumatic.   Eyes:      Extraocular Movements: Extraocular movements intact.   Neck:      Thyroid: No thyromegaly.   Cardiovascular:      Rate and Rhythm: Normal rate and regular rhythm.   Pulmonary:      Breath sounds: Normal breath sounds. No wheezing or rales.   Abdominal:      General: Bowel sounds are normal.      Palpations: Abdomen is soft.      Tenderness: There is no abdominal tenderness.   Musculoskeletal:         General: No swelling.      Cervical back: Neck supple. No rigidity.   Lymphadenopathy: "      Cervical: No cervical adenopathy.   Skin:     General: Skin is warm and dry.   Neurological:      Mental Status: He is alert and oriented to person, place, and time.   Psychiatric:         Behavior: Behavior normal.         Lab Results   Component Value Date    WBC 7.18 11/13/2024    HGB 13.1 (L) 11/13/2024    HGB 12.0 (L) 05/15/2024    HGB 11.4 (L) 04/05/2024    HGB 11.4 (L) 04/05/2024    HGB 11.4 (L) 04/05/2024    HCT 39.0 (L) 11/13/2024    MCV 95 11/13/2024    MCV 95 05/15/2024    MCV 91 04/05/2024    MCV 91 04/05/2024    MCV 91 04/05/2024     11/13/2024    CHOL 136 11/13/2024    TRIG 143 11/13/2024    HDL 51 11/13/2024    LDLCALC 56.4 (L) 11/13/2024    LDLCALC 52 08/27/2024    LDLCALC 48.4 (L) 03/16/2024    ALT 16 11/13/2024    AST 20 11/13/2024     02/12/2025    K 4.3 02/12/2025    CALCIUM 9.2 02/12/2025     02/12/2025    CO2 24 02/12/2025    BUN 23 02/12/2025    CREATININE 1.7 (H) 02/12/2025    CREATININE 1.3 11/13/2024    CREATININE 1.5 (A) 08/27/2024    EGFRNORACEVR 41.8 (A) 02/12/2025    EGFRNORACEVR 58.0 (A) 11/13/2024    EGFRNORACEVR >60 04/05/2024    EGFRNORACEVR >60 04/05/2024    TSH 2.179 11/13/2024    TSH 1.925 05/21/2024    TSH 1.516 11/10/2023    PSA 1.7 11/13/2024    PSA 1.3 11/10/2023    PSA 1.2 05/10/2023    GLU 99 02/12/2025    HGBA1C 5.5 11/13/2024    HGBA1C 5.6 08/27/2024    HGBA1C 5.4 03/16/2024    CAAWTAKV53EE 47 02/12/2025    EYZZKGFI79BM 43 05/22/2017     (H) 04/04/2024          The ASCVD Risk score (Guillermo VALENZUELA, et al., 2019) failed to calculate for the following reasons:    Risk score cannot be calculated because patient has a medical history suggesting prior/existing ASCVD     Assessment:     1. Coronary artery disease involving native coronary artery of native heart with unstable angina pectoris    2. Hypertension associated with diabetes    3. Hyperlipidemia associated with type 2 diabetes mellitus    4. Type 2 diabetes mellitus without complication,  without long-term current use of insulin    5. Stage 3b chronic kidney disease    6. S/P CABG (coronary artery bypass graft)    7. Hypomagnesemia    8. Old MI (myocardial infarction)    9. Low serum vitamin B12    10. Skin cancer      Plan:   1. Coronary artery disease involving native coronary artery of native heart with unstable angina pectoris  Overview:  Stable.  Continue as per Cardiology.  Vickie Bennett      2. Hypertension associated with diabetes  Overview:  Controlled.  Continue current medications      3. Hyperlipidemia associated with type 2 diabetes mellitus  Overview:  Controlled.  Continue current medications      4. Type 2 diabetes mellitus without complication, without long-term current use of insulin  Overview:  Stable.  Continue current    Orders:  -     Hemoglobin A1C; Future; Expected date: 05/19/2025    5. Stage 3b chronic kidney disease  -     Ambulatory referral/consult to Nephrology; Future; Expected date: 02/26/2025    6. S/P CABG (coronary artery bypass graft)    7. Hypomagnesemia    8. Old MI (myocardial infarction)    9. Low serum vitamin B12  Overview:  Otc supplement    Orders:  -     Vitamin B12; Future; Expected date: 05/19/2025    10. Skin cancer  -     Ambulatory referral/consult to Dermatology; Future; Expected date: 02/26/2025        Patient Instructions   Check with your pharmacy regarding RSV vaccine.      Future Appointments   Date Time Provider Department Center   2/26/2025  3:00 PM Diamond Headley MD [facility]ERM [facility]   3/25/2025 11:10 AM [facility]   3/25/2025 11:20 AM [facility]   4/1/2025 10:00 AM Talha Varma MD [facility] NEPHRO [place]   5/12/2025 10:20 AM LAB[facility]ATORY[facility] [place]   5/19/2025 10:40 AM Hiro Kraft MD [facility] IM [place]   7/11/2025  8:30 AM LAB[facility]ATORY, [facility] [place]   7/17/2025 10:20 AM Kimberly Bennett MD [facility]       Lab Frequency Next Occurrence    Comprehensive Metabolic Panel Once 10/28/2024   Lipid Panel Once 10/28/2024   Hemoglobin A1C Once 10/30/2024   Ambulatory referral/consult to General Surgery Once 11/26/2024   Comprehensive Metabolic Panel Once 07/11/2025   Lipid Panel Once 07/11/2025   Hemoglobin A1C Once 07/08/2025       Follow up in about 3 months (around 5/19/2025), or if symptoms worsen or fail to improve.         Visit today included increased complexity  addressed and managing the longitudinal care of the patient due to the serious and/or complex managed problem(s)               [1]   Patient Active Problem List  Diagnosis    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    Hypertension associated with diabetes    Hyperlipidemia associated with type 2 diabetes mellitus    S/P CABG (coronary artery bypass graft)    Old MI (myocardial infarction)    Type 2 diabetes mellitus without complication, without long-term current use of insulin    Hypomagnesemia    Effort angina    Colon cancer screening    Troponin level elevated    Unstable angina    Preop cardiovascular exam    Low serum vitamin B12

## 2025-02-24 ENCOUNTER — PATIENT MESSAGE (OUTPATIENT)
Dept: DERMATOLOGY | Facility: CLINIC | Age: 75
End: 2025-02-24
Payer: MEDICARE

## 2025-02-24 ENCOUNTER — TELEPHONE (OUTPATIENT)
Dept: DERMATOLOGY | Facility: CLINIC | Age: 75
End: 2025-02-24
Payer: MEDICARE

## 2025-02-24 NOTE — TELEPHONE ENCOUNTER
Called pt in regards to appt with  on 2/26/2025 at 3p.m.  Provider will be out of clinic that afternoon.  Called pt to see if he will be able to come in earlier or appt will have to be rescheduled. No answer. LVM.

## 2025-02-24 NOTE — TELEPHONE ENCOUNTER
Returned call to pt. No answer. LVM.    ----- Message from Shelby Morocho sent at 2/24/2025  9:17 AM CST -----  Regarding: return call  PATIENT RETURNING CALLPt returned Leandra's call regarding appt rescheduling. Please call pt at [phone], he is open to being seen earlier that day if possible

## 2025-02-26 ENCOUNTER — OFFICE VISIT (OUTPATIENT)
Dept: DERMATOLOGY | Facility: CLINIC | Age: 75
End: 2025-02-26
Payer: MEDICARE

## 2025-02-26 DIAGNOSIS — D18.00 HEMANGIOMA, UNSPECIFIED SITE: ICD-10-CM

## 2025-02-26 DIAGNOSIS — Z85.828 HISTORY OF NONMELANOMA SKIN CANCER: Primary | ICD-10-CM

## 2025-02-26 DIAGNOSIS — D22.9 JUNCTION NEVUS: ICD-10-CM

## 2025-02-26 DIAGNOSIS — L57.0 ACTINIC KERATOSES: ICD-10-CM

## 2025-02-26 DIAGNOSIS — L81.4 SOLAR LENTIGO: ICD-10-CM

## 2025-02-26 PROCEDURE — 99204 OFFICE O/P NEW MOD 45 MIN: CPT | Mod: 25,S$GLB,, | Performed by: STUDENT IN AN ORGANIZED HEALTH CARE EDUCATION/TRAINING PROGRAM

## 2025-02-26 PROCEDURE — 17004 DESTROY PREMAL LESIONS 15/>: CPT | Mod: S$GLB,,, | Performed by: STUDENT IN AN ORGANIZED HEALTH CARE EDUCATION/TRAINING PROGRAM

## 2025-02-26 PROCEDURE — 99999 PR PBB SHADOW E&M-EST. PATIENT-LVL IV: CPT | Mod: PBBFAC,,, | Performed by: STUDENT IN AN ORGANIZED HEALTH CARE EDUCATION/TRAINING PROGRAM

## 2025-02-26 PROCEDURE — 1159F MED LIST DOCD IN RCRD: CPT | Mod: CPTII,S$GLB,, | Performed by: STUDENT IN AN ORGANIZED HEALTH CARE EDUCATION/TRAINING PROGRAM

## 2025-02-26 PROCEDURE — 1126F AMNT PAIN NOTED NONE PRSNT: CPT | Mod: CPTII,S$GLB,, | Performed by: STUDENT IN AN ORGANIZED HEALTH CARE EDUCATION/TRAINING PROGRAM

## 2025-02-26 PROCEDURE — 1160F RVW MEDS BY RX/DR IN RCRD: CPT | Mod: CPTII,S$GLB,, | Performed by: STUDENT IN AN ORGANIZED HEALTH CARE EDUCATION/TRAINING PROGRAM

## 2025-02-26 PROCEDURE — 1101F PT FALLS ASSESS-DOCD LE1/YR: CPT | Mod: CPTII,S$GLB,, | Performed by: STUDENT IN AN ORGANIZED HEALTH CARE EDUCATION/TRAINING PROGRAM

## 2025-02-26 PROCEDURE — 3288F FALL RISK ASSESSMENT DOCD: CPT | Mod: CPTII,S$GLB,, | Performed by: STUDENT IN AN ORGANIZED HEALTH CARE EDUCATION/TRAINING PROGRAM

## 2025-02-26 RX ORDER — FLUOROURACIL 50 MG/G
CREAM TOPICAL
Qty: 40 G | Refills: 1 | Status: SHIPPED | OUTPATIENT
Start: 2025-02-26

## 2025-02-26 RX ORDER — CALCIPOTRIENE 50 UG/G
CREAM TOPICAL 2 TIMES DAILY
Qty: 60 G | Refills: 1 | Status: SHIPPED | OUTPATIENT
Start: 2025-02-26 | End: 2026-02-26

## 2025-02-26 NOTE — PROGRESS NOTES
Patient Information  Name: Deniz Paulino  : 1950  MRN: [id number]     Referring Physician:  Dr. Kraft   Primary Care Physician:  Dr. Kraft, Hior VARGAS MD   Date of Visit: 2025      Subjective:       Deniz Paulino is a 74 y.o. male who presents for   Chief Complaint   Patient presents with    Skin Check     TBSE, hx of SCC     HPI  Patient here for skin check.     Does patient have a personal hx of skin cancers? Yes, NMSC  Does patient have family hx of melanoma?  no  Does patient have hx of strong sun exposure or tanning bed use in the past? yes    Patient was last seen:Visit date not found     Prior notes by myself reviewed.   Clinical documentation obtained by nursing staff reviewed.    Review of Systems   Skin:  Negative for itching and rash.        Objective:    Physical Exam   Constitutional: He appears well-developed and well-nourished. No distress.   Neurological: He is alert and oriented to person, place, and time. He is not disoriented.   Psychiatric: He has a normal mood and affect.   Skin:   Areas Examined (abnormalities noted in diagram):   Scalp / Hair Palpated and Inspected  Head / Face Inspection Performed  Neck Inspection Performed  Chest / Axilla Inspection Performed  Abdomen Inspection Performed  Genitals / Buttocks / Groin Inspection Performed  Back Inspection Performed  RUE Inspected  LUE Inspection Performed  RLE Inspected  LLE Inspection Performed  Nails and Digits Inspection Performed                   Diagram Legend     Erythematous scaling macule/papule c/w actinic keratosis       Vascular papule c/w angioma      Pigmented verrucoid papule/plaque c/w seborrheic keratosis      Yellow umbilicated papule c/w sebaceous hyperplasia      Irregularly shaped tan macule c/w lentigo     1-2 mm smooth white papules consistent with Milia      Movable subcutaneous cyst with punctum c/w epidermal inclusion cyst      Subcutaneous movable cyst c/w pilar cyst      Firm pink to brown  papule c/w dermatofibroma      Pedunculated fleshy papule(s) c/w skin tag(s)      Evenly pigmented macule c/w junctional nevus     Mildly variegated pigmented, slightly irregular-bordered macule c/w mildly atypical nevus      Flesh colored to evenly pigmented papule c/w intradermal nevus       Pink pearly papule/plaque c/w basal cell carcinoma      Erythematous hyperkeratotic cursted plaque c/w SCC      Surgical scar with no sign of skin cancer recurrence      Open and closed comedones      Inflammatory papules and pustules      Verrucoid papule consistent consistent with wart     Erythematous eczematous patches and plaques     Dystrophic onycholytic nail with subungual debris c/w onychomycosis     Umbilicated papule    Erythematous-base heme-crusted tan verrucoid plaque consistent with inflamed seborrheic keratosis     Erythematous Silvery Scaling Plaque c/w Psoriasis     See annotation      No images are attached to the encounter or orders placed in the encounter.    [] Data reviewed  [] Independent review of test  [] Management discussed with another provider    Assessment / Plan:        History of nonmelanoma skin cancer  -     Area of previous nonmelanoma examined. Site well healed with no signs of recurrence.    Total body skin examination performed today including at least 12 points as noted in physical examination. No lesions suspicious for malignancy noted.    Recommend daily sun protection/avoidance, use of at least SPF 30, broad spectrum sunscreen (OTC drug), skin self examinations, and routine physician surveillance to optimize early detection    Actinic keratoses  -     fluorouraciL (EFUDEX) 5 % cream; Mix with calcipotriene cream and AAA on scalp twice daily for 2 weeks at a time  Dispense: 40 g; Refill: 1  -     calcipotriene (DOVONEX) 0.005 % cream; Apply topically 2 (two) times daily. Mix with efudex cream and AAA on scalp twice daily for 2 weeks at a time  Dispense: 60 g; Refill: 1  Cryosurgery  Procedure Note    Verbal consent from the patient is obtained including, but not limited to, risk of hypopigmentation/hyperpigmentation, scar, recurrence of lesion. The patient is aware of the precancerous quality and need for treatment of these lesions. Liquid nitrogen cryosurgery is applied to the 16 actinic keratoses, as detailed in the physical exam, to produce a freeze injury. The patient is aware that blisters may form and is instructed on wound care with gentle cleansing and use of vaseline ointment to keep moist until healed. The patient is supplied a handout on cryosurgery and is instructed to call if lesions do not completely resolve.    Hemangioma, unspecified site  This is a benign vascular lesion. Reassurance given. No treatment required.     Solar lentigo  This is a benign hyperpigmented sun induced lesion. Recommend daily sun protection/avoidance and use of at least SPF 30, broad spectrum sunscreen (OTC drug) will reduce the number of new lesions. Treatment of these benign lesions are considered cosmetic.    Junction nevus  Monitor for new mole or moles that are becoming bigger, darker, irritated, or developing irregular borders. Brochure provided. Instructed patient to observe lesion(s) for changes and follow up in clinic if changes are noted. Patient to monitor skin at home for new or changing lesions.              LOS NUMBER AND COMPLEXITY OF PROBLEMS    COMPLEXITY OF DATA RISK TOTAL TIME (m)   22691  57795 [] 1 self-limited or minor problem [x] Minimal to none [] No treatment recommended or patient to monitor 15-29  10-19   13409  00528 Low  [] 2 or > self limited or minor problems  [] 1 stable chronic illness  [] 1 acute, uncomplicated illness or injury Limited (2)  [] Prior external notes from each unique source  [] Review result of each unique test  [] Order each unique test []  Low  OTC medications, minor skin biopsy 30-44  20-29   30274  81039 Moderate  [x]  1 or > chronic illness with  progression, exacerbation or SE of treatment  [x]  2 or more stable chronic illnesses  []  1 acute illness with systemic symptoms  []  1 acute complicated injury  []  1 undiagnosed new problem with uncertain prognosis Moderate (1/3 below)  []  3 or more data items        *Now includes assessment requiring independent historian  []  Independent interpretation of a test  []  Discuss management/test with another provider Moderate  [x]  Prescription drug mgmt  []  Minor surgery with risk discussed  []  Mgmt limited by social determinates 45-59  30-39   72048  99758 High  []  1 or more chronic illness with severe exacerbation, progression or SE of treatment  []  1 acute or chronic illness/injury that poses a threat to life or bodily function Extensive (2/3 below)  []  3 or more data items        *Now includes assessment requiring independent historian.  []  Independent interpretation of a test  []  Discuss management/test with another provider High  []  Major surgery with risk discussed  []  Drug therapy requiring intensive monitoring for toxicity  []  Hospitalization  []  Decision for DNR 60-74  40-54      No follow-ups on file.    Diamond Headley MD, FAAD  [facility]

## 2025-03-09 DIAGNOSIS — I25.10 CORONARY ARTERY DISEASE INVOLVING NATIVE CORONARY ARTERY OF NATIVE HEART WITHOUT ANGINA PECTORIS: ICD-10-CM

## 2025-03-09 DIAGNOSIS — I10 ESSENTIAL HYPERTENSION: ICD-10-CM

## 2025-03-09 DIAGNOSIS — Z95.1 S/P CABG (CORONARY ARTERY BYPASS GRAFT): ICD-10-CM

## 2025-03-10 RX ORDER — LISINOPRIL 40 MG/1
40 TABLET ORAL
Qty: 90 TABLET | Refills: 3 | Status: SHIPPED | OUTPATIENT
Start: 2025-03-10 | End: 2025-03-11 | Stop reason: SDUPTHER

## 2025-03-11 DIAGNOSIS — Z95.1 S/P CABG (CORONARY ARTERY BYPASS GRAFT): ICD-10-CM

## 2025-03-11 DIAGNOSIS — I10 ESSENTIAL HYPERTENSION: ICD-10-CM

## 2025-03-11 DIAGNOSIS — I25.10 CORONARY ARTERY DISEASE INVOLVING NATIVE CORONARY ARTERY OF NATIVE HEART WITHOUT ANGINA PECTORIS: ICD-10-CM

## 2025-03-11 RX ORDER — LISINOPRIL 40 MG/1
40 TABLET ORAL DAILY
Qty: 90 TABLET | Refills: 3 | Status: SHIPPED | OUTPATIENT
Start: 2025-03-11

## 2025-03-12 RX ORDER — PANTOPRAZOLE SODIUM 20 MG/1
20 TABLET, DELAYED RELEASE ORAL
Qty: 90 TABLET | Refills: 3 | Status: SHIPPED | OUTPATIENT
Start: 2025-03-12

## 2025-03-12 NOTE — TELEPHONE ENCOUNTER
Care Due:                  Date            Visit Type   Department     Provider  --------------------------------------------------------------------------------                                EP -                              PRIMARY      [facility] INTERNAL  Last Visit: 02-      CARE ([facility])   CHINO Kraft                              EP -                              PRIMARY      [facility] INTER[facility]  Next Visit: 05-      CARE ([facility])   CHINO Kraft                                                            Last  Test          Frequency    Reason                     Performed    Due Date  --------------------------------------------------------------------------------    HBA1C.......  6 months...  metFORMIN................  11- 05-    [facility] Embedded Care Due Messages. Reference number: [id number].   3/12/2025 2:50:37 PM CDT

## 2025-03-12 NOTE — TELEPHONE ENCOUNTER
Refill Decision Note   Deniz Paulino  is requesting a refill authorization.  Brief Assessment and Rationale for Refill:  Approve     Medication Therapy Plan:  FLOS;,YESSYS      Comments:     Note composed:3:41 PM 03/12/2025

## 2025-03-13 RX ORDER — NIFEDIPINE 90 MG/1
90 TABLET, EXTENDED RELEASE ORAL DAILY
Qty: 30 TABLET | Refills: 11 | Status: SHIPPED | OUTPATIENT
Start: 2025-03-13 | End: 2026-03-13

## 2025-03-13 NOTE — TELEPHONE ENCOUNTER
----- Message from Mindy sent at 3/13/2025 10:11 AM CDT -----  Contact: patient  Type:  RX Refill RequestWho Called: Deniz Paulino Refill or New Rx: refill RX Name and Strength: NIFEdipine (PROCARDIA-XL) 90 MG (OSM) 24 hr tablet How is the patient currently taking it? (ex. 1XDay): 1x daily Is this a 30 day or 90 day RX: 90 day Preferred Pharmacy with phone number: [facility] [address] [place], [place] - [address] [facility] [facility] [address]: [phone] Fax: [phone] Local or Mail Order:Local Ordering Provider:  Would the patient rather a call back or a response via [facility]?  Call Best Call Back Number: [phone] Information:

## 2025-03-24 DIAGNOSIS — Z00.00 ENCOUNTER FOR MEDICARE ANNUAL WELLNESS EXAM: ICD-10-CM

## 2025-03-25 ENCOUNTER — LAB VISIT (OUTPATIENT)
Dept: LAB | Facility: HOSPITAL | Age: 75
End: 2025-03-25
Attending: INTERNAL MEDICINE
Payer: MEDICARE

## 2025-03-25 DIAGNOSIS — I15.2 HYPERTENSION ASSOCIATED WITH DIABETES: ICD-10-CM

## 2025-03-25 DIAGNOSIS — E11.59 HYPERTENSION ASSOCIATED WITH DIABETES: ICD-10-CM

## 2025-03-25 PROCEDURE — 84156 ASSAY OF PROTEIN URINE: CPT

## 2025-03-26 LAB
CREAT UR-MCNC: 445 MG/DL (ref 23–375)
PROT UR-MCNC: 45 MG/DL
PROT/CREAT UR: 0.1 MG/G{CREAT}

## 2025-04-01 ENCOUNTER — HOSPITAL ENCOUNTER (OUTPATIENT)
Dept: CARDIOLOGY | Facility: HOSPITAL | Age: 75
Discharge: HOME | End: 2025-04-01
Attending: INTERNAL MEDICINE
Payer: MEDICARE

## 2025-04-01 ENCOUNTER — OFFICE VISIT (OUTPATIENT)
Dept: NEPHROLOGY | Facility: CLINIC | Age: 75
End: 2025-04-01
Payer: MEDICARE

## 2025-04-01 VITALS
SYSTOLIC BLOOD PRESSURE: 102 MMHG | BODY MASS INDEX: 31.32 KG/M2 | WEIGHT: 218.25 LBS | DIASTOLIC BLOOD PRESSURE: 64 MMHG | HEART RATE: 113 BPM

## 2025-04-01 VITALS
WEIGHT: 218 LBS | BODY MASS INDEX: 31.21 KG/M2 | HEART RATE: 80 BPM | HEIGHT: 70 IN | DIASTOLIC BLOOD PRESSURE: 64 MMHG | SYSTOLIC BLOOD PRESSURE: 102 MMHG

## 2025-04-01 DIAGNOSIS — R06.09 DOE (DYSPNEA ON EXERTION): ICD-10-CM

## 2025-04-01 DIAGNOSIS — Z95.1 S/P CABG (CORONARY ARTERY BYPASS GRAFT): ICD-10-CM

## 2025-04-01 DIAGNOSIS — N18.32 STAGE 3B CHRONIC KIDNEY DISEASE: ICD-10-CM

## 2025-04-01 DIAGNOSIS — I10 ESSENTIAL HYPERTENSION: ICD-10-CM

## 2025-04-01 DIAGNOSIS — I25.10 CORONARY ARTERY DISEASE INVOLVING NATIVE CORONARY ARTERY OF NATIVE HEART WITHOUT ANGINA PECTORIS: ICD-10-CM

## 2025-04-01 DIAGNOSIS — N17.9 AKI (ACUTE KIDNEY INJURY): Primary | ICD-10-CM

## 2025-04-01 LAB
AORTIC ROOT ANNULUS: 2.83 CM
AV INDEX (PROSTH): 0.68
AV MEAN GRADIENT: 4 MMHG
AV PEAK GRADIENT: 7 MMHG
AV VALVE AREA BY VELOCITY RATIO: 2.2 CM²
AV VALVE AREA: 2.1 CM²
AV VELOCITY RATIO: 0.69
BSA FOR ECHO PROCEDURE: 2.21 M2
CV ECHO LV RWT: 0.35 CM
DOP CALC AO PEAK VEL: 1.3 M/S
DOP CALC AO VTI: 30.3 CM
DOP CALC LVOT AREA: 3.1 CM2
DOP CALC LVOT DIAMETER: 2 CM
DOP CALC LVOT PEAK VEL: 0.9 M/S
DOP CALC LVOT STROKE VOLUME: 64.7 CM3
DOP CALC RVOT PEAK VEL: 0.94 M/S
DOP CALC RVOT VTI: 24.8 CM
DOP CALCLVOT PEAK VEL VTI: 20.6 CM
E WAVE DECELERATION TIME: 219 MSEC
E/A RATIO: 0.64
E/E' RATIO: 8 M/S
ECHO LV POSTERIOR WALL: 0.9 CM (ref 0.6–1.1)
FRACTIONAL SHORTENING: 26.9 % (ref 28–44)
INTERVENTRICULAR SEPTUM: 0.8 CM (ref 0.6–1.1)
LA MAJOR: 4.2 CM
LA MINOR: 4.2 CM
LA WIDTH: 3.3 CM
LEFT ATRIUM AREA SYSTOLIC (APICAL 2 CHAMBER): 15.82 CM2
LEFT ATRIUM AREA SYSTOLIC (APICAL 4 CHAMBER): 15.93 CM2
LEFT ATRIUM SIZE: 4.1 CM
LEFT ATRIUM VOLUME INDEX MOD: 19 ML/M2
LEFT ATRIUM VOLUME INDEX: 22 ML/M2
LEFT ATRIUM VOLUME MOD: 42 ML
LEFT ATRIUM VOLUME: 48 CM3
LEFT INTERNAL DIMENSION IN SYSTOLE: 3.8 CM (ref 2.1–4)
LEFT VENTRICLE DIASTOLIC VOLUME INDEX: 58.99 ML/M2
LEFT VENTRICLE DIASTOLIC VOLUME: 128 ML
LEFT VENTRICLE END SYSTOLIC VOLUME APICAL 2 CHAMBER: 41.83 ML
LEFT VENTRICLE END SYSTOLIC VOLUME APICAL 4 CHAMBER: 40.25 ML
LEFT VENTRICLE MASS INDEX: 72.3 G/M2
LEFT VENTRICLE SYSTOLIC VOLUME INDEX: 27.6 ML/M2
LEFT VENTRICLE SYSTOLIC VOLUME: 60 ML
LEFT VENTRICULAR INTERNAL DIMENSION IN DIASTOLE: 5.2 CM (ref 3.5–6)
LEFT VENTRICULAR MASS: 156.9 G
LV LATERAL E/E' RATIO: 6.1 M/S
LV SEPTAL E/E' RATIO: 9.8 M/S
LVED V (TEICH): 128.06 ML
LVES V (TEICH): 60.34 ML
LVOT MG: 1.91 MMHG
LVOT MV: 0.65 CM/S
MV PEAK A VEL: 0.76 M/S
MV PEAK E VEL: 0.49 M/S
MV STENOSIS PRESSURE HALF TIME: 63.45 MS
MV VALVE AREA P 1/2 METHOD: 3.47 CM2
OHS CV RV/LV RATIO: 0.75 CM
PV MEAN GRADIENT: 2 MMHG
PV PEAK GRADIENT: 5 MMHG
PV PEAK VELOCITY: 1.13 M/S
RA MAJOR: 4.31 CM
RA PRESSURE ESTIMATED: 3 MMHG
RA WIDTH: 3.12 CM
RIGHT VENTRICLE DIASTOLIC BASEL DIMENSION: 3.9 CM
RIGHT VENTRICULAR END-DIASTOLIC DIMENSION: 3.94 CM
SINUS: 2.96 CM
STJ: 3.13 CM
TDI LATERAL: 0.08 M/S
TDI SEPTAL: 0.05 M/S
TDI: 0.07 M/S
TRICUSPID ANNULAR PLANE SYSTOLIC EXCURSION: 1.52 CM
Z-SCORE OF LEFT VENTRICULAR DIMENSION IN END DIASTOLE: -3.23
Z-SCORE OF LEFT VENTRICULAR DIMENSION IN END SYSTOLE: -1.11

## 2025-04-01 PROCEDURE — 4010F ACE/ARB THERAPY RXD/TAKEN: CPT | Mod: CPTII,S$GLB,, | Performed by: INTERNAL MEDICINE

## 2025-04-01 PROCEDURE — 99205 OFFICE O/P NEW HI 60 MIN: CPT | Mod: S$GLB,,, | Performed by: INTERNAL MEDICINE

## 2025-04-01 PROCEDURE — 93306 TTE W/DOPPLER COMPLETE: CPT

## 2025-04-01 PROCEDURE — 3066F NEPHROPATHY DOC TX: CPT | Mod: CPTII,S$GLB,, | Performed by: INTERNAL MEDICINE

## 2025-04-01 PROCEDURE — 1101F PT FALLS ASSESS-DOCD LE1/YR: CPT | Mod: CPTII,S$GLB,, | Performed by: INTERNAL MEDICINE

## 2025-04-01 PROCEDURE — 93306 TTE W/DOPPLER COMPLETE: CPT | Mod: 26,,, | Performed by: INTERNAL MEDICINE

## 2025-04-01 PROCEDURE — 1159F MED LIST DOCD IN RCRD: CPT | Mod: CPTII,S$GLB,, | Performed by: INTERNAL MEDICINE

## 2025-04-01 PROCEDURE — 99999 PR PBB SHADOW E&M-EST. PATIENT-LVL IV: CPT | Mod: PBBFAC,,, | Performed by: INTERNAL MEDICINE

## 2025-04-01 PROCEDURE — 1126F AMNT PAIN NOTED NONE PRSNT: CPT | Mod: CPTII,S$GLB,, | Performed by: INTERNAL MEDICINE

## 2025-04-01 PROCEDURE — 3074F SYST BP LT 130 MM HG: CPT | Mod: CPTII,S$GLB,, | Performed by: INTERNAL MEDICINE

## 2025-04-01 PROCEDURE — 3008F BODY MASS INDEX DOCD: CPT | Mod: CPTII,S$GLB,, | Performed by: INTERNAL MEDICINE

## 2025-04-01 PROCEDURE — 3078F DIAST BP <80 MM HG: CPT | Mod: CPTII,S$GLB,, | Performed by: INTERNAL MEDICINE

## 2025-04-01 PROCEDURE — 3288F FALL RISK ASSESSMENT DOCD: CPT | Mod: CPTII,S$GLB,, | Performed by: INTERNAL MEDICINE

## 2025-04-01 RX ORDER — NITROGLYCERIN 0.4 MG/1
0.4 TABLET SUBLINGUAL EVERY 5 MIN PRN
COMMUNITY

## 2025-04-01 RX ORDER — LISINOPRIL 20 MG/1
20 TABLET ORAL DAILY
Qty: 90 TABLET | Refills: 3 | Status: SHIPPED | OUTPATIENT
Start: 2025-04-01

## 2025-04-01 RX ORDER — HYDROCHLOROTHIAZIDE 12.5 MG/1
12.5 TABLET ORAL DAILY
Qty: 30 TABLET | Refills: 11 | Status: SHIPPED | OUTPATIENT
Start: 2025-04-01 | End: 2026-04-01

## 2025-04-01 NOTE — PROGRESS NOTES
Deniz Paulino is a 74 y.o. male for whom nephrology consult has been requested to evaluate and give opinion.   Renal consultation note:  Date of consult: 4/1/25  Reason for consult:  RAGHAV  Referring physician:  Dr Hiro Kraft  Cardiologist: Dr. Kimberly Bennett    HPI: Thank you for referring the pt to us. H/o and chart were reviewed. Pt was seen and examined.  Patient is a 74-year-old male with history of DM type 2, HTN, CAD, s/p CABG, and diastolic CHF, who was referred to renal clinic for a steady increase in s Cr from 1.3 to 1.9 in the past 3 months.  Recent labs were from 1 week ago. S Cr repeated today was 2.0.  Patient denies using NSAIDs, no GI losses, no ABX, no recent infections.  Patient describes himself as feeling rather poorly.  He has noted that in the past month he has more SOB on exertion.  No change in number of pillows use at night to sleep on, no SOB at night while sleeping, no worsened leg swelling.  Patient says that cardiologist replaced HCTZ with Dyazide about 3 months ago because his legs were more swollen then.  His blood pressure is rather low today and he feels occasional dizziness.  No CP, no acute SOB, no fever, the pertinent issues today.      PAST MEDICAL HISTORY:  Acute coronary syndrome, Coronary artery disease, Diabetes mellitus (04/24/2014), Effort angina (04/30/2023), Hyperlipidemia, Hypertension, Old MI (myocardial infarction) (04/24/2014), Preop cardiovascular exam (11/27/2024), and S/P CABG (coronary artery bypass graft) (04/24/2014).    PAST SURGICAL HISTORY:  He  has a past surgical history that includes Coronary artery bypass graft; Cardiac catheterization; Esophagogastroduodenoscopy (N/A, 07/26/2019); Colonoscopy (N/A, 07/26/2019); Skin cancer excision (09/2018); Vasectomy; Left heart catheterization (Left, 5/1/2023); Coronary bypass graft angiography (5/1/2023); Left heart catheterization (Left, 4/4/2024); protection, coronary, filter (4/4/2024); stent, drug eluting, single  vessel, coronary (4/4/2024); ptca, single vessel (4/4/2024); instantaneous wave-free ratio (ifr) (N/A, 4/4/2024); and colonoscopy, screening, low risk patient (N/A, 12/17/2024).    SOCIAL HISTORY:  He  reports that he quit smoking about 22 years ago. His smoking use included cigarettes. He started smoking about 52 years ago. He has a 30 pack-year smoking history. He has quit using smokeless tobacco. He reports current alcohol use of about 16.0 standard drinks of alcohol per week. He reports that he does not use drugs.    FAMILY MEDICAL HISTORY:  His family history includes Alcohol abuse in his father; Cancer in his father and mother; Diabetes in his brother and mother; Heart attack (age of onset: 63) in his father; Heart murmur in his mother; Stroke in his mother.    Review of patient's allergies indicates:  No Known Allergies        Prior to Admission medications    Medication Sig Start Date End Date Taking? Authorizing Provider   aspirin (ECOTRIN) 81 MG EC tablet Take 1 tablet (81 mg total) by mouth once daily. 1/9/25  Yes Kimberly Bennett MD   atorvastatin (LIPITOR) 40 MG tablet Take 1 tablet (40 mg total) by mouth once daily. 2/8/25  Yes Kimberly Bennett MD   calcipotriene (DOVONEX) 0.005 % cream Apply topically 2 (two) times daily. Mix with efudex cream and AAA on scalp twice daily for 2 weeks at a time 2/26/25 2/26/26 Yes Diamond Headley MD   coenzyme Q10 200 mg capsule Take 200 mg by mouth 2 (two) times daily.   Yes Provider, Historical   cyanocobalamin (VITAMIN B-12) 1000 MCG tablet Take 1,000 mcg by mouth once daily.   Yes Provider, Historical   fluorouraciL (EFUDEX) 5 % cream Mix with calcipotriene cream and AAA on scalp twice daily for 2 weeks at a time 2/26/25  Yes Diamond Headley MD   magnesium oxide (MAG-OX) 400 mg (241.3 mg magnesium) tablet Take 400 mg by mouth once daily.   Yes Provider, Historical   metFORMIN (GLUCOPHAGE) 1000 MG tablet Take 1 tablet (1,000 mg total) by mouth 2 (two) times daily with  meals. 11/19/24  Yes Hiro Kraft MD   NIFEdipine (PROCARDIA-XL) 90 MG (OSM) 24 hr tablet Take 1 tablet (90 mg total) by mouth once daily. 3/13/25 3/13/26 Yes Kimberly Bennett MD   nitroGLYCERIN (NITROSTAT) 0.4 MG SL tablet Place 0.4 mg under the tongue every 5 (five) minutes as needed for Chest pain.   Yes Provider, Historical   nitroGLYCERIN 0.2 mg/hr TD PT24 (NITRODUR) 0.2 mg/hr APPLY 1 PATCH TOPICALLY ONCE DAILY 11/15/24  Yes Kimberly Bennett MD   pantoprazole (PROTONIX) 20 MG tablet Take 1 tablet by mouth once daily 3/12/25  Yes Hiro Kraft MD   prasugreL (EFFIENT) 10 mg Tab Take 1 tablet (10 mg total) by mouth once daily. 7/30/24  Yes Kimberly Bennett MD   tamsulosin (FLOMAX) 0.4 mg Cap Take 1 capsule (0.4 mg total) by mouth once daily. 11/19/24 11/19/25 Yes Hiro Kraft MD   lisinopriL (PRINIVIL,ZESTRIL) 40 MG tablet Take 1 tablet (40 mg total) by mouth once daily. 3/11/25 4/1/25 Yes Kimberly Bennett MD   triamterene-hydrochlorothiazide 37.5-25 mg (DYAZIDE) 37.5-25 mg per capsule Take 1 capsule by mouth every morning. 1/9/25 4/1/25 Yes Kimberly Bennett MD                   metoprolol succinate (TOPROL-XL) 50 MG 24 hr tablet Take 1 tablet (50 mg total) by mouth once daily. 11/4/24   Kimberly Bennett MD   vitamin D 1000 units Tab Take 1,000 Units by mouth every other day.    Provider, Historical        REVIEW OF SYSTEMS:  Patient has no fever, fatigue, visual changes, chest pain, edema, cough, dyspnea, nausea, vomiting, constipation, diarrhea, arthralgias, pruritis, dizziness, weakness, depression, confusion.    PHYSICAL EXAM:   weight is 99 kg (218 lb 4.1 oz). His blood pressure is 102/64 and his pulse is 113 (abnormal).   Gen: WDWN male in no apparent distress  Psych: Normal mood and affect  Skin: No rashes or ulcers  Neck: No JVD  Chest: Clear with no rales, rhonchi, wheezing with normal effort  CV: Regular with no murmurs, gallops or rubs  Abd: Soft, nontender, no distension  Ext: No  edema    Labs reviewed  BMP  Lab Results   Component Value Date     04/01/2025    K 4.6 04/01/2025     04/01/2025    CO2 23 04/01/2025    BUN 27 (H) 04/01/2025    CREATININE 2.0 (H) 04/01/2025    CALCIUM 10.0 04/01/2025    ANIONGAP 14 04/01/2025    EGFRNORACEVR 34 (L) 04/01/2025     Lab Results   Component Value Date    WBC 7.48 03/25/2025    HGB 12.2 (L) 03/25/2025    HCT 37.7 (L) 03/25/2025    MCV 96 03/25/2025     03/25/2025     U/a: specific gravity 1.030, 1+ protein, neg blood  Urine Na 47, urine Cr 450, FENa = 0.1%        IMPRESSION AND RECOMMENDATIONS:  74-year-old male with history of significant heart disease and relatively recent change in medicines presented for evaluation of RAGHAV:    Renal:  s Cr has steadily worsened in the past 3 months.  FENa is < 1%.  This is consistent with prerenal azotemia.  The cause of RAGHAV is hypotension and possibly mild over-diuresis.  No intrinsic renal damage or injury is suspected.    DC Dyazide (37.5/25)  Restart HCTZ 12.5 mg p.o. q.d.  Decrease lisinopril from 40-20 mg p.o. q.d.  RTC in 1 week with repeat blood work    2. Cardiac:  History of CAD, CABG, systolic CHF.  Patient has worsened GARRIDO.  No orthopnea  No peripheral edema  Patient was advised to see his cardiologist  A new echo was ordered    3. DM-2:  Chart was reviewed    4. HTN:  Controlled to low  Management as above.    Plans and recommendations:  As discussed above.  Total time spent 60 minutes including time needed to review the records, the   patient evaluation, documentation, face-to-face discussion with the patient,   more than 50% of the time was spent on coordination of care and counseling.    Level V visit.  RTC 1 week    Talha Varma MD

## 2025-04-04 ENCOUNTER — LAB VISIT (OUTPATIENT)
Dept: LAB | Facility: HOSPITAL | Age: 75
End: 2025-04-04
Attending: INTERNAL MEDICINE
Payer: MEDICARE

## 2025-04-04 DIAGNOSIS — N17.9 AKI (ACUTE KIDNEY INJURY): ICD-10-CM

## 2025-04-04 LAB
ALBUMIN SERPL BCP-MCNC: 3.6 G/DL (ref 3.5–5.2)
ANION GAP (OHS): 9 MMOL/L (ref 8–16)
BUN SERPL-MCNC: 25 MG/DL (ref 8–23)
CALCIUM SERPL-MCNC: 9.1 MG/DL (ref 8.7–10.5)
CHLORIDE SERPL-SCNC: 105 MMOL/L (ref 95–110)
CO2 SERPL-SCNC: 23 MMOL/L (ref 23–29)
CREAT SERPL-MCNC: 1.4 MG/DL (ref 0.5–1.4)
GFR SERPLBLD CREATININE-BSD FMLA CKD-EPI: 53 ML/MIN/1.73/M2
GLUCOSE SERPL-MCNC: 103 MG/DL (ref 70–110)
PHOSPHATE SERPL-MCNC: 2.7 MG/DL (ref 2.7–4.5)
POTASSIUM SERPL-SCNC: 4 MMOL/L (ref 3.5–5.1)
SODIUM SERPL-SCNC: 137 MMOL/L (ref 136–145)

## 2025-04-04 PROCEDURE — 36415 COLL VENOUS BLD VENIPUNCTURE: CPT

## 2025-04-04 PROCEDURE — 80069 RENAL FUNCTION PANEL: CPT

## 2025-04-07 ENCOUNTER — OFFICE VISIT (OUTPATIENT)
Dept: NEPHROLOGY | Facility: CLINIC | Age: 75
End: 2025-04-07
Payer: MEDICARE

## 2025-04-07 VITALS
BODY MASS INDEX: 31.88 KG/M2 | HEIGHT: 70 IN | WEIGHT: 222.69 LBS | RESPIRATION RATE: 18 BRPM | SYSTOLIC BLOOD PRESSURE: 132 MMHG | DIASTOLIC BLOOD PRESSURE: 80 MMHG | HEART RATE: 55 BPM

## 2025-04-07 DIAGNOSIS — N17.9 AKI (ACUTE KIDNEY INJURY): Primary | ICD-10-CM

## 2025-04-07 PROCEDURE — 3008F BODY MASS INDEX DOCD: CPT | Mod: CPTII,S$GLB,, | Performed by: INTERNAL MEDICINE

## 2025-04-07 PROCEDURE — 1101F PT FALLS ASSESS-DOCD LE1/YR: CPT | Mod: CPTII,S$GLB,, | Performed by: INTERNAL MEDICINE

## 2025-04-07 PROCEDURE — 3079F DIAST BP 80-89 MM HG: CPT | Mod: CPTII,S$GLB,, | Performed by: INTERNAL MEDICINE

## 2025-04-07 PROCEDURE — 1159F MED LIST DOCD IN RCRD: CPT | Mod: CPTII,S$GLB,, | Performed by: INTERNAL MEDICINE

## 2025-04-07 PROCEDURE — 1126F AMNT PAIN NOTED NONE PRSNT: CPT | Mod: CPTII,S$GLB,, | Performed by: INTERNAL MEDICINE

## 2025-04-07 PROCEDURE — 3288F FALL RISK ASSESSMENT DOCD: CPT | Mod: CPTII,S$GLB,, | Performed by: INTERNAL MEDICINE

## 2025-04-07 PROCEDURE — 3075F SYST BP GE 130 - 139MM HG: CPT | Mod: CPTII,S$GLB,, | Performed by: INTERNAL MEDICINE

## 2025-04-07 PROCEDURE — 99999 PR PBB SHADOW E&M-EST. PATIENT-LVL III: CPT | Mod: PBBFAC,,, | Performed by: INTERNAL MEDICINE

## 2025-04-07 PROCEDURE — 4010F ACE/ARB THERAPY RXD/TAKEN: CPT | Mod: CPTII,S$GLB,, | Performed by: INTERNAL MEDICINE

## 2025-04-07 PROCEDURE — 99215 OFFICE O/P EST HI 40 MIN: CPT | Mod: S$GLB,,, | Performed by: INTERNAL MEDICINE

## 2025-04-07 PROCEDURE — 3066F NEPHROPATHY DOC TX: CPT | Mod: CPTII,S$GLB,, | Performed by: INTERNAL MEDICINE

## 2025-04-07 NOTE — PROGRESS NOTES
Renal consultation note:  Date of consult: 4/7/25  Reason for consult f/u:  RAGHAV  Referring physician:  Dr Hiro Kraft  Cardiologist: Dr. Kimberly Bennett     HPI: Patient is a 74-year-old male who presents for follow-up of RAGHAV.  Patient was initially seen last. He has a  h/o of DM type 2, HTN, CAD, s/p CABG, and diastolic CHF.  On initial evaluation, patient appeared slightly over diuresed and was mildly hypotensive. FENa was less than 1% consistent with prerenal azotemia.  Lisinopril dose was reduced by half.  Dyazide was stopped, and patient was placed on a lower dose of HCTZ. Patient denied using NSAIDs, no GI losses, no ABX, no recent infections.      On f/u today.  Patient feels better, less tired, and feels he has more energy. No CP, no acute SOB, no leg swelling, the pertinent other issues today.  His wife had questions about how much vitamin-D to take daily.        PAST MEDICAL HISTORY:  Acute coronary syndrome, Coronary artery disease, Diabetes mellitus (04/24/2014), Effort angina (04/30/2023), Hyperlipidemia, Hypertension, Old MI (myocardial infarction) (04/24/2014), Preop cardiovascular exam (11/27/2024), and S/P CABG (coronary artery bypass graft) (04/24/2014).  CHF     PAST SURGICAL HISTORY:  He  has a past surgical history that includes Coronary artery bypass graft; Cardiac catheterization; Esophagogastroduodenoscopy (N/A, 07/26/2019); Colonoscopy (N/A, 07/26/2019); Skin cancer excision (09/2018); Vasectomy; Left heart catheterization (Left, 5/1/2023); Coronary bypass graft angiography (5/1/2023); Left heart catheterization (Left, 4/4/2024); protection, coronary, filter (4/4/2024); stent, drug eluting, single vessel, coronary (4/4/2024); ptca, single vessel (4/4/2024); instantaneous wave-free ratio (ifr) (N/A, 4/4/2024); and colonoscopy, screening, low risk patient (N/A, 12/17/2024).     SOCIAL HISTORY:  He  reports that he quit smoking about 22 years ago. His smoking use included cigarettes. He started  smoking about 52 years ago. He has a 30 pack-year smoking history. He has quit using smokeless tobacco. He reports current alcohol use of about 16.0 standard drinks of alcohol per week. He reports that he does not use drugs.     FAMILY MEDICAL HISTORY:  His family history includes Alcohol abuse in his father; Cancer in his father and mother; Diabetes in his brother and mother; Heart attack (age of onset: 63) in his father; Heart murmur in his mother; Stroke in his mother.     Review of patient's allergies indicates:  No Known Allergies     Meds reviewed     Current Outpatient Medications   Medication Instructions    aspirin (ECOTRIN) 81 mg, Oral, Daily    atorvastatin (LIPITOR) 40 mg, Oral, Daily    calcipotriene (DOVONEX) 0.005 % cream Topical (Top), 2 times daily, Mix with efudex cream and AAA on scalp twice daily for 2 weeks at a time    coenzyme Q10 200 mg, 2 times daily    cyanocobalamin (VITAMIN B-12) 1,000 mcg, Daily    fluorouraciL (EFUDEX) 5 % cream Mix with calcipotriene cream and AAA on scalp twice daily for 2 weeks at a time    hydroCHLOROthiazide 12.5 mg, Oral, Daily    lisinopriL (PRINIVIL,ZESTRIL) 20 mg, Oral, Daily    magnesium oxide (MAG-OX) 400 mg, Daily    metFORMIN (GLUCOPHAGE) 1,000 mg, Oral, 2 times daily with meals    metoprolol succinate (TOPROL-XL) 50 mg, Oral, Daily    NIFEdipine (PROCARDIA-XL) 90 mg, Oral, Daily    nitroGLYCERIN (NITROSTAT) 0.4 mg, Every 5 min PRN    nitroGLYCERIN 0.2 mg/hr TD PT24 (NITRODUR) 0.2 mg/hr 1 patch    pantoprazole (PROTONIX) 20 mg, Oral    prasugreL HCl (EFFIENT) 10 mg, Oral, Daily    tamsulosin (FLOMAX) 0.4 mg, Oral, Daily    vitamin D (VITAMIN D3) 1,000 Units, Every other day          REVIEW OF SYSTEMS:  Patient has no fever, fatigue, visual changes, chest pain, edema, cough, dyspnea, nausea, vomiting, constipation, diarrhea, arthralgias, pruritis, dizziness, weakness, depression, confusion.     PHYSICAL EXAM:  Blood pressure 132/80, pulse (!) 55, resp. rate  "18, height 5' 10" (1.778 m), weight 101 kg (222 lb 10.6 oz). Wt last week was 99.5 Kg  Gen:WDWN male in no apparent distress  Psych:Normal mood and affect  Skin:No rashes or ulcers  Neck:No JVD  Chest:Clear with no rales, rhonchi, wheezing with normal effort  CV:Regular with no murmurs, gallops or rubs  Abd:Soft, nontender, no distension  Ext:No edema     Labs reviewed  BMP  Lab Results   Component Value Date     04/04/2025    K 4.0 04/04/2025     04/04/2025    CO2 23 04/04/2025    BUN 25 (H) 04/04/2025    CREATININE 1.4 04/04/2025    CALCIUM 9.1 04/04/2025    ANIONGAP 9 04/04/2025    EGFRNORACEVR 53 (L) 04/04/2025     Lab Results   Component Value Date    WBC 7.48 03/25/2025    HGB 12.2 (L) 03/25/2025    HCT 37.7 (L) 03/25/2025    MCV 96 03/25/2025     03/25/2025        U/a: specific gravity 1.030, 1+ protein, neg blood  Urine Na 47, urine Cr 450, FENa = 0.1%     Echo report reviewed  EF normal.  Diastolic dysfunction        IMPRESSION AND RECOMMENDATIONS:  74-year-old male with CHF and as metabolic syndrome presents for follow-up of RAGHAV:     Renal:  s Cr has improved back to prior baseline  Had RAGHAV.  Was over diuresed and was due to hypotension  FENa was < 1%.  C/w prerenal azotemia.  No intrinsic renal damage or injury suspected.  Good response to stopping Dyazide and reducing HCTZ dose, as well as lisinopril dose      2. Cardiac:  History of CAD, CABG, diastolic CHF.  Patient has worsened GARRIDO.  No orthopnea  No peripheral edema  Echo report was reviewed  Continue current dose of HCTZ 12.5 mg p.o. q.d.   Advised patient to follow with his cardiologist     3. DM-2:  Chart was reviewed  Proteinuria, likely diabetic nephropathy  On lisinopril  Would be a candidate for SGLT2 inhibitor     4. HTN:  Improved, no longer low  Management as above.     Plans and recommendations:  As discussed above.  Total time spent 40 minutes including time needed to review the records, the   patient evaluation, " documentation, face-to-face discussion with the patient,   more than 50% of the time was spent on coordination of care and counseling.    Level V visit.  RTC 6 months     Talha Varma MD

## 2025-05-12 ENCOUNTER — LAB VISIT (OUTPATIENT)
Dept: LAB | Facility: HOSPITAL | Age: 75
End: 2025-05-12
Attending: INTERNAL MEDICINE
Payer: MEDICARE

## 2025-05-12 DIAGNOSIS — E11.9 TYPE 2 DIABETES MELLITUS WITHOUT COMPLICATION, WITHOUT LONG-TERM CURRENT USE OF INSULIN: ICD-10-CM

## 2025-05-12 DIAGNOSIS — E53.8 LOW SERUM VITAMIN B12: ICD-10-CM

## 2025-05-12 LAB
EAG (OHS): 123 MG/DL (ref 68–131)
HBA1C MFR BLD: 5.9 % (ref 4–5.6)
VIT B12 SERPL-MCNC: 599 PG/ML (ref 210–950)

## 2025-05-12 PROCEDURE — 36415 COLL VENOUS BLD VENIPUNCTURE: CPT

## 2025-05-12 PROCEDURE — 83036 HEMOGLOBIN GLYCOSYLATED A1C: CPT

## 2025-05-12 PROCEDURE — 82607 VITAMIN B-12: CPT

## 2025-05-13 ENCOUNTER — HOSPITAL ENCOUNTER (OUTPATIENT)
Facility: HOSPITAL | Age: 75
Discharge: HOME HEALTH | End: 2025-05-14
Attending: EMERGENCY MEDICINE | Admitting: HOSPITALIST
Payer: MEDICARE

## 2025-05-13 DIAGNOSIS — R07.9 CHEST PAIN: ICD-10-CM

## 2025-05-13 DIAGNOSIS — I25.110 CORONARY ARTERY DISEASE INVOLVING NATIVE CORONARY ARTERY OF NATIVE HEART WITH UNSTABLE ANGINA PECTORIS: ICD-10-CM

## 2025-05-13 DIAGNOSIS — I25.2 OLD MI (MYOCARDIAL INFARCTION): ICD-10-CM

## 2025-05-13 DIAGNOSIS — I20.89 EFFORT ANGINA: ICD-10-CM

## 2025-05-13 DIAGNOSIS — I20.0 UNSTABLE ANGINA: Primary | ICD-10-CM

## 2025-05-13 DIAGNOSIS — Z95.1 HX OF CABG: ICD-10-CM

## 2025-05-13 LAB
ABSOLUTE EOSINOPHIL (OHS): 0.08 K/UL
ABSOLUTE MONOCYTE (OHS): 0.84 K/UL (ref 0.3–1)
ABSOLUTE NEUTROPHIL COUNT (OHS): 7.91 K/UL (ref 1.8–7.7)
ALBUMIN SERPL BCP-MCNC: 4 G/DL (ref 3.5–5.2)
ALP SERPL-CCNC: 91 UNIT/L (ref 40–150)
ALT SERPL W/O P-5'-P-CCNC: 14 UNIT/L (ref 10–44)
ANION GAP (OHS): 10 MMOL/L (ref 8–16)
AST SERPL-CCNC: 17 UNIT/L (ref 11–45)
BASOPHILS # BLD AUTO: 0.03 K/UL
BASOPHILS NFR BLD AUTO: 0.3 %
BILIRUB SERPL-MCNC: 0.6 MG/DL (ref 0.1–1)
BNP SERPL-MCNC: 185 PG/ML (ref 0–99)
BUN SERPL-MCNC: 23 MG/DL (ref 8–23)
CALCIUM SERPL-MCNC: 9.4 MG/DL (ref 8.7–10.5)
CATH EF QUANTITATIVE: 50 %
CHLORIDE SERPL-SCNC: 103 MMOL/L (ref 95–110)
CO2 SERPL-SCNC: 25 MMOL/L (ref 23–29)
CREAT SERPL-MCNC: 1.4 MG/DL (ref 0.5–1.4)
ERYTHROCYTE [DISTWIDTH] IN BLOOD BY AUTOMATED COUNT: 13.1 % (ref 11.5–14.5)
GFR SERPLBLD CREATININE-BSD FMLA CKD-EPI: 53 ML/MIN/1.73/M2
GLUCOSE SERPL-MCNC: 105 MG/DL (ref 70–110)
HCT VFR BLD AUTO: 35.5 % (ref 40–54)
HGB BLD-MCNC: 12.1 GM/DL (ref 14–18)
IMM GRANULOCYTES # BLD AUTO: 0.03 K/UL (ref 0–0.04)
IMM GRANULOCYTES NFR BLD AUTO: 0.3 % (ref 0–0.5)
LYMPHOCYTES # BLD AUTO: 1.23 K/UL (ref 1–4.8)
MCH RBC QN AUTO: 31.3 PG (ref 27–31)
MCHC RBC AUTO-ENTMCNC: 34.1 G/DL (ref 32–36)
MCV RBC AUTO: 92 FL (ref 82–98)
NUCLEATED RBC (/100WBC) (OHS): 0 /100 WBC
OHS QRS DURATION: 90 MS
OHS QRS DURATION: 94 MS
OHS QTC CALCULATION: 480 MS
OHS QTC CALCULATION: 489 MS
PLATELET # BLD AUTO: 184 K/UL (ref 150–450)
PMV BLD AUTO: 9.9 FL (ref 9.2–12.9)
POCT GLUCOSE: 175 MG/DL (ref 70–110)
POTASSIUM SERPL-SCNC: 3.6 MMOL/L (ref 3.5–5.1)
PROT SERPL-MCNC: 7.4 GM/DL (ref 6–8.4)
RBC # BLD AUTO: 3.87 M/UL (ref 4.6–6.2)
RELATIVE EOSINOPHIL (OHS): 0.8 %
RELATIVE LYMPHOCYTE (OHS): 12.2 % (ref 18–48)
RELATIVE MONOCYTE (OHS): 8.3 % (ref 4–15)
RELATIVE NEUTROPHIL (OHS): 78.1 % (ref 38–73)
SODIUM SERPL-SCNC: 138 MMOL/L (ref 136–145)
TROPONIN I SERPL DL<=0.01 NG/ML-MCNC: 0.01 NG/ML
TROPONIN I SERPL DL<=0.01 NG/ML-MCNC: <0.006 NG/ML
TROPONIN I SERPL DL<=0.01 NG/ML-MCNC: <0.006 NG/ML
WBC # BLD AUTO: 10.12 K/UL (ref 3.9–12.7)

## 2025-05-13 PROCEDURE — 93459 L HRT ART/GRFT ANGIO: CPT | Performed by: INTERNAL MEDICINE

## 2025-05-13 PROCEDURE — 63600175 PHARM REV CODE 636 W HCPCS: Performed by: EMERGENCY MEDICINE

## 2025-05-13 PROCEDURE — 93005 ELECTROCARDIOGRAM TRACING: CPT

## 2025-05-13 PROCEDURE — 80053 COMPREHEN METABOLIC PANEL: CPT

## 2025-05-13 PROCEDURE — 83880 ASSAY OF NATRIURETIC PEPTIDE: CPT

## 2025-05-13 PROCEDURE — 85025 COMPLETE CBC W/AUTO DIFF WBC: CPT

## 2025-05-13 PROCEDURE — 84484 ASSAY OF TROPONIN QUANT: CPT

## 2025-05-13 PROCEDURE — 25000003 PHARM REV CODE 250: Performed by: NURSE PRACTITIONER

## 2025-05-13 PROCEDURE — 25000003 PHARM REV CODE 250: Performed by: INTERNAL MEDICINE

## 2025-05-13 PROCEDURE — 84484 ASSAY OF TROPONIN QUANT: CPT | Mod: 91 | Performed by: HOSPITALIST

## 2025-05-13 PROCEDURE — G0378 HOSPITAL OBSERVATION PER HR: HCPCS

## 2025-05-13 PROCEDURE — 63600175 PHARM REV CODE 636 W HCPCS: Performed by: INTERNAL MEDICINE

## 2025-05-13 PROCEDURE — 99153 MOD SED SAME PHYS/QHP EA: CPT | Performed by: INTERNAL MEDICINE

## 2025-05-13 PROCEDURE — 25500020 PHARM REV CODE 255: Performed by: INTERNAL MEDICINE

## 2025-05-13 PROCEDURE — C1769 GUIDE WIRE: HCPCS | Performed by: INTERNAL MEDICINE

## 2025-05-13 PROCEDURE — 99152 MOD SED SAME PHYS/QHP 5/>YRS: CPT | Mod: ,,, | Performed by: INTERNAL MEDICINE

## 2025-05-13 PROCEDURE — C1894 INTRO/SHEATH, NON-LASER: HCPCS | Performed by: INTERNAL MEDICINE

## 2025-05-13 PROCEDURE — 99152 MOD SED SAME PHYS/QHP 5/>YRS: CPT | Performed by: INTERNAL MEDICINE

## 2025-05-13 PROCEDURE — 25000003 PHARM REV CODE 250: Performed by: EMERGENCY MEDICINE

## 2025-05-13 PROCEDURE — 93459 L HRT ART/GRFT ANGIO: CPT | Mod: 26,,, | Performed by: INTERNAL MEDICINE

## 2025-05-13 RX ORDER — NITROGLYCERIN 0.4 MG/1
0.4 TABLET SUBLINGUAL EVERY 5 MIN PRN
Status: DISCONTINUED | OUTPATIENT
Start: 2025-05-13 | End: 2025-05-14 | Stop reason: HOSPADM

## 2025-05-13 RX ORDER — NITROGLYCERIN 20 MG/G
1 OINTMENT TOPICAL
Status: COMPLETED | OUTPATIENT
Start: 2025-05-13 | End: 2025-05-13

## 2025-05-13 RX ORDER — GLUCAGON 1 MG
1 KIT INJECTION
Status: DISCONTINUED | OUTPATIENT
Start: 2025-05-13 | End: 2025-05-14 | Stop reason: HOSPADM

## 2025-05-13 RX ORDER — IBUPROFEN 200 MG
24 TABLET ORAL
Status: DISCONTINUED | OUTPATIENT
Start: 2025-05-13 | End: 2025-05-14 | Stop reason: HOSPADM

## 2025-05-13 RX ORDER — PRASUGREL 10 MG/1
10 TABLET, FILM COATED ORAL DAILY
Status: DISCONTINUED | OUTPATIENT
Start: 2025-05-13 | End: 2025-05-14 | Stop reason: HOSPADM

## 2025-05-13 RX ORDER — ONDANSETRON 8 MG/1
8 TABLET, ORALLY DISINTEGRATING ORAL EVERY 8 HOURS PRN
Status: DISCONTINUED | OUTPATIENT
Start: 2025-05-13 | End: 2025-05-14 | Stop reason: HOSPADM

## 2025-05-13 RX ORDER — LISINOPRIL 20 MG/1
20 TABLET ORAL DAILY
Status: DISCONTINUED | OUTPATIENT
Start: 2025-05-13 | End: 2025-05-14 | Stop reason: HOSPADM

## 2025-05-13 RX ORDER — NITROGLYCERIN 5 MG/ML
INJECTION, SOLUTION INTRAVENOUS
Status: DISCONTINUED | OUTPATIENT
Start: 2025-05-13 | End: 2025-05-13

## 2025-05-13 RX ORDER — ONDANSETRON HYDROCHLORIDE 2 MG/ML
4 INJECTION, SOLUTION INTRAVENOUS EVERY 8 HOURS PRN
Status: DISCONTINUED | OUTPATIENT
Start: 2025-05-13 | End: 2025-05-13 | Stop reason: SDUPTHER

## 2025-05-13 RX ORDER — NALOXONE HCL 0.4 MG/ML
0.02 VIAL (ML) INJECTION
Status: DISCONTINUED | OUTPATIENT
Start: 2025-05-13 | End: 2025-05-14 | Stop reason: HOSPADM

## 2025-05-13 RX ORDER — HYDROCHLOROTHIAZIDE 12.5 MG/1
12.5 TABLET ORAL DAILY
Status: DISCONTINUED | OUTPATIENT
Start: 2025-05-13 | End: 2025-05-13

## 2025-05-13 RX ORDER — DIPHENHYDRAMINE HYDROCHLORIDE 50 MG/ML
INJECTION, SOLUTION INTRAMUSCULAR; INTRAVENOUS
Status: DISCONTINUED | OUTPATIENT
Start: 2025-05-13 | End: 2025-05-13

## 2025-05-13 RX ORDER — MORPHINE SULFATE 4 MG/ML
4 INJECTION, SOLUTION INTRAMUSCULAR; INTRAVENOUS
Refills: 0 | Status: COMPLETED | OUTPATIENT
Start: 2025-05-13 | End: 2025-05-13

## 2025-05-13 RX ORDER — MIDAZOLAM HYDROCHLORIDE 1 MG/ML
INJECTION INTRAMUSCULAR; INTRAVENOUS
Status: DISCONTINUED | OUTPATIENT
Start: 2025-05-13 | End: 2025-05-13

## 2025-05-13 RX ORDER — LIDOCAINE HYDROCHLORIDE 20 MG/ML
INJECTION, SOLUTION EPIDURAL; INFILTRATION; INTRACAUDAL; PERINEURAL
Status: DISCONTINUED | OUTPATIENT
Start: 2025-05-13 | End: 2025-05-13

## 2025-05-13 RX ORDER — FENTANYL CITRATE 50 UG/ML
INJECTION, SOLUTION INTRAMUSCULAR; INTRAVENOUS
Status: DISCONTINUED | OUTPATIENT
Start: 2025-05-13 | End: 2025-05-13

## 2025-05-13 RX ORDER — ASPIRIN 81 MG/1
81 TABLET ORAL DAILY
Status: DISCONTINUED | OUTPATIENT
Start: 2025-05-13 | End: 2025-05-14 | Stop reason: HOSPADM

## 2025-05-13 RX ORDER — SODIUM CHLORIDE 9 MG/ML
INJECTION, SOLUTION INTRAVENOUS CONTINUOUS
Status: DISCONTINUED | OUTPATIENT
Start: 2025-05-13 | End: 2025-05-13

## 2025-05-13 RX ORDER — SODIUM CHLORIDE 9 MG/ML
INJECTION, SOLUTION INTRAVENOUS CONTINUOUS
Status: ACTIVE | OUTPATIENT
Start: 2025-05-13 | End: 2025-05-14

## 2025-05-13 RX ORDER — POLYETHYLENE GLYCOL 3350 17 G/17G
17 POWDER, FOR SOLUTION ORAL DAILY PRN
Status: DISCONTINUED | OUTPATIENT
Start: 2025-05-13 | End: 2025-05-14 | Stop reason: HOSPADM

## 2025-05-13 RX ORDER — IBUPROFEN 200 MG
16 TABLET ORAL
Status: DISCONTINUED | OUTPATIENT
Start: 2025-05-13 | End: 2025-05-14 | Stop reason: HOSPADM

## 2025-05-13 RX ORDER — SODIUM CHLORIDE 0.9 % (FLUSH) 0.9 %
3 SYRINGE (ML) INJECTION EVERY 12 HOURS PRN
Status: DISCONTINUED | OUTPATIENT
Start: 2025-05-13 | End: 2025-05-14 | Stop reason: HOSPADM

## 2025-05-13 RX ORDER — METOPROLOL SUCCINATE 50 MG/1
50 TABLET, EXTENDED RELEASE ORAL DAILY
Status: DISCONTINUED | OUTPATIENT
Start: 2025-05-13 | End: 2025-05-14 | Stop reason: HOSPADM

## 2025-05-13 RX ORDER — HEPARIN SODIUM 1000 [USP'U]/ML
INJECTION, SOLUTION INTRAVENOUS; SUBCUTANEOUS
Status: DISCONTINUED | OUTPATIENT
Start: 2025-05-13 | End: 2025-05-13

## 2025-05-13 RX ORDER — ENOXAPARIN SODIUM 100 MG/ML
40 INJECTION SUBCUTANEOUS EVERY 24 HOURS
Status: DISCONTINUED | OUTPATIENT
Start: 2025-05-13 | End: 2025-05-14 | Stop reason: HOSPADM

## 2025-05-13 RX ORDER — ACETAMINOPHEN 325 MG/1
650 TABLET ORAL EVERY 4 HOURS PRN
Status: DISCONTINUED | OUTPATIENT
Start: 2025-05-13 | End: 2025-05-14 | Stop reason: HOSPADM

## 2025-05-13 RX ORDER — SIMETHICONE 80 MG
1 TABLET,CHEWABLE ORAL 4 TIMES DAILY PRN
Status: DISCONTINUED | OUTPATIENT
Start: 2025-05-13 | End: 2025-05-14 | Stop reason: HOSPADM

## 2025-05-13 RX ORDER — ACETAMINOPHEN 650 MG/1
650 SUPPOSITORY RECTAL EVERY 6 HOURS PRN
Status: DISCONTINUED | OUTPATIENT
Start: 2025-05-13 | End: 2025-05-14 | Stop reason: HOSPADM

## 2025-05-13 RX ORDER — ACETAMINOPHEN 325 MG/1
650 TABLET ORAL EVERY 6 HOURS PRN
Status: DISCONTINUED | OUTPATIENT
Start: 2025-05-13 | End: 2025-05-13 | Stop reason: SDUPTHER

## 2025-05-13 RX ORDER — ATORVASTATIN CALCIUM 40 MG/1
40 TABLET, FILM COATED ORAL DAILY
Status: DISCONTINUED | OUTPATIENT
Start: 2025-05-13 | End: 2025-05-14 | Stop reason: HOSPADM

## 2025-05-13 RX ORDER — ALUMINUM HYDROXIDE, MAGNESIUM HYDROXIDE, AND SIMETHICONE 1200; 120; 1200 MG/30ML; MG/30ML; MG/30ML
30 SUSPENSION ORAL 4 TIMES DAILY PRN
Status: DISCONTINUED | OUTPATIENT
Start: 2025-05-13 | End: 2025-05-14 | Stop reason: HOSPADM

## 2025-05-13 RX ORDER — TALC
6 POWDER (GRAM) TOPICAL NIGHTLY PRN
Status: DISCONTINUED | OUTPATIENT
Start: 2025-05-13 | End: 2025-05-14 | Stop reason: HOSPADM

## 2025-05-13 RX ORDER — TAMSULOSIN HYDROCHLORIDE 0.4 MG/1
0.4 CAPSULE ORAL DAILY
Status: DISCONTINUED | OUTPATIENT
Start: 2025-05-13 | End: 2025-05-14 | Stop reason: HOSPADM

## 2025-05-13 RX ORDER — PROMETHAZINE HYDROCHLORIDE 25 MG/1
25 TABLET ORAL EVERY 6 HOURS PRN
Status: DISCONTINUED | OUTPATIENT
Start: 2025-05-13 | End: 2025-05-14 | Stop reason: HOSPADM

## 2025-05-13 RX ADMIN — ASPIRIN 81 MG: 81 TABLET, COATED ORAL at 09:05

## 2025-05-13 RX ADMIN — NIFEDIPINE 90 MG: 30 TABLET, FILM COATED, EXTENDED RELEASE ORAL at 09:05

## 2025-05-13 RX ADMIN — MORPHINE SULFATE 4 MG: 4 INJECTION INTRAVENOUS at 03:05

## 2025-05-13 RX ADMIN — TAMSULOSIN HYDROCHLORIDE 0.4 MG: 0.4 CAPSULE ORAL at 09:05

## 2025-05-13 RX ADMIN — SODIUM CHLORIDE: 9 INJECTION, SOLUTION INTRAVENOUS at 09:05

## 2025-05-13 RX ADMIN — ATORVASTATIN CALCIUM 40 MG: 40 TABLET, FILM COATED ORAL at 09:05

## 2025-05-13 RX ADMIN — SODIUM CHLORIDE: 9 INJECTION, SOLUTION INTRAVENOUS at 07:05

## 2025-05-13 RX ADMIN — METOPROLOL SUCCINATE 50 MG: 50 TABLET, EXTENDED RELEASE ORAL at 09:05

## 2025-05-13 RX ADMIN — NITROGLYCERIN 1 INCH: 20 OINTMENT TOPICAL at 02:05

## 2025-05-13 RX ADMIN — LISINOPRIL 20 MG: 20 TABLET ORAL at 09:05

## 2025-05-13 RX ADMIN — PRASUGREL 10 MG: 10 TABLET, FILM COATED ORAL at 09:05

## 2025-05-13 NOTE — INTERVAL H&P NOTE
The patient has been examined and the H&P has been reviewed:    I concur with the findings and no changes have occurred since H&P was written.    Procedure risks, benefits and alternative options discussed and understood by patient/family.          Active Hospital Problems    Diagnosis  POA    *Chest pain [R07.9]  Unknown    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 [I25.110]  Yes     Stable.  Continue as per Cardiology.  Seegenaro Bennett      Hypertension associated with diabetes [E11.59, I15.2]  Yes     Controlled.  Continue current medications      Hyperlipidemia associated with type 2 diabetes mellitus [E11.69, E78.5]  Yes     Controlled.  Continue current medications      Type 2 diabetes mellitus without complication, without long-term current use of insulin [E11.9]  Yes     Stable.  Continue current        Resolved Hospital Problems   No resolved problems to display.

## 2025-05-13 NOTE — ED PROVIDER NOTES
SCRIBE #1 NOTE: I, Malia Veloz, am scribing for, and in the presence of, Ansley Ricketts MD. I have scribed the entire note.       History     Chief Complaint   Patient presents with    Chest Pain     Pt presents with left sided chest pain that radiates to left neck and shoulder. Onset around 10 pm. Pt also reports SOB     Review of patient's allergies indicates:  No Known Allergies      History of Present Illness     HPI    5/13/2025, 1:56 AM  History obtained from the patient, medical records, and wife      History of Present Illness: Deniz Paulino is a 74 y.o. male patient with a PMHx of CAD, hyperlipidemia, hypertension, MI, and diabetes mellitus who presents to the Emergency Department for evaluation of chest pain which began around 10 PM. Pt states he was resting when the sxs began. Laying down and taking deep breaths worsens the pain. Symptoms are constant and moderate in severity. Pt reports compliance with all medications. Associated sxs include shortness of breath. Patient denies any fever, cough, dizziness, nausea, vomiting, palpitations, or diaphoresis. Prior Tx includes nitro without improvement.  No further complaints or concerns at this time.       Arrival mode: Personal Transportation    PCP: Hiro Kraft MD        Past Medical History:  Past Medical History:   Diagnosis Date    Acute coronary syndrome     Coronary artery disease     Diabetes mellitus 04/24/2014    Effort angina 04/30/2023    Hyperlipidemia     Hypertension     Old MI (myocardial infarction) 04/24/2014    Preop cardiovascular exam 11/27/2024    S/P CABG (coronary artery bypass graft) 04/24/2014       Past Surgical History:  Past Surgical History:   Procedure Laterality Date    CARDIAC CATHETERIZATION      COLONOSCOPY N/A 07/26/2019    Procedure: COLONOSCOPY;  Surgeon: Opal Oshea MD;  Location: [facility];  Service: Endoscopy;  Laterality: N/A;    COLONOSCOPY, SCREENING, LOW RISK PATIENT N/A 12/17/2024    Procedure:  COLONOSCOPY, SCREENING, LOW RISK PATIENT 11/27-[facility] rec'd-Ok to stop asa effient 5-7 days;  Surgeon: Carol Pinon MD;  Location: [facility];  Service: Colon and Rectal;  Laterality: N/A;    CORONARY ARTERY BYPASS GRAFT      CORONARY BYPASS GRAFT ANGIOGRAPHY  5/1/2023    Procedure: Bypass graft study;  Surgeon: Kimberly Bennett MD;  Location: [facility] CATH LAB;  Service: Cardiology;;    ESOPHAGOGASTRODUODENOSCOPY N/A 07/26/2019    Procedure: ESOPHAGOGASTRODUODENOSCOPY (EGD);  Surgeon: Opal Oshea MD;  Location: [facility];  Service: Endoscopy;  Laterality: N/A;    INSTANTANEOUS WAVE-FREE RATIO (IFR) N/A 4/4/2024    Procedure: Instantaneous Wave-Free Ratio (IFR);  Surgeon: Kimberly Bennett MD;  Location: [facility] CATH LAB;  Service: Cardiology;  Laterality: N/A;    LEFT HEART CATHETERIZATION Left 5/1/2023    Procedure: Left heart cath;  Surgeon: Kimberly Bennett MD;  Location: [facility] CATH LAB;  Service: Cardiology;  Laterality: Left;    LEFT HEART CATHETERIZATION Left 4/4/2024    Procedure: Left heart cath;  Surgeon: Kimberly Bennett MD;  Location: [facility] CATH LAB;  Service: Cardiology;  Laterality: Left;    PROTECTION, CORONARY, FILTER  4/4/2024    Procedure: Protection, Coronary, Filter;  Surgeon: Kimberly Bennett MD;  Location: [facility] CATH LAB;  Service: Cardiology;;    PTCA, SINGLE VESSEL  4/4/2024    Procedure: PTCA, Single Vessel;  Surgeon: Kimberly Bennett MD;  Location: [facility] CATH LAB;  Service: Cardiology;;    SKIN CANCER EXCISION  09/2018    STENT, DRUG ELUTING, SINGLE VESSEL, CORONARY  4/4/2024    Procedure: Stent, Drug Eluting, Single Vessel, Coronary;  Surgeon: Kimberly Bennett MD;  Location: [facility] CATH LAB;  Service: Cardiology;;    VASECTOMY           Family History:  Family History   Problem Relation Name Age of Onset    Diabetes Mother Mother     Heart murmur Mother Mother     Cancer Mother Mother         Breast    Stroke Mother Mother     Alcohol abuse Father Father     Heart attack Father Father 63    Cancer  Father Father         Esophageal    Diabetes Brother Frank        Social History:  Social History     Tobacco Use    Smoking status: Former     Current packs/day: 0.00     Average packs/day: 1 pack/day for 30.0 years (30.0 ttl pk-yrs)     Types: Cigarettes     Start date: 1973     Quit date: 2003     Years since quittin.2    Smokeless tobacco: Former   Substance and Sexual Activity    Alcohol use: Yes     Alcohol/week: 16.0 standard drinks of alcohol     Types: 10 Glasses of wine, 6 Cans of beer per week     Comment: socially    Drug use: Never    Sexual activity: Not Currently     Partners: Female        Review of Systems     Review of Systems   Constitutional:  Negative for diaphoresis and fever.   HENT:  Negative for sore throat.    Respiratory:  Positive for shortness of breath. Negative for cough.    Cardiovascular:  Positive for chest pain. Negative for palpitations.   Gastrointestinal:  Negative for nausea and vomiting.   Genitourinary:  Negative for dysuria.   Musculoskeletal:  Negative for back pain.   Skin:  Negative for rash.   Neurological:  Negative for dizziness and weakness.   Hematological:  Does not bruise/bleed easily.   All other systems reviewed and are negative.     Physical Exam     Initial Vitals [25 0045]   BP Pulse Resp Temp SpO2   (!) 148/69 (!) 52 19 97.8 °F (36.6 °C) 98 %      MAP       --          Physical Exam  Nursing Notes and Vital Signs Reviewed.  Constitutional: Patient is in no acute distress. Well-developed and well-nourished.  Head: Atraumatic. Normocephalic.  Eyes: PERRL. EOM intact. Conjunctivae are not pale. No scleral icterus.  ENT: Mucous membranes are moist. Oropharynx is clear and symmetric.    Neck: Supple. Full ROM. No lymphadenopathy.  Cardiovascular: Regular rate. Regular rhythm. No murmurs, rubs, or gallops. Distal pulses are 2+ and symmetric.  Pulmonary/Chest: No respiratory distress. Clear to auscultation bilaterally. No wheezing or  "rales.  Abdominal: Soft and non-distended.  There is no tenderness.  No rebound, guarding, or rigidity.  Genitourinary: No CVA tenderness.  Musculoskeletal: Moves all extremities. No obvious deformities. No edema.  Skin: Warm and dry.  Neurological:  Alert, awake, and appropriate.  Normal speech.  No acute focal neurological deficits are appreciated.  Psychiatric: Normal affect. Good eye contact. Appropriate in content.     ED Course   Procedures  ED Vital Signs:  Vitals:    05/13/25 0045 05/13/25 0137 05/13/25 0145 05/13/25 0205   BP: (!) 148/69  (!) 166/85 (!) 152/81   Pulse: (!) 52 61 (!) 59 (!) 59   Resp: 19  14 15   Temp: 97.8 °F (36.6 °C)      TempSrc: Oral      SpO2: 98%  98% 97%   Weight: 102.1 kg (225 lb)      Height: 5' 10" (1.778 m)       05/13/25 0235 05/13/25 0245 05/13/25 0305 05/13/25 0335   BP:  (!) 158/86 (!) 159/78 (!) 157/84   Pulse:  61 (!) 56 (!) 58   Resp: 19  12 14   Temp:       TempSrc:       SpO2:  99% 97% 97%   Weight:       Height:        05/13/25 0347 05/13/25 0355 05/13/25 0405 05/13/25 0435   BP:   (!) 154/83 (!) 153/80   Pulse:   (!) 54 (!) 55   Resp: 17  16 13   Temp:       TempSrc:       SpO2:  95%  95%   Weight:       Height:           Abnormal Lab Results:  Labs Reviewed   COMPREHENSIVE METABOLIC PANEL - Abnormal       Result Value    Sodium 138      Potassium 3.6      Chloride 103      CO2 25      Glucose 105      BUN 23      Creatinine 1.4      Calcium 9.4      Protein Total 7.4      Albumin 4.0      Bilirubin Total 0.6      ALP 91      AST 17      ALT 14      Anion Gap 10      eGFR 53 (*)    B-TYPE NATRIURETIC PEPTIDE - Abnormal     (*)    CBC WITH DIFFERENTIAL - Abnormal    WBC 10.12      RBC 3.87 (*)     HGB 12.1 (*)     HCT 35.5 (*)     MCV 92      MCH 31.3 (*)     MCHC 34.1      RDW 13.1      Platelet Count 184      MPV 9.9      Nucleated RBC 0      Neut % 78.1 (*)     Lymph % 12.2 (*)     Mono % 8.3      Eos % 0.8      Basophil % 0.3      Imm Grans % 0.3      Neut # " 7.91 (*)     Lymph # 1.23      Mono # 0.84      Eos # 0.08      Baso # 0.03      Imm Grans # 0.03     TROPONIN I - Normal    Troponin-I <0.006     TROPONIN I - Normal    Troponin-I 0.006     CBC W/ AUTO DIFFERENTIAL    Narrative:     The following orders were created for panel order CBC auto differential.  Procedure                               Abnormality         Status                     ---------                               -----------         ------                     CBC with Differential[[id number]]       Abnormal            Final result                 Please view results for these tests on the individual orders.        All Lab Results:  Results for orders placed or performed during the hospital encounter of 05/13/25   Comprehensive metabolic panel    Collection Time: 05/13/25  1:37 AM   Result Value Ref Range    Sodium 138 136 - 145 mmol/L    Potassium 3.6 3.5 - 5.1 mmol/L    Chloride 103 95 - 110 mmol/L    CO2 25 23 - 29 mmol/L    Glucose 105 70 - 110 mg/dL    BUN 23 8 - 23 mg/dL    Creatinine 1.4 0.5 - 1.4 mg/dL    Calcium 9.4 8.7 - 10.5 mg/dL    Protein Total 7.4 6.0 - 8.4 gm/dL    Albumin 4.0 3.5 - 5.2 g/dL    Bilirubin Total 0.6 0.1 - 1.0 mg/dL    ALP 91 40 - 150 unit/L    AST 17 11 - 45 unit/L    ALT 14 10 - 44 unit/L    Anion Gap 10 8 - 16 mmol/L    eGFR 53 (L) >60 mL/min/1.73/m2   Troponin I #1    Collection Time: 05/13/25  1:37 AM   Result Value Ref Range    Troponin-I <0.006 <=0.026 ng/mL   BNP    Collection Time: 05/13/25  1:37 AM   Result Value Ref Range     (H) 0 - 99 pg/mL   CBC with Differential    Collection Time: 05/13/25  1:37 AM   Result Value Ref Range    WBC 10.12 3.90 - 12.70 K/uL    RBC 3.87 (L) 4.60 - 6.20 M/uL    HGB 12.1 (L) 14.0 - 18.0 gm/dL    HCT 35.5 (L) 40.0 - 54.0 %    MCV 92 82 - 98 fL    MCH 31.3 (H) 27.0 - 31.0 pg    MCHC 34.1 32.0 - 36.0 g/dL    RDW 13.1 11.5 - 14.5 %    Platelet Count 184 150 - 450 K/uL    MPV 9.9 9.2 - 12.9 fL    Nucleated RBC 0 <=0 /100 WBC     Neut % 78.1 (H) 38 - 73 %    Lymph % 12.2 (L) 18 - 48 %    Mono % 8.3 4 - 15 %    Eos % 0.8 <=8 %    Basophil % 0.3 <=1.9 %    Imm Grans % 0.3 0.0 - 0.5 %    Neut # 7.91 (H) 1.8 - 7.7 K/uL    Lymph # 1.23 1 - 4.8 K/uL    Mono # 0.84 0.3 - 1 K/uL    Eos # 0.08 <=0.5 K/uL    Baso # 0.03 <=0.2 K/uL    Imm Grans # 0.03 0.00 - 0.04 K/uL   Troponin I #2    Collection Time: 05/13/25  3:40 AM   Result Value Ref Range    Troponin-I 0.006 <=0.026 ng/mL       Imaging Results:  Imaging Results              X-Ray Chest AP Portable (In process)                      The EKG was ordered, reviewed, and independently interpreted by the ED provider.  Interpretation time: 00:48  Rate: 64 BPM  Rhythm: Sinus rhythm with premature atrial complexes with aberrant conduction   Interpretation: Low voltage QRS. Prolonged QT. No STEMI.    The EKG was ordered, reviewed, and independently interpreted by the ED provider.  Interpretation time: 04:29  Rate: 60 BPM  Rhythm: Sinus rhythm with occasional premature ventricular complexes  Interpretation: Low voltage QRS. Prolonged QT. No STEMI.           The Emergency Provider reviewed the vital signs and test results, which are outlined above.     ED Discussion       4:56 AM: Discussed case with Yemi Harris NP ([facility]). Dr. Harris agrees with current care and management of pt and accepts admission.   Admitting Service: Hospital Medicine  Admitting Physician: Dr. Harris  Admit to: Obs/Tele    4:56 AM: Re-evaluated pt. I have discussed test results, shared treatment plan, and the need for admission with patient/family/caretaker at bedside. Pt and/or family/caretaker express understanding at this time and agree with all information. All questions answered. Pt/caretaker/family member(s) have no further questions or concerns at this time. Pt is ready for admit.                Medical Decision Making  Amount and/or Complexity of Data Reviewed  Labs: ordered. Decision-making details  documented in ED Course.  Radiology: ordered. Decision-making details documented in ED Course.  ECG/medicine tests: ordered and independent interpretation performed. Decision-making details documented in ED Course.    Risk  Prescription drug management.  Decision regarding hospitalization.                ED Medication(s):  Medications   nitroGLYCERIN 2% TD oint ointment 1 inch (1 inch Transdermal Given 5/13/25 0241)   morphine injection 4 mg (4 mg Intravenous Given 5/13/25 9750)       New Prescriptions    No medications on file               Scribe Attestation:   Scribe #1: I performed the above scribed service and the documentation accurately describes the services I performed. I attest to the accuracy of the note.     Attending:   Physician Attestation Statement for Scribe #1: I, Ansley Ricketts MD, personally performed the services described in this documentation, as scribed by Malia Veloz, in my presence, and it is both accurate and complete.           Clinical Impression       ICD-10-CM ICD-9-CM   1. Unstable angina  I20.0 411.1   2. Chest pain  R07.9 786.50       Disposition:   Disposition: Admitted  Condition: Fair       Ansley Ricketts MD  05/13/25 0600     numerical 0-10

## 2025-05-13 NOTE — ASSESSMENT & PLAN NOTE
Significant history of CABG and recent LHC in April 2024 with intervention to mid graft   CP started last night and still having some recurrent chest pain  No pain on palpation   Given extensive cardiac history will plan for LHC today   Remain NPO  Continue with heparin   Hold HCTZ, start gentle hydration   Continue remaining OMT   BNP slightly elevated, all other labs wnl

## 2025-05-13 NOTE — SUBJECTIVE & OBJECTIVE
Past Medical History:   Diagnosis Date    Acute coronary syndrome     Coronary artery disease     Diabetes mellitus 04/24/2014    Effort angina 04/30/2023    Hyperlipidemia     Hypertension     Old MI (myocardial infarction) 04/24/2014    Preop cardiovascular exam 11/27/2024    S/P CABG (coronary artery bypass graft) 04/24/2014       Past Surgical History:   Procedure Laterality Date    CARDIAC CATHETERIZATION      COLONOSCOPY N/A 07/26/2019    Procedure: COLONOSCOPY;  Surgeon: Opal Oshea MD;  Location: [facility];  Service: Endoscopy;  Laterality: N/A;    COLONOSCOPY, SCREENING, LOW RISK PATIENT N/A 12/17/2024    Procedure: COLONOSCOPY, SCREENING, LOW RISK PATIENT 11/27-clr rec'd-Ok to stop asa effient 5-7 days;  Surgeon: Carol Pinon MD;  Location: [facility];  Service: Colon and Rectal;  Laterality: N/A;    CORONARY ARTERY BYPASS GRAFT      CORONARY BYPASS GRAFT ANGIOGRAPHY  5/1/2023    Procedure: Bypass graft study;  Surgeon: Kimberly Bennett MD;  Location: [facility] CATH LAB;  Service: Cardiology;;    ESOPHAGOGASTRODUODENOSCOPY N/A 07/26/2019    Procedure: ESOPHAGOGASTRODUODENOSCOPY (EGD);  Surgeon: Opal Oshea MD;  Location: [facility];  Service: Endoscopy;  Laterality: N/A;    INSTANTANEOUS WAVE-FREE RATIO (IFR) N/A 4/4/2024    Procedure: Instantaneous Wave-Free Ratio (IFR);  Surgeon: Kimberly Bennett MD;  Location: [facility] CATH LAB;  Service: Cardiology;  Laterality: N/A;    LEFT HEART CATHETERIZATION Left 5/1/2023    Procedure: Left heart cath;  Surgeon: Kimberly Bennett MD;  Location: [facility] CATH LAB;  Service: Cardiology;  Laterality: Left;    LEFT HEART CATHETERIZATION Left 4/4/2024    Procedure: Left heart cath;  Surgeon: Kimberly Bennett MD;  Location: [facility] CATH LAB;  Service: Cardiology;  Laterality: Left;    PROTECTION, CORONARY, FILTER  4/4/2024    Procedure: Protection, Coronary, Filter;  Surgeon: Kimberly Bennett MD;  Location: [facility] CATH LAB;  Service: Cardiology;;    PTCA, SINGLE VESSEL   4/4/2024    Procedure: PTCA, Single Vessel;  Surgeon: Kimberly Bennett MD;  Location: [facility] CATH LAB;  Service: Cardiology;;    SKIN CANCER EXCISION  09/2018    STENT, DRUG ELUTING, SINGLE VESSEL, CORONARY  4/4/2024    Procedure: Stent, Drug Eluting, Single Vessel, Coronary;  Surgeon: Kimberly Bennett MD;  Location: [facility] CATH LAB;  Service: Cardiology;;    VASECTOMY         Review of patient's allergies indicates:  No Known Allergies    No current facility-administered medications on file prior to encounter.     Current Outpatient Medications on File Prior to Encounter   Medication Sig    aspirin (ECOTRIN) 81 MG EC tablet Take 1 tablet (81 mg total) by mouth once daily.    atorvastatin (LIPITOR) 40 MG tablet Take 1 tablet (40 mg total) by mouth once daily.    hydroCHLOROthiazide 12.5 MG Tab Take 1 tablet (12.5 mg total) by mouth once daily.    lisinopriL (PRINIVIL,ZESTRIL) 20 MG tablet Take 1 tablet (20 mg total) by mouth once daily.    metFORMIN (GLUCOPHAGE) 1000 MG tablet Take 1 tablet (1,000 mg total) by mouth 2 (two) times daily with meals.    metoprolol succinate (TOPROL-XL) 50 MG 24 hr tablet Take 1 tablet (50 mg total) by mouth once daily.    NIFEdipine (PROCARDIA-XL) 90 MG (OSM) 24 hr tablet Take 1 tablet (90 mg total) by mouth once daily.    nitroGLYCERIN 0.2 mg/hr TD PT24 (NITRODUR) 0.2 mg/hr APPLY 1 PATCH TOPICALLY ONCE DAILY    pantoprazole (PROTONIX) 20 MG tablet Take 1 tablet by mouth once daily    tamsulosin (FLOMAX) 0.4 mg Cap Take 1 capsule (0.4 mg total) by mouth once daily.    calcipotriene (DOVONEX) 0.005 % cream Apply topically 2 (two) times daily. Mix with efudex cream and AAA on scalp twice daily for 2 weeks at a time    coenzyme Q10 200 mg capsule Take 200 mg by mouth 2 (two) times daily.    cyanocobalamin (VITAMIN B-12) 1000 MCG tablet Take 1,000 mcg by mouth once daily.    fluorouraciL (EFUDEX) 5 % cream Mix with calcipotriene cream and AAA on scalp twice daily for 2 weeks at a time     magnesium oxide (MAG-OX) 400 mg (241.3 mg magnesium) tablet Take 400 mg by mouth once daily.    nitroGLYCERIN (NITROSTAT) 0.4 MG SL tablet Place 0.4 mg under the tongue every 5 (five) minutes as needed for Chest pain.    prasugreL (EFFIENT) 10 mg Tab Take 1 tablet (10 mg total) by mouth once daily.    vitamin D 1000 units Tab Take 1,000 Units by mouth every other day. (Patient not taking: Reported on 2025)     Family History       Problem Relation (Age of Onset)    Alcohol abuse Father    Cancer Mother, Father    Diabetes Mother, Brother    Heart attack Father (63)    Heart murmur Mother    Stroke Mother          Tobacco Use    Smoking status: Former     Current packs/day: 0.00     Average packs/day: 1 pack/day for 30.0 years (30.0 ttl pk-yrs)     Types: Cigarettes     Start date: 1973     Quit date: 2003     Years since quittin.2    Smokeless tobacco: Former   Substance and Sexual Activity    Alcohol use: Yes     Alcohol/week: 16.0 standard drinks of alcohol     Types: 10 Glasses of wine, 6 Cans of beer per week     Comment: socially    Drug use: Never    Sexual activity: Not Currently     Partners: Female     Review of Systems   Respiratory:  Positive for shortness of breath. Negative for cough.    Cardiovascular:  Positive for chest pain. Negative for palpitations and leg swelling.   Gastrointestinal:  Negative for abdominal pain, diarrhea, nausea and vomiting.   All other systems reviewed and are negative.    Objective:     Vital Signs (Most Recent):  Temp: 97.8 °F (36.6 °C) (25 0045)  Pulse: (!) 53 (25 0605)  Resp: 14 (25 0555)  BP: 136/71 (25 0605)  SpO2: 97 % (25 0605) Vital Signs (24h Range):  Temp:  [97.8 °F (36.6 °C)] 97.8 °F (36.6 °C)  Pulse:  [49-61] 53  Resp:  [12-19] 14  SpO2:  [95 %-99 %] 97 %  BP: (136-166)/(69-86) 136/71     Weight: 102.1 kg (225 lb)  Body mass index is 32.28 kg/m².     Physical Exam  Vitals and nursing note reviewed.   Constitutional:        General: He is awake. He is not in acute distress.     Appearance: Normal appearance. He is well-developed and well-groomed. He is not ill-appearing, toxic-appearing or diaphoretic.   HENT:      Head: Normocephalic and atraumatic.      Mouth/Throat:      Mouth: Mucous membranes are moist.      Pharynx: Oropharynx is clear.   Eyes:      Extraocular Movements: Extraocular movements intact.      Conjunctiva/sclera: Conjunctivae normal.      Pupils: Pupils are equal, round, and reactive to light.   Cardiovascular:      Rate and Rhythm: Normal rate and regular rhythm.      Pulses: Normal pulses.      Heart sounds: Normal heart sounds. No murmur heard.  Pulmonary:      Effort: Pulmonary effort is normal.      Breath sounds: Decreased breath sounds present. No wheezing, rhonchi or rales.   Abdominal:      General: Bowel sounds are normal.      Palpations: Abdomen is soft.      Tenderness: There is no abdominal tenderness. There is no right CVA tenderness, left CVA tenderness, guarding or rebound.   Musculoskeletal:      Cervical back: Normal range of motion and neck supple.      Right lower leg: No edema.      Left lower leg: No edema.      Comments: 5/5 strength throughout   Skin:     General: Skin is warm and dry.      Capillary Refill: Capillary refill takes less than 2 seconds.   Neurological:      General: No focal deficit present.      Mental Status: He is alert and oriented to person, place, and time. Mental status is at baseline.      GCS: GCS eye subscore is 4. GCS verbal subscore is 5. GCS motor subscore is 6.      Cranial Nerves: Cranial nerves 2-12 are intact.      Motor: Motor function is intact.      Coordination: Coordination is intact.   Psychiatric:         Mood and Affect: Mood normal.         Behavior: Behavior normal. Behavior is cooperative.              CRANIAL NERVES     CN III, IV, VI   Pupils are equal, round, and reactive to light.     LABS:  Recent Results (from the past 24 hours)    Hemoglobin A1C    Collection Time: 05/12/25 10:52 AM   Result Value Ref Range    Hemoglobin A1c 5.9 (H) 4.0 - 5.6 %    Estimated Average Glucose 123 68 - 131 mg/dL   Vitamin B12    Collection Time: 05/12/25 10:52 AM   Result Value Ref Range    Vitamin B12 599 210 - 950 pg/mL   Comprehensive metabolic panel    Collection Time: 05/13/25  1:37 AM   Result Value Ref Range    Sodium 138 136 - 145 mmol/L    Potassium 3.6 3.5 - 5.1 mmol/L    Chloride 103 95 - 110 mmol/L    CO2 25 23 - 29 mmol/L    Glucose 105 70 - 110 mg/dL    BUN 23 8 - 23 mg/dL    Creatinine 1.4 0.5 - 1.4 mg/dL    Calcium 9.4 8.7 - 10.5 mg/dL    Protein Total 7.4 6.0 - 8.4 gm/dL    Albumin 4.0 3.5 - 5.2 g/dL    Bilirubin Total 0.6 0.1 - 1.0 mg/dL    ALP 91 40 - 150 unit/L    AST 17 11 - 45 unit/L    ALT 14 10 - 44 unit/L    Anion Gap 10 8 - 16 mmol/L    eGFR 53 (L) >60 mL/min/1.73/m2   Troponin I #1    Collection Time: 05/13/25  1:37 AM   Result Value Ref Range    Troponin-I <0.006 <=0.026 ng/mL   BNP    Collection Time: 05/13/25  1:37 AM   Result Value Ref Range     (H) 0 - 99 pg/mL   CBC with Differential    Collection Time: 05/13/25  1:37 AM   Result Value Ref Range    WBC 10.12 3.90 - 12.70 K/uL    RBC 3.87 (L) 4.60 - 6.20 M/uL    HGB 12.1 (L) 14.0 - 18.0 gm/dL    HCT 35.5 (L) 40.0 - 54.0 %    MCV 92 82 - 98 fL    MCH 31.3 (H) 27.0 - 31.0 pg    MCHC 34.1 32.0 - 36.0 g/dL    RDW 13.1 11.5 - 14.5 %    Platelet Count 184 150 - 450 K/uL    MPV 9.9 9.2 - 12.9 fL    Nucleated RBC 0 <=0 /100 WBC    Neut % 78.1 (H) 38 - 73 %    Lymph % 12.2 (L) 18 - 48 %    Mono % 8.3 4 - 15 %    Eos % 0.8 <=8 %    Basophil % 0.3 <=1.9 %    Imm Grans % 0.3 0.0 - 0.5 %    Neut # 7.91 (H) 1.8 - 7.7 K/uL    Lymph # 1.23 1 - 4.8 K/uL    Mono # 0.84 0.3 - 1 K/uL    Eos # 0.08 <=0.5 K/uL    Baso # 0.03 <=0.2 K/uL    Imm Grans # 0.03 0.00 - 0.04 K/uL   Troponin I #2    Collection Time: 05/13/25  3:40 AM   Result Value Ref Range    Troponin-I 0.006 <=0.026 ng/mL        RADIOLOGY  No results found.    EKG    MICROBIOLOGY    MDM     Amount and/or Complexity of Data Reviewed  Clinical lab tests: reviewed  Tests in the radiology section of CPT®: reviewed  Tests in the medicine section of CPT®: reviewed  Discussion of test results with the performing providers: yes  Decide to obtain previous medical records or to obtain history from someone other than the patient: yes  Obtain history from someone other than the patient: yes  Review and summarize past medical records: yes  Discuss the patient with other providers: yes  Independent visualization of images, tracings, or specimens: yes

## 2025-05-13 NOTE — SUBJECTIVE & OBJECTIVE
Past Medical History:   Diagnosis Date    Acute coronary syndrome     Coronary artery disease     Diabetes mellitus 04/24/2014    Effort angina 04/30/2023    Hyperlipidemia     Hypertension     Old MI (myocardial infarction) 04/24/2014    Preop cardiovascular exam 11/27/2024    S/P CABG (coronary artery bypass graft) 04/24/2014       Past Surgical History:   Procedure Laterality Date    CARDIAC CATHETERIZATION      COLONOSCOPY N/A 07/26/2019    Procedure: COLONOSCOPY;  Surgeon: Opal Oshea MD;  Location: [facility];  Service: Endoscopy;  Laterality: N/A;    COLONOSCOPY, SCREENING, LOW RISK PATIENT N/A 12/17/2024    Procedure: COLONOSCOPY, SCREENING, LOW RISK PATIENT 11/27-clr rec'd-Ok to stop asa effient 5-7 days;  Surgeon: Carol Pinon MD;  Location: [facility];  Service: Colon and Rectal;  Laterality: N/A;    CORONARY ARTERY BYPASS GRAFT      CORONARY BYPASS GRAFT ANGIOGRAPHY  5/1/2023    Procedure: Bypass graft study;  Surgeon: Kimberly Bennett MD;  Location: [facility] CATH LAB;  Service: Cardiology;;    ESOPHAGOGASTRODUODENOSCOPY N/A 07/26/2019    Procedure: ESOPHAGOGASTRODUODENOSCOPY (EGD);  Surgeon: Opal Oshea MD;  Location: [facility];  Service: Endoscopy;  Laterality: N/A;    INSTANTANEOUS WAVE-FREE RATIO (IFR) N/A 4/4/2024    Procedure: Instantaneous Wave-Free Ratio (IFR);  Surgeon: Kimberly Bennett MD;  Location: [facility] CATH LAB;  Service: Cardiology;  Laterality: N/A;    LEFT HEART CATHETERIZATION Left 5/1/2023    Procedure: Left heart cath;  Surgeon: Kimberly Bennett MD;  Location: [facility] CATH LAB;  Service: Cardiology;  Laterality: Left;    LEFT HEART CATHETERIZATION Left 4/4/2024    Procedure: Left heart cath;  Surgeon: Kimberly Bennett MD;  Location: [facility] CATH LAB;  Service: Cardiology;  Laterality: Left;    PROTECTION, CORONARY, FILTER  4/4/2024    Procedure: Protection, Coronary, Filter;  Surgeon: Kimberly Bennett MD;  Location: [facility] CATH LAB;  Service: Cardiology;;    PTCA, SINGLE VESSEL   4/4/2024    Procedure: PTCA, Single Vessel;  Surgeon: Kimberly Bennett MD;  Location: [facility] CATH LAB;  Service: Cardiology;;    SKIN CANCER EXCISION  09/2018    STENT, DRUG ELUTING, SINGLE VESSEL, CORONARY  4/4/2024    Procedure: Stent, Drug Eluting, Single Vessel, Coronary;  Surgeon: Kimberly Bennett MD;  Location: [facility] CATH LAB;  Service: Cardiology;;    VASECTOMY         Review of patient's allergies indicates:  No Known Allergies    No current facility-administered medications on file prior to encounter.     Current Outpatient Medications on File Prior to Encounter   Medication Sig    aspirin (ECOTRIN) 81 MG EC tablet Take 1 tablet (81 mg total) by mouth once daily.    atorvastatin (LIPITOR) 40 MG tablet Take 1 tablet (40 mg total) by mouth once daily.    hydroCHLOROthiazide 12.5 MG Tab Take 1 tablet (12.5 mg total) by mouth once daily.    lisinopriL (PRINIVIL,ZESTRIL) 20 MG tablet Take 1 tablet (20 mg total) by mouth once daily.    metFORMIN (GLUCOPHAGE) 1000 MG tablet Take 1 tablet (1,000 mg total) by mouth 2 (two) times daily with meals.    metoprolol succinate (TOPROL-XL) 50 MG 24 hr tablet Take 1 tablet (50 mg total) by mouth once daily.    NIFEdipine (PROCARDIA-XL) 90 MG (OSM) 24 hr tablet Take 1 tablet (90 mg total) by mouth once daily.    nitroGLYCERIN 0.2 mg/hr TD PT24 (NITRODUR) 0.2 mg/hr APPLY 1 PATCH TOPICALLY ONCE DAILY    pantoprazole (PROTONIX) 20 MG tablet Take 1 tablet by mouth once daily    tamsulosin (FLOMAX) 0.4 mg Cap Take 1 capsule (0.4 mg total) by mouth once daily.    calcipotriene (DOVONEX) 0.005 % cream Apply topically 2 (two) times daily. Mix with efudex cream and AAA on scalp twice daily for 2 weeks at a time    coenzyme Q10 200 mg capsule Take 200 mg by mouth 2 (two) times daily.    cyanocobalamin (VITAMIN B-12) 1000 MCG tablet Take 1,000 mcg by mouth once daily.    fluorouraciL (EFUDEX) 5 % cream Mix with calcipotriene cream and AAA on scalp twice daily for 2 weeks at a time     magnesium oxide (MAG-OX) 400 mg (241.3 mg magnesium) tablet Take 400 mg by mouth once daily.    nitroGLYCERIN (NITROSTAT) 0.4 MG SL tablet Place 0.4 mg under the tongue every 5 (five) minutes as needed for Chest pain.    prasugreL (EFFIENT) 10 mg Tab Take 1 tablet (10 mg total) by mouth once daily.    vitamin D 1000 units Tab Take 1,000 Units by mouth every other day. (Patient not taking: Reported on 2025)     Family History       Problem Relation (Age of Onset)    Alcohol abuse Father    Cancer Mother, Father    Diabetes Mother, Brother    Heart attack Father (63)    Heart murmur Mother    Stroke Mother          Tobacco Use    Smoking status: Former     Current packs/day: 0.00     Average packs/day: 1 pack/day for 30.0 years (30.0 ttl pk-yrs)     Types: Cigarettes     Start date: 1973     Quit date: 2003     Years since quittin.2    Smokeless tobacco: Former   Substance and Sexual Activity    Alcohol use: Yes     Alcohol/week: 16.0 standard drinks of alcohol     Types: 10 Glasses of wine, 6 Cans of beer per week     Comment: socially    Drug use: Never    Sexual activity: Not Currently     Partners: Female     Review of Systems   Cardiovascular:  Positive for chest pain. Negative for dyspnea on exertion, leg swelling, near-syncope, palpitations and syncope.   Respiratory:  Positive for shortness of breath. Negative for cough and wheezing.    Neurological:  Negative for dizziness and light-headedness.     Objective:     Vital Signs (Most Recent):  Temp: 97.7 °F (36.5 °C) (25 08)  Pulse: 64 (25 0835)  Resp: 18 (25 08)  BP: (!) 150/81 (25 08)  SpO2: 98 % (25) Vital Signs (24h Range):  Temp:  [97.7 °F (36.5 °C)-98.1 °F (36.7 °C)] 97.7 °F (36.5 °C)  Pulse:  [46-65] 64  Resp:  [12-19] 18  SpO2:  [95 %-99 %] 98 %  BP: (136-166)/(68-86) 150/81     Weight: 100.3 kg (221 lb 1.9 oz)  Body mass index is 31.73 kg/m².    SpO2: 98 %       No intake or output data in the 24  "hours ending 05/13/25 1159    Lines/Drains/Airways       Peripheral Intravenous Line  Duration                  Peripheral IV - Single Lumen 05/13/25 0136 20 G Yes Anterior;Distal;Right Upper Arm <1 day                     Physical Exam  Vitals reviewed.   Constitutional:       Appearance: Normal appearance. He is obese.   HENT:      Head: Normocephalic and atraumatic.      Nose: Nose normal.   Eyes:      Extraocular Movements: Extraocular movements intact.   Cardiovascular:      Rate and Rhythm: Normal rate.   Pulmonary:      Effort: Pulmonary effort is normal.   Abdominal:      Palpations: Abdomen is soft.   Musculoskeletal:         General: Normal range of motion.      Cervical back: Normal range of motion and neck supple.      Right lower leg: No edema.      Left lower leg: No edema.   Skin:     General: Skin is warm and dry.   Neurological:      General: No focal deficit present.      Mental Status: He is alert and oriented to person, place, and time. Mental status is at baseline.   Psychiatric:         Mood and Affect: Mood normal.         Behavior: Behavior normal.          Significant Labs: BMP:   Recent Labs   Lab 05/13/25  0137         K 3.6      CO2 25   BUN 23   CREATININE 1.4   CALCIUM 9.4   , CMP   Recent Labs   Lab 05/13/25  0137      K 3.6      CO2 25      BUN 23   CREATININE 1.4   CALCIUM 9.4   PROT 7.4   ALBUMIN 4.0   BILITOT 0.6   ALKPHOS 91   AST 17   ALT 14   ANIONGAP 10   , CBC   Recent Labs   Lab 05/13/25  0137   WBC 10.12   HGB 12.1*   HCT 35.5*      , Lipid Panel No results for input(s): "CHOL", "HDL", "LDLCALC", "TRIG", "CHOLHDL" in the last 48 hours., Troponin No results for input(s): "TROPONINIHS" in the last 48 hours., and   Recent Lab Results         05/13/25  0700   05/13/25  0340   05/13/25 0137        Albumin     4.0       ALP     91       ALT     14       Anion Gap     10       AST     17       Baso #     0.03       Basophil %     0.3    "    BILIRUBIN TOTAL     0.6  Comment: For infants and newborns, interpretation of results should be based   on gestational age, weight and in agreement with clinical   observations.    Premature Infant recommended reference ranges:   0-24 hours:  <8.0 mg/dL   24-48 hours: <12.0 mg/dL   3-5 days:    <15.0 mg/dL   6-29 days:   <15.0 mg/dL       BNP     185  Comment: Values of less than 100 pg/ml are consistent with non-CHF populations.        BUN     23       Calcium     9.4       Chloride     103       CO2     25       Creatinine     1.4       eGFR     53  Comment: Estimated GFR calculated using the CKD-EPI creatinine (2021) equation.       Eos #     0.08       Eos %     0.8       Glucose     105       Gran # (ANC)     7.91       Hematocrit     35.5       Hemoglobin     12.1       Immature Grans (Abs)     0.03  Comment: Mild elevation in immature granulocytes is non specific and can be seen in a variety of conditions including stress response, acute inflammation, trauma and pregnancy. Correlation with other laboratory and clinical findings is essential.       Immature Granulocytes     0.3       Lymph #     1.23       Lymph %     12.2       MCH     31.3       MCHC     34.1       MCV     92       Mono #     0.84       Mono %     8.3       MPV     9.9       Neut %     78.1       nRBC     0       Platelet Count     184       Potassium     3.6       PROTEIN TOTAL     7.4       RBC     3.87       RDW     13.1       Sodium     138       Troponin I <0.006  Comment: The reference interval for Troponin I represents the 99th percentile cutoff for our facility and is consistent with 3rd generation assay performance.   0.006  Comment: The reference interval for Troponin I represents the 99th percentile cutoff for our facility and is consistent with 3rd generation assay performance.   <0.006  Comment: The reference interval for Troponin I represents the 99th percentile cutoff for our facility and is consistent with 3rd generation  assay performance.       WBC     10.12               Significant Imaging: Cardiac Cath: reviewed, Echocardiogram: Transthoracic echo (TTE) complete (Cupid Only):   Results for orders placed or performed during the hospital encounter of 04/01/25   Echo   Result Value Ref Range    BSA 2.21 m2    LVOT stroke volume 64.7 cm3    LVIDd 5.2 3.5 - 6.0 cm    LV Systolic Volume 60 mL    LV Systolic Volume Index 27.6 mL/m2    LVIDs 3.8 2.1 - 4.0 cm    LV ESV A2C 41.83 mL    LV Diastolic Volume 128 mL    LV ESV A4C 40.25 mL    LV Diastolic Volume Index 58.99 mL/m2    Left Ventricular End Systolic Volume by Teichholz Method 60.34 mL    Left Ventricular End Diastolic Volume by Teichholz Method 128.06 mL    IVS 0.8 0.6 - 1.1 cm    LVOT diameter 2.0 cm    LVOT area 3.1 cm2    FS 26.9 (A) 28 - 44 %    Left Ventricle Relative Wall Thickness 0.35 cm    PW 0.9 0.6 - 1.1 cm    LV mass 156.9 g    LV Mass Index 72.3 g/m2    MV Peak E Akshat 0.49 m/s    TDI LATERAL 0.08 m/s    TDI SEPTAL 0.05 m/s    E/E' ratio 8 m/s    MV Peak A Akshat 0.76 m/s    E/A ratio 0.64     E wave deceleration time 219 msec    LV SEPTAL E/E' RATIO 9.8 m/s    LV LATERAL E/E' RATIO 6.1 m/s    LVOT peak akshat 0.9 m/s    Left Ventricular Outflow Tract Mean Velocity 0.65 cm/s    Left Ventricular Outflow Tract Mean Gradient 1.91 mmHg    RV- garcia basal diam 3.9 cm    RVOT peak VTI 24.8 cm    TAPSE 1.52 cm    RV/LV Ratio 0.75 cm    LA size 4.1 cm    Left Atrium Minor Axis 4.2 cm    Left Atrium Major Axis 4.2 cm    LA Vol (MOD) 42 mL    DEZ (MOD) 19 mL/m2    RA Major Axis 4.31 cm    RA Width 3.12 cm    AV mean gradient 4 mmHg    AV peak gradient 7 mmHg    Ao peak akshat 1.3 m/s    Ao VTI 30.3 cm    LVOT peak VTI 20.6 cm    AV valve area 2.1 cm²    AV Velocity Ratio 0.69     AV index (prosthetic) 0.68     FATUMA by Velocity Ratio 2.2 cm²    MV stenosis pressure 1/2 time 63.45 ms    MV valve area p 1/2 method 3.47 cm2    PV mean gradient 2 mmHg    PV PEAK VELOCITY 1.13 m/s    PV peak  gradient 5 mmHg    RVOT peak mickie 0.94 m/s    Ao root annulus 2.83 cm    Sinus 2.96 cm    STJ 3.13 cm    Mean e' 0.07 m/s    ZLVIDS -1.11     ZLVIDD -3.23     LA area A4C 15.93 cm2    LA area A2C 15.82 cm2    RVDD 3.94 cm    DEZ 22 mL/m2    LA Vol 48 cm3    LA WIDTH 3.3 cm    Est. RA pres 3 mmHg    Narrative      Left Ventricle: The left ventricle is normal in size. Regional wall   motion abnormalities present. There is mildly reduced systolic function   with a visually estimated ejection fraction of 45 - 50%. Grade I diastolic   dysfunction.    Right Ventricle: The right ventricle is normal in size. Systolic   function is normal.    Left Atrium: Mildly dilated    Mitral Valve: There is mild regurgitation.    IVC/SVC: Normal venous pressure at 3 mmHg.     , EKG: reviewed, and X-Ray: CXR: X-Ray Chest 1 View (CXR): No results found for this visit on 05/13/25.

## 2025-05-13 NOTE — ASSESSMENT & PLAN NOTE
Patient with known CAD s/p CABG, which is uncontrolled due to continued CP. Will continue Effient, ASA, and Statin and monitor for S/Sx of angina/ACS. Continue to monitor on telemetry.

## 2025-05-13 NOTE — ASSESSMENT & PLAN NOTE
Chronic, controlled.  Latest blood pressure and vitals reviewed-   Temp:  [97.8 °F (36.6 °C)]   Pulse:  [49-61]   Resp:  [12-19]   BP: (136-166)/(69-86)   SpO2:  [95 %-99 %] .   Home meds for hypertension were reviewed and noted below.   Hypertension Medications              hydroCHLOROthiazide 12.5 MG Tab Take 1 tablet (12.5 mg total) by mouth once daily.    lisinopriL (PRINIVIL,ZESTRIL) 20 MG tablet Take 1 tablet (20 mg total) by mouth once daily.    metoprolol succinate (TOPROL-XL) 50 MG 24 hr tablet Take 1 tablet (50 mg total) by mouth once daily.    NIFEdipine (PROCARDIA-XL) 90 MG (OSM) 24 hr tablet Take 1 tablet (90 mg total) by mouth once daily.    nitroGLYCERIN 0.2 mg/hr TD PT24 (NITRODUR) 0.2 mg/hr APPLY 1 PATCH TOPICALLY ONCE DAILY    nitroGLYCERIN (NITROSTAT) 0.4 MG SL tablet Place 0.4 mg under the tongue every 5 (five) minutes as needed for Chest pain.            While in the hospital, will manage blood pressure as follows; Continue home antihypertensive regimen    Will utilize p.r.n. blood pressure medication only if patient's blood pressure greater than  160/90 and he develops symptoms such as worsening chest pain or shortness of breath.

## 2025-05-13 NOTE — ASSESSMENT & PLAN NOTE
"Patient's FSGs are controlled on current medication regimen.  Last A1c reviewed-   Lab Results   Component Value Date    HGBA1C 5.9 (H) 05/12/2025     Most recent fingerstick glucose reviewed- No results for input(s): "POCTGLUCOSE" in the last 24 hours.  Current correctional scale  Low  Maintain anti-hyperglycemic dose as follows-   Antihyperglycemics (From admission, onward)      None          Plan:  -SSI  -Accu-checks  -Hold oral hypoglycemics while patient is in the hospital  -Hypoglycemic protocol        "

## 2025-05-13 NOTE — H&P (VIEW-ONLY)
[facility]  Cardiology  Consult Note    Patient Name: Deniz Paulino  MRN: [id number]  Admission Date: 5/13/2025  Hospital Length of Stay: 0 days  Code Status: Full Code   Attending Provider: Randi Thomas MD   Consulting Provider: Brent Holley PA-C  Primary Care Physician: Hiro Kraft MD  Principal Problem:Chest pain    Patient information was obtained from patient and ER records.     Inpatient consult to Cardiology  Consult performed by: Brent Holley PA-C  Consult ordered by: Yemi Harris NP        Subjective:     Chief Complaint:  Chest pain     HPI:   Deniz Paulino is a 74 y.o. male with a PMH  has a past medical history of Acute coronary syndrome, Coronary artery disease, Diabetes mellitus (04/24/2014), Effort angina (04/30/2023), Hyperlipidemia, Hypertension, Old MI (myocardial infarction) (04/24/2014), Preop cardiovascular exam (11/27/2024), and S/P CABG (coronary artery bypass graft) (04/24/2014). presented to the Emergency Department for evaluation of chest pain which began around 10 PM. Pt states he was resting when the sxs began. Laying down and taking deep breaths worsens the pain. Symptoms are constant and moderate in severity. Pt reports compliance with all medications. Associated sxs include shortness of breath. Patient denies any fever, cough, dizziness, nausea, vomiting, palpitations, or diaphoresis. Prior Tx includes nitro without improvement.  No further complaints or concerns at this time.      ER workup revealed CBC to be unremarkable.  CMP unremarkable.  .  Troponin negative x2.  Chest x-ray negative for acute findings.  EKG sinus rhythm with occasional PVCs with prolonged QT with a ventricular rate of 60 beats per minute and a QT/QTC of 480/40.  BP stable with heart rate in the 50s with history of being on beta-blocker.  Patient received 4 mg morphine and 1 in nitroglycerin paste in ER.  Hospital Medicine consulted to admit patient for further cardiac  workup for chest pain.  Patient in agreement with treatment plan.  Patient admitted under observation status.    Cardiology consulted for chest pain. Pt resting in bed with no acute distress. Reports having active chest pain at this time. Chest pain started last night while getting ready for bed. No relief with sublingual nitro or morphine given in ED. He did have LHC in April 2024 by Dr. Bennett with intervention to Mid Graft lesion (Saphenous Graft To RPDA). No recent viral illness. Pain and SOB seems to be worse when lying down. Describes the pain as tightness similar to finishing a long run. Remains NPO and gently hydration started.     Past Medical History:   Diagnosis Date    Acute coronary syndrome     Coronary artery disease     Diabetes mellitus 04/24/2014    Effort angina 04/30/2023    Hyperlipidemia     Hypertension     Old MI (myocardial infarction) 04/24/2014    Preop cardiovascular exam 11/27/2024    S/P CABG (coronary artery bypass graft) 04/24/2014       Past Surgical History:   Procedure Laterality Date    CARDIAC CATHETERIZATION      COLONOSCOPY N/A 07/26/2019    Procedure: COLONOSCOPY;  Surgeon: Opal Oshea MD;  Location: [facility] ENDO;  Service: Endoscopy;  Laterality: N/A;    COLONOSCOPY, SCREENING, LOW RISK PATIENT N/A 12/17/2024    Procedure: COLONOSCOPY, SCREENING, LOW RISK PATIENT 11/27-clr rec'd-Ok to stop asa effient 5-7 days;  Surgeon: Carol Pinon MD;  Location: [facility] ENDO;  Service: Colon and Rectal;  Laterality: N/A;    CORONARY ARTERY BYPASS GRAFT      CORONARY BYPASS GRAFT ANGIOGRAPHY  5/1/2023    Procedure: Bypass graft study;  Surgeon: Kimberly Bennett MD;  Location: [facility] CATH LAB;  Service: Cardiology;;    ESOPHAGOGASTRODUODENOSCOPY N/A 07/26/2019    Procedure: ESOPHAGOGASTRODUODENOSCOPY (EGD);  Surgeon: Opal Oshea MD;  Location: [facility] ENDO;  Service: Endoscopy;  Laterality: N/A;    INSTANTANEOUS WAVE-FREE RATIO (IFR) N/A 4/4/2024    Procedure: Instantaneous Wave-Free  Ratio (IFR);  Surgeon: Kimberly Bennett MD;  Location: [facility] CATH LAB;  Service: Cardiology;  Laterality: N/A;    LEFT HEART CATHETERIZATION Left 5/1/2023    Procedure: Left heart cath;  Surgeon: Kimberly Bennett MD;  Location: [facility] CATH LAB;  Service: Cardiology;  Laterality: Left;    LEFT HEART CATHETERIZATION Left 4/4/2024    Procedure: Left heart cath;  Surgeon: Kimberly Bennett MD;  Location: [facility] CATH LAB;  Service: Cardiology;  Laterality: Left;    PROTECTION, CORONARY, FILTER  4/4/2024    Procedure: Protection, Coronary, Filter;  Surgeon: Kimberly Bennett MD;  Location: [facility] CATH LAB;  Service: Cardiology;;    PTCA, SINGLE VESSEL  4/4/2024    Procedure: PTCA, Single Vessel;  Surgeon: Kimberly Bennett MD;  Location: [facility] CATH LAB;  Service: Cardiology;;    SKIN CANCER EXCISION  09/2018    STENT, DRUG ELUTING, SINGLE VESSEL, CORONARY  4/4/2024    Procedure: Stent, Drug Eluting, Single Vessel, Coronary;  Surgeon: Kimberly Bennett MD;  Location: [facility] CATH LAB;  Service: Cardiology;;    VASECTOMY         Review of patient's allergies indicates:  No Known Allergies    No current facility-administered medications on file prior to encounter.     Current Outpatient Medications on File Prior to Encounter   Medication Sig    aspirin (ECOTRIN) 81 MG EC tablet Take 1 tablet (81 mg total) by mouth once daily.    atorvastatin (LIPITOR) 40 MG tablet Take 1 tablet (40 mg total) by mouth once daily.    hydroCHLOROthiazide 12.5 MG Tab Take 1 tablet (12.5 mg total) by mouth once daily.    lisinopriL (PRINIVIL,ZESTRIL) 20 MG tablet Take 1 tablet (20 mg total) by mouth once daily.    metFORMIN (GLUCOPHAGE) 1000 MG tablet Take 1 tablet (1,000 mg total) by mouth 2 (two) times daily with meals.    metoprolol succinate (TOPROL-XL) 50 MG 24 hr tablet Take 1 tablet (50 mg total) by mouth once daily.    NIFEdipine (PROCARDIA-XL) 90 MG (OSM) 24 hr tablet Take 1 tablet (90 mg total) by mouth once daily.    nitroGLYCERIN 0.2 mg/hr TD PT24  (NITRODUR) 0.2 mg/hr APPLY 1 PATCH TOPICALLY ONCE DAILY    pantoprazole (PROTONIX) 20 MG tablet Take 1 tablet by mouth once daily    tamsulosin (FLOMAX) 0.4 mg Cap Take 1 capsule (0.4 mg total) by mouth once daily.    calcipotriene (DOVONEX) 0.005 % cream Apply topically 2 (two) times daily. Mix with efudex cream and AAA on scalp twice daily for 2 weeks at a time    coenzyme Q10 200 mg capsule Take 200 mg by mouth 2 (two) times daily.    cyanocobalamin (VITAMIN B-12) 1000 MCG tablet Take 1,000 mcg by mouth once daily.    fluorouraciL (EFUDEX) 5 % cream Mix with calcipotriene cream and AAA on scalp twice daily for 2 weeks at a time    magnesium oxide (MAG-OX) 400 mg (241.3 mg magnesium) tablet Take 400 mg by mouth once daily.    nitroGLYCERIN (NITROSTAT) 0.4 MG SL tablet Place 0.4 mg under the tongue every 5 (five) minutes as needed for Chest pain.    prasugreL (EFFIENT) 10 mg Tab Take 1 tablet (10 mg total) by mouth once daily.    vitamin D 1000 units Tab Take 1,000 Units by mouth every other day. (Patient not taking: Reported on 2025)     Family History       Problem Relation (Age of Onset)    Alcohol abuse Father    Cancer Mother, Father    Diabetes Mother, Brother    Heart attack Father (63)    Heart murmur Mother    Stroke Mother          Tobacco Use    Smoking status: Former     Current packs/day: 0.00     Average packs/day: 1 pack/day for 30.0 years (30.0 ttl pk-yrs)     Types: Cigarettes     Start date: 1973     Quit date: 2003     Years since quittin.2    Smokeless tobacco: Former   Substance and Sexual Activity    Alcohol use: Yes     Alcohol/week: 16.0 standard drinks of alcohol     Types: 10 Glasses of wine, 6 Cans of beer per week     Comment: socially    Drug use: Never    Sexual activity: Not Currently     Partners: Female     Review of Systems   Cardiovascular:  Positive for chest pain. Negative for dyspnea on exertion, leg swelling, near-syncope, palpitations and syncope.    Respiratory:  Positive for shortness of breath. Negative for cough and wheezing.    Neurological:  Negative for dizziness and light-headedness.     Objective:     Vital Signs (Most Recent):  Temp: 97.7 °F (36.5 °C) (05/13/25 0827)  Pulse: 64 (05/13/25 0835)  Resp: 18 (05/13/25 0827)  BP: (!) 150/81 (05/13/25 0827)  SpO2: 98 % (05/13/25 0827) Vital Signs (24h Range):  Temp:  [97.7 °F (36.5 °C)-98.1 °F (36.7 °C)] 97.7 °F (36.5 °C)  Pulse:  [46-65] 64  Resp:  [12-19] 18  SpO2:  [95 %-99 %] 98 %  BP: (136-166)/(68-86) 150/81     Weight: 100.3 kg (221 lb 1.9 oz)  Body mass index is 31.73 kg/m².    SpO2: 98 %       No intake or output data in the 24 hours ending 05/13/25 1159    Lines/Drains/Airways       Peripheral Intravenous Line  Duration                  Peripheral IV - Single Lumen 05/13/25 0136 20 G Yes Anterior;Distal;Right Upper Arm <1 day                     Physical Exam  Vitals reviewed.   Constitutional:       Appearance: Normal appearance. He is obese.   HENT:      Head: Normocephalic and atraumatic.      Nose: Nose normal.   Eyes:      Extraocular Movements: Extraocular movements intact.   Cardiovascular:      Rate and Rhythm: Normal rate.   Pulmonary:      Effort: Pulmonary effort is normal.   Abdominal:      Palpations: Abdomen is soft.   Musculoskeletal:         General: Normal range of motion.      Cervical back: Normal range of motion and neck supple.      Right lower leg: No edema.      Left lower leg: No edema.   Skin:     General: Skin is warm and dry.   Neurological:      General: No focal deficit present.      Mental Status: He is alert and oriented to person, place, and time. Mental status is at baseline.   Psychiatric:         Mood and Affect: Mood normal.         Behavior: Behavior normal.          Significant Labs: BMP:   Recent Labs   Lab 05/13/25 0137         K 3.6      CO2 25   BUN 23   CREATININE 1.4   CALCIUM 9.4   , CMP   Recent Labs   Lab 05/13/25 0137     "  K 3.6      CO2 25      BUN 23   CREATININE 1.4   CALCIUM 9.4   PROT 7.4   ALBUMIN 4.0   BILITOT 0.6   ALKPHOS 91   AST 17   ALT 14   ANIONGAP 10   , CBC   Recent Labs   Lab 05/13/25  0137   WBC 10.12   HGB 12.1*   HCT 35.5*      , Lipid Panel No results for input(s): "CHOL", "HDL", "LDLCALC", "TRIG", "CHOLHDL" in the last 48 hours., Troponin No results for input(s): "TROPONINIHS" in the last 48 hours., and   Recent Lab Results         05/13/25  0700   05/13/25  0340   05/13/25  0137        Albumin     4.0       ALP     91       ALT     14       Anion Gap     10       AST     17       Baso #     0.03       Basophil %     0.3       BILIRUBIN TOTAL     0.6  Comment: For infants and newborns, interpretation of results should be based   on gestational age, weight and in agreement with clinical   observations.    Premature Infant recommended reference ranges:   0-24 hours:  <8.0 mg/dL   24-48 hours: <12.0 mg/dL   3-5 days:    <15.0 mg/dL   6-29 days:   <15.0 mg/dL       BNP     185  Comment: Values of less than 100 pg/ml are consistent with non-CHF populations.        BUN     23       Calcium     9.4       Chloride     103       CO2     25       Creatinine     1.4       eGFR     53  Comment: Estimated GFR calculated using the CKD-EPI creatinine (2021) equation.       Eos #     0.08       Eos %     0.8       Glucose     105       Gran # (ANC)     7.91       Hematocrit     35.5       Hemoglobin     12.1       Immature Grans (Abs)     0.03  Comment: Mild elevation in immature granulocytes is non specific and can be seen in a variety of conditions including stress response, acute inflammation, trauma and pregnancy. Correlation with other laboratory and clinical findings is essential.       Immature Granulocytes     0.3       Lymph #     1.23       Lymph %     12.2       MCH     31.3       MCHC     34.1       MCV     92       Mono #     0.84       Mono %     8.3       MPV     9.9       Neut %     78.1    "    nRBC     0       Platelet Count     184       Potassium     3.6       PROTEIN TOTAL     7.4       RBC     3.87       RDW     13.1       Sodium     138       Troponin I <0.006  Comment: The reference interval for Troponin I represents the 99th percentile cutoff for our facility and is consistent with 3rd generation assay performance.   0.006  Comment: The reference interval for Troponin I represents the 99th percentile cutoff for our facility and is consistent with 3rd generation assay performance.   <0.006  Comment: The reference interval for Troponin I represents the 99th percentile cutoff for our facility and is consistent with 3rd generation assay performance.       WBC     10.12               Significant Imaging: Cardiac Cath: reviewed, Echocardiogram: Transthoracic echo (TTE) complete (Cupid Only):   Results for orders placed or performed during the hospital encounter of 04/01/25   Echo   Result Value Ref Range    BSA 2.21 m2    LVOT stroke volume 64.7 cm3    LVIDd 5.2 3.5 - 6.0 cm    LV Systolic Volume 60 mL    LV Systolic Volume Index 27.6 mL/m2    LVIDs 3.8 2.1 - 4.0 cm    LV ESV A2C 41.83 mL    LV Diastolic Volume 128 mL    LV ESV A4C 40.25 mL    LV Diastolic Volume Index 58.99 mL/m2    Left Ventricular End Systolic Volume by Teichholz Method 60.34 mL    Left Ventricular End Diastolic Volume by Teichholz Method 128.06 mL    IVS 0.8 0.6 - 1.1 cm    LVOT diameter 2.0 cm    LVOT area 3.1 cm2    FS 26.9 (A) 28 - 44 %    Left Ventricle Relative Wall Thickness 0.35 cm    PW 0.9 0.6 - 1.1 cm    LV mass 156.9 g    LV Mass Index 72.3 g/m2    MV Peak E Akshat 0.49 m/s    TDI LATERAL 0.08 m/s    TDI SEPTAL 0.05 m/s    E/E' ratio 8 m/s    MV Peak A Akshat 0.76 m/s    E/A ratio 0.64     E wave deceleration time 219 msec    LV SEPTAL E/E' RATIO 9.8 m/s    LV LATERAL E/E' RATIO 6.1 m/s    LVOT peak akshat 0.9 m/s    Left Ventricular Outflow Tract Mean Velocity 0.65 cm/s    Left Ventricular Outflow Tract Mean Gradient 1.91 mmHg     RV- garcia basal diam 3.9 cm    RVOT peak VTI 24.8 cm    TAPSE 1.52 cm    RV/LV Ratio 0.75 cm    LA size 4.1 cm    Left Atrium Minor Axis 4.2 cm    Left Atrium Major Axis 4.2 cm    LA Vol (MOD) 42 mL    DEZ (MOD) 19 mL/m2    RA Major Axis 4.31 cm    RA Width 3.12 cm    AV mean gradient 4 mmHg    AV peak gradient 7 mmHg    Ao peak mickie 1.3 m/s    Ao VTI 30.3 cm    LVOT peak VTI 20.6 cm    AV valve area 2.1 cm²    AV Velocity Ratio 0.69     AV index (prosthetic) 0.68     FATUMA by Velocity Ratio 2.2 cm²    MV stenosis pressure 1/2 time 63.45 ms    MV valve area p 1/2 method 3.47 cm2    PV mean gradient 2 mmHg    PV PEAK VELOCITY 1.13 m/s    PV peak gradient 5 mmHg    RVOT peak mickie 0.94 m/s    Ao root annulus 2.83 cm    Sinus 2.96 cm    STJ 3.13 cm    Mean e' 0.07 m/s    ZLVIDS -1.11     ZLVIDD -3.23     LA area A4C 15.93 cm2    LA area A2C 15.82 cm2    RVDD 3.94 cm    DEZ 22 mL/m2    LA Vol 48 cm3    LA WIDTH 3.3 cm    Est. RA pres 3 mmHg    Narrative      Left Ventricle: The left ventricle is normal in size. Regional wall   motion abnormalities present. There is mildly reduced systolic function   with a visually estimated ejection fraction of 45 - 50%. Grade I diastolic   dysfunction.    Right Ventricle: The right ventricle is normal in size. Systolic   function is normal.    Left Atrium: Mildly dilated    Mitral Valve: There is mild regurgitation.    IVC/SVC: Normal venous pressure at 3 mmHg.     , EKG: reviewed, and X-Ray: CXR: X-Ray Chest 1 View (CXR): No results found for this visit on 05/13/25.  Assessment and Plan:     * Chest pain  Significant history of CABG and recent LHC in April 2024 with intervention to mid graft   CP started last night and still having some recurrent chest pain  No pain on palpation   Given extensive cardiac history will plan for LHC today   Remain NPO  Continue with heparin   Hold HCTZ, start gentle hydration   Continue remaining OMT   BNP slightly elevated, all other labs wnl          Type 2 diabetes mellitus without complication, without long-term current use of insulin  Per hospital medicine     Hyperlipidemia associated with type 2 diabetes mellitus  Statin     Hypertension associated with diabetes  Titrate home meds     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  Significant history of CABG and recent LHC in April 2024 with intervention to mid graft   CP started last night and still having some recurrent chest pain  No pain on palpation   Given extensive cardiac history will plan for LHC today   Remain NPO  Continue with heparin   Hold HCTZ, start gentle hydration   Continue remaining OMT   BNP slightly elevated, all other labs wnl         VTE Risk Mitigation (From admission, onward)           Ordered     enoxaparin injection 40 mg  Daily         05/13/25 0624     IP VTE HIGH RISK PATIENT  Once         05/13/25 0624     Place sequential compression device  Until discontinued         05/13/25 0624                    Thank you for your consult. I will follow-up with patient. Please contact us if you have any additional questions.    Brent Holley PA-C  Cardiology   [facility]

## 2025-05-13 NOTE — PLAN OF CARE
Discussed poc with pt, pt verbalized understanding    Purposeful rounding every 2hours    VS wnl  Cardiac monitoring in use, pt is NSR, tele monitor # [id number]  Blood glucose monitoring   Fall precautions in place, remains injury free    IVFs  Accurate I&Os  Bed locked at lowest position  Call light within reach    Chart check complete  Will cont with POC

## 2025-05-13 NOTE — PLAN OF CARE
[facility]  Discharge Assessment    Primary Care Provider: Hiro Kraft MD     Discharge Assessment (most recent)       BRIEF DISCHARGE ASSESSMENT - 05/13/25 1149          Discharge Planning    Assessment Type Discharge Planning Brief Assessment     Resource/Environmental Concerns none     Support Systems Spouse/significant other     Equipment Currently Used at Home none     Current Living Arrangements home     Patient/Family Anticipates Transition to home     Patient/Family Anticipated Services at Transition none     DME Needed Upon Discharge  none     Discharge Plan A Home

## 2025-05-13 NOTE — BRIEF OP NOTE
INPATIENT Operative Note         SUMMARY     Surgery Date: 5/13/2025     Surgeons and Role:     * Kimberly Bennett MD - Primary    ASSISTANT:    Pre-op Diagnosis:  chest pain      Post-op Diagnosis:  chest pain    Procedure(s) (LRB):  Left heart cath (Left)    COMPLICATION:none    Anesthesia: RN IV Sedation    Findings/Key Components:  Occluded lad   Lcx 40%   Occluded svg to diag  Patent lima to lad.  Patent svg to pda .  Ef 50% inf ak    Estimated Blood Loss: < 50 ML.         SPECIMEN: NONE    Devices/Prostetics: None    PLAN: medical therapy

## 2025-05-13 NOTE — HPI
Deniz Paulino is a 74 y.o. male with a PMH  has a past medical history of Acute coronary syndrome, Coronary artery disease, Diabetes mellitus (04/24/2014), Effort angina (04/30/2023), Hyperlipidemia, Hypertension, Old MI (myocardial infarction) (04/24/2014), Preop cardiovascular exam (11/27/2024), and S/P CABG (coronary artery bypass graft) (04/24/2014). presented to the Emergency Department for evaluation of chest pain which began around 10 PM. Pt states he was resting when the sxs began. Laying down and taking deep breaths worsens the pain. Symptoms are constant and moderate in severity. Pt reports compliance with all medications. Associated sxs include shortness of breath. Patient denies any fever, cough, dizziness, nausea, vomiting, palpitations, or diaphoresis. Prior Tx includes nitro without improvement.  No further complaints or concerns at this time.      ER workup revealed CBC to be unremarkable.  CMP unremarkable.  .  Troponin negative x2.  Chest x-ray negative for acute findings.  EKG sinus rhythm with occasional PVCs with prolonged QT with a ventricular rate of 60 beats per minute and a QT/QTC of 480/40.  BP stable with heart rate in the 50s with history of being on beta-blocker.  Patient received 4 mg morphine and 1 in nitroglycerin paste in ER.  Hospital Medicine consulted to admit patient for further cardiac workup for chest pain.  Patient in agreement with treatment plan.  Patient admitted under observation status.    Cardiology consulted for chest pain. Pt resting in bed with no acute distress. Reports having active chest pain at this time. Chest pain started last night while getting ready for bed. No relief with sublingual nitro or morphine given in ED. He did have LHC in April 2024 by Dr. Bennett with intervention to Mid Graft lesion (Saphenous Graft To RPDA). No recent viral illness. Pain and SOB seems to be worse when lying down. Describes the pain as tightness similar to finishing a  long run. Remains NPO and gently hydration started.

## 2025-05-13 NOTE — FIRST PROVIDER EVALUATION
"Medical screening examination initiated.  I have conducted a focused provider triage encounter, findings are as follows:    Brief history of present illness:  74-year-old male with a PMHx significant of CAD, DM, HTN, and HLD presenting to emergency department s/p CABG with complaints of left-sided chest pain that radiates into his left shoulder and neck which onset at 10:00 p.m. last night.  Also has associated shortness of breath.  Patient reports taking nitro prior to arrival.    Vitals:    05/13/25 0045   BP: (!) 148/69   Pulse: (!) 52   Resp: 19   Temp: 97.8 °F (36.6 °C)   TempSrc: Oral   SpO2: 98%   Weight: 102.1 kg (225 lb)   Height: 5' 10" (1.778 m)       Pertinent physical exam:  Pulse 52; vital signs otherwise stable.  No acute distress.  Respirations even and unlabored.    Brief workup plan:  Cardiac workup    Preliminary workup initiated; this workup will be continued and followed by the physician or advanced practice provider that is assigned to the patient when roomed.  "

## 2025-05-13 NOTE — CONSULTS
[facility]  Cardiology  Consult Note    Patient Name: Deniz Paulino  MRN: [id number]  Admission Date: 5/13/2025  Hospital Length of Stay: 0 days  Code Status: Full Code   Attending Provider: Randi Thomas MD   Consulting Provider: Brent Holley PA-C  Primary Care Physician: Hiro Kraft MD  Principal Problem:Chest pain    Patient information was obtained from patient and ER records.     Inpatient consult to Cardiology  Consult performed by: Brent Holley PA-C  Consult ordered by: Yemi Harris NP        Subjective:     Chief Complaint:  Chest pain     HPI:   Deniz Paulino is a 74 y.o. male with a PMH  has a past medical history of Acute coronary syndrome, Coronary artery disease, Diabetes mellitus (04/24/2014), Effort angina (04/30/2023), Hyperlipidemia, Hypertension, Old MI (myocardial infarction) (04/24/2014), Preop cardiovascular exam (11/27/2024), and S/P CABG (coronary artery bypass graft) (04/24/2014). presented to the Emergency Department for evaluation of chest pain which began around 10 PM. Pt states he was resting when the sxs began. Laying down and taking deep breaths worsens the pain. Symptoms are constant and moderate in severity. Pt reports compliance with all medications. Associated sxs include shortness of breath. Patient denies any fever, cough, dizziness, nausea, vomiting, palpitations, or diaphoresis. Prior Tx includes nitro without improvement.  No further complaints or concerns at this time.      ER workup revealed CBC to be unremarkable.  CMP unremarkable.  .  Troponin negative x2.  Chest x-ray negative for acute findings.  EKG sinus rhythm with occasional PVCs with prolonged QT with a ventricular rate of 60 beats per minute and a QT/QTC of 480/40.  BP stable with heart rate in the 50s with history of being on beta-blocker.  Patient received 4 mg morphine and 1 in nitroglycerin paste in ER.  Hospital Medicine consulted to admit patient for further cardiac  workup for chest pain.  Patient in agreement with treatment plan.  Patient admitted under observation status.    Cardiology consulted for chest pain. Pt resting in bed with no acute distress. Reports having active chest pain at this time. Chest pain started last night while getting ready for bed. No relief with sublingual nitro or morphine given in ED. He did have LHC in April 2024 by Dr. Bennett with intervention to Mid Graft lesion (Saphenous Graft To RPDA). No recent viral illness. Pain and SOB seems to be worse when lying down. Describes the pain as tightness similar to finishing a long run. Remains NPO and gently hydration started.     Past Medical History:   Diagnosis Date    Acute coronary syndrome     Coronary artery disease     Diabetes mellitus 04/24/2014    Effort angina 04/30/2023    Hyperlipidemia     Hypertension     Old MI (myocardial infarction) 04/24/2014    Preop cardiovascular exam 11/27/2024    S/P CABG (coronary artery bypass graft) 04/24/2014       Past Surgical History:   Procedure Laterality Date    CARDIAC CATHETERIZATION      COLONOSCOPY N/A 07/26/2019    Procedure: COLONOSCOPY;  Surgeon: Opal Oshea MD;  Location: [facility] ENDO;  Service: Endoscopy;  Laterality: N/A;    COLONOSCOPY, SCREENING, LOW RISK PATIENT N/A 12/17/2024    Procedure: COLONOSCOPY, SCREENING, LOW RISK PATIENT 11/27-clr rec'd-Ok to stop asa effient 5-7 days;  Surgeon: Carol Pinon MD;  Location: [facility] ENDO;  Service: Colon and Rectal;  Laterality: N/A;    CORONARY ARTERY BYPASS GRAFT      CORONARY BYPASS GRAFT ANGIOGRAPHY  5/1/2023    Procedure: Bypass graft study;  Surgeon: Kimberly Bennett MD;  Location: [facility] CATH LAB;  Service: Cardiology;;    ESOPHAGOGASTRODUODENOSCOPY N/A 07/26/2019    Procedure: ESOPHAGOGASTRODUODENOSCOPY (EGD);  Surgeon: Opal Oshea MD;  Location: [facility] ENDO;  Service: Endoscopy;  Laterality: N/A;    INSTANTANEOUS WAVE-FREE RATIO (IFR) N/A 4/4/2024    Procedure: Instantaneous Wave-Free  Ratio (IFR);  Surgeon: Kimberly Bennett MD;  Location: [facility] CATH LAB;  Service: Cardiology;  Laterality: N/A;    LEFT HEART CATHETERIZATION Left 5/1/2023    Procedure: Left heart cath;  Surgeon: Kimberly Bennett MD;  Location: [facility] CATH LAB;  Service: Cardiology;  Laterality: Left;    LEFT HEART CATHETERIZATION Left 4/4/2024    Procedure: Left heart cath;  Surgeon: Kimberly Bennett MD;  Location: [facility] CATH LAB;  Service: Cardiology;  Laterality: Left;    PROTECTION, CORONARY, FILTER  4/4/2024    Procedure: Protection, Coronary, Filter;  Surgeon: Kimberly Bennett MD;  Location: [facility] CATH LAB;  Service: Cardiology;;    PTCA, SINGLE VESSEL  4/4/2024    Procedure: PTCA, Single Vessel;  Surgeon: Kimberly Bennett MD;  Location: [facility] CATH LAB;  Service: Cardiology;;    SKIN CANCER EXCISION  09/2018    STENT, DRUG ELUTING, SINGLE VESSEL, CORONARY  4/4/2024    Procedure: Stent, Drug Eluting, Single Vessel, Coronary;  Surgeon: Kimberly Bennett MD;  Location: [facility] CATH LAB;  Service: Cardiology;;    VASECTOMY         Review of patient's allergies indicates:  No Known Allergies    No current facility-administered medications on file prior to encounter.     Current Outpatient Medications on File Prior to Encounter   Medication Sig    aspirin (ECOTRIN) 81 MG EC tablet Take 1 tablet (81 mg total) by mouth once daily.    atorvastatin (LIPITOR) 40 MG tablet Take 1 tablet (40 mg total) by mouth once daily.    hydroCHLOROthiazide 12.5 MG Tab Take 1 tablet (12.5 mg total) by mouth once daily.    lisinopriL (PRINIVIL,ZESTRIL) 20 MG tablet Take 1 tablet (20 mg total) by mouth once daily.    metFORMIN (GLUCOPHAGE) 1000 MG tablet Take 1 tablet (1,000 mg total) by mouth 2 (two) times daily with meals.    metoprolol succinate (TOPROL-XL) 50 MG 24 hr tablet Take 1 tablet (50 mg total) by mouth once daily.    NIFEdipine (PROCARDIA-XL) 90 MG (OSM) 24 hr tablet Take 1 tablet (90 mg total) by mouth once daily.    nitroGLYCERIN 0.2 mg/hr TD PT24  (NITRODUR) 0.2 mg/hr APPLY 1 PATCH TOPICALLY ONCE DAILY    pantoprazole (PROTONIX) 20 MG tablet Take 1 tablet by mouth once daily    tamsulosin (FLOMAX) 0.4 mg Cap Take 1 capsule (0.4 mg total) by mouth once daily.    calcipotriene (DOVONEX) 0.005 % cream Apply topically 2 (two) times daily. Mix with efudex cream and AAA on scalp twice daily for 2 weeks at a time    coenzyme Q10 200 mg capsule Take 200 mg by mouth 2 (two) times daily.    cyanocobalamin (VITAMIN B-12) 1000 MCG tablet Take 1,000 mcg by mouth once daily.    fluorouraciL (EFUDEX) 5 % cream Mix with calcipotriene cream and AAA on scalp twice daily for 2 weeks at a time    magnesium oxide (MAG-OX) 400 mg (241.3 mg magnesium) tablet Take 400 mg by mouth once daily.    nitroGLYCERIN (NITROSTAT) 0.4 MG SL tablet Place 0.4 mg under the tongue every 5 (five) minutes as needed for Chest pain.    prasugreL (EFFIENT) 10 mg Tab Take 1 tablet (10 mg total) by mouth once daily.    vitamin D 1000 units Tab Take 1,000 Units by mouth every other day. (Patient not taking: Reported on 2025)     Family History       Problem Relation (Age of Onset)    Alcohol abuse Father    Cancer Mother, Father    Diabetes Mother, Brother    Heart attack Father (63)    Heart murmur Mother    Stroke Mother          Tobacco Use    Smoking status: Former     Current packs/day: 0.00     Average packs/day: 1 pack/day for 30.0 years (30.0 ttl pk-yrs)     Types: Cigarettes     Start date: 1973     Quit date: 2003     Years since quittin.2    Smokeless tobacco: Former   Substance and Sexual Activity    Alcohol use: Yes     Alcohol/week: 16.0 standard drinks of alcohol     Types: 10 Glasses of wine, 6 Cans of beer per week     Comment: socially    Drug use: Never    Sexual activity: Not Currently     Partners: Female     Review of Systems   Cardiovascular:  Positive for chest pain. Negative for dyspnea on exertion, leg swelling, near-syncope, palpitations and syncope.    Respiratory:  Positive for shortness of breath. Negative for cough and wheezing.    Neurological:  Negative for dizziness and light-headedness.     Objective:     Vital Signs (Most Recent):  Temp: 97.7 °F (36.5 °C) (05/13/25 0827)  Pulse: 64 (05/13/25 0835)  Resp: 18 (05/13/25 0827)  BP: (!) 150/81 (05/13/25 0827)  SpO2: 98 % (05/13/25 0827) Vital Signs (24h Range):  Temp:  [97.7 °F (36.5 °C)-98.1 °F (36.7 °C)] 97.7 °F (36.5 °C)  Pulse:  [46-65] 64  Resp:  [12-19] 18  SpO2:  [95 %-99 %] 98 %  BP: (136-166)/(68-86) 150/81     Weight: 100.3 kg (221 lb 1.9 oz)  Body mass index is 31.73 kg/m².    SpO2: 98 %       No intake or output data in the 24 hours ending 05/13/25 1159    Lines/Drains/Airways       Peripheral Intravenous Line  Duration                  Peripheral IV - Single Lumen 05/13/25 0136 20 G Yes Anterior;Distal;Right Upper Arm <1 day                     Physical Exam  Vitals reviewed.   Constitutional:       Appearance: Normal appearance. He is obese.   HENT:      Head: Normocephalic and atraumatic.      Nose: Nose normal.   Eyes:      Extraocular Movements: Extraocular movements intact.   Cardiovascular:      Rate and Rhythm: Normal rate.   Pulmonary:      Effort: Pulmonary effort is normal.   Abdominal:      Palpations: Abdomen is soft.   Musculoskeletal:         General: Normal range of motion.      Cervical back: Normal range of motion and neck supple.      Right lower leg: No edema.      Left lower leg: No edema.   Skin:     General: Skin is warm and dry.   Neurological:      General: No focal deficit present.      Mental Status: He is alert and oriented to person, place, and time. Mental status is at baseline.   Psychiatric:         Mood and Affect: Mood normal.         Behavior: Behavior normal.          Significant Labs: BMP:   Recent Labs   Lab 05/13/25 0137         K 3.6      CO2 25   BUN 23   CREATININE 1.4   CALCIUM 9.4   , CMP   Recent Labs   Lab 05/13/25 0137     "  K 3.6      CO2 25      BUN 23   CREATININE 1.4   CALCIUM 9.4   PROT 7.4   ALBUMIN 4.0   BILITOT 0.6   ALKPHOS 91   AST 17   ALT 14   ANIONGAP 10   , CBC   Recent Labs   Lab 05/13/25  0137   WBC 10.12   HGB 12.1*   HCT 35.5*      , Lipid Panel No results for input(s): "CHOL", "HDL", "LDLCALC", "TRIG", "CHOLHDL" in the last 48 hours., Troponin No results for input(s): "TROPONINIHS" in the last 48 hours., and   Recent Lab Results         05/13/25  0700   05/13/25  0340   05/13/25  0137        Albumin     4.0       ALP     91       ALT     14       Anion Gap     10       AST     17       Baso #     0.03       Basophil %     0.3       BILIRUBIN TOTAL     0.6  Comment: For infants and newborns, interpretation of results should be based   on gestational age, weight and in agreement with clinical   observations.    Premature Infant recommended reference ranges:   0-24 hours:  <8.0 mg/dL   24-48 hours: <12.0 mg/dL   3-5 days:    <15.0 mg/dL   6-29 days:   <15.0 mg/dL       BNP     185  Comment: Values of less than 100 pg/ml are consistent with non-CHF populations.        BUN     23       Calcium     9.4       Chloride     103       CO2     25       Creatinine     1.4       eGFR     53  Comment: Estimated GFR calculated using the CKD-EPI creatinine (2021) equation.       Eos #     0.08       Eos %     0.8       Glucose     105       Gran # (ANC)     7.91       Hematocrit     35.5       Hemoglobin     12.1       Immature Grans (Abs)     0.03  Comment: Mild elevation in immature granulocytes is non specific and can be seen in a variety of conditions including stress response, acute inflammation, trauma and pregnancy. Correlation with other laboratory and clinical findings is essential.       Immature Granulocytes     0.3       Lymph #     1.23       Lymph %     12.2       MCH     31.3       MCHC     34.1       MCV     92       Mono #     0.84       Mono %     8.3       MPV     9.9       Neut %     78.1    "    nRBC     0       Platelet Count     184       Potassium     3.6       PROTEIN TOTAL     7.4       RBC     3.87       RDW     13.1       Sodium     138       Troponin I <0.006  Comment: The reference interval for Troponin I represents the 99th percentile cutoff for our facility and is consistent with 3rd generation assay performance.   0.006  Comment: The reference interval for Troponin I represents the 99th percentile cutoff for our facility and is consistent with 3rd generation assay performance.   <0.006  Comment: The reference interval for Troponin I represents the 99th percentile cutoff for our facility and is consistent with 3rd generation assay performance.       WBC     10.12               Significant Imaging: Cardiac Cath: reviewed, Echocardiogram: Transthoracic echo (TTE) complete (Cupid Only):   Results for orders placed or performed during the hospital encounter of 04/01/25   Echo   Result Value Ref Range    BSA 2.21 m2    LVOT stroke volume 64.7 cm3    LVIDd 5.2 3.5 - 6.0 cm    LV Systolic Volume 60 mL    LV Systolic Volume Index 27.6 mL/m2    LVIDs 3.8 2.1 - 4.0 cm    LV ESV A2C 41.83 mL    LV Diastolic Volume 128 mL    LV ESV A4C 40.25 mL    LV Diastolic Volume Index 58.99 mL/m2    Left Ventricular End Systolic Volume by Teichholz Method 60.34 mL    Left Ventricular End Diastolic Volume by Teichholz Method 128.06 mL    IVS 0.8 0.6 - 1.1 cm    LVOT diameter 2.0 cm    LVOT area 3.1 cm2    FS 26.9 (A) 28 - 44 %    Left Ventricle Relative Wall Thickness 0.35 cm    PW 0.9 0.6 - 1.1 cm    LV mass 156.9 g    LV Mass Index 72.3 g/m2    MV Peak E Akshat 0.49 m/s    TDI LATERAL 0.08 m/s    TDI SEPTAL 0.05 m/s    E/E' ratio 8 m/s    MV Peak A Akshat 0.76 m/s    E/A ratio 0.64     E wave deceleration time 219 msec    LV SEPTAL E/E' RATIO 9.8 m/s    LV LATERAL E/E' RATIO 6.1 m/s    LVOT peak akshat 0.9 m/s    Left Ventricular Outflow Tract Mean Velocity 0.65 cm/s    Left Ventricular Outflow Tract Mean Gradient 1.91 mmHg     RV- garcia basal diam 3.9 cm    RVOT peak VTI 24.8 cm    TAPSE 1.52 cm    RV/LV Ratio 0.75 cm    LA size 4.1 cm    Left Atrium Minor Axis 4.2 cm    Left Atrium Major Axis 4.2 cm    LA Vol (MOD) 42 mL    DEZ (MOD) 19 mL/m2    RA Major Axis 4.31 cm    RA Width 3.12 cm    AV mean gradient 4 mmHg    AV peak gradient 7 mmHg    Ao peak mickie 1.3 m/s    Ao VTI 30.3 cm    LVOT peak VTI 20.6 cm    AV valve area 2.1 cm²    AV Velocity Ratio 0.69     AV index (prosthetic) 0.68     FATUMA by Velocity Ratio 2.2 cm²    MV stenosis pressure 1/2 time 63.45 ms    MV valve area p 1/2 method 3.47 cm2    PV mean gradient 2 mmHg    PV PEAK VELOCITY 1.13 m/s    PV peak gradient 5 mmHg    RVOT peak mickie 0.94 m/s    Ao root annulus 2.83 cm    Sinus 2.96 cm    STJ 3.13 cm    Mean e' 0.07 m/s    ZLVIDS -1.11     ZLVIDD -3.23     LA area A4C 15.93 cm2    LA area A2C 15.82 cm2    RVDD 3.94 cm    DEZ 22 mL/m2    LA Vol 48 cm3    LA WIDTH 3.3 cm    Est. RA pres 3 mmHg    Narrative      Left Ventricle: The left ventricle is normal in size. Regional wall   motion abnormalities present. There is mildly reduced systolic function   with a visually estimated ejection fraction of 45 - 50%. Grade I diastolic   dysfunction.    Right Ventricle: The right ventricle is normal in size. Systolic   function is normal.    Left Atrium: Mildly dilated    Mitral Valve: There is mild regurgitation.    IVC/SVC: Normal venous pressure at 3 mmHg.     , EKG: reviewed, and X-Ray: CXR: X-Ray Chest 1 View (CXR): No results found for this visit on 05/13/25.  Assessment and Plan:     * Chest pain  Significant history of CABG and recent LHC in April 2024 with intervention to mid graft   CP started last night and still having some recurrent chest pain  No pain on palpation   Given extensive cardiac history will plan for LHC today   Remain NPO  Continue with heparin   Hold HCTZ, start gentle hydration   Continue remaining OMT   BNP slightly elevated, all other labs wnl          Type 2 diabetes mellitus without complication, without long-term current use of insulin  Per hospital medicine     Hyperlipidemia associated with type 2 diabetes mellitus  Statin     Hypertension associated with diabetes  Titrate home meds     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  Significant history of CABG and recent LHC in April 2024 with intervention to mid graft   CP started last night and still having some recurrent chest pain  No pain on palpation   Given extensive cardiac history will plan for LHC today   Remain NPO  Continue with heparin   Hold HCTZ, start gentle hydration   Continue remaining OMT   BNP slightly elevated, all other labs wnl         VTE Risk Mitigation (From admission, onward)           Ordered     enoxaparin injection 40 mg  Daily         05/13/25 0624     IP VTE HIGH RISK PATIENT  Once         05/13/25 0624     Place sequential compression device  Until discontinued         05/13/25 0624                    Thank you for your consult. I will follow-up with patient. Please contact us if you have any additional questions.    Brent Holely PA-C  Cardiology   [facility]

## 2025-05-13 NOTE — Clinical Note
100 ml of contrast were injected throughout the case.
A percutaneous stick to the left radial artery was performed. Ultrasound guidance was used to obtain access.
A pre-sedation assessment was completed by the physician immediately prior to sedation start. 
A pulse oximeter was placed on the patient's left index finger and left index finger.
All catheters exchanged over wire.   
An airway assessment has been completed by MD Sabrina.
ID band present and verified. Family is in the lobby.
The ECG showed sinus rhythm.
The ECG showed sinus rhythm.
The catheter was inserted into the LIMA to LAD. An angiography was performed of the LIMA to LAD. Multiple views were taken. The angiography was performed via power injection. The injected amount was 10 mL contrast at 5 mL/s.
The catheter was inserted into the SVG to RPDA. An angiography was performed of the SVG to RPDA. Multiple views were taken.
The catheter was inserted into the left ventricle. Hemodynamics were performed.
The catheter was inserted into the ostium   left main. An angiography was performed of the left coronary arteries. Multiple views were taken.
The catheter was removed from the SVG to RPDA.
The catheter was removed from the aorta. Pullback was recorded.
The catheter was removed from the ostium   left main.
The catheter was removed from the ostium   right coronary artery.
The catheter was repositioned into the ostium   right coronary artery. An angiography was performed of the right coronary arteries. Multiple views were taken.
The left radial was prepped. The site was prepped with ChloraPrep. The site was clipped. The patient was draped.
The left ventricle was injected. The injected rate was 12 mL/sec. The total injected volume was 25 mL.
The physician was paged.
The procedural consent was signed. A history and physical note was completed in the chart.
The radial band was applied to the left radial artery. 11 cc's of air were inserted into the closure device.
The radial pulses were +2 bilaterally.
The radial pulses were +2 bilaterally.
The sheath was inserted into the left radial artery.
The sheath was removed from the left radial artery.
The wire was inserted into the aorta.
The wire was removed from the aorta.
Universal procedure checklist and airway assessment completed.
Wife updated via voicemail
dry, intact, no bleeding and no hematoma.
PAST SURGICAL HISTORY:  Bilateral Cataracts surgery 1998, 1999    History of Cholecystectomy     History of tonsillectomy as a child    Status Post Foot Surgery left    Stented coronary artery 07/2014

## 2025-05-13 NOTE — CARE UPDATE
Evaluated patient at bedside.  Wife present    Onset of chest pain at 10pm while getting ready for bed  Not similar to prior chest pain requiring stent or cabg  Characterizes as muscle ache   Worse with deep inspiration and laying down  No improvement with leaning forward  Denies trauma  Hungry     No acute distress  No respiratory distress  On room air  Clear lungs on auscultation   No wheezing or rales  Murmur  No chest tenderness on palpation  Soft non tender abdomen   AO3  No lower extremity edema     Cardiology consulted and recommended cardiac cath  Gentle hydration   Npo

## 2025-05-13 NOTE — H&P
[facility] [place] - Emergency Dept.  [facility] Medicine  History & Physical    Patient Name: Deniz Paulino  MRN: [id number]  Patient Class: OP- Observation  Admission Date: 5/13/2025  Attending Physician: Last Harris MD   Primary Care Provider: Hiro Kraft MD         Patient information was obtained from patient, past medical records, and ER records.     Subjective:     Principal Problem:Chest pain    Chief Complaint:   Chief Complaint   Patient presents with    Chest Pain     Pt presents with left sided chest pain that radiates to left neck and shoulder. Onset around 10 pm. Pt also reports SOB        HPI: Deniz Paulino is a 74 y.o. male with a PMH  has a past medical history of Acute coronary syndrome, Coronary artery disease, Diabetes mellitus (04/24/2014), Effort angina (04/30/2023), Hyperlipidemia, Hypertension, Old MI (myocardial infarction) (04/24/2014), Preop cardiovascular exam (11/27/2024), and S/P CABG (coronary artery bypass graft) (04/24/2014). presented to the Emergency Department for evaluation of chest pain which began around 10 PM. Pt states he was resting when the sxs began. Laying down and taking deep breaths worsens the pain. Symptoms are constant and moderate in severity. Pt reports compliance with all medications. Associated sxs include shortness of breath. Patient denies any fever, cough, dizziness, nausea, vomiting, palpitations, or diaphoresis. Prior Tx includes nitro without improvement.  No further complaints or concerns at this time.     ER workup revealed CBC to be unremarkable.  CMP unremarkable.  .  Troponin negative x2.  Chest x-ray negative for acute findings.  EKG sinus rhythm with occasional PVCs with prolonged QT with a ventricular rate of 60 beats per minute and a QT/QTC of 480/40.  BP stable with heart rate in the 50s with history of being on beta-blocker.  Patient received 4 mg morphine and 1 in nitroglycerin paste in ER.  Hospital Medicine consulted to admit  patient for further cardiac workup for chest pain.  Patient in agreement with treatment plan.  Patient admitted under observation status.    PCP: Hiro Kraft      Past Medical History:   Diagnosis Date    Acute coronary syndrome     Coronary artery disease     Diabetes mellitus 04/24/2014    Effort angina 04/30/2023    Hyperlipidemia     Hypertension     Old MI (myocardial infarction) 04/24/2014    Preop cardiovascular exam 11/27/2024    S/P CABG (coronary artery bypass graft) 04/24/2014       Past Surgical History:   Procedure Laterality Date    CARDIAC CATHETERIZATION      COLONOSCOPY N/A 07/26/2019    Procedure: COLONOSCOPY;  Surgeon: Opal Oshea MD;  Location: [facility] ENDO;  Service: Endoscopy;  Laterality: N/A;    COLONOSCOPY, SCREENING, LOW RISK PATIENT N/A 12/17/2024    Procedure: COLONOSCOPY, SCREENING, LOW RISK PATIENT 11/27-clr rec'd-Ok to stop asa effient 5-7 days;  Surgeon: Carol Pinon MD;  Location: [facility];  Service: Colon and Rectal;  Laterality: N/A;    CORONARY ARTERY BYPASS GRAFT      CORONARY BYPASS GRAFT ANGIOGRAPHY  5/1/2023    Procedure: Bypass graft study;  Surgeon: Kimberly Bennett MD;  Location: [facility] CATH LAB;  Service: Cardiology;;    ESOPHAGOGASTRODUODENOSCOPY N/A 07/26/2019    Procedure: ESOPHAGOGASTRODUODENOSCOPY (EGD);  Surgeon: Opal Oshea MD;  Location: [facility];  Service: Endoscopy;  Laterality: N/A;    INSTANTANEOUS WAVE-FREE RATIO (IFR) N/A 4/4/2024    Procedure: Instantaneous Wave-Free Ratio (IFR);  Surgeon: Kimberly Bennett MD;  Location: [facility] CATH LAB;  Service: Cardiology;  Laterality: N/A;    LEFT HEART CATHETERIZATION Left 5/1/2023    Procedure: Left heart cath;  Surgeon: Kimberly Bennett MD;  Location: [facility] CATH LAB;  Service: Cardiology;  Laterality: Left;    LEFT HEART CATHETERIZATION Left 4/4/2024    Procedure: Left heart cath;  Surgeon: Kimberly Bennett MD;  Location: [facility] CATH LAB;  Service: Cardiology;  Laterality: Left;    PROTECTION, CORONARY,  FILTER  4/4/2024    Procedure: Protection, Coronary, Filter;  Surgeon: Kimberly Bennett MD;  Location: [facility] [place] CATH LAB;  Service: Cardiology;;    PTCA, SINGLE VESSEL  4/4/2024    Procedure: PTCA, Single Vessel;  Surgeon: Kimberly Bennett MD;  Location: [facility] [place] CATH LAB;  Service: Cardiology;;    SKIN CANCER EXCISION  09/2018    STENT, DRUG ELUTING, SINGLE VESSEL, CORONARY  4/4/2024    Procedure: Stent, Drug Eluting, Single Vessel, Coronary;  Surgeon: Kimberly Bennett MD;  Location: [facility] [place] CATH LAB;  Service: Cardiology;;    VASECTOMY         Review of patient's allergies indicates:  No Known Allergies    No current facility-administered medications on file prior to encounter.     Current Outpatient Medications on File Prior to Encounter   Medication Sig    aspirin (ECOTRIN) 81 MG EC tablet Take 1 tablet (81 mg total) by mouth once daily.    atorvastatin (LIPITOR) 40 MG tablet Take 1 tablet (40 mg total) by mouth once daily.    hydroCHLOROthiazide 12.5 MG Tab Take 1 tablet (12.5 mg total) by mouth once daily.    lisinopriL (PRINIVIL,ZESTRIL) 20 MG tablet Take 1 tablet (20 mg total) by mouth once daily.    metFORMIN (GLUCOPHAGE) 1000 MG tablet Take 1 tablet (1,000 mg total) by mouth 2 (two) times daily with meals.    metoprolol succinate (TOPROL-XL) 50 MG 24 hr tablet Take 1 tablet (50 mg total) by mouth once daily.    NIFEdipine (PROCARDIA-XL) 90 MG (OSM) 24 hr tablet Take 1 tablet (90 mg total) by mouth once daily.    nitroGLYCERIN 0.2 mg/hr TD PT24 (NITRODUR) 0.2 mg/hr APPLY 1 PATCH TOPICALLY ONCE DAILY    pantoprazole (PROTONIX) 20 MG tablet Take 1 tablet by mouth once daily    tamsulosin (FLOMAX) 0.4 mg Cap Take 1 capsule (0.4 mg total) by mouth once daily.    calcipotriene (DOVONEX) 0.005 % cream Apply topically 2 (two) times daily. Mix with efudex cream and AAA on scalp twice daily for 2 weeks at a time    coenzyme Q10 200 mg capsule Take 200 mg by mouth 2 (two) times daily.    cyanocobalamin (VITAMIN B-12) 1000 MCG  tablet Take 1,000 mcg by mouth once daily.    fluorouraciL (EFUDEX) 5 % cream Mix with calcipotriene cream and AAA on scalp twice daily for 2 weeks at a time    magnesium oxide (MAG-OX) 400 mg (241.3 mg magnesium) tablet Take 400 mg by mouth once daily.    nitroGLYCERIN (NITROSTAT) 0.4 MG SL tablet Place 0.4 mg under the tongue every 5 (five) minutes as needed for Chest pain.    prasugreL (EFFIENT) 10 mg Tab Take 1 tablet (10 mg total) by mouth once daily.    vitamin D 1000 units Tab Take 1,000 Units by mouth every other day. (Patient not taking: Reported on 2025)     Family History       Problem Relation (Age of Onset)    Alcohol abuse Father    Cancer Mother, Father    Diabetes Mother, Brother    Heart attack Father (63)    Heart murmur Mother    Stroke Mother          Tobacco Use    Smoking status: Former     Current packs/day: 0.00     Average packs/day: 1 pack/day for 30.0 years (30.0 ttl pk-yrs)     Types: Cigarettes     Start date: 1973     Quit date: 2003     Years since quittin.2    Smokeless tobacco: Former   Substance and Sexual Activity    Alcohol use: Yes     Alcohol/week: 16.0 standard drinks of alcohol     Types: 10 Glasses of wine, 6 Cans of beer per week     Comment: socially    Drug use: Never    Sexual activity: Not Currently     Partners: Female     Review of Systems   Respiratory:  Positive for shortness of breath. Negative for cough.    Cardiovascular:  Positive for chest pain. Negative for palpitations and leg swelling.   Gastrointestinal:  Negative for abdominal pain, diarrhea, nausea and vomiting.   All other systems reviewed and are negative.    Objective:     Vital Signs (Most Recent):  Temp: 97.8 °F (36.6 °C) (255)  Pulse: (!) 53 (25)  Resp: 14 (25)  BP: 136/71 (25)  SpO2: 97 % (25) Vital Signs (24h Range):  Temp:  [97.8 °F (36.6 °C)] 97.8 °F (36.6 °C)  Pulse:  [49-61] 53  Resp:  [12-19] 14  SpO2:  [95 %-99 %] 97  %  BP: (136-166)/(69-86) 136/71     Weight: 102.1 kg (225 lb)  Body mass index is 32.28 kg/m².     Physical Exam  Vitals and nursing note reviewed.   Constitutional:       General: He is awake. He is not in acute distress.     Appearance: Normal appearance. He is well-developed and well-groomed. He is not ill-appearing, toxic-appearing or diaphoretic.   HENT:      Head: Normocephalic and atraumatic.      Mouth/Throat:      Mouth: Mucous membranes are moist.      Pharynx: Oropharynx is clear.   Eyes:      Extraocular Movements: Extraocular movements intact.      Conjunctiva/sclera: Conjunctivae normal.      Pupils: Pupils are equal, round, and reactive to light.   Cardiovascular:      Rate and Rhythm: Normal rate and regular rhythm.      Pulses: Normal pulses.      Heart sounds: Normal heart sounds. No murmur heard.  Pulmonary:      Effort: Pulmonary effort is normal.      Breath sounds: Decreased breath sounds present. No wheezing, rhonchi or rales.   Abdominal:      General: Bowel sounds are normal.      Palpations: Abdomen is soft.      Tenderness: There is no abdominal tenderness. There is no right CVA tenderness, left CVA tenderness, guarding or rebound.   Musculoskeletal:      Cervical back: Normal range of motion and neck supple.      Right lower leg: No edema.      Left lower leg: No edema.      Comments: 5/5 strength throughout   Skin:     General: Skin is warm and dry.      Capillary Refill: Capillary refill takes less than 2 seconds.   Neurological:      General: No focal deficit present.      Mental Status: He is alert and oriented to person, place, and time. Mental status is at baseline.      GCS: GCS eye subscore is 4. GCS verbal subscore is 5. GCS motor subscore is 6.      Cranial Nerves: Cranial nerves 2-12 are intact.      Motor: Motor function is intact.      Coordination: Coordination is intact.   Psychiatric:         Mood and Affect: Mood normal.         Behavior: Behavior normal. Behavior is  cooperative.              CRANIAL NERVES     CN III, IV, VI   Pupils are equal, round, and reactive to light.     LABS:  Recent Results (from the past 24 hours)   Hemoglobin A1C    Collection Time: 05/12/25 10:52 AM   Result Value Ref Range    Hemoglobin A1c 5.9 (H) 4.0 - 5.6 %    Estimated Average Glucose 123 68 - 131 mg/dL   Vitamin B12    Collection Time: 05/12/25 10:52 AM   Result Value Ref Range    Vitamin B12 599 210 - 950 pg/mL   Comprehensive metabolic panel    Collection Time: 05/13/25  1:37 AM   Result Value Ref Range    Sodium 138 136 - 145 mmol/L    Potassium 3.6 3.5 - 5.1 mmol/L    Chloride 103 95 - 110 mmol/L    CO2 25 23 - 29 mmol/L    Glucose 105 70 - 110 mg/dL    BUN 23 8 - 23 mg/dL    Creatinine 1.4 0.5 - 1.4 mg/dL    Calcium 9.4 8.7 - 10.5 mg/dL    Protein Total 7.4 6.0 - 8.4 gm/dL    Albumin 4.0 3.5 - 5.2 g/dL    Bilirubin Total 0.6 0.1 - 1.0 mg/dL    ALP 91 40 - 150 unit/L    AST 17 11 - 45 unit/L    ALT 14 10 - 44 unit/L    Anion Gap 10 8 - 16 mmol/L    eGFR 53 (L) >60 mL/min/1.73/m2   Troponin I #1    Collection Time: 05/13/25  1:37 AM   Result Value Ref Range    Troponin-I <0.006 <=0.026 ng/mL   BNP    Collection Time: 05/13/25  1:37 AM   Result Value Ref Range     (H) 0 - 99 pg/mL   CBC with Differential    Collection Time: 05/13/25  1:37 AM   Result Value Ref Range    WBC 10.12 3.90 - 12.70 K/uL    RBC 3.87 (L) 4.60 - 6.20 M/uL    HGB 12.1 (L) 14.0 - 18.0 gm/dL    HCT 35.5 (L) 40.0 - 54.0 %    MCV 92 82 - 98 fL    MCH 31.3 (H) 27.0 - 31.0 pg    MCHC 34.1 32.0 - 36.0 g/dL    RDW 13.1 11.5 - 14.5 %    Platelet Count 184 150 - 450 K/uL    MPV 9.9 9.2 - 12.9 fL    Nucleated RBC 0 <=0 /100 WBC    Neut % 78.1 (H) 38 - 73 %    Lymph % 12.2 (L) 18 - 48 %    Mono % 8.3 4 - 15 %    Eos % 0.8 <=8 %    Basophil % 0.3 <=1.9 %    Imm Grans % 0.3 0.0 - 0.5 %    Neut # 7.91 (H) 1.8 - 7.7 K/uL    Lymph # 1.23 1 - 4.8 K/uL    Mono # 0.84 0.3 - 1 K/uL    Eos # 0.08 <=0.5 K/uL    Baso # 0.03 <=0.2 K/uL     Imm Grans # 0.03 0.00 - 0.04 K/uL   Troponin I #2    Collection Time: 05/13/25  3:40 AM   Result Value Ref Range    Troponin-I 0.006 <=0.026 ng/mL       RADIOLOGY  No results found.    EKG    MICROBIOLOGY    MDM     Amount and/or Complexity of Data Reviewed  Clinical lab tests: reviewed  Tests in the radiology section of CPT®: reviewed  Tests in the medicine section of CPT®: reviewed  Discussion of test results with the performing providers: yes  Decide to obtain previous medical records or to obtain history from someone other than the patient: yes  Obtain history from someone other than the patient: yes  Review and summarize past medical records: yes  Discuss the patient with other providers: yes  Independent visualization of images, tracings, or specimens: yes        Assessment/Plan:     Assessment & Plan  Chest pain  -tele monitoring   -trend troponin   -repeat EKG   -analgesics p.r.n.   -nitrites p.r.n.   -cardiology consult    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  Patient with known CAD s/p CABG, which is uncontrolled due to continued CP. Will continue Effient, ASA, and Statin and monitor for S/Sx of angina/ACS. Continue to monitor on telemetry.   Hypertension associated with diabetes  Chronic, controlled.  Latest blood pressure and vitals reviewed-   Temp:  [97.8 °F (36.6 °C)]   Pulse:  [49-61]   Resp:  [12-19]   BP: (136-166)/(69-86)   SpO2:  [95 %-99 %] .   Home meds for hypertension were reviewed and noted below.   Hypertension Medications              hydroCHLOROthiazide 12.5 MG Tab Take 1 tablet (12.5 mg total) by mouth once daily.    lisinopriL (PRINIVIL,ZESTRIL) 20 MG tablet Take 1 tablet (20 mg total) by mouth once daily.    metoprolol succinate (TOPROL-XL) 50 MG 24 hr tablet Take 1 tablet (50 mg total) by mouth once daily.    NIFEdipine (PROCARDIA-XL) 90 MG (OSM) 24 hr tablet Take 1 tablet (90 mg total) by mouth once daily.    nitroGLYCERIN 0.2 mg/hr TD PT24  "(NITRODUR) 0.2 mg/hr APPLY 1 PATCH TOPICALLY ONCE DAILY    nitroGLYCERIN (NITROSTAT) 0.4 MG SL tablet Place 0.4 mg under the tongue every 5 (five) minutes as needed for Chest pain.            While in the hospital, will manage blood pressure as follows; Continue home antihypertensive regimen    Will utilize p.r.n. blood pressure medication only if patient's blood pressure greater than  160/90 and he develops symptoms such as worsening chest pain or shortness of breath.      Hyperlipidemia associated with type 2 diabetes mellitus  Patient is chronically on statin.will continue for now. Last Lipid Panel:   Lab Results   Component Value Date    CHOL 117 (L) 03/25/2025    HDL 43 03/25/2025    LDLCALC 35.8 (L) 03/25/2025    TRIG 191 (H) 03/25/2025    CHOLHDL 36.8 03/25/2025     Plan:  -Continue home medication  -low fat/low calorie diet      Type 2 diabetes mellitus without complication, without long-term current use of insulin  Patient's FSGs are controlled on current medication regimen.  Last A1c reviewed-   Lab Results   Component Value Date    HGBA1C 5.9 (H) 05/12/2025     Most recent fingerstick glucose reviewed- No results for input(s): "POCTGLUCOSE" in the last 24 hours.  Current correctional scale  Low  Maintain anti-hyperglycemic dose as follows-   Antihyperglycemics (From admission, onward)      None          Plan:  -SSI  -Accu-checks  -Hold oral hypoglycemics while patient is in the hospital  -Hypoglycemic protocol        VTE Risk Mitigation (From admission, onward)           Ordered     enoxaparin injection 40 mg  Daily         05/13/25 0624     IP VTE HIGH RISK PATIENT  Once         05/13/25 0624     Place sequential compression device  Until discontinued         05/13/25 0624                  //Core Measures   -DVT proph: SCDs, lovenox  -Code status Full    -Surrogate:none present    Components of this note were documented using a voice recognition system and are subject to errors not corrected at the time " the document was proof read. Please contact the author for any clarifications.       Yemi Harris NP  Department of Hospital Medicine  [facility] - Emergency Dept.

## 2025-05-13 NOTE — ASSESSMENT & PLAN NOTE
-tele monitoring   -trend troponin   -repeat EKG   -analgesics p.r.n.   -nitrites p.r.n.   -cardiology consult

## 2025-05-13 NOTE — HPI
Deniz Paulino is a 74 y.o. male with a PMH  has a past medical history of Acute coronary syndrome, Coronary artery disease, Diabetes mellitus (04/24/2014), Effort angina (04/30/2023), Hyperlipidemia, Hypertension, Old MI (myocardial infarction) (04/24/2014), Preop cardiovascular exam (11/27/2024), and S/P CABG (coronary artery bypass graft) (04/24/2014). presented to the Emergency Department for evaluation of chest pain which began around 10 PM. Pt states he was resting when the sxs began. Laying down and taking deep breaths worsens the pain. Symptoms are constant and moderate in severity. Pt reports compliance with all medications. Associated sxs include shortness of breath. Patient denies any fever, cough, dizziness, nausea, vomiting, palpitations, or diaphoresis. Prior Tx includes nitro without improvement.  No further complaints or concerns at this time.     ER workup revealed CBC to be unremarkable.  CMP unremarkable.  .  Troponin negative x2.  Chest x-ray negative for acute findings.  EKG sinus rhythm with occasional PVCs with prolonged QT with a ventricular rate of 60 beats per minute and a QT/QTC of 480/40.  BP stable with heart rate in the 50s with history of being on beta-blocker.  Patient received 4 mg morphine and 1 in nitroglycerin paste in ER.  Hospital Medicine consulted to admit patient for further cardiac workup for chest pain.  Patient in agreement with treatment plan.  Patient admitted under observation status.    PCP: Hiro Kraft

## 2025-05-13 NOTE — ASSESSMENT & PLAN NOTE
Patient is chronically on statin.will continue for now. Last Lipid Panel:   Lab Results   Component Value Date    CHOL 117 (L) 03/25/2025    HDL 43 03/25/2025    LDLCALC 35.8 (L) 03/25/2025    TRIG 191 (H) 03/25/2025    CHOLHDL 36.8 03/25/2025     Plan:  -Continue home medication  -low fat/low calorie diet

## 2025-05-14 LAB
ABSOLUTE EOSINOPHIL (OHS): 0.06 K/UL
ABSOLUTE MONOCYTE (OHS): 0.66 K/UL (ref 0.3–1)
ABSOLUTE NEUTROPHIL COUNT (OHS): 4.29 K/UL (ref 1.8–7.7)
ANION GAP (OHS): 10 MMOL/L (ref 8–16)
BASOPHILS # BLD AUTO: 0.02 K/UL
BASOPHILS NFR BLD AUTO: 0.3 %
BUN SERPL-MCNC: 16 MG/DL (ref 8–23)
CALCIUM SERPL-MCNC: 8.6 MG/DL (ref 8.7–10.5)
CHLORIDE SERPL-SCNC: 105 MMOL/L (ref 95–110)
CO2 SERPL-SCNC: 24 MMOL/L (ref 23–29)
CREAT SERPL-MCNC: 1.1 MG/DL (ref 0.5–1.4)
ERYTHROCYTE [DISTWIDTH] IN BLOOD BY AUTOMATED COUNT: 13 % (ref 11.5–14.5)
GFR SERPLBLD CREATININE-BSD FMLA CKD-EPI: >60 ML/MIN/1.73/M2
GLUCOSE SERPL-MCNC: 115 MG/DL (ref 70–110)
HCT VFR BLD AUTO: 32.7 % (ref 40–54)
HGB BLD-MCNC: 11.2 GM/DL (ref 14–18)
IMM GRANULOCYTES # BLD AUTO: 0.02 K/UL (ref 0–0.04)
IMM GRANULOCYTES NFR BLD AUTO: 0.3 % (ref 0–0.5)
LYMPHOCYTES # BLD AUTO: 0.98 K/UL (ref 1–4.8)
MCH RBC QN AUTO: 31.5 PG (ref 27–31)
MCHC RBC AUTO-ENTMCNC: 34.3 G/DL (ref 32–36)
MCV RBC AUTO: 92 FL (ref 82–98)
NUCLEATED RBC (/100WBC) (OHS): 0 /100 WBC
PLATELET # BLD AUTO: 161 K/UL (ref 150–450)
PMV BLD AUTO: 10 FL (ref 9.2–12.9)
POCT GLUCOSE: 131 MG/DL (ref 70–110)
POTASSIUM SERPL-SCNC: 3.4 MMOL/L (ref 3.5–5.1)
RBC # BLD AUTO: 3.56 M/UL (ref 4.6–6.2)
RELATIVE EOSINOPHIL (OHS): 1 %
RELATIVE LYMPHOCYTE (OHS): 16.3 % (ref 18–48)
RELATIVE MONOCYTE (OHS): 10.9 % (ref 4–15)
RELATIVE NEUTROPHIL (OHS): 71.2 % (ref 38–73)
SODIUM SERPL-SCNC: 139 MMOL/L (ref 136–145)
WBC # BLD AUTO: 6.03 K/UL (ref 3.9–12.7)

## 2025-05-14 PROCEDURE — G0378 HOSPITAL OBSERVATION PER HR: HCPCS

## 2025-05-14 PROCEDURE — 85025 COMPLETE CBC W/AUTO DIFF WBC: CPT | Performed by: INTERNAL MEDICINE

## 2025-05-14 PROCEDURE — 36415 COLL VENOUS BLD VENIPUNCTURE: CPT | Performed by: INTERNAL MEDICINE

## 2025-05-14 PROCEDURE — 25000003 PHARM REV CODE 250: Performed by: INTERNAL MEDICINE

## 2025-05-14 PROCEDURE — 94761 N-INVAS EAR/PLS OXIMETRY MLT: CPT

## 2025-05-14 PROCEDURE — 80051 ELECTROLYTE PANEL: CPT | Performed by: INTERNAL MEDICINE

## 2025-05-14 RX ORDER — RANOLAZINE 500 MG/1
500 TABLET, EXTENDED RELEASE ORAL 2 TIMES DAILY
Qty: 60 TABLET | Refills: 0 | Status: SHIPPED | OUTPATIENT
Start: 2025-05-14 | End: 2025-05-16

## 2025-05-14 RX ORDER — RANOLAZINE 500 MG/1
500 TABLET, EXTENDED RELEASE ORAL 2 TIMES DAILY
Status: DISCONTINUED | OUTPATIENT
Start: 2025-05-14 | End: 2025-05-14 | Stop reason: HOSPADM

## 2025-05-14 RX ADMIN — ASPIRIN 81 MG: 81 TABLET, COATED ORAL at 09:05

## 2025-05-14 RX ADMIN — PRASUGREL 10 MG: 10 TABLET, FILM COATED ORAL at 09:05

## 2025-05-14 RX ADMIN — TAMSULOSIN HYDROCHLORIDE 0.4 MG: 0.4 CAPSULE ORAL at 09:05

## 2025-05-14 RX ADMIN — LISINOPRIL 20 MG: 20 TABLET ORAL at 09:05

## 2025-05-14 RX ADMIN — ATORVASTATIN CALCIUM 40 MG: 40 TABLET, FILM COATED ORAL at 09:05

## 2025-05-14 RX ADMIN — NIFEDIPINE 90 MG: 30 TABLET, FILM COATED, EXTENDED RELEASE ORAL at 09:05

## 2025-05-14 RX ADMIN — METOPROLOL SUCCINATE 50 MG: 50 TABLET, EXTENDED RELEASE ORAL at 09:05

## 2025-05-14 NOTE — PLAN OF CARE
L TR Band removed per protocol; pressure dressing placed.  Dressing remains c/d/I and site wnl at time of transfer.   Pt able to eat and drink while in CVRU and remains AAOx3 at time of transfer.   Transferred back to room 560, via hospital bed in no apparent distress.   Report given to RN at bedside; no further questions at this time.   Wife at bedside at time of transfer.

## 2025-05-14 NOTE — DISCHARGE SUMMARY
[facility] Medicine  Discharge Summary      Patient Name: Deniz Paulino  MRN: [id number]  ANTHONY: [id number]  Patient Class: OP- Observation  Admission Date: 5/13/2025  Hospital Length of Stay: 0 days  Discharge Date and Time: 05/14/2025 5:06 PM  Attending Physician: No att. providers found   Discharging Provider: Randi Thomas MD  Primary Care Provider: Hiro Kraft MD    Primary Care Team: Networked reference to record PCT     HPI:   Deniz Paulino is a 74 y.o. male with a PMH  has a past medical history of Acute coronary syndrome, Coronary artery disease, Diabetes mellitus (04/24/2014), Effort angina (04/30/2023), Hyperlipidemia, Hypertension, Old MI (myocardial infarction) (04/24/2014), Preop cardiovascular exam (11/27/2024), and S/P CABG (coronary artery bypass graft) (04/24/2014). presented to the Emergency Department for evaluation of chest pain which began around 10 PM. Pt states he was resting when the sxs began. Laying down and taking deep breaths worsens the pain. Symptoms are constant and moderate in severity. Pt reports compliance with all medications. Associated sxs include shortness of breath. Patient denies any fever, cough, dizziness, nausea, vomiting, palpitations, or diaphoresis. Prior Tx includes nitro without improvement.  No further complaints or concerns at this time.     ER workup revealed CBC to be unremarkable.  CMP unremarkable.  .  Troponin negative x2.  Chest x-ray negative for acute findings.  EKG sinus rhythm with occasional PVCs with prolonged QT with a ventricular rate of 60 beats per minute and a QT/QTC of 480/40.  BP stable with heart rate in the 50s with history of being on beta-blocker.  Patient received 4 mg morphine and 1 in nitroglycerin paste in ER.  Hospital Medicine consulted to admit patient for further cardiac workup for chest pain.  Patient in agreement with treatment plan.  Patient admitted under observation status.    PCP: Hiro Kraft  P.      Procedure(s) (LRB):  Left heart cath (Left)  ANGIOGRAM, CORONARY, INCLUDING BYPASS GRAFT, WITH LEFT HEART CATHETERIZATION (N/A)      Hospital Course:   5/14  Admitted for unstable angina with significant cardiac history. Cardiology performed cardiac catheterization with patent stents. Advised follow up outpatient for continued workup if chest pain persists. Patient denies chest pain.    No acute distress. No respiratory distress. On room air. AO3. Self ambulating. Wife present    Patient seen and evaluated by me. Patient was determined to be suitable for discharge. Patient deemed stable for discharge to home with homehealth physical/occupational therapy and nurse practitioner to visit home program. Patient and family declined.    Ranexa prescribed by cardiology. This was sent to pharmacy on file. Patient reports picking up the prescription but had a bad reaction when taking. Reviewed records and ranexa prescribed approximately one year ago and then discontinued.    Advised to follow up with primary cardiologist to discuss before starting.        Goals of Care Treatment Preferences:  Code Status: Full Code         Consults:   Consults (From admission, onward)          Status Ordering Provider     Inpatient consult to Cardiology  Once        Provider:  Reynaldo Washburn MD    Completed GLEN ARNOLD            Assessment & Plan  Chest pain  -tele monitoring   -trend troponin   -repeat EKG   -analgesics p.r.n.   -nitrites p.r.n.   -cardiology consult    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  Patient with known CAD s/p CABG, which is uncontrolled due to continued CP. Will continue Effient, ASA, and Statin and monitor for S/Sx of angina/ACS. Continue to monitor on telemetry.   Hypertension associated with diabetes  Chronic, controlled.  Latest blood pressure and vitals reviewed-   Temp:  [97.7 °F (36.5 °C)-99.1 °F (37.3 °C)]   Pulse:  [51-89]   Resp:  [14-20]   BP:  (101-148)/(58-82)   SpO2:  [92 %-98 %] .   Home meds for hypertension were reviewed and noted below.   Hypertension Medications              hydroCHLOROthiazide 12.5 MG Tab Take 1 tablet (12.5 mg total) by mouth once daily.    lisinopriL (PRINIVIL,ZESTRIL) 20 MG tablet Take 1 tablet (20 mg total) by mouth once daily.    metoprolol succinate (TOPROL-XL) 50 MG 24 hr tablet Take 1 tablet (50 mg total) by mouth once daily.    NIFEdipine (PROCARDIA-XL) 90 MG (OSM) 24 hr tablet Take 1 tablet (90 mg total) by mouth once daily.    nitroGLYCERIN 0.2 mg/hr TD PT24 (NITRODUR) 0.2 mg/hr APPLY 1 PATCH TOPICALLY ONCE DAILY    nitroGLYCERIN (NITROSTAT) 0.4 MG SL tablet Place 0.4 mg under the tongue every 5 (five) minutes as needed for Chest pain.            While in the hospital, will manage blood pressure as follows; Continue home antihypertensive regimen    Will utilize p.r.n. blood pressure medication only if patient's blood pressure greater than  160/90 and he develops symptoms such as worsening chest pain or shortness of breath.      Hyperlipidemia associated with type 2 diabetes mellitus  Patient is chronically on statin.will continue for now. Last Lipid Panel:   Lab Results   Component Value Date    CHOL 117 (L) 03/25/2025    HDL 43 03/25/2025    LDLCALC 35.8 (L) 03/25/2025    TRIG 191 (H) 03/25/2025    CHOLHDL 36.8 03/25/2025     Plan:  -Continue home medication  -low fat/low calorie diet      Type 2 diabetes mellitus without complication, without long-term current use of insulin  Patient's FSGs are controlled on current medication regimen.  Last A1c reviewed-   Lab Results   Component Value Date    HGBA1C 5.9 (H) 05/12/2025     Most recent fingerstick glucose reviewed-   Recent Labs   Lab 05/13/25  2231 05/14/25  0646   POCTGLUCOSE 175* 131*     Current correctional scale  Low  Maintain anti-hyperglycemic dose as follows-   Antihyperglycemics (From admission, onward)      None          Plan:  -[facility]  -Accu-checks  -Hold  oral hypoglycemics while patient is in the hospital  -Hypoglycemic protocol        Final Active Diagnoses:    Diagnosis Date Noted POA    PRINCIPAL PROBLEM:  Chest pain [R07.9] 05/13/2025 Yes    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 [I25.110] 04/24/2014 Yes    Hypertension associated with diabetes [E11.59, I15.2] 04/24/2014 Yes    Hyperlipidemia associated with type 2 diabetes mellitus [E11.69, E78.5] 04/24/2014 Yes    Type 2 diabetes mellitus without complication, without long-term current use of insulin [E11.9] 04/24/2014 Yes      Problems Resolved During this Admission:       Discharged Condition: stable    Disposition: Home-Health Care [facility]    Follow Up:   Follow-up Information       Hiro Kraft MD. Schedule an appointment as soon as possible for a visit in 3 day(s).    Specialty: Internal Medicine  Why: hospital follow up  Contact information:  [id number]  [place] [address]  [phone]               Kimberly Bennett MD. Schedule an appointment as soon as possible for a visit in 1 week(s).    Specialties: Interventional Cardiology, Cardiology  Why: hospital follow up  Contact information:  [address]  [place] [address]  [phone]                           Patient Instructions:      Ambulatory referral/consult to [facility]     Ambulatory referral/consult to Home Health   Standing Status: Future   Referral Priority: Routine Referral Type: Home Health   Referral Reason: Specialty Services Required   Requested Specialty: Home Health Services   Number of Visits Requested: 1     Diet Cardiac     Activity as tolerated       Significant Diagnostic Studies: Labs: CMP   Recent Labs   Lab 05/13/25  0137 05/14/25  0435    139   K 3.6 3.4*    105   CO2 25 24    115*   BUN 23 16   CREATININE 1.4 1.1   CALCIUM 9.4 8.6*   PROT 7.4  --    ALBUMIN 4.0  --    BILITOT 0.6  --    ALKPHOS 91  --    AST 17  --    ALT 14   --    ANIONGAP 10 10   , CBC   Recent Labs   Lab 05/13/25  0137 05/14/25  0435   WBC 10.12 6.03   HGB 12.1* 11.2*   HCT 35.5* 32.7*    161   , INR   Lab Results   Component Value Date    INR 1.0 04/04/2024    INR 1.0 03/31/2024    INR 0.9 03/16/2024   , Troponin   Recent Labs   Lab 05/13/25  0137 05/13/25  0340 05/13/25  0700   TROPONINI <0.006 0.006 <0.006   , and All labs within the past 24 hours have been reviewed  Radiology: X-Ray: CXR: portable showing no acute finding  Angiography: cardiac catheterization with patent stents    Pending Diagnostic Studies:       None           Medications:  Reconciled Home Medications:      Medication List        START taking these medications      ranolazine 500 MG Tb12  Commonly known as: RANEXA  Take 1 tablet (500 mg total) by mouth 2 (two) times daily.            CONTINUE taking these medications      aspirin 81 MG EC tablet  Commonly known as: ECOTRIN  Take 1 tablet (81 mg total) by mouth once daily.     atorvastatin 40 MG tablet  Commonly known as: LIPITOR  Take 1 tablet (40 mg total) by mouth once daily.     calcipotriene 0.005 % cream  Commonly known as: DOVONEX  Apply topically 2 (two) times daily. Mix with efudex cream and AAA on scalp twice daily for 2 weeks at a time     coenzyme Q10 200 mg capsule  Take 200 mg by mouth 2 (two) times daily.     cyanocobalamin 1000 MCG tablet  Commonly known as: VITAMIN B-12  Take 1,000 mcg by mouth once daily.     fluorouraciL 5 % cream  Commonly known as: EFUDEX  Mix with calcipotriene cream and AAA on scalp twice daily for 2 weeks at a time     hydroCHLOROthiazide 12.5 MG Tab  Take 1 tablet (12.5 mg total) by mouth once daily.     lisinopriL 20 MG tablet  Commonly known as: PRINIVIL,ZESTRIL  Take 1 tablet (20 mg total) by mouth once daily.     magnesium oxide 400 mg (241.3 mg magnesium) tablet  Commonly known as: MAG-OX  Take 400 mg by mouth once daily.     metFORMIN 1000 MG tablet  Commonly known as: GLUCOPHAGE  Take 1  tablet (1,000 mg total) by mouth 2 (two) times daily with meals.     metoprolol succinate 50 MG 24 hr tablet  Commonly known as: TOPROL-XL  Take 1 tablet (50 mg total) by mouth once daily.     NIFEdipine 90 MG (OSM) 24 hr tablet  Commonly known as: PROCARDIA-XL  Take 1 tablet (90 mg total) by mouth once daily.     * nitroGLYCERIN 0.4 MG SL tablet  Commonly known as: NITROSTAT  Place 0.4 mg under the tongue every 5 (five) minutes as needed for Chest pain.     * nitroGLYCERIN 0.2 mg/hr TD PT24 0.2 mg/hr  Commonly known as: NITRODUR  APPLY 1 PATCH TOPICALLY ONCE DAILY     pantoprazole 20 MG tablet  Commonly known as: PROTONIX  Take 1 tablet by mouth once daily     prasugreL HCl 10 mg Tab  Commonly known as: EFFIENT  Take 1 tablet (10 mg total) by mouth once daily.     tamsulosin 0.4 mg Cap  Commonly known as: FLOMAX  Take 1 capsule (0.4 mg total) by mouth once daily.           * This list has 2 medication(s) that are the same as other medications prescribed for you. Read the directions carefully, and ask your doctor or other care provider to review them with you.                STOP taking these medications      vitamin D 1000 units Tab  Commonly known as: VITAMIN D3              Indwelling Lines/Drains at time of discharge:   Lines/Drains/Airways       None                   Time spent on the discharge of patient: 35 minutes         Randi Thomas MD  Department of Hospital Medicine  '[facility] Surg

## 2025-05-14 NOTE — DISCHARGE INSTRUCTIONS
"Post-op Heart Catheterization    DO NOT RESTART METFORMIN UNTIL THE MORNING OF SATURDAY MAY 17th    1. DIET: It is advisable for you to follow a diet that limits the intake of salt, sugar, saturated fats and cholesterol.     2. DRIVING: Due to sedation you received during your procedure, DO NOT drive or operate machinery for 24 hours. Avoid making critical decisions or signing legal documents until tomorrow.    3. ACTIVITY: AVOID activities that require bending of the affected arm/wrist for 3 days and submerging the site in water for 3 days.   REMOVE the dressing the 24 hours after it was placed and shower.    Wash with soap and water in hand; do not use wash cloth.  Apply a bandaid to site after shower if desired.    No ointments, lotions, powders, colognes, ... for 3 days.  WEAR wrist immobilizer until Thursday morning.  No pushing, pulling, or lifting more than 10 pounds for 3 days  No riding motorcycles, riding lawn mowers, using push mowers or weed eaters... for 5 days.  You may RESUME your normal activities or prescribed exercise program as instructed by your physician after 5 days.                                                                                                       4. WOUND CARE: It is not unusual to have a small amount of bruising to appear at or near the puncture site. It is also common to have a tender "knot" develop beneath the skin at the puncture site of the wrist/arm. This is usually scar tissue and is not a cause for concern or alarm. This tender knot may take several weeks to fully resolve. The bruise will usually spread over several days. However, if the lump gets bigger, call your doctor immediately.    5. DISCOMFORT: For general discomfort at the puncture site, you may take 1 or 2 Acetaminophen (Tylenol) tablets every 4 - 6 hours as needed. (Do not take more than 4000 mg a day)    6. SIGNS AND SYMPTOMS TO REPORT:  Call your physician immediately if any of the following occur:      " "                                      1. Loss of feeling, warmth or color to the affected arm/wrist                                                                                                            2. Mild bleeding from the site                 3. Pain that is sudden, sharp or persistent in the affected arm/wrist                 4. Swelling or a change in "lump" size, increased redness or drainage at the puncture site                                                                               5. High fever (101 degrees or higher)    7. GO TO  THE EMERGENCY ROOM OR CALL 911 IF YOU HAVE: Chest pains or discomforts not relieved with 3 nitroglycerin doses (sublingual tablets or spray), numbness or severe pain of limb, if your limb becomes cold or discolored or if you develop uncontrolled bleeding from the puncture site (quickly apply firm, direct pressure above the site.)                                                                                                          DO NOT RESTART METFORMIN UNTIL THE MORNING OF SATURDAY MAY 17th  "

## 2025-05-14 NOTE — ASSESSMENT & PLAN NOTE
Chronic, controlled.  Latest blood pressure and vitals reviewed-   Temp:  [97.7 °F (36.5 °C)-99.1 °F (37.3 °C)]   Pulse:  [51-89]   Resp:  [14-20]   BP: (101-148)/(58-82)   SpO2:  [92 %-98 %] .   Home meds for hypertension were reviewed and noted below.   Hypertension Medications              hydroCHLOROthiazide 12.5 MG Tab Take 1 tablet (12.5 mg total) by mouth once daily.    lisinopriL (PRINIVIL,ZESTRIL) 20 MG tablet Take 1 tablet (20 mg total) by mouth once daily.    metoprolol succinate (TOPROL-XL) 50 MG 24 hr tablet Take 1 tablet (50 mg total) by mouth once daily.    NIFEdipine (PROCARDIA-XL) 90 MG (OSM) 24 hr tablet Take 1 tablet (90 mg total) by mouth once daily.    nitroGLYCERIN 0.2 mg/hr TD PT24 (NITRODUR) 0.2 mg/hr APPLY 1 PATCH TOPICALLY ONCE DAILY    nitroGLYCERIN (NITROSTAT) 0.4 MG SL tablet Place 0.4 mg under the tongue every 5 (five) minutes as needed for Chest pain.            While in the hospital, will manage blood pressure as follows; Continue home antihypertensive regimen    Will utilize p.r.n. blood pressure medication only if patient's blood pressure greater than  160/90 and he develops symptoms such as worsening chest pain or shortness of breath.

## 2025-05-14 NOTE — ASSESSMENT & PLAN NOTE
Patient's FSGs are controlled on current medication regimen.  Last A1c reviewed-   Lab Results   Component Value Date    HGBA1C 5.9 (H) 05/12/2025     Most recent fingerstick glucose reviewed-   Recent Labs   Lab 05/13/25  2231 05/14/25  0646   POCTGLUCOSE 175* 131*     Current correctional scale  Low  Maintain anti-hyperglycemic dose as follows-   Antihyperglycemics (From admission, onward)      None          Plan:  -[facility]  -Accu-checks  -Hold oral hypoglycemics while patient is in the hospital  -Hypoglycemic protocol

## 2025-05-14 NOTE — PLAN OF CARE
[facility] - Med Surg  Discharge Final Note    Primary Care Provider: Hiro Kraft MD    Expected Discharge Date: 5/14/2025    Final Discharge Note (most recent)       Final Note - 05/14/25 0945          Final Note    Assessment Type Final Discharge Note     Anticipated Discharge Disposition Home or Self Care     Hospital Resources/Appts/Education Provided Appointments scheduled and added to AVS

## 2025-05-14 NOTE — PLAN OF CARE
Pt being discharged Home in stable condition. IV removed, catheter intact, pt tolerated well. Tele monitor removed, given to US. Discharge instructions given to pt and went over instructions from heart cath, pt verbalized understanding.

## 2025-05-14 NOTE — HOSPITAL COURSE
5/14  Admitted for unstable angina with significant cardiac history. Cardiology performed cardiac catheterization with patent stents. Advised follow up outpatient for continued workup if chest pain persists. Patient denies chest pain.    No acute distress. No respiratory distress. On room air. AO3. Self ambulating. Wife present    Patient seen and evaluated by me. Patient was determined to be suitable for discharge. Patient deemed stable for discharge to home with homehealth physical/occupational therapy and nurse practitioner to visit home program. Patient and family declined.    Ranexa prescribed by cardiology. This was sent to pharmacy on file. Patient reports picking up the prescription but had a bad reaction when taking. Reviewed records and ranexa prescribed approximately one year ago and then discontinued.    Advised to follow up with primary cardiologist to discuss before starting.

## 2025-05-15 ENCOUNTER — TELEPHONE (OUTPATIENT)
Dept: CARDIOLOGY | Facility: CLINIC | Age: 75
End: 2025-05-15
Payer: MEDICARE

## 2025-05-15 VITALS
BODY MASS INDEX: 31.66 KG/M2 | OXYGEN SATURATION: 95 % | HEIGHT: 70 IN | TEMPERATURE: 98 F | DIASTOLIC BLOOD PRESSURE: 77 MMHG | HEART RATE: 71 BPM | RESPIRATION RATE: 19 BRPM | WEIGHT: 221.13 LBS | SYSTOLIC BLOOD PRESSURE: 141 MMHG

## 2025-05-15 NOTE — TELEPHONE ENCOUNTER
Spoke with patient scheduled hosp f/u .    ----- Message from Ángela sent at 5/15/2025 11:15 AM CDT -----  Type:  Sooner Apoointment RequestCaller is requesting a sooner appointment.  Caller declined first available appointment listed below.  Caller will not accept being placed on the waitlist and is requesting a message be sent to doctor.Name of Caller:RajeshAdelia is the first available appointment? No slots availableSymptoms:follow up from a procedure that he had on 5/12/25Would the patient rather a call back or a response via [facility]? Please call [facility] Call Back Number:[phone] Information: patient had a heart catheter

## 2025-05-16 ENCOUNTER — OFFICE VISIT (OUTPATIENT)
Dept: CARDIOLOGY | Facility: CLINIC | Age: 75
End: 2025-05-16
Payer: MEDICARE

## 2025-05-16 ENCOUNTER — PATIENT OUTREACH (OUTPATIENT)
Dept: ADMINISTRATIVE | Facility: CLINIC | Age: 75
End: 2025-05-16
Payer: MEDICARE

## 2025-05-16 ENCOUNTER — TELEPHONE (OUTPATIENT)
Dept: HOME HEALTH SERVICES | Facility: CLINIC | Age: 75
End: 2025-05-16
Payer: MEDICARE

## 2025-05-16 VITALS
SYSTOLIC BLOOD PRESSURE: 128 MMHG | WEIGHT: 222 LBS | HEIGHT: 70 IN | DIASTOLIC BLOOD PRESSURE: 72 MMHG | BODY MASS INDEX: 31.78 KG/M2 | HEART RATE: 70 BPM | OXYGEN SATURATION: 97 %

## 2025-05-16 DIAGNOSIS — I25.110 CORONARY ARTERY DISEASE INVOLVING NATIVE CORONARY ARTERY OF NATIVE HEART WITH UNSTABLE ANGINA PECTORIS: Primary | ICD-10-CM

## 2025-05-16 DIAGNOSIS — E11.59 HYPERTENSION ASSOCIATED WITH DIABETES: ICD-10-CM

## 2025-05-16 DIAGNOSIS — I15.2 HYPERTENSION ASSOCIATED WITH DIABETES: ICD-10-CM

## 2025-05-16 DIAGNOSIS — E11.9 TYPE 2 DIABETES MELLITUS WITHOUT COMPLICATION, WITHOUT LONG-TERM CURRENT USE OF INSULIN: ICD-10-CM

## 2025-05-16 DIAGNOSIS — E78.5 HYPERLIPIDEMIA ASSOCIATED WITH TYPE 2 DIABETES MELLITUS: ICD-10-CM

## 2025-05-16 DIAGNOSIS — Z95.1 S/P CABG (CORONARY ARTERY BYPASS GRAFT): ICD-10-CM

## 2025-05-16 DIAGNOSIS — E11.69 HYPERLIPIDEMIA ASSOCIATED WITH TYPE 2 DIABETES MELLITUS: ICD-10-CM

## 2025-05-16 PROCEDURE — 99999 PR PBB SHADOW E&M-EST. PATIENT-LVL III: CPT | Mod: PBBFAC,,,

## 2025-05-16 NOTE — PROGRESS NOTES
Subjective:   Patient ID:  Deniz Paulino is a 74 y.o. male who presents for hospital follow up following recent LHC and admission for chest pain.     Hospital course: 5/13-5/14/25  Deniz Paulino is a 74 y.o. male with a PMH  has a past medical history of Acute coronary syndrome, Coronary artery disease, Diabetes mellitus (04/24/2014), Effort angina (04/30/2023), Hyperlipidemia, Hypertension, Old MI (myocardial infarction) (04/24/2014), Preop cardiovascular exam (11/27/2024), and S/P CABG (coronary artery bypass graft) (04/24/2014). presented to the Emergency Department for evaluation of chest pain which began around 10 PM. Pt states he was resting when the sxs began. Laying down and taking deep breaths worsens the pain. Symptoms are constant and moderate in severity. Pt reports compliance with all medications. Associated sxs include shortness of breath. Patient denies any fever, cough, dizziness, nausea, vomiting, palpitations, or diaphoresis. Prior Tx includes nitro without improvement.  No further complaints or concerns at this time.      ER workup revealed CBC to be unremarkable.  CMP unremarkable.  .  Troponin negative x2.  Chest x-ray negative for acute findings.  EKG sinus rhythm with occasional PVCs with prolonged QT with a ventricular rate of 60 beats per minute and a QT/QTC of 480/40.  BP stable with heart rate in the 50s with history of being on beta-blocker.  Patient received 4 mg morphine and 1 in nitroglycerin paste in ER.  Hospital Medicine consulted to admit patient for further cardiac workup for chest pain.  Patient in agreement with treatment plan.  Patient admitted under observation status.  Cardiology consulted for chest pain. Pt resting in bed with no acute distress. Reports having active chest pain at this time. Chest pain started last night while getting ready for bed. No relief with sublingual nitro or morphine given in ED. He did have LHC in April 2024 by Dr. Bennett with  intervention to Mid Graft lesion (Saphenous Graft To RPDA). No recent viral illness. Pain and SOB seems to be worse when lying down. Describes the pain as tightness similar to finishing a long run. Remains NPO and gently hydration started.     5/16/2025  73 yo male presents for follow up after recent hospital admission and LHC. His current medical conditions include CAD with hx of CABG, hypertension, hyperlipidemia, and DM Type II.   Patient underwent LHC which revealed an occluded LAD, 40% stenosis of Lcx, occluded svg to diag, patient lopez to lad, patent svg to pda, EF 50%, inf akinesis.   He reports he is overall doing well. He has had no further chest pain or shortness of breath since the day of discharge. He denies palpitations, pre-syncope, syncope, leg edema and PND. Does use 2 pillows due to sinuses.   He was started on Ranexa, but he did not tolerate this a year ago so he is not currently taking this. He was also taken off of brilinta at the same time, so he is unsure which medication he was reacting to. He is interested in potentially trying this again.   His left radial access site is healing well. 2+ left radial pulse. No hematoma or pulsatile mass.   Tolerating all medications well. He is compliant with medications.     Past Medical History:   Diagnosis Date    Acute coronary syndrome     Coronary artery disease     Diabetes mellitus 04/24/2014    Effort angina 04/30/2023    Hyperlipidemia     Hypertension     Old MI (myocardial infarction) 04/24/2014    Preop cardiovascular exam 11/27/2024    S/P CABG (coronary artery bypass graft) 04/24/2014       Past Surgical History:   Procedure Laterality Date    CARDIAC CATHETERIZATION      COLONOSCOPY N/A 07/26/2019    Procedure: COLONOSCOPY;  Surgeon: Opal Oshea MD;  Location: [facility] ENDO;  Service: Endoscopy;  Laterality: N/A;    COLONOSCOPY, SCREENING, LOW RISK PATIENT N/A 12/17/2024    Procedure: COLONOSCOPY, SCREENING, LOW RISK PATIENT 11/27-[facility]  rec'd-Ok to stop asa effient 5-7 days;  Surgeon: Carol Pinon MD;  Location: [facility] ENDO;  Service: Colon and Rectal;  Laterality: N/A;    CORONARY ANGIOGRAPHY INCLUDING BYPASS GRAFTS WITH CATHETERIZATION OF LEFT HEART N/A 5/13/2025    Procedure: ANGIOGRAM, CORONARY, INCLUDING BYPASS GRAFT, WITH LEFT HEART CATHETERIZATION;  Surgeon: Kimberly Bennett MD;  Location: [facility] CATH LAB;  Service: Cardiology;  Laterality: N/A;    CORONARY ARTERY BYPASS GRAFT      CORONARY BYPASS GRAFT ANGIOGRAPHY  5/1/2023    Procedure: Bypass graft study;  Surgeon: Kimberly Bennett MD;  Location: [facility] CATH LAB;  Service: Cardiology;;    ESOPHAGOGASTRODUODENOSCOPY N/A 07/26/2019    Procedure: ESOPHAGOGASTRODUODENOSCOPY (EGD);  Surgeon: Opal Oshea MD;  Location: [facility] ENDO;  Service: Endoscopy;  Laterality: N/A;    INSTANTANEOUS WAVE-FREE RATIO (IFR) N/A 4/4/2024    Procedure: Instantaneous Wave-Free Ratio (IFR);  Surgeon: Kimberly Bennett MD;  Location: [facility] CATH LAB;  Service: Cardiology;  Laterality: N/A;    LEFT HEART CATHETERIZATION Left 5/1/2023    Procedure: Left heart cath;  Surgeon: Kimberly Bennett MD;  Location: [facility] CATH LAB;  Service: Cardiology;  Laterality: Left;    LEFT HEART CATHETERIZATION Left 4/4/2024    Procedure: Left heart cath;  Surgeon: Kimberly Bennett MD;  Location: [facility] CATH LAB;  Service: Cardiology;  Laterality: Left;    LEFT HEART CATHETERIZATION Left 5/13/2025    Procedure: Left heart cath;  Surgeon: Kimberly Bennett MD;  Location: [facility] CATH LAB;  Service: Cardiology;  Laterality: Left;    PROTECTION, CORONARY, FILTER  4/4/2024    Procedure: Protection, Coronary, Filter;  Surgeon: Kimberly Bennett MD;  Location: [facility] CATH LAB;  Service: Cardiology;;    PTCA, SINGLE VESSEL  4/4/2024    Procedure: PTCA, Single Vessel;  Surgeon: Kimberly Bennett MD;  Location: [facility] CATH LAB;  Service: Cardiology;;    SKIN CANCER EXCISION  09/2018    STENT, DRUG ELUTING, SINGLE VESSEL, CORONARY  4/4/2024    Procedure:  Stent, Drug Eluting, Single Vessel, Coronary;  Surgeon: Kimberly Bennett MD;  Location: [facility] CATH LAB;  Service: Cardiology;;    VASECTOMY         Social History[1]    Family History   Problem Relation Name Age of Onset    Diabetes Mother Mother     Heart murmur Mother Mother     Cancer Mother Mother         Breast    Stroke Mother Mother     Alcohol abuse Father Father     Heart attack Father Father 63    Cancer Father Father         Esophageal    Diabetes Brother Frank        Wt Readings from Last 3 Encounters:   05/16/25 100.7 kg (222 lb 0.1 oz)   05/13/25 100.3 kg (221 lb 1.9 oz)   04/07/25 101 kg (222 lb 10.6 oz)     Temp Readings from Last 3 Encounters:   05/14/25 98.4 °F (36.9 °C) (Oral)   02/19/25 97.7 °F (36.5 °C)   12/17/24 97.7 °F (36.5 °C) (Temporal)     BP Readings from Last 3 Encounters:   05/16/25 128/72   05/14/25 (!) 141/77   04/07/25 132/80     Pulse Readings from Last 3 Encounters:   05/16/25 70   05/14/25 71   04/07/25 (!) 55       Medications Ordered Prior to Encounter[2]    No cardiac monitor results found for the past 12 months    Results for orders placed during the hospital encounter of 04/01/25    Echo    Interpretation Summary    Left Ventricle: The left ventricle is normal in size. Regional wall motion abnormalities present. There is mildly reduced systolic function with a visually estimated ejection fraction of 45 - 50%. Grade I diastolic dysfunction.    Right Ventricle: The right ventricle is normal in size. Systolic function is normal.    Left Atrium: Mildly dilated    Mitral Valve: There is mild regurgitation.    IVC/SVC: Normal venous pressure at 3 mmHg.    Results for orders placed during the hospital encounter of 03/16/24    Nuclear Stress - Cardiology Interpreted    Interpretation Summary    There are two significant perfusion abnormalities.    Perfusion Abnormality #1 - There is a moderate intensity, fixed perfusion abnormality consistent with scar in the apical  wall(s).     Perfusion Abnormality #2 - There is a mild to moderate intensity, fixed perfusion abnormality consistent with scar in the basal to mid inferoseptal wall(s).    The gated perfusion images showed an ejection fraction of 65% at rest. The gated perfusion images showed an ejection fraction of 50% post stress. Normal ejection fraction is greater than 59%.    There is moderate apical hypokinesis at rest and stress.    LV cavity size is normal at rest and normal at stress.    The ECG portion of the study is negative for ischemia.        Results for orders placed or performed during the hospital encounter of 05/13/25   EKG 12-lead    Collection Time: 05/13/25  4:29 AM   Result Value Ref Range    QRS Duration 94 ms    [facility] QTC Calculation 480 ms    Narrative    Test Reason : R07.9,    Vent. Rate :  60 BPM     Atrial Rate :  60 BPM     P-R Int : 132 ms          QRS Dur :  94 ms      QT Int : 480 ms       P-R-T Axes :  36 -25  77 degrees    QTcB Int : 480 ms    Sinus rhythm with occasional Premature ventricular complexes  Low voltage QRS  Prolonged QT  Abnormal ECG  No previous ECGs available    Referred By: AAAREFERRAL SELF           Confirmed By:          Review of Systems   Constitutional: Negative.   HENT: Negative.     Eyes: Negative.    Cardiovascular:  Negative for chest pain, dyspnea on exertion, leg swelling, near-syncope, palpitations and syncope.   Respiratory: Negative.     Skin: Negative.    Musculoskeletal: Negative.    Gastrointestinal: Negative.    Genitourinary: Negative.    Neurological: Negative.    Psychiatric/Behavioral: Negative.           Physical Exam  Vitals and nursing note reviewed.   Constitutional:       Appearance: Normal appearance.   HENT:      Head: Normocephalic and atraumatic.   Eyes:      General:         Right eye: No discharge.         Left eye: No discharge.      Pupils: Pupils are equal, round, and reactive to light.   Cardiovascular:      Rate and Rhythm: Normal rate. Rhythm irregular.  Extrasystoles are present.     Pulses:           Radial pulses are 2+ on the left side.      Heart sounds: S1 normal and S2 normal. No murmur heard.     No friction rub.      Comments: Left radial access site healing well. No hematoma or pulsatile mass.   Pulmonary:      Effort: Pulmonary effort is normal. No respiratory distress.      Breath sounds: Normal breath sounds. No rales.   Abdominal:      Palpations: Abdomen is soft.      Tenderness: There is no abdominal tenderness.   Musculoskeletal:      Cervical back: Neck supple.      Right lower leg: No edema.      Left lower leg: No edema.   Skin:     General: Skin is warm and dry.   Neurological:      General: No focal deficit present.      Mental Status: He is alert and oriented to person, place, and time.   Psychiatric:         Mood and Affect: Mood normal.         Behavior: Behavior normal.         Thought Content: Thought content normal.         Lab Results   Component Value Date    CHOL 117 (L) 03/25/2025    CHOL 136 11/13/2024    CHOL 137 08/27/2024     Lab Results   Component Value Date    HDL 43 03/25/2025    HDL 51 11/13/2024    HDL 53 08/27/2024     Lab Results   Component Value Date    LDLCALC 35.8 (L) 03/25/2025    LDLCALC 56.4 (L) 11/13/2024    LDLCALC 52 08/27/2024     Lab Results   Component Value Date    TRIG 191 (H) 03/25/2025    TRIG 143 11/13/2024    TRIG 156 08/27/2024     Lab Results   Component Value Date    CHOLHDL 36.8 03/25/2025    CHOLHDL 37.5 11/13/2024    CHOLHDL 40.7 03/16/2024       Chemistry        Component Value Date/Time     05/14/2025 0435     02/12/2025 1016    K 3.4 (L) 05/14/2025 0435    K 4.3 02/12/2025 1016     05/14/2025 0435     02/12/2025 1016    CO2 24 05/14/2025 0435    CO2 24 02/12/2025 1016    BUN 16 05/14/2025 0435    CREATININE 1.1 05/14/2025 0435     (H) 05/14/2025 0435    GLU 99 02/12/2025 1016        Component Value Date/Time    CALCIUM 8.6 (L) 05/14/2025 0435    CALCIUM 9.2  02/12/2025 1016    ALKPHOS 91 05/13/2025 0137    ALKPHOS 78 11/13/2024 0816    AST 17 05/13/2025 0137    AST 20 11/13/2024 0816    ALT 14 05/13/2025 0137    ALT 16 11/13/2024 0816    BILITOT 0.6 05/13/2025 0137    BILITOT 0.6 11/13/2024 0816    ESTGFRAFRICA >60.0 05/04/2022 0806    ESTGFRAFRICA >60.0 05/04/2022 0806    EGFRNONAA >60.0 05/04/2022 0806    EGFRNONAA >60.0 05/04/2022 0806          Lab Results   Component Value Date    TSH 2.179 11/13/2024     Lab Results   Component Value Date    INR 1.0 04/04/2024    INR 1.0 03/31/2024    INR 0.9 03/16/2024     Lab Results   Component Value Date    WBC 6.03 05/14/2025    HGB 11.2 (L) 05/14/2025    HCT 32.7 (L) 05/14/2025    MCV 92 05/14/2025     05/14/2025       Assessment:      1. Coronary artery disease involving native coronary artery of native heart with unstable angina pectoris    2. Hypertension associated with diabetes    3. S/P CABG (coronary artery bypass graft)    4. Hyperlipidemia associated with type 2 diabetes mellitus    5. Type 2 diabetes mellitus without complication, without long-term current use of insulin        Plan:   Coronary artery disease involving native coronary artery of native heart with unstable angina pectoris    Hypertension associated with diabetes    S/P CABG (coronary artery bypass graft)    Hyperlipidemia associated with type 2 diabetes mellitus    Type 2 diabetes mellitus without complication, without long-term current use of insulin        Continue Asa, effient, lipitor, toprol - CAD   Continue Lisinopril, hctz, nifedipine - HTN  Exercise as tolerated, low Na diet, low fat diet  Keep scheduled appointment with Dr. Bennett in July     Shadia Chan PA-C  [facility]          [1]   Social History  Tobacco Use    Smoking status: Former     Current packs/day: 0.00     Average packs/day: 1 pack/day for 30.0 years (30.0 ttl pk-yrs)     Types: Cigarettes     Start date: 2/1/1973     Quit date: 2/1/2003     Years since quitting:  22.3    Smokeless tobacco: Former   Substance Use Topics    Alcohol use: Yes     Alcohol/week: 16.0 standard drinks of alcohol     Types: 10 Glasses of wine, 6 Cans of beer per week     Comment: socially    Drug use: Never   [2]   Current Outpatient Medications on File Prior to Visit   Medication Sig Dispense Refill    aspirin (ECOTRIN) 81 MG EC tablet Take 1 tablet (81 mg total) by mouth once daily. 30 tablet 6    atorvastatin (LIPITOR) 40 MG tablet Take 1 tablet (40 mg total) by mouth once daily. 90 tablet 3    calcipotriene (DOVONEX) 0.005 % cream Apply topically 2 (two) times daily. Mix with efudex cream and AAA on scalp twice daily for 2 weeks at a time 60 g 1    coenzyme Q10 200 mg capsule Take 200 mg by mouth 2 (two) times daily.      cyanocobalamin (VITAMIN B-12) 1000 MCG tablet Take 1,000 mcg by mouth once daily.      fluorouraciL (EFUDEX) 5 % cream Mix with calcipotriene cream and AAA on scalp twice daily for 2 weeks at a time 40 g 1    hydroCHLOROthiazide 12.5 MG Tab Take 1 tablet (12.5 mg total) by mouth once daily. 30 tablet 11    lisinopriL (PRINIVIL,ZESTRIL) 20 MG tablet Take 1 tablet (20 mg total) by mouth once daily. 90 tablet 3    magnesium oxide (MAG-OX) 400 mg (241.3 mg magnesium) tablet Take 400 mg by mouth once daily.      metFORMIN (GLUCOPHAGE) 1000 MG tablet Take 1 tablet (1,000 mg total) by mouth 2 (two) times daily with meals. 180 tablet 2    metoprolol succinate (TOPROL-XL) 50 MG 24 hr tablet Take 1 tablet (50 mg total) by mouth once daily. 90 tablet 3    NIFEdipine (PROCARDIA-XL) 90 MG (OSM) 24 hr tablet Take 1 tablet (90 mg total) by mouth once daily. 30 tablet 11    nitroGLYCERIN (NITROSTAT) 0.4 MG SL tablet Place 0.4 mg under the tongue every 5 (five) minutes as needed for Chest pain.      nitroGLYCERIN 0.2 mg/hr TD PT24 (NITRODUR) 0.2 mg/hr APPLY 1 PATCH TOPICALLY ONCE DAILY 30 patch 6    pantoprazole (PROTONIX) 20 MG tablet Take 1 tablet by mouth once daily 90 tablet 3    prasugreL  (EFFIENT) 10 mg Tab Take 1 tablet (10 mg total) by mouth once daily. 30 tablet 11    tamsulosin (FLOMAX) 0.4 mg Cap Take 1 capsule (0.4 mg total) by mouth once daily. 90 capsule 3    [DISCONTINUED] ranolazine (RANEXA) 500 MG Tb12 Take 1 tablet (500 mg total) by mouth 2 (two) times daily. 60 tablet 0     No current facility-administered medications on file prior to visit.

## 2025-05-16 NOTE — TELEPHONE ENCOUNTER
Contacted pt to schedule an appointment regarding a referral placed for a tcc home visit with a nurse practitioner. Scheduling declined.    Patient stated he is going see Cardiologsit today and does not need homecare

## 2025-05-19 ENCOUNTER — OFFICE VISIT (OUTPATIENT)
Dept: INTERNAL MEDICINE | Facility: CLINIC | Age: 75
End: 2025-05-19
Payer: MEDICARE

## 2025-05-19 VITALS
BODY MASS INDEX: 32.48 KG/M2 | SYSTOLIC BLOOD PRESSURE: 110 MMHG | HEART RATE: 57 BPM | DIASTOLIC BLOOD PRESSURE: 62 MMHG | TEMPERATURE: 98 F | OXYGEN SATURATION: 98 % | WEIGHT: 226.88 LBS | HEIGHT: 70 IN

## 2025-05-19 DIAGNOSIS — E83.42 HYPOMAGNESEMIA: ICD-10-CM

## 2025-05-19 DIAGNOSIS — I25.110 CORONARY ARTERY DISEASE INVOLVING NATIVE CORONARY ARTERY OF NATIVE HEART WITH UNSTABLE ANGINA PECTORIS: Primary | ICD-10-CM

## 2025-05-19 DIAGNOSIS — I15.2 HYPERTENSION ASSOCIATED WITH DIABETES: ICD-10-CM

## 2025-05-19 DIAGNOSIS — Z95.1 S/P CABG (CORONARY ARTERY BYPASS GRAFT): ICD-10-CM

## 2025-05-19 DIAGNOSIS — E78.5 HYPERLIPIDEMIA ASSOCIATED WITH TYPE 2 DIABETES MELLITUS: ICD-10-CM

## 2025-05-19 DIAGNOSIS — E11.59 HYPERTENSION ASSOCIATED WITH DIABETES: ICD-10-CM

## 2025-05-19 DIAGNOSIS — E11.69 HYPERLIPIDEMIA ASSOCIATED WITH TYPE 2 DIABETES MELLITUS: ICD-10-CM

## 2025-05-19 DIAGNOSIS — E11.9 TYPE 2 DIABETES MELLITUS WITHOUT COMPLICATION, WITHOUT LONG-TERM CURRENT USE OF INSULIN: ICD-10-CM

## 2025-05-19 PROCEDURE — 99999 PR PBB SHADOW E&M-EST. PATIENT-LVL IV: CPT | Mod: PBBFAC,,, | Performed by: INTERNAL MEDICINE

## 2025-05-19 RX ORDER — METFORMIN HYDROCHLORIDE 1000 MG/1
1000 TABLET ORAL 2 TIMES DAILY WITH MEALS
Qty: 180 TABLET | Refills: 2 | Status: SHIPPED | OUTPATIENT
Start: 2025-05-19

## 2025-05-19 RX ORDER — RANOLAZINE 500 MG/1
500 TABLET, EXTENDED RELEASE ORAL 2 TIMES DAILY
COMMUNITY

## 2025-05-19 NOTE — PROGRESS NOTES
Subjective:      Patient ID: Deniz Paulino is a 74 y.o. male.    Chief Complaint: Follow-up    Follow-up  Pertinent negatives include no abdominal pain, chest pain, chills, coughing, fever or sore throat.     History of Present Illness                   74 y.o. with  Problem List[1]  Past Medical History:   Diagnosis Date    Acute coronary syndrome     Coronary artery disease     Diabetes mellitus 04/24/2014    Effort angina 04/30/2023    Hyperlipidemia     Hypertension     Old MI (myocardial infarction) 04/24/2014    Preop cardiovascular exam 11/27/2024    S/P CABG (coronary artery bypass graft) 04/24/2014       Here today for annual prev exam.  Compliant with meds without significant side effects. Energy and appetite are good.         Past Surgical History:   Procedure Laterality Date    CARDIAC CATHETERIZATION      COLONOSCOPY N/A 07/26/2019    Procedure: COLONOSCOPY;  Surgeon: Opal Oshea MD;  Location: [facility];  Service: Endoscopy;  Laterality: N/A;    COLONOSCOPY, SCREENING, LOW RISK PATIENT N/A 12/17/2024    Procedure: COLONOSCOPY, SCREENING, LOW RISK PATIENT 11/27-clr rec'd-Ok to stop asa effient 5-7 days;  Surgeon: Carol Pinon MD;  Location: [facility];  Service: Colon and Rectal;  Laterality: N/A;    CORONARY ANGIOGRAPHY INCLUDING BYPASS GRAFTS WITH CATHETERIZATION OF LEFT HEART N/A 5/13/2025    Procedure: ANGIOGRAM, CORONARY, INCLUDING BYPASS GRAFT, WITH LEFT HEART CATHETERIZATION;  Surgeon: Kimberly Bennett MD;  Location: [facility] CATH LAB;  Service: Cardiology;  Laterality: N/A;    CORONARY ARTERY BYPASS GRAFT      CORONARY BYPASS GRAFT ANGIOGRAPHY  5/1/2023    Procedure: Bypass graft study;  Surgeon: Kimberly Bennett MD;  Location: [facility] CATH LAB;  Service: Cardiology;;    ESOPHAGOGASTRODUODENOSCOPY N/A 07/26/2019    Procedure: ESOPHAGOGASTRODUODENOSCOPY (EGD);  Surgeon: Opal Oshea MD;  Location: [facility];  Service: Endoscopy;  Laterality: N/A;    INSTANTANEOUS WAVE-FREE RATIO (IFR)  N/A 4/4/2024    Procedure: Instantaneous Wave-Free Ratio (IFR);  Surgeon: Kimberly Bennett MD;  Location: [facility] CATH LAB;  Service: Cardiology;  Laterality: N/A;    LEFT HEART CATHETERIZATION Left 5/1/2023    Procedure: Left heart cath;  Surgeon: Kimberly Bennett MD;  Location: [facility] CATH LAB;  Service: Cardiology;  Laterality: Left;    LEFT HEART CATHETERIZATION Left 4/4/2024    Procedure: Left heart cath;  Surgeon: Kimberly Bennett MD;  Location: [facility] CATH LAB;  Service: Cardiology;  Laterality: Left;    LEFT HEART CATHETERIZATION Left 5/13/2025    Procedure: Left heart cath;  Surgeon: Kimberly Bennett MD;  Location: [facility] CATH LAB;  Service: Cardiology;  Laterality: Left;    PROTECTION, CORONARY, FILTER  4/4/2024    Procedure: Protection, Coronary, Filter;  Surgeon: Kimberly Bennett MD;  Location: [facility] CATH LAB;  Service: Cardiology;;    PTCA, SINGLE VESSEL  4/4/2024    Procedure: PTCA, Single Vessel;  Surgeon: Kimberly Bennett MD;  Location: [facility] CATH LAB;  Service: Cardiology;;    SKIN CANCER EXCISION  09/2018    STENT, DRUG ELUTING, SINGLE VESSEL, CORONARY  4/4/2024    Procedure: Stent, Drug Eluting, Single Vessel, Coronary;  Surgeon: Kimberly Bennett MD;  Location: [facility] CATH LAB;  Service: Cardiology;;    VASECTOMY       Social History[2]  family history includes Alcohol abuse in his father; Cancer in his father and mother; Diabetes in his brother and mother; Heart attack (age of onset: 63) in his father; Heart murmur in his mother; Stroke in his mother.    Review of Systems   Constitutional:  Negative for chills and fever.   HENT:  Negative for ear pain and sore throat.    Respiratory:  Negative for cough.    Cardiovascular:  Negative for chest pain.   Gastrointestinal:  Negative for abdominal pain and blood in stool.   Genitourinary:  Negative for dysuria and hematuria.   Neurological:  Negative for seizures and syncope.     Objective:   /62 (BP Location: Left arm, Patient Position: Sitting)   Pulse (!)  "57   Temp 97.8 °F (36.6 °C)   Ht 5' 10" (1.778 m)   Wt 102.9 kg (226 lb 13.7 oz)   SpO2 98%   BMI 32.55 kg/m²     Physical Exam  Constitutional:       General: He is not in acute distress.     Appearance: He is well-developed.   HENT:      Head: Normocephalic and atraumatic.   Eyes:      Extraocular Movements: Extraocular movements intact.   Neck:      Thyroid: No thyromegaly.   Cardiovascular:      Rate and Rhythm: Normal rate and regular rhythm.   Pulmonary:      Breath sounds: Normal breath sounds. No wheezing or rales.   Abdominal:      General: Bowel sounds are normal.      Palpations: Abdomen is soft.      Tenderness: There is no abdominal tenderness.   Musculoskeletal:         General: No swelling.      Cervical back: Neck supple. No rigidity.   Lymphadenopathy:      Cervical: No cervical adenopathy.   Skin:     General: Skin is warm and dry.   Neurological:      Mental Status: He is alert and oriented to person, place, and time.   Psychiatric:         Behavior: Behavior normal.         Lab Results   Component Value Date    WBC 6.03 05/14/2025    HGB 11.2 (L) 05/14/2025    HGB 12.1 (L) 05/13/2025    HGB 12.2 (L) 03/25/2025    HCT 32.7 (L) 05/14/2025    MCV 92 05/14/2025    MCV 92 05/13/2025    MCV 96 03/25/2025     05/14/2025    CHOL 117 (L) 03/25/2025    TRIG 191 (H) 03/25/2025    HDL 43 03/25/2025    LDLCALC 35.8 (L) 03/25/2025    LDLCALC 56.4 (L) 11/13/2024    LDLCALC 52 08/27/2024    ALT 14 05/13/2025    AST 17 05/13/2025     05/14/2025    K 3.4 (L) 05/14/2025    CALCIUM 8.6 (L) 05/14/2025     05/14/2025    CO2 24 05/14/2025    BUN 16 05/14/2025    CREATININE 1.1 05/14/2025    CREATININE 1.4 05/13/2025    CREATININE 1.4 04/04/2025    EGFRNORACEVR >60 05/14/2025    EGFRNORACEVR 53 (L) 05/13/2025    EGFRNORACEVR 53 (L) 04/04/2025    TSH 2.179 11/13/2024    TSH 1.925 05/21/2024    TSH 1.516 11/10/2023    PSA 1.7 11/13/2024    PSA 1.3 11/10/2023    PSA 1.2 05/10/2023     (H) " 05/14/2025    HGBA1C 5.9 (H) 05/12/2025    HGBA1C 5.5 11/13/2024    HGBA1C 5.6 08/27/2024    [id number] 47 02/12/2025    [id number] 43 05/22/2017     (H) 05/13/2025          The ASCVD Risk score (Guillermo DK, et al., 2019) failed to calculate for the following reasons:    Risk score cannot be calculated because patient has a medical history suggesting prior/existing ASCVD     Assessment:     1. Coronary artery disease involving native coronary artery of native heart with unstable angina pectoris    2. Hyperlipidemia associated with type 2 diabetes mellitus    3. Hypertension associated with diabetes    4. Hypomagnesemia    5. S/P CABG (coronary artery bypass graft)    6. Type 2 diabetes mellitus without complication, without long-term current use of insulin      Plan:   1. Routine general medical examination at a health care facility  Overview:  Heart healthy diet, regular exercise, and regular use of sunscreen.   HM reviewed        2. Coronary artery disease involving native coronary artery of native heart with unstable angina pectoris  Overview:  Stable.  Continue as per Cardiology.  Vickie Bennett      3. Hyperlipidemia associated with type 2 diabetes mellitus  Overview:  Controlled.  Continue current medications      4. Hypertension associated with diabetes  Overview:  Controlled.  Continue current medications      5. Hypomagnesemia  Cont supplementation  6. S/P CABG (coronary artery bypass graft)    7. Type 2 diabetes mellitus without complication, without long-term current use of insulin  Overview:  Stable.  Continue current    Orders:  -     Hemoglobin A1C; Future; Expected date: 11/15/2025  -     metFORMIN (GLUCOPHAGE) 1000 MG tablet; Take 1 tablet (1,000 mg total) by mouth 2 (two) times daily with meals.  Dispense: 180 tablet; Refill: 2        Patient Instructions   Check with your pharmacy regarding RSV vaccine.      Future Appointments   Date Time Provider Department Center   6/4/2025 11:00 AM Kayode  MD [facility]   2025  8:30 AM LABORATORY, [facility]   2025 10:20 AM Kimberly Bennett MD [facility]   11/10/2025 10:20 AM LAB[facility], [facility]   2025  8:20 AM [facility]       Lab Frequency Next Occurrence   Comprehensive Metabolic Panel Once 10/28/2024   Hemoglobin A1C Once 10/30/2024   Ambulatory referral/consult to General Surgery Once 2024   Comprehensive Metabolic Panel Once 2025   Lipid Panel Once 2025   Hemoglobin A1C Once 2025   Referral to Enhanced Annual Wellness Visit (eAWV) W+1 Once 2025   Renal Function Panel Once 2026   Ambulatory referral/consult to Home Health Once 05/15/2025       Follow up in about 6 months (around 2025), or if symptoms worsen or fail to improve.                  [1]   Patient Active Problem List  Diagnosis    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    Hypertension associated with diabetes    Hyperlipidemia associated with type 2 diabetes mellitus    S/P CABG (coronary artery bypass graft)    Old MI (myocardial infarction)    Type 2 diabetes mellitus without complication, without long-term current use of insulin    Hypomagnesemia    Effort angina    Routine general medical examination at a health care facility    Colon cancer screening    Troponin level elevated    Unstable angina    Preop cardiovascular exam    Low serum vitamin B12    Chest pain    BMI 31.0-31.9,adult   [2]   Social History  Socioeconomic History    Marital status:    Tobacco Use    Smoking status: Former     Current packs/day: 0.00     Average packs/day: 1 pack/day for 30.0 years (30.0 ttl pk-yrs)     Types: Cigarettes     Start date: 1973     Quit date: 2003     Years since quittin.3    Smokeless tobacco: Former   Substance and Sexual Activity    Alcohol use: Yes     Alcohol/week: 16.0 standard drinks of  alcohol     Types: 10 Glasses of wine, 6 Cans of beer per week     Comment: socially    Drug use: Never    Sexual activity: Not Currently     Partners: Female     Social Drivers of Health     Financial Resource Strain: Low Risk  (1/2/2025)    Overall Financial Resource Strain (CARDIA)     Difficulty of Paying Living Expenses: Not hard at all   Food Insecurity: No Food Insecurity (1/2/2025)    Hunger Vital Sign     Worried About Running Out of Food in the Last Year: Never true     Ran Out of Food in the Last Year: Never true   Transportation Needs: No Transportation Needs (11/9/2023)    PRAPARE - Transportation     Lack of Transportation (Medical): No     Lack of Transportation (Non-Medical): No   Physical Activity: Unknown (1/2/2025)    Exercise Vital Sign     Days of Exercise per Week: 0 days   Stress: No Stress Concern Present (1/2/2025)    [employer] - Occupational Stress Questionnaire     Feeling of Stress : Not at all   Housing Stability: Low Risk  (11/9/2023)    Housing Stability Vital Sign     Unable to Pay for Housing in the Last Year: No     Number of Places Lived in the Last Year: 1     Unstable Housing in the Last Year: No

## 2025-06-04 ENCOUNTER — OFFICE VISIT (OUTPATIENT)
Dept: DERMATOLOGY | Facility: CLINIC | Age: 75
End: 2025-06-04
Payer: MEDICARE

## 2025-06-04 DIAGNOSIS — L72.0 EPIDERMAL INCLUSION CYST: Primary | ICD-10-CM

## 2025-06-04 DIAGNOSIS — L57.0 ACTINIC KERATOSES: ICD-10-CM

## 2025-06-04 DIAGNOSIS — Z85.828 HISTORY OF NONMELANOMA SKIN CANCER: ICD-10-CM

## 2025-06-04 DIAGNOSIS — L81.4 SOLAR LENTIGO: ICD-10-CM

## 2025-06-04 DIAGNOSIS — D18.00 HEMANGIOMA, UNSPECIFIED SITE: ICD-10-CM

## 2025-06-04 PROCEDURE — 99999 PR PBB SHADOW E&M-EST. PATIENT-LVL IV: CPT | Mod: PBBFAC,,, | Performed by: STUDENT IN AN ORGANIZED HEALTH CARE EDUCATION/TRAINING PROGRAM

## 2025-06-04 PROCEDURE — 99214 OFFICE O/P EST MOD 30 MIN: CPT | Mod: 25,S$GLB,, | Performed by: STUDENT IN AN ORGANIZED HEALTH CARE EDUCATION/TRAINING PROGRAM

## 2025-06-04 PROCEDURE — 1101F PT FALLS ASSESS-DOCD LE1/YR: CPT | Mod: CPTII,S$GLB,, | Performed by: STUDENT IN AN ORGANIZED HEALTH CARE EDUCATION/TRAINING PROGRAM

## 2025-06-04 PROCEDURE — 1159F MED LIST DOCD IN RCRD: CPT | Mod: CPTII,S$GLB,, | Performed by: STUDENT IN AN ORGANIZED HEALTH CARE EDUCATION/TRAINING PROGRAM

## 2025-06-04 PROCEDURE — 3066F NEPHROPATHY DOC TX: CPT | Mod: CPTII,S$GLB,, | Performed by: STUDENT IN AN ORGANIZED HEALTH CARE EDUCATION/TRAINING PROGRAM

## 2025-06-04 PROCEDURE — 17000 DESTRUCT PREMALG LESION: CPT | Mod: S$GLB,,, | Performed by: STUDENT IN AN ORGANIZED HEALTH CARE EDUCATION/TRAINING PROGRAM

## 2025-06-04 PROCEDURE — 3044F HG A1C LEVEL LT 7.0%: CPT | Mod: CPTII,S$GLB,, | Performed by: STUDENT IN AN ORGANIZED HEALTH CARE EDUCATION/TRAINING PROGRAM

## 2025-06-04 PROCEDURE — 1160F RVW MEDS BY RX/DR IN RCRD: CPT | Mod: CPTII,S$GLB,, | Performed by: STUDENT IN AN ORGANIZED HEALTH CARE EDUCATION/TRAINING PROGRAM

## 2025-06-04 PROCEDURE — 4010F ACE/ARB THERAPY RXD/TAKEN: CPT | Mod: CPTII,S$GLB,, | Performed by: STUDENT IN AN ORGANIZED HEALTH CARE EDUCATION/TRAINING PROGRAM

## 2025-06-04 PROCEDURE — 3288F FALL RISK ASSESSMENT DOCD: CPT | Mod: CPTII,S$GLB,, | Performed by: STUDENT IN AN ORGANIZED HEALTH CARE EDUCATION/TRAINING PROGRAM

## 2025-06-04 PROCEDURE — 17003 DESTRUCT PREMALG LES 2-14: CPT | Mod: S$GLB,,, | Performed by: STUDENT IN AN ORGANIZED HEALTH CARE EDUCATION/TRAINING PROGRAM

## 2025-06-04 PROCEDURE — 1126F AMNT PAIN NOTED NONE PRSNT: CPT | Mod: CPTII,S$GLB,, | Performed by: STUDENT IN AN ORGANIZED HEALTH CARE EDUCATION/TRAINING PROGRAM

## 2025-07-08 ENCOUNTER — PATIENT OUTREACH (OUTPATIENT)
Dept: ADMINISTRATIVE | Facility: HOSPITAL | Age: 75
End: 2025-07-08
Payer: MEDICARE

## 2025-07-11 ENCOUNTER — LAB VISIT (OUTPATIENT)
Dept: LAB | Facility: HOSPITAL | Age: 75
End: 2025-07-11
Attending: INTERNAL MEDICINE
Payer: MEDICARE

## 2025-07-11 DIAGNOSIS — E11.9 TYPE 2 DIABETES MELLITUS WITHOUT COMPLICATION, WITHOUT LONG-TERM CURRENT USE OF INSULIN: ICD-10-CM

## 2025-07-11 DIAGNOSIS — I25.2 OLD MI (MYOCARDIAL INFARCTION): ICD-10-CM

## 2025-07-11 LAB
ALBUMIN SERPL BCP-MCNC: 3.8 G/DL (ref 3.5–5.2)
ALP SERPL-CCNC: 100 UNIT/L (ref 40–150)
ALT SERPL W/O P-5'-P-CCNC: 16 UNIT/L (ref 10–44)
ANION GAP (OHS): 12 MMOL/L (ref 8–16)
AST SERPL-CCNC: 16 UNIT/L (ref 11–45)
BILIRUB SERPL-MCNC: 0.5 MG/DL (ref 0.1–1)
BUN SERPL-MCNC: 20 MG/DL (ref 8–23)
CALCIUM SERPL-MCNC: 9.5 MG/DL (ref 8.7–10.5)
CHLORIDE SERPL-SCNC: 104 MMOL/L (ref 95–110)
CHOLEST SERPL-MCNC: 120 MG/DL (ref 120–199)
CHOLEST/HDLC SERPL: 3.1 {RATIO} (ref 2–5)
CO2 SERPL-SCNC: 26 MMOL/L (ref 23–29)
CREAT SERPL-MCNC: 1.7 MG/DL (ref 0.5–1.4)
EAG (OHS): 126 MG/DL (ref 68–131)
GFR SERPLBLD CREATININE-BSD FMLA CKD-EPI: 42 ML/MIN/1.73/M2
GLUCOSE SERPL-MCNC: 126 MG/DL (ref 70–110)
HBA1C MFR BLD: 6 % (ref 4–5.6)
HDLC SERPL-MCNC: 39 MG/DL (ref 40–75)
HDLC SERPL: 32.5 % (ref 20–50)
LDLC SERPL CALC-MCNC: 56.6 MG/DL (ref 63–159)
NONHDLC SERPL-MCNC: 81 MG/DL
POTASSIUM SERPL-SCNC: 3.8 MMOL/L (ref 3.5–5.1)
PROT SERPL-MCNC: 6.7 GM/DL (ref 6–8.4)
SODIUM SERPL-SCNC: 142 MMOL/L (ref 136–145)
TRIGL SERPL-MCNC: 122 MG/DL (ref 30–150)

## 2025-07-11 PROCEDURE — 80061 LIPID PANEL: CPT

## 2025-07-11 PROCEDURE — 82040 ASSAY OF SERUM ALBUMIN: CPT

## 2025-07-11 PROCEDURE — 36415 COLL VENOUS BLD VENIPUNCTURE: CPT

## 2025-07-11 PROCEDURE — 83036 HEMOGLOBIN GLYCOSYLATED A1C: CPT

## 2025-07-17 ENCOUNTER — OFFICE VISIT (OUTPATIENT)
Dept: CARDIOLOGY | Facility: CLINIC | Age: 75
End: 2025-07-17
Payer: MEDICARE

## 2025-07-17 VITALS
WEIGHT: 218.25 LBS | DIASTOLIC BLOOD PRESSURE: 76 MMHG | SYSTOLIC BLOOD PRESSURE: 114 MMHG | HEIGHT: 70 IN | OXYGEN SATURATION: 98 % | HEART RATE: 67 BPM | BODY MASS INDEX: 31.25 KG/M2

## 2025-07-17 DIAGNOSIS — I15.2 HYPERTENSION ASSOCIATED WITH DIABETES: ICD-10-CM

## 2025-07-17 DIAGNOSIS — E11.9 TYPE 2 DIABETES MELLITUS WITHOUT COMPLICATION, WITHOUT LONG-TERM CURRENT USE OF INSULIN: ICD-10-CM

## 2025-07-17 DIAGNOSIS — I25.2 OLD MI (MYOCARDIAL INFARCTION): ICD-10-CM

## 2025-07-17 DIAGNOSIS — E11.69 HYPERLIPIDEMIA ASSOCIATED WITH TYPE 2 DIABETES MELLITUS: ICD-10-CM

## 2025-07-17 DIAGNOSIS — I20.89 EFFORT ANGINA: ICD-10-CM

## 2025-07-17 DIAGNOSIS — E78.5 HYPERLIPIDEMIA ASSOCIATED WITH TYPE 2 DIABETES MELLITUS: ICD-10-CM

## 2025-07-17 DIAGNOSIS — E11.59 HYPERTENSION ASSOCIATED WITH DIABETES: ICD-10-CM

## 2025-07-17 DIAGNOSIS — Z95.1 S/P CABG (CORONARY ARTERY BYPASS GRAFT): ICD-10-CM

## 2025-07-17 DIAGNOSIS — I25.110 CORONARY ARTERY DISEASE INVOLVING NATIVE CORONARY ARTERY OF NATIVE HEART WITH UNSTABLE ANGINA PECTORIS: Primary | ICD-10-CM

## 2025-07-17 PROCEDURE — 99999 PR PBB SHADOW E&M-EST. PATIENT-LVL IV: CPT | Mod: PBBFAC,,, | Performed by: INTERNAL MEDICINE

## 2025-07-17 NOTE — PROGRESS NOTES
Subjective:   Patient ID:  Deniz Paulino is a 74 y.o. male who presents for follow up of No chief complaint on file.      [facility]  Hospital course: 5/13-5/14/25  Deniz Paulino is a 74 y.o. male with a PMH  has a past medical history of Acute coronary syndrome, Coronary artery disease, Diabetes mellitus (04/24/2014), Effort angina (04/30/2023), Hyperlipidemia, Hypertension, Old MI (myocardial infarction) (04/24/2014), Preop cardiovascular exam (11/27/2024), and S/P CABG (coronary artery bypass graft) (04/24/2014). presented to the Emergency Department for evaluation of chest pain which began around 10 PM. Pt states he was resting when the sxs began. Laying down and taking deep breaths worsens the pain. Symptoms are constant and moderate in severity. Pt reports compliance with all medications. Associated sxs include shortness of breath. Patient denies any fever, cough, dizziness, nausea, vomiting, palpitations, or diaphoresis. Prior Tx includes nitro without improvement.  No further complaints or concerns at this time.      ER workup revealed CBC to be unremarkable.  CMP unremarkable.  .  Troponin negative x2.  Chest x-ray negative for acute findings.  EKG sinus rhythm with occasional PVCs with prolonged QT with a ventricular rate of 60 beats per minute and a QT/QTC of 480/40.  BP stable with heart rate in the 50s with history of being on beta-blocker.  Patient received 4 mg morphine and 1 in nitroglycerin paste in ER.  Hospital Medicine consulted to admit patient for further cardiac workup for chest pain.  Patient in agreement with treatment plan.  Patient admitted under observation status.  Cardiology consulted for chest pain. Pt resting in bed with no acute distress. Reports having active chest pain at this time. Chest pain started last night while getting ready for bed. No relief with sublingual nitro or morphine given in ED. He did have LHC in April 2024 by Dr. Bennett with intervention to Mid Graft lesion  (Saphenous Graft To RPDA). No recent viral illness. Pain and SOB seems to be worse when lying down. Describes the pain as tightness similar to finishing a long run. Remains NPO and gently hydration started.      5/16/2025  75 yo male presents for follow up after recent hospital admission and LHC. His current medical conditions include CAD with hx of CABG, hypertension, hyperlipidemia, and DM Type II.   Patient underwent LHC which revealed an occluded LAD, 40% stenosis of Lcx, occluded svg to diag, patient lopez to lad, patent svg to pda, EF 50%, inf akinesis.   He reports he is overall doing well. He has had no further chest pain or shortness of breath since the day of discharge. He denies palpitations, pre-syncope, syncope, leg edema and PND. Does use 2 pillows due to sinuses.   He was started on Ranexa, but he did not tolerate this a year ago so he is not currently taking this. He was also taken off of brilinta at the same time, so he is unsure which medication he was reacting to. He is interested in potentially trying this again.   His left radial access site is healing well. 2+ left radial pulse. No hematoma or pulsatile mass.   Tolerating all medications well. He is compliant with medications.      7/17/2025   History of Present Illness    Mr. Paulino presents today for follow up of CAD. He reports stable CAD with no current chest pain. He has a history of stent placement 1 year ago and continues on aspirin for long-term management. He experiences dyspnea and dizziness half the time, correlating with BP drops to 110/70 or less. He reports bilateral lower extremity swelling in ankles and feet, which is attributed to nifedipine (Procardia). He currently uses compression socks for management. He takes HCTZ 12.5 mg and nifedipine (Procardia). Ranolazine (Ranexa) was discontinued after May 13th hospitalization with no noticeable difference reported. He remains largely sedentary. His last attempt at mowing the lawn  resulted in hospitalization, which has significantly limited his physical activity. He now utilizes a lawn care service due to these limitations. He has diabetes with A1C of 6, acknowledging need for continued exercise and weight loss to improve glycemic control. His HLD is well-controlled with current medication regimen, with LDL levels at target range.      ROS:  General: -fever, -chills, -fatigue, -weight gain, -weight loss  Eyes: -vision changes, -redness, -discharge  ENT: -ear pain, -nasal congestion, -sore throat  Cardiovascular: -chest pain, -palpitations, +lower extremity edema  Respiratory: -cough, +shortness of breath  Gastrointestinal: -abdominal pain, -nausea, -vomiting, -diarrhea, -constipation, -blood in stool, +indigestion  Genitourinary: -dysuria, -hematuria, -frequency  Musculoskeletal: -joint pain, -muscle pain  Skin: -rash, -lesion  Neurological: -headache, +dizziness, -numbness, -tingling  Psychiatric: -anxiety, -depression, -sleep difficulty              Past Medical History:   Diagnosis Date    Acute coronary syndrome     Coronary artery disease     Diabetes mellitus 04/24/2014    Effort angina 04/30/2023    Hyperlipidemia     Hypertension     Old MI (myocardial infarction) 04/24/2014    Preop cardiovascular exam 11/27/2024    S/P CABG (coronary artery bypass graft) 04/24/2014       Past Surgical History:   Procedure Laterality Date    CARDIAC CATHETERIZATION      COLONOSCOPY N/A 07/26/2019    Procedure: COLONOSCOPY;  Surgeon: Opal Oshea MD;  Location: [facility];  Service: Endoscopy;  Laterality: N/A;    COLONOSCOPY, SCREENING, LOW RISK PATIENT N/A 12/17/2024    Procedure: COLONOSCOPY, SCREENING, LOW RISK PATIENT 11/27-clr rec'd-Ok to stop asa effient 5-7 days;  Surgeon: Carol Pinon MD;  Location: [facility];  Service: Colon and Rectal;  Laterality: N/A;    CORONARY ANGIOGRAPHY INCLUDING BYPASS GRAFTS WITH CATHETERIZATION OF LEFT HEART N/A 5/13/2025    Procedure: ANGIOGRAM,  CORONARY, INCLUDING BYPASS GRAFT, WITH LEFT HEART CATHETERIZATION;  Surgeon: Kimberly Bennett MD;  Location: [facility] CATH LAB;  Service: Cardiology;  Laterality: N/A;    CORONARY ARTERY BYPASS GRAFT      CORONARY BYPASS GRAFT ANGIOGRAPHY  5/1/2023    Procedure: Bypass graft study;  Surgeon: Kimberly Bennett MD;  Location: [facility] CATH LAB;  Service: Cardiology;;    ESOPHAGOGASTRODUODENOSCOPY N/A 07/26/2019    Procedure: ESOPHAGOGASTRODUODENOSCOPY (EGD);  Surgeon: Opal Oshea MD;  Location: [facility] ENDO;  Service: Endoscopy;  Laterality: N/A;    INSTANTANEOUS WAVE-FREE RATIO (IFR) N/A 4/4/2024    Procedure: Instantaneous Wave-Free Ratio (IFR);  Surgeon: Kimberly Bennett MD;  Location: [facility] CATH LAB;  Service: Cardiology;  Laterality: N/A;    LEFT HEART CATHETERIZATION Left 5/1/2023    Procedure: Left heart cath;  Surgeon: Kimberly Bennett MD;  Location: [facility] CATH LAB;  Service: Cardiology;  Laterality: Left;    LEFT HEART CATHETERIZATION Left 4/4/2024    Procedure: Left heart cath;  Surgeon: Kimberly Bennett MD;  Location: [facility] CATH LAB;  Service: Cardiology;  Laterality: Left;    LEFT HEART CATHETERIZATION Left 5/13/2025    Procedure: Left heart cath;  Surgeon: Kimberly Bennett MD;  Location: [facility] CATH LAB;  Service: Cardiology;  Laterality: Left;    PROTECTION, CORONARY, FILTER  4/4/2024    Procedure: Protection, Coronary, Filter;  Surgeon: Kimberly Bennett MD;  Location: [facility] CATH LAB;  Service: Cardiology;;    PTCA, SINGLE VESSEL  4/4/2024    Procedure: PTCA, Single Vessel;  Surgeon: Kimberly Bennett MD;  Location: [facility] CATH LAB;  Service: Cardiology;;    SKIN CANCER EXCISION  09/2018    STENT, DRUG ELUTING, SINGLE VESSEL, CORONARY  4/4/2024    Procedure: Stent, Drug Eluting, Single Vessel, Coronary;  Surgeon: Kimberly Bennett MD;  Location: [facility] CATH LAB;  Service: Cardiology;;    VASECTOMY         Social History[1]    Family History   Problem Relation Name Age of Onset    Diabetes Mother Mother     Heart murmur  Mother Mother     Cancer Mother Mother         Breast    Stroke Mother Mother     Alcohol abuse Father Father     Heart attack Father Father 63    Cancer Father Father         Esophageal    Diabetes Brother Frank        Current Outpatient Medications   Medication Sig    aspirin (ECOTRIN) 81 MG EC tablet Take 1 tablet (81 mg total) by mouth once daily.    atorvastatin (LIPITOR) 40 MG tablet Take 1 tablet (40 mg total) by mouth once daily.    calcipotriene (DOVONEX) 0.005 % cream Apply topically 2 (two) times daily. Mix with efudex cream and AAA on scalp twice daily for 2 weeks at a time    coenzyme Q10 200 mg capsule Take 200 mg by mouth 2 (two) times daily.    cyanocobalamin (VITAMIN B-12) 1000 MCG tablet Take 1,000 mcg by mouth once daily.    fluorouraciL (EFUDEX) 5 % cream Mix with calcipotriene cream and AAA on scalp twice daily for 2 weeks at a time    hydroCHLOROthiazide 12.5 MG Tab Take 1 tablet (12.5 mg total) by mouth once daily.    lisinopriL (PRINIVIL,ZESTRIL) 20 MG tablet Take 1 tablet (20 mg total) by mouth once daily.    magnesium oxide (MAG-OX) 400 mg (241.3 mg magnesium) tablet Take 400 mg by mouth once daily.    metFORMIN (GLUCOPHAGE) 1000 MG tablet Take 1 tablet (1,000 mg total) by mouth 2 (two) times daily with meals.    metoprolol succinate (TOPROL-XL) 50 MG 24 hr tablet Take 1 tablet (50 mg total) by mouth once daily.    NIFEdipine (PROCARDIA-XL) 90 MG (OSM) 24 hr tablet Take 1 tablet (90 mg total) by mouth once daily.    nitroGLYCERIN 0.2 mg/hr TD PT24 (NITRODUR) 0.2 mg/hr APPLY 1 PATCH TOPICALLY ONCE DAILY    pantoprazole (PROTONIX) 20 MG tablet Take 1 tablet by mouth once daily    prasugreL (EFFIENT) 10 mg Tab Take 1 tablet (10 mg total) by mouth once daily.    tamsulosin (FLOMAX) 0.4 mg Cap Take 1 capsule (0.4 mg total) by mouth once daily.    nitroGLYCERIN (NITROSTAT) 0.4 MG SL tablet Place 0.4 mg under the tongue every 5 (five) minutes as needed for Chest pain.    ranolazine (RANEXA) 500 MG  Tb12 Take 500 mg by mouth 2 (two) times daily. (Patient not taking: Reported on 7/17/2025)     No current facility-administered medications for this visit.     Current Outpatient Medications on File Prior to Visit   Medication Sig    aspirin (ECOTRIN) 81 MG EC tablet Take 1 tablet (81 mg total) by mouth once daily.    atorvastatin (LIPITOR) 40 MG tablet Take 1 tablet (40 mg total) by mouth once daily.    calcipotriene (DOVONEX) 0.005 % cream Apply topically 2 (two) times daily. Mix with efudex cream and AAA on scalp twice daily for 2 weeks at a time    coenzyme Q10 200 mg capsule Take 200 mg by mouth 2 (two) times daily.    cyanocobalamin (VITAMIN B-12) 1000 MCG tablet Take 1,000 mcg by mouth once daily.    fluorouraciL (EFUDEX) 5 % cream Mix with calcipotriene cream and AAA on scalp twice daily for 2 weeks at a time    hydroCHLOROthiazide 12.5 MG Tab Take 1 tablet (12.5 mg total) by mouth once daily.    lisinopriL (PRINIVIL,ZESTRIL) 20 MG tablet Take 1 tablet (20 mg total) by mouth once daily.    magnesium oxide (MAG-OX) 400 mg (241.3 mg magnesium) tablet Take 400 mg by mouth once daily.    metFORMIN (GLUCOPHAGE) 1000 MG tablet Take 1 tablet (1,000 mg total) by mouth 2 (two) times daily with meals.    metoprolol succinate (TOPROL-XL) 50 MG 24 hr tablet Take 1 tablet (50 mg total) by mouth once daily.    NIFEdipine (PROCARDIA-XL) 90 MG (OSM) 24 hr tablet Take 1 tablet (90 mg total) by mouth once daily.    nitroGLYCERIN 0.2 mg/hr TD PT24 (NITRODUR) 0.2 mg/hr APPLY 1 PATCH TOPICALLY ONCE DAILY    pantoprazole (PROTONIX) 20 MG tablet Take 1 tablet by mouth once daily    prasugreL (EFFIENT) 10 mg Tab Take 1 tablet (10 mg total) by mouth once daily.    tamsulosin (FLOMAX) 0.4 mg Cap Take 1 capsule (0.4 mg total) by mouth once daily.    nitroGLYCERIN (NITROSTAT) 0.4 MG SL tablet Place 0.4 mg under the tongue every 5 (five) minutes as needed for Chest pain.    ranolazine (RANEXA) 500 MG Tb12 Take 500 mg by mouth 2 (two)  "times daily. (Patient not taking: Reported on 7/17/2025)     No current facility-administered medications on file prior to visit.     Review of patient's allergies indicates:  No Known Allergies         Objective:     Vitals:    07/17/25 1023 07/17/25 1024   BP: 110/68 114/76   BP Location: Left arm Right arm   Patient Position: Sitting Sitting   Pulse: 67    SpO2: 98%    Weight: 99 kg (218 lb 4.1 oz)    Height: 5' 10" (1.778 m)      Body mass index is 31.32 kg/m².         Physical Exam  Vitals and nursing note reviewed.   Constitutional:       General: He is not in acute distress.     Appearance: He is well-developed. He is not diaphoretic.   HENT:      Head: Normocephalic and atraumatic.   Eyes:      General:         Right eye: No discharge.         Left eye: No discharge.      Pupils: Pupils are equal, round, and reactive to light.   Neck:      Thyroid: No thyromegaly.      Vascular: No JVD.   Cardiovascular:      Rate and Rhythm: Normal rate and regular rhythm.      Pulses: Normal pulses and intact distal pulses.      Heart sounds: Normal heart sounds. No murmur heard.     No friction rub. No gallop.   Pulmonary:      Effort: Pulmonary effort is normal. No respiratory distress.      Breath sounds: Normal breath sounds. No wheezing or rales.      Comments: Scar well healed.  Chest:      Chest wall: No tenderness.   Abdominal:      General: Bowel sounds are normal. There is no distension.      Palpations: Abdomen is soft.      Tenderness: There is no abdominal tenderness.   Musculoskeletal:         General: Normal range of motion.      Cervical back: Neck supple.      Right lower leg: Edema (trace) present.      Left lower leg: Edema (trace) present.   Skin:     General: Skin is warm and dry.      Findings: No erythema or rash.   Neurological:      General: No focal deficit present.      Mental Status: He is alert and oriented to person, place, and time.      Cranial Nerves: No cranial nerve deficit. "   Psychiatric:         Mood and Affect: Mood normal.         Behavior: Behavior normal.       Lab Results   Component Value Date    CHOL 120 07/11/2025    CHOL 117 (L) 03/25/2025    CHOL 136 11/13/2024     Lab Results   Component Value Date    HGBA1C 6.0 (H) 07/11/2025      BMP  Lab Results   Component Value Date     07/11/2025    K 3.8 07/11/2025     07/11/2025    CO2 26 07/11/2025    BUN 20 07/11/2025    CREATININE 1.7 (H) 07/11/2025    CALCIUM 9.5 07/11/2025    ANIONGAP 12 07/11/2025    EGFRNORACEVR 42 (L) 07/11/2025      Lab Results   Component Value Date    HDL 39 (L) 07/11/2025    HDL 43 03/25/2025    HDL 51 11/13/2024     Lab Results   Component Value Date    LDLCALC 56.6 (L) 07/11/2025    LDLCALC 35.8 (L) 03/25/2025     Lab Results   Component Value Date    TRIG 122 07/11/2025    TRIG 191 (H) 03/25/2025    TRIG 143 11/13/2024     Lab Results   Component Value Date    CHOLHDL 32.5 07/11/2025    CHOLHDL 36.8 03/25/2025    CHOLHDL 37.5 11/13/2024       Chemistry        Component Value Date/Time     07/11/2025 0821     02/12/2025 1016    K 3.8 07/11/2025 0821    K 4.3 02/12/2025 1016     07/11/2025 0821     02/12/2025 1016    CO2 26 07/11/2025 0821    CO2 24 02/12/2025 1016    BUN 20 07/11/2025 0821    CREATININE 1.7 (H) 07/11/2025 0821     (H) 07/11/2025 0821    GLU 99 02/12/2025 1016        Component Value Date/Time    CALCIUM 9.5 07/11/2025 0821    CALCIUM 9.2 02/12/2025 1016    ALKPHOS 100 07/11/2025 0821    ALKPHOS 78 11/13/2024 0816    AST 16 07/11/2025 0821    AST 20 11/13/2024 0816    ALT 16 07/11/2025 0821    ALT 16 11/13/2024 0816    BILITOT 0.5 07/11/2025 0821    BILITOT 0.6 11/13/2024 0816    ESTGFRAFRICA >60.0 05/04/2022 0806    ESTGFRAFRICA >60.0 05/04/2022 0806    EGFRNONAA >60.0 05/04/2022 0806    EGFRNONAA >60.0 05/04/2022 0806          Lab Results   Component Value Date    TSH 2.179 11/13/2024     Lab Results   Component Value Date    INR 1.0  04/04/2024    INR 1.0 03/31/2024    INR 0.9 03/16/2024     Lab Results   Component Value Date    WBC 6.03 05/14/2025    HGB 11.2 (L) 05/14/2025    HCT 32.7 (L) 05/14/2025    MCV 92 05/14/2025     05/14/2025     BMP  Sodium   Date Value Ref Range Status   07/11/2025 142 136 - 145 mmol/L Final   02/12/2025 141 136 - 145 mmol/L Final     Potassium   Date Value Ref Range Status   07/11/2025 3.8 3.5 - 5.1 mmol/L Final   02/12/2025 4.3 3.5 - 5.1 mmol/L Final     Chloride   Date Value Ref Range Status   07/11/2025 104 95 - 110 mmol/L Final   02/12/2025 106 95 - 110 mmol/L Final     CO2   Date Value Ref Range Status   07/11/2025 26 23 - 29 mmol/L Final   02/12/2025 24 23 - 29 mmol/L Final     BUN   Date Value Ref Range Status   07/11/2025 20 8 - 23 mg/dL Final     Creatinine   Date Value Ref Range Status   07/11/2025 1.7 (H) 0.5 - 1.4 mg/dL Final     Calcium   Date Value Ref Range Status   07/11/2025 9.5 8.7 - 10.5 mg/dL Final   02/12/2025 9.2 8.7 - 10.5 mg/dL Final     Anion Gap   Date Value Ref Range Status   07/11/2025 12 8 - 16 mmol/L Final     eGFR if    Date Value Ref Range Status   05/04/2022 >60.0 >60 mL/min/1.73 m^2 Final   05/04/2022 >60.0 >60 mL/min/1.73 m^2 Final     eGFR if non    Date Value Ref Range Status   05/04/2022 >60.0 >60 mL/min/1.73 m^2 Final     Comment:     Calculation used to obtain the estimated glomerular filtration  rate (eGFR) is the CKD-EPI equation.      05/04/2022 >60.0 >60 mL/min/1.73 m^2 Final     Comment:     Calculation used to obtain the estimated glomerular filtration  rate (eGFR) is the CKD-EPI equation.        Estimated Creatinine Clearance: 45 mL/min (A) (based on SCr of 1.7 mg/dL (H)).    Assessment:     1. Coronary artery disease involving native coronary artery of native heart with unstable angina pectoris    2. Hypertension associated with diabetes    3. Hyperlipidemia associated with type 2 diabetes mellitus    4. S/P CABG (coronary artery  bypass graft)    5. Old MI (myocardial infarction)    6. Effort angina    7. BMI 31.0-31.9,adult    8. Type 2 diabetes mellitus without complication, without long-term current use of insulin        Plan:     Assessment & Plan    I25.10 Atherosclerotic heart disease of native coronary artery without angina pectoris  I95.1 Orthostatic hypotension  R60.9 Edema, unspecified  T46.5X5D Adverse effect of other antihypertensive drugs, subsequent encounter  E11.9 Type 2 diabetes mellitus without complications  E78.5 Hyperlipidemia, unspecified  Z95.5 Presence of coronary angioplasty implant and graft  Z73.6 Limitation of activities due to disability  Z79.82 Long term (current) use of aspirin    IMPRESSION:  Discontinued HCTZ 12.5 mg due to symptomatic low blood pressure and increased dehydration risk in summer.  Discontinued ranolazine as patient reported no difference after being off it for over a month.  Transitioning from prasugrel to daily aspirin alone for long-term antiplatelet therapy.  Continued other current medications, including nifedipine and metoprolol.    PLAN SUMMARY:  - Increase physical activity and implement weight loss measures  - Repeat lab work in 6 months  - Transition from ticagrelor to daily aspirin for long-term antiplatelet therapy  - Discontinue ranolazine  - Discontinue HCTZ 12.5 mg  - Use compression socks for leg swelling  - Continue nifedipine and metoprolol  - Follow up in 6 months    CORONARY ARTERY DISEASE:  - Discontinued ranolazine as patient reported no difference after being off it for over a month.  - Transitioning from prasugrel to daily aspirin alone for long-term antiplatelet therapy.    HYPERTENSION:  - Explained ankle and foot swelling is a side effect of nifedipine.  - Discontinued HCTZ 12.5 mg due to symptomatic low blood pressure and increased dehydration risk in summer.  - Continued nifedipine and metoprolol.  - Check blood pressure regularly and monitor for symptom changes  after medication adjustments.    EDEMA:  - Use compression socks to manage leg swelling.  - Monitor salt intake.  RELATED TO NIFEDIPINE.     LIFESTYLE MODIFICATIONS:  - Recommend increasing physical activity and exercise, implementing weight loss measures.    DIABETES  CONTROLLED DISCUSSED WEIGHT LOSS EXERCISE.    [facility] ON [employer] CONTINUE SAME MEDS. DISCUSSED EXERCISE ACTIVE LIFESTYLE.  FOLLOW-UP:  - Ordered repeat lab work in 6 months.  - Follow up in 6 months.  - Contact the office if experiencing symptom changes after medication adjustments.          This note was generated with the assistance of ambient listening technology. Verbal consent was obtained by the patient and accompanying visitor(s) for the recording of patient appointment to facilitate this note. I attest to having reviewed and edited the generated note for accuracy, though some syntax or spelling errors may persist. Please contact the author of this note for any clarification.            [1]   Social History  Tobacco Use    Smoking status: Former     Current packs/day: 0.00     Average packs/day: 1 pack/day for 30.0 years (30.0 ttl pk-yrs)     Types: Cigarettes     Start date: 1973     Quit date: 2003     Years since quittin.4    Smokeless tobacco: Former   Substance Use Topics    Alcohol use: Yes     Alcohol/week: 16.0 standard drinks of alcohol     Types: 10 Glasses of wine, 6 Cans of beer per week     Comment: socially    Drug use: Never

## 2025-07-22 ENCOUNTER — PROCEDURE VISIT (OUTPATIENT)
Dept: DERMATOLOGY | Facility: CLINIC | Age: 75
End: 2025-07-22
Payer: MEDICARE

## 2025-07-22 DIAGNOSIS — L72.0 EPIDERMAL INCLUSION CYST: Primary | ICD-10-CM

## 2025-07-22 PROCEDURE — 11421 EXC H-F-NK-SP B9+MARG 0.6-1: CPT | Mod: S$GLB,,, | Performed by: STUDENT IN AN ORGANIZED HEALTH CARE EDUCATION/TRAINING PROGRAM

## 2025-07-22 PROCEDURE — 88304 TISSUE EXAM BY PATHOLOGIST: CPT | Mod: TC | Performed by: STUDENT IN AN ORGANIZED HEALTH CARE EDUCATION/TRAINING PROGRAM

## 2025-07-22 PROCEDURE — 12041 INTMD RPR N-HF/GENIT 2.5CM/<: CPT | Mod: 51,S$GLB,, | Performed by: STUDENT IN AN ORGANIZED HEALTH CARE EDUCATION/TRAINING PROGRAM

## 2025-07-22 PROCEDURE — 99499 UNLISTED E&M SERVICE: CPT | Mod: S$GLB,,, | Performed by: STUDENT IN AN ORGANIZED HEALTH CARE EDUCATION/TRAINING PROGRAM

## 2025-07-22 NOTE — PROGRESS NOTES
Patient Information  Name: Deniz Paulino  : 1950  MRN: [id number]     Referring Physician:  Dr. Bhatt ref. provider found   Primary Care Physician:  Dr. Kraft, Hiro VARGAS MD   Date of Visit: 2025      Subjective:       Deniz Paulino is a 74 y.o. male who presents for   Chief Complaint   Patient presents with    Cyst     Cyst removal neck   Pt c/o of dizziness blood pressure 90/54     Dermatologic Surgery preoperative checklist:    Pacemaker/Defibrillator:  none    Anticoagulants:  prasugrel, asa    Implants requiring prophylaxis:  none    Any other conditions affecting surgery:  none    Patient was last seen:2025     Prior notes by myself reviewed.   Clinical documentation obtained by nursing staff reviewed.    Review of Systems   Skin:  Negative for itching and rash.        Objective:    Physical Exam   Constitutional: He appears well-developed and well-nourished. No distress.   Neurological: He is alert and oriented to person, place, and time. He is not disoriented.   Psychiatric: He has a normal mood and affect.   Skin:   Areas Examined (abnormalities noted in diagram):   Neck Inspection Performed              Diagram Legend     Erythematous scaling macule/papule c/w actinic keratosis       Vascular papule c/w angioma      Pigmented verrucoid papule/plaque c/w seborrheic keratosis      Yellow umbilicated papule c/w sebaceous hyperplasia      Irregularly shaped tan macule c/w lentigo     1-2 mm smooth white papules consistent with Milia      Movable subcutaneous cyst with punctum c/w epidermal inclusion cyst      Subcutaneous movable cyst c/w pilar cyst      Firm pink to brown papule c/w dermatofibroma      Pedunculated fleshy papule(s) c/w skin tag(s)      Evenly pigmented macule c/w junctional nevus     Mildly variegated pigmented, slightly irregular-bordered macule c/w mildly atypical nevus      Flesh colored to evenly pigmented papule c/w intradermal nevus       Pink pearly papule/plaque  c/w basal cell carcinoma      Erythematous hyperkeratotic cursted plaque c/w SCC      Surgical scar with no sign of skin cancer recurrence      Open and closed comedones      Inflammatory papules and pustules      Verrucoid papule consistent consistent with wart     Erythematous eczematous patches and plaques     Dystrophic onycholytic nail with subungual debris c/w onychomycosis     Umbilicated papule    Erythematous-base heme-crusted tan verrucoid plaque consistent with inflamed seborrheic keratosis     Erythematous Silvery Scaling Plaque c/w Psoriasis     See annotation          [] Data reviewed  [] Independent review of test  [] Management discussed with another provider    Assessment / Plan:      Pathology Orders:       Normal Orders This Visit    Specimen to Pathology, Dermatology     Questions:    Procedure Type: Dermatology and skin neoplasms    Number of Specimens: 1    ------------------------: -------------------------    Spec 1 Procedure: Excision    Spec 1 Clinical Impression: EIC    Spec 1 Source: left neck    Clinical Information:     Clinical History:     Specimen Source: Neck    Release to patient: Immediate    Send normal result to authorizing provider's In Basket if patient is active on MyChart: Yes          Epidermal inclusion cyst  -     Specimen to Pathology, Dermatology    PROCEDURE: Elliptical excision with intermediate layered repair in order to decrease dead space, decrease tension, and close large gap.    ANESTHETIC: 1 cc 1% Xylocaine with Epinephrine 1:100,000, buffered    SURGEON: Diamond Headley M.D.    ASSISTANTS: Leandra Garza MA    PREOPERATIVE DIAGNOSIS:  EIC    POSTOPERATIVE DIAGNOSIS:  Same as preoperative diagnosis    PATHOLOGIC DIAGNOSIS: Pending    LOCATION: left neck    INITIAL LESION SIZE: 1 cm    EXCISED DIAMETER: 1 cm    PREPARATION: The diagnosis, procedure, alternatives, benefits and risks, including but not limited to: infection, bleeding/bruising, drug reactions, pain, scar  or cosmetic defect, local sensation disturbances, wound dehiscence (separation of wound edges after sutures removed) and/or recurrence of present condition were explained to the patient. The patient elected to proceed.  Patient's identity was verified using 2 patient identifiers and the side and site was verified.  Time out period with surgeon, assistant and patient in surgical suite was taken.    PROCEDURE: The location noted above was prepped, draped, and anesthetized in the usual sterile fashion per Diamond Headley MD. Lesional tissue was carefully marked with at least 0 mm margins of clinically normal skin in all directions. A fusiform elliptical excision was done with #15 blade carried down completely through the dermis into the deep subcutaneous tissues to the level of the non-muscle fascia, and dissection was carried out in that plane.  Electrocoagulation was used to obtain hemostasis. Blood loss was minimal. The wound was then approximated in a layered fashion with subcutaneous and intradermal sutures of 4.0 Monocryl, approximately 1 in number, and the wound was then superficially closed with simple interrupted sutures of 4.0 Prolene.    The patient tolerated the procedure well.    The area was cleaned and dressed appropriately and the patient was given wound care instructions, as well as an appointment for follow-up evaluation.    LENGTH OF REPAIR: 1.4 cm             LOS NUMBER AND COMPLEXITY OF PROBLEMS    COMPLEXITY OF DATA RISK TOTAL TIME (m)   19708  00749 [] 1 self-limited or minor problem [] Minimal to none [] No treatment recommended or patient to monitor 15-29  10-19   42619  [id number] Low  [] 2 or > self limited or minor problems  [] 1 stable chronic illness  [] 1 acute, uncomplicated illness or injury Limited (2)  [] Prior external notes from each unique source  [] Review result of each unique test  [] Order each unique test []  Low  OTC medications, minor skin biopsy 30-44 20-29   48733  [id number]  Moderate  []  1 or > chronic illness with progression, exacerbation or SE of treatment  []  2 or more stable chronic illnesses  []  1 acute illness with systemic symptoms  []  1 acute complicated injury  []  1 undiagnosed new problem with uncertain prognosis Moderate (1/3 below)  []  3 or more data items        *Now includes assessment requiring independent historian  []  Independent interpretation of a test  []  Discuss management/test with another provider Moderate  []  Prescription drug mgmt  []  Minor surgery with risk discussed  []  Mgmt limited by social determinates 45-59  30-39   44020  34333 High  []  1 or more chronic illness with severe exacerbation, progression or SE of treatment  []  1 acute or chronic illness/injury that poses a threat to life or bodily function Extensive (2/3 below)  []  3 or more data items        *Now includes assessment requiring independent historian.  []  Independent interpretation of a test  []  Discuss management/test with another provider High  []  Major surgery with risk discussed  []  Drug therapy requiring intensive monitoring for toxicity  []  Hospitalization  []  Decision for DNR 60-74  40-54      No follow-ups on file.    Diamond Headley MD, FAAD  [facility]

## 2025-07-22 NOTE — PATIENT INSTRUCTIONS

## 2025-07-24 LAB
ESTROGEN SERPL-MCNC: NORMAL PG/ML
INSULIN SERPL-ACNC: NORMAL U[IU]/ML
LAB AP CLINICAL INFORMATION: NORMAL
LAB AP GROSS DESCRIPTION: NORMAL
LAB AP REPORT FOOTNOTES: NORMAL
T3RU NFR SERPL: NORMAL %

## 2025-08-05 ENCOUNTER — CLINICAL SUPPORT (OUTPATIENT)
Dept: DERMATOLOGY | Facility: CLINIC | Age: 75
End: 2025-08-05
Payer: MEDICARE

## 2025-08-05 DIAGNOSIS — Z48.02 VISIT FOR SUTURE REMOVAL: Primary | ICD-10-CM

## 2025-08-05 PROCEDURE — 99024 POSTOP FOLLOW-UP VISIT: CPT | Mod: S$GLB,,, | Performed by: STUDENT IN AN ORGANIZED HEALTH CARE EDUCATION/TRAINING PROGRAM

## 2025-08-05 PROCEDURE — 99999 PR PBB SHADOW E&M-EST. PATIENT-LVL II: CPT | Mod: PBBFAC,,,

## 2025-08-05 NOTE — PROGRESS NOTES
Patient presents for suture removal. The wound is well healed without signs of infection.  Pinkness noted at site. Dr. Headley notified and visualized the site.  The sutures are removed. Wound care and activity instructions given. Return prn.      Sent letter

## 2025-08-11 ENCOUNTER — PATIENT OUTREACH (OUTPATIENT)
Dept: ADMINISTRATIVE | Facility: HOSPITAL | Age: 75
End: 2025-08-11
Payer: MEDICARE

## 2025-08-11 DIAGNOSIS — E11.9 TYPE 2 DIABETES MELLITUS WITHOUT COMPLICATION, WITHOUT LONG-TERM CURRENT USE OF INSULIN: Primary | ICD-10-CM

## (undated) DEVICE — CATH JL4 5FR

## (undated) DEVICE — CATH AL1 5FR

## (undated) DEVICE — KIT MANIFOLD LOW PRESS TUBING

## (undated) DEVICE — PACK HEART CATH BR

## (undated) DEVICE — CATH MPA2 INFINITI SH 5X100CM

## (undated) DEVICE — GUIDEWIRE OMNI J TIP 185CM

## (undated) DEVICE — BAND TR WITH INFLATOR

## (undated) DEVICE — SHEATH INTRODUCER 6FR 11CM

## (undated) DEVICE — CATH INFINITI MULTIPAK JR4 5FR

## (undated) DEVICE — OMNIPAQUE 300MG 150ML VIAL

## (undated) DEVICE — KIT MICROINTRO 4F .018X40X7CM

## (undated) DEVICE — CATH IMPULSE IM CRV 100CM 5FR

## (undated) DEVICE — GUIDEWIRE STF .035X180CM ANG

## (undated) DEVICE — ANGIOTOUCH KIT

## (undated) DEVICE — CATH JR4 5FR

## (undated) DEVICE — GUIDE LAUNCHER 6FR JR 4.0

## (undated) DEVICE — CATH EMERGE MR 30 X 4.00

## (undated) DEVICE — CATH PIG145 INFINITI 5X110CM

## (undated) DEVICE — KIT GLIDESHEATH SLEND 6FR 10CM

## (undated) DEVICE — GUIDE LAUNCHER 6FR EBU 3.0

## (undated) DEVICE — CATH IMPULSE PIGTAIL 6F 110CM

## (undated) DEVICE — CATH GUIDE LAUNCH MP1 6F 100CM

## (undated) DEVICE — INFLATOR ENCORE 26 BLLN INFL

## (undated) DEVICE — CATH NC EMERGE MR 4.50X20MM

## (undated) DEVICE — GUIDE RUNWAY 6FR VODA 3.0

## (undated) DEVICE — BAND TR COMP DEVICE REG 24CM

## (undated) DEVICE — CATH DIAG IMPULSE 6FR FL4

## (undated) DEVICE — ARROW SHEATH SAF 7FR X 11CM

## (undated) DEVICE — NDL PERCUTANEOUS 21G 7CM VASC

## (undated) DEVICE — WIRE GUIDE TEFLON 3CM .035 145

## (undated) DEVICE — GUIDEWIRE WHOLEY HI TORQ 175CM

## (undated) DEVICE — Device

## (undated) DEVICE — GUIDE LAUNCHER 6FR EBU 3.5

## (undated) DEVICE — PAD DEFIB CADENCE ADULT R2

## (undated) DEVICE — KIT SYR REUSABLE

## (undated) DEVICE — WIRE X-SUP CHOICE PT .014X182

## (undated) DEVICE — DRAPE ANGIO BRACH 38X44IN

## (undated) DEVICE — CATH 5FR MP 100CM

## (undated) DEVICE — DEVICE SPIDER FX 5MMX320CM

## (undated) DEVICE — GUIDEWIRE EMERALD .035IN 260CM

## (undated) DEVICE — CATH ANGIO EXPO AL1 6F

## (undated) DEVICE — PACK ANGIOPLASTY ACCESS PLUS